# Patient Record
Sex: MALE | Race: WHITE | NOT HISPANIC OR LATINO | Employment: FULL TIME | URBAN - METROPOLITAN AREA
[De-identification: names, ages, dates, MRNs, and addresses within clinical notes are randomized per-mention and may not be internally consistent; named-entity substitution may affect disease eponyms.]

---

## 2017-08-19 ENCOUNTER — APPOINTMENT (OUTPATIENT)
Dept: GENERAL RADIOLOGY | Facility: CLINIC | Age: 27
End: 2017-08-19
Attending: NURSE PRACTITIONER

## 2017-08-19 ENCOUNTER — HOSPITAL ENCOUNTER (EMERGENCY)
Facility: CLINIC | Age: 27
Discharge: HOME | End: 2017-08-19
Attending: NURSE PRACTITIONER | Admitting: NURSE PRACTITIONER

## 2017-08-19 ENCOUNTER — NURSE TRIAGE (OUTPATIENT)
Dept: NURSING | Facility: CLINIC | Age: 27
End: 2017-08-19

## 2017-08-19 VITALS
SYSTOLIC BLOOD PRESSURE: 136 MMHG | OXYGEN SATURATION: 99 % | DIASTOLIC BLOOD PRESSURE: 81 MMHG | RESPIRATION RATE: 18 BRPM | WEIGHT: 166 LBS | TEMPERATURE: 98.9 F

## 2017-08-19 DIAGNOSIS — M25.561 ACUTE PAIN OF RIGHT KNEE: Primary | ICD-10-CM

## 2017-08-19 PROCEDURE — 73562 X-RAY EXAM OF KNEE 3: CPT | Mod: LT

## 2017-08-19 PROCEDURE — 99213 OFFICE O/P EST LOW 20 MIN: CPT | Performed by: NURSE PRACTITIONER

## 2017-08-19 PROCEDURE — 99213 OFFICE O/P EST LOW 20 MIN: CPT

## 2017-08-19 RX ORDER — HYDROCODONE BITARTRATE AND ACETAMINOPHEN 5; 325 MG/1; MG/1
1-2 TABLET ORAL EVERY 4 HOURS PRN
Qty: 15 TABLET | Refills: 0 | Status: SHIPPED | OUTPATIENT
Start: 2017-08-19 | End: 2018-05-17

## 2017-08-19 NOTE — ED AVS SNAPSHOT
[facility] Emergency Department    [address] [facility] [address]    Phone:  [phone]    Fax:  [phone]                                       Rao Leavitt   MRN: [id number]    Department:  [facility] Emergency Department   Date of Visit:  8/19/2017           Patient Information     Date Of Birth          1990        Your diagnoses for this visit were:     Acute pain of right knee        You were seen by Nessa Rojas APRN CNP.      Follow-up Information     Schedule an appointment as soon as possible for a visit with Brandon Sabillon DO.    Specialty:  Family Practice    Contact information:    [place] [address] [place] [address]  [phone]          Discharge Instructions         Wear hinged knee brace at all times except when showering and sleeping.  Follow up with primary care provider for further evaluation of knee.  Start doing exercises daily.  Take tylenol 1000 mg with 600 mg of ibuprofen  3-4 times daily for pain.  Quad Set for Leg and Knee    This exercise is designed to stretch and strengthen your knee. Before beginning, read through all the instructions. While exercising, breathe normally and use smooth movements. If you feel any pain, stop the exercise. If pain persists, call your healthcare provider.  1.  Sit on the floor with one leg straight, the other bent.  2.  Flex the foot of your straight leg by pointing your toes toward you. Press the back of your knee into the floor while tightening the muscle on the top of your thigh. Hold for ______ seconds. Then relax.  3.  Repeat ______ times. Do ______ sets a day.  Caution    Don t arch your back.    Don t hunch your shoulders.  Date Last Reviewed: 9/20/2015 2000-2017 The [employer]. [address] [place] [address], [place] [address]. All rights reserved. This information is not intended as a substitute for professional medical care. Always follow your  healthcare professional's instructions.          Arthralgia    Arthralgia is the term for pain in or around the joint. It is a symptom, not a disease. This pain may involve one or more joints. In some cases, the pain moves from joint to joint.  There are many causes for joint pain. These include:    Injury    Osteoarthritis (wearing out of the joint surface)    Gout (inflammation of the joint due to crystals in the joint fluid)    Infection inside the joint      Bursitis (inflammation of the fluid-filled sacs around the joint)    Autoimmune disorders such as rheumatoid arthritis or lupus    Tendonitis (inflamation of chords that attach muscle to bone)  Home care    Rest the involved joint(s) until your symptoms improve.     You may be prescribed pain medication. If none is prescribed, you may use acetaminophen or ibuprofen to control pain and inflammation.  Follow up  Follow up with your healthcare provider or our staff as advised.  When to seek medical care  Contact your healthcare provider right away if any of the following occurs:    Pain, swelling, or redness of joint increases    Pain worsens or recurs after a period of improvement    Pain moves to other joints    You cannot bear weight on the affected joint     You cannot move the affected joint    Joint appears deformed    New rash appears    Fever of 101 F (38.8 C) or higher, or as directed by your healthcare provider  Date Last Reviewed: 4/26/2015 2000-2017 [employer]. [address] [place] [address], [place] [address]. All rights reserved. This information is not intended as a substitute for professional medical care. Always follow your healthcare professional's instructions.          Knee Pain of Uncertain Cause    There are several common causes for knee pain. These can include:    A sprain of the ligaments that support the joint    An injury to the cartilage lining of the joint    Arthritis from wear-and-tear or inflammation  There are other  causes as well. There may also be swelling, reduced movement of the knee joint, and pain with walking. A definite diagnosis will still need to be made. If your symptoms do not improve, further follow-up and testing may be needed.  Home care    Stay off the injured leg as much as possible until pain improves.    Apply an ice pack over the injured area for 15 to 20 minutes every 3 to 6 hours. You should do this for the first 24 to 48 hours. You can make an ice pack by filling a plastic bag that seals at the top with ice cubes and then wrapping it with a thin towel. Continue to use ice packs for relief of pain and swelling as needed. As the ice melts, be careful to avoid getting your wrap, splint, or cast wet. After 48 hours, apply heat (warm shower or warm bath) for 15 to 20 minutes several times a day, or alternate ice and heat. If you have to wear a hook-and-loop knee brace, you can open it to apply the ice pack, or heat, directly to the knee. Never put ice directly on the skin. Always wrap the ice in a towel or other type of cloth.    You may use over-the-counter pain medicine to control pain, unless another pain medicine was prescribed. If you have chronic liver or kidney disease or ever had a stomach ulcer or GI bleeding, talk with your healthcare provider using these medicines.    If crutches or a walker have been recommended, do not put weight on the injured leg until you can do so without pain. Check with your healthcare provider before returning to sports or full work duties.    If you have a hook-and-loop knee brace, you can remove it to bathe and sleep, unless told otherwise.  Follow-up care  Follow up with your healthcare provider as advised. This is usually within 1-2 weeks.  If X-rays were taken, you will be told of any new findings that may affect your care.  Call 911  Call 911 if you have:    Shortness of breath    Chest pain  When to seek medical advice  Call your healthcare provider right away if any  of these occur:    Toes or foot becomes swollen, cold, blue, numb, or tingly    Pain or swelling spreads over the knee or calf    Warmth or redness appears over the knee or calf    Other joints become painful    Rash appears    Fever of 100.4 F (38 C) or above lasting for 24 to 48 hours  Date Last Reviewed: 11/23/2015 2000-2017 The [employer]. [address] [place] [address]. All rights reserved. This information is not intended as a substitute for professional medical care. Always follow your healthcare professional's instructions.          24 Hour Appointment Hotline       To make an appointment at any [facility], call [phone] ([phone]). If you don't have a family doctor or clinic, we will help you find one. [facility] clinics are conveniently located to serve the needs of you and your family.          ED Discharge Orders     Hinged Knee Brace                    Review of your medicines      Notice     You have not been prescribed any medications.            Procedures and tests performed during your visit     Knee XR, 3 views, left      Orders Needing Specimen Collection     None      Pending Results     Date and Time Order Name Status Description    8/19/2017 1935 Knee XR, 3 views, left Preliminary             Pending Culture Results     No orders found from 8/17/2017 to 8/20/2017.            Pending Results Instructions     If you had any lab results that were not finalized at the time of your Discharge, you can call the ED Lab Result RN at [phone]. You will be contacted by this team for any positive Lab results or changes in treatment. The nurses are available 7 days a week from 10A to 6:30P.  You can leave a message 24 hours per day and they will return your call.        Test Results From Your Hospital Stay        8/19/2017  8:04 PM      Narrative     LEFT KNEE THREE VIEWS    8/19/2017 7:54 PM     HISTORY: Left knee pain.    COMPARISON: None.    FINDINGS: There is  "no fracture or significant joint effusion. Joint  spaces are well maintained.        Impression     IMPRESSION:  Negative left knee x-rays.                   Thank you for choosing [facility]       Thank you for choosing [facility] for your care. Our goal is always to provide you with excellent care. Hearing back from our patients is one way we can continue to improve our services. Please take a few minutes to complete the written survey that you may receive in the mail after you visit with us. Thank you!        [employer] Information     [employer] lets you send messages to your doctor, view your test results, renew your prescriptions, schedule appointments and more. To sign up, go to [email or web address] . Click on \"Log in\" on the left side of the screen, which will take you to the Welcome page. Then click on \"Sign up Now\" on the right side of the page.     You will be asked to enter the access code listed below, as well as some personal information. Please follow the directions to create your username and password.     Your access code is: [email or web address]  Expires: 2017  8:06 PM     Your access code will  in 90 days. If you need help or a new code, please call your [facility] clinic or [phone].        Care EveryWhere ID     This is your Care EveryWhere ID. This could be used by other organizations to access your [facility] medical records  [id number]        Equal Access to Services     DEVAN CORDOVA : Hadii kyleigh eugeneo Soaquiles, waaxda luqadaha, qaybta kaalmada stevenson, finesse combs. So [facility] [phone].    ATENCIÓN: Si habla español, tiene a sims disposición servicios gratuitos de asistencia lingüística. Llame al [phone].    We comply with applicable federal civil rights laws and [place] laws. We do not discriminate on the basis of race, color, national origin, age, disability sex, sexual orientation or gender identity.            After Visit Summary       This is your " record. Keep this with you and show to your community pharmacist(s) and doctor(s) at your next visit.

## 2017-08-19 NOTE — TELEPHONE ENCOUNTER
I called and left a voice message for Rao to call back and we can discuss his symptoms.  Laila Eddy RN-[facility]

## 2017-08-19 NOTE — ED AVS SNAPSHOT
[facility] Emergency Department    [address] [address]    Phone:  [phone]    Fax:  [phone]                                       Rao Leavitt   MRN: [id number]    Department:  [facility] Emergency Department   Date of Visit:  8/19/2017           After Visit Summary Signature Page     I have received my discharge instructions, and my questions have been answered. I have discussed any challenges I see with this plan with the nurse or doctor.    ..........................................................................................................................................  Patient/Patient Representative Signature      ..........................................................................................................................................  Patient Representative Print Name and Relationship to Patient    ..................................................               ................................................  Date                                            Time    ..........................................................................................................................................  Reviewed by Signature/Title    ...................................................              ..............................................  Date                                                            Time

## 2017-08-19 NOTE — TELEPHONE ENCOUNTER
Regarding: bad knee pain  ----- Message from Ely Simms sent at 8/19/2017  3:30 PM CDT -----  Reason for call:  Symptom   Symptom or request: knee pain in both knee's, flare up    Duration (how long have symptoms been present): since last Saturday 8.5  Have you been treated for this before? No    Additional comments: pain in knees     Phone number to reach patient: [phone]    Best Time:  any    Can we leave a detailed message on this number?  YES

## 2017-08-20 NOTE — ED PROVIDER NOTES
History     Chief Complaint   Patient presents with     Knee Pain     left knee pain.   pt was seen by primary provider yesterday and was told to take ibuprofen for pain, pt continues to have pain.     HPI  Rao Leavitt is a 27 year old male who presents with left knee pain.  Pt reports he has chronic knee pain for the past 10 years.  Pt states he is now concerned that there is additional cracking/popping noise in his knee when he wiggles his toes or moves his foot.  He states it feels like water in the knee.  Pt denies fever.  PT reports good health.  Pt states he has never seen an orthopedist for this pain.  He admits to past use of recreational drugs but states last usage was over one year ago.    I have reviewed the Medications, Allergies, Past Medical and Surgical History, and Social History in the Epic system.  Pt denies active medical problems and denies any daily prescription medications.  He states he sees Dr. Majano from [facility].  He states he saw him for a clinic visit to establish care on Friday and he was advised to take ibuprofen and gave him an ace wrap.      Review of Systems  10 point ROS of systems including Constitutional, Eyes, Respiratory, Cardiovascular, Gastroenterology, Genitourinary, Integumentary, Muscularskeletal, Psychiatric were all negative except for pertinent positives noted in my HPI.    Physical Exam   BP: 136/81  Heart Rate: 69  Temp: 98.9  F (37.2  C)  Resp: 18  Weight: 75.3 kg (166 lb)  SpO2: 99 %  Physical Exam   Constitutional: He appears well-developed and well-nourished. No distress.   HENT:   Head: Normocephalic and atraumatic.   Eyes: Conjunctivae are normal. Right eye exhibits no discharge. Left eye exhibits no discharge.   Cardiovascular: Normal rate, regular rhythm and normal heart sounds.  Exam reveals no gallop and no friction rub.    No murmur heard.  Pulmonary/Chest: Effort normal and breath sounds normal. No respiratory distress. He  has no wheezes. He has no rales. He exhibits no tenderness.   Musculoskeletal:        Right knee: He exhibits normal range of motion, no swelling, no effusion, no ecchymosis, no deformity, no laceration, no erythema, normal alignment and no LCL laxity. Tenderness (diffuse tenderness reports when touching quadriceps insertion, medial joint line, lateral joint line - negative stress varus, valgus) found.        Left knee: He exhibits normal range of motion, no swelling, no effusion, no ecchymosis, no deformity, no laceration, no erythema, normal alignment, no LCL laxity, normal patellar mobility, no bony tenderness, normal meniscus and no MCL laxity. Tenderness (pt reports tenderness with palpation when touching at quadriceps insertion, medial joint line, lateral joint line, but is negative for stress varus and stress valgus, no swelling, warmth erythema noted) found.   Skin: He is not diaphoretic.   Nursing note and vitals reviewed.      ED Course     ED Course     Procedures    Labs Ordered and Resulted from Time of ED Arrival Up to the Time of Departure from the ED - No data to display  Results for orders placed or performed during the hospital encounter of 08/19/17   Knee XR, 3 views, left    Narrative    LEFT KNEE THREE VIEWS    8/19/2017 7:54 PM     HISTORY: Left knee pain.    COMPARISON: None.    FINDINGS: There is no fracture or significant joint effusion. Joint  spaces are well maintained.      Impression    IMPRESSION:  Negative left knee x-rays.       RAYRAY DUTTON MD     Assessments & Plan (with Medical Decision Making)     I have reviewed the nursing notes.    I have reviewed the findings, diagnosis, plan and need for follow up with the patient.  Rao Leavitt is a 27 year old male who presents with left knee pain.  Pt reports he has chronic knee pain for the past 10 years.  Pt states he is now concerned that there is additional cracking/popping noise in his knee when he wiggles his toes or moves  his foot.  He states it feels like water in the knee.  Pt denies fever.  PT reports good health.  Pt states he has never seen an orthopedist for this pain.  He admits to past use of recreational drugs but states last usage was over one year ago.  Pt denies active medical problems and denies any daily prescription medications.  He states he sees Dr. Majano from [facility].  He states he saw him for a clinic visit to establish care on Friday and he was advised to take ibuprofen and gave him an ace wrap.  Exam as noted above.  Xray negative.  Reviewed with patient and discussed that I cannot find any definitive findings for his reported level of pain.  Hinged knee brace applied.  Advised to take tylenol 1000 mg with ibuprofen 600 mg three times daily for pain.  Pt then states this is what he has been doing and it is not working and he needs something else.  [place] Prescription drug monitoring program reviewed and patient has received narcotics of tramadol, hydrocodone, and oxycodone.  Pt states the tramadol just doesn't work and the hydrocodone does not work very well and makes him feel itchy but the oxycodone works the best.  Advised that I am not willing to prescribe the oxycodone and will prescribe the hydrocodone and recommend follow up with PCP.  Offered referral to orthopedics and advised that it may take one to two weeks to get in and patient declined this.  Fracture, dislocation, contusion, sprain/strain, drug seeking, drug diversion    There are no discharge medications for this patient.      Final diagnoses:   Acute pain of right knee       8/19/2017   [facility] EMERGENCY DEPARTMENT     Nessa Rojas, JULES CNP  08/19/17 [id number]

## 2017-08-20 NOTE — ED NOTES
Patient here for pain in both knees - pain worsening over the past week.  Patient presents in wheel chair to the urgent care.

## 2017-08-20 NOTE — DISCHARGE INSTRUCTIONS
Wear hinged knee brace at all times except when showering and sleeping.  Follow up with primary care provider for further evaluation of knee.  Start doing exercises daily.  Take tylenol 1000 mg with 600 mg of ibuprofen  3 times daily for pain.   Add Norco 5-325 (hydrocodone-acetaminophen) 1-2 pills up to every 12 hours if needed for pain. Do not use alcohol, operate machinery, drive, or climb on ladders for 8 hours after taking Norco. Use docusate (100mg) 2 times a day to prevent constipation while on narcotics.    Quad Set for Leg and Knee    This exercise is designed to stretch and strengthen your knee. Before beginning, read through all the instructions. While exercising, breathe normally and use smooth movements. If you feel any pain, stop the exercise. If pain persists, call your healthcare provider.  1.  Sit on the floor with one leg straight, the other bent.  2.  Flex the foot of your straight leg by pointing your toes toward you. Press the back of your knee into the floor while tightening the muscle on the top of your thigh. Hold for ______ seconds. Then relax.  3.  Repeat ______ times. Do ______ sets a day.  Caution    Don t arch your back.    Don t hunch your shoulders.  Date Last Reviewed: 9/20/2015 2000-2017 [employer]. [address] [place] [address], [place] [id number]. All rights reserved. This information is not intended as a substitute for professional medical care. Always follow your healthcare professional's instructions.          Arthralgia    Arthralgia is the term for pain in or around the joint. It is a symptom, not a disease. This pain may involve one or more joints. In some cases, the pain moves from joint to joint.  There are many causes for joint pain. These include:    Injury    Osteoarthritis (wearing out of the joint surface)    Gout (inflammation of the joint due to crystals in the joint fluid)    Infection inside the joint      Bursitis (inflammation of the fluid-filled sacs  around the joint)    Autoimmune disorders such as rheumatoid arthritis or lupus    Tendonitis (inflamation of chords that attach muscle to bone)  Home care    Rest the involved joint(s) until your symptoms improve.     You may be prescribed pain medication. If none is prescribed, you may use acetaminophen or ibuprofen to control pain and inflammation.  Follow up  Follow up with your healthcare provider or our staff as advised.  When to seek medical care  Contact your healthcare provider right away if any of the following occurs:    Pain, swelling, or redness of joint increases    Pain worsens or recurs after a period of improvement    Pain moves to other joints    You cannot bear weight on the affected joint     You cannot move the affected joint    Joint appears deformed    New rash appears    Fever of 101 F (38.8 C) or higher, or as directed by your healthcare provider  Date Last Reviewed: 4/26/2015 2000-2017 [employer]. [address] [place] [address]. All rights reserved. This information is not intended as a substitute for professional medical care. Always follow your healthcare professional's instructions.          Knee Pain of Uncertain Cause    There are several common causes for knee pain. These can include:    A sprain of the ligaments that support the joint    An injury to the cartilage lining of the joint    Arthritis from wear-and-tear or inflammation  There are other causes as well. There may also be swelling, reduced movement of the knee joint, and pain with walking. A definite diagnosis will still need to be made. If your symptoms do not improve, further follow-up and testing may be needed.  Home care    Stay off the injured leg as much as possible until pain improves.    Apply an ice pack over the injured area for 15 to 20 minutes every 3 to 6 hours. You should do this for the first 24 to 48 hours. You can make an ice pack by filling a plastic bag that seals at the top  with ice cubes and then wrapping it with a thin towel. Continue to use ice packs for relief of pain and swelling as needed. As the ice melts, be careful to avoid getting your wrap, splint, or cast wet. After 48 hours, apply heat (warm shower or warm bath) for 15 to 20 minutes several times a day, or alternate ice and heat. If you have to wear a hook-and-loop knee brace, you can open it to apply the ice pack, or heat, directly to the knee. Never put ice directly on the skin. Always wrap the ice in a towel or other type of cloth.    You may use over-the-counter pain medicine to control pain, unless another pain medicine was prescribed. If you have chronic liver or kidney disease or ever had a stomach ulcer or GI bleeding, talk with your healthcare provider using these medicines.    If crutches or a walker have been recommended, do not put weight on the injured leg until you can do so without pain. Check with your healthcare provider before returning to sports or full work duties.    If you have a hook-and-loop knee brace, you can remove it to bathe and sleep, unless told otherwise.  Follow-up care  Follow up with your healthcare provider as advised. This is usually within 1-2 weeks.  If X-rays were taken, you will be told of any new findings that may affect your care.  Call 911  Call 911 if you have:    Shortness of breath    Chest pain  When to seek medical advice  Call your healthcare provider right away if any of these occur:    Toes or foot becomes swollen, cold, blue, numb, or tingly    Pain or swelling spreads over the knee or calf    Warmth or redness appears over the knee or calf    Other joints become painful    Rash appears    Fever of 100.4 F (38 C) or above lasting for 24 to 48 hours  Date Last Reviewed: 11/23/2015 2000-2017 [employer]. [address] [place] [address], [place] [address]. All rights reserved. This information is not intended as a substitute for professional medical care. Always  follow your healthcare professional's instructions.

## 2018-04-07 ENCOUNTER — HOSPITAL ENCOUNTER (EMERGENCY)
Facility: CLINIC | Age: 28
Discharge: HOME | End: 2018-04-07
Attending: FAMILY MEDICINE | Admitting: FAMILY MEDICINE

## 2018-04-07 VITALS
TEMPERATURE: 97.8 F | DIASTOLIC BLOOD PRESSURE: 87 MMHG | OXYGEN SATURATION: 100 % | RESPIRATION RATE: 18 BRPM | HEIGHT: 68 IN | BODY MASS INDEX: 22.73 KG/M2 | SYSTOLIC BLOOD PRESSURE: 148 MMHG | WEIGHT: 150 LBS

## 2018-04-07 DIAGNOSIS — K04.7 DENTAL INFECTION: ICD-10-CM

## 2018-04-07 PROCEDURE — 99283 EMERGENCY DEPT VISIT LOW MDM: CPT | Mod: Z6 | Performed by: FAMILY MEDICINE

## 2018-04-07 PROCEDURE — 99283 EMERGENCY DEPT VISIT LOW MDM: CPT

## 2018-04-07 RX ORDER — DICLOFENAC SODIUM 75 MG/1
75 TABLET, DELAYED RELEASE ORAL 2 TIMES DAILY PRN
Qty: 30 TABLET | Refills: 0 | Status: SHIPPED | OUTPATIENT
Start: 2018-04-07 | End: 2018-05-17

## 2018-04-07 RX ORDER — OXYCODONE AND ACETAMINOPHEN 5; 325 MG/1; MG/1
1-2 TABLET ORAL EVERY 6 HOURS PRN
Qty: 10 TABLET | Refills: 0 | Status: SHIPPED | OUTPATIENT
Start: 2018-04-07 | End: 2018-05-17

## 2018-04-07 RX ORDER — PENICILLIN V POTASSIUM 500 MG/1
500 TABLET, FILM COATED ORAL 4 TIMES DAILY
Qty: 40 TABLET | Refills: 0 | Status: SHIPPED | OUTPATIENT
Start: 2018-04-07 | End: 2019-02-20

## 2018-04-07 NOTE — ED PROVIDER NOTES
"  History     Chief Complaint   Patient presents with     Dental Pain     left dental pain, pt has appointment on Monday for removal.  Pt has been taking ibuprofen and tylenol with little relief and unable to sleep.     HPI  Rao Leavitt is a 28 year old male, past medical history is unremarkable, presents to the emergency department with concerns of dental pain left mandible area worse over the past 3-4 days.  The patient states he has had issues with dental caries in the area described for many months, worsening over the last several days he has a dental appointment for early this coming week.  He has been using Tylenol and ibuprofen with minimal improvement of pain.  He reports increased swelling in the left neck area and discomfort with swallowing.  He is able to eat and drink.  No difficulties breathing.  He denies any fever chills or sweats.      Problem List:    There are no active problems to display for this patient.       Past Medical History:    No past medical history on file.    Past Surgical History:    No past surgical history on file.    Family History:    No family history on file.    Social History:  Marital Status:  Single [1]  Social History   Substance Use Topics     Smoking status: Current Every Day Smoker     Smokeless tobacco: Current User     Alcohol use No        Medications:      diclofenac (VOLTAREN) 75 MG EC tablet   oxyCODONE-acetaminophen (PERCOCET) 5-325 MG per tablet   penicillin V potassium (VEETID) 500 MG tablet   HYDROcodone-acetaminophen (NORCO) 5-325 MG per tablet         Review of Systems   All other systems reviewed and are negative.      Physical Exam   BP: 148/87  Heart Rate: 91  Temp: 97.8  F (36.6  C)  Resp: 18  Height: 172.7 cm (5' 8\")  Weight: 68 kg (150 lb)  SpO2: 100 %      Physical Exam   Constitutional: He appears well-developed and well-nourished.   HENT:   Head: Normocephalic and atraumatic.   Right Ear: External ear normal.   Left Ear: External ear normal. "   Mouth/Throat:       Eyes: Conjunctivae and EOM are normal. Pupils are equal, round, and reactive to light.   Neck:       Lymphadenopathy:     He has cervical adenopathy.   Nursing note and vitals reviewed.      ED Course     ED Course     Procedures               Critical Care time:  none               No results found for this or any previous visit (from the past 24 hour(s)).    Medications - No data to display    Assessments & Plan (with Medical Decision Making)   20-year-old male past medical history reviewed as above who presents with concerns of dental infection.  Physical exam is consistent with odontogenic origin left neck anterior cervical lymphadenopathy.  No palpable abscess.  No abscess intraorally amenable to drainage.  The patient has dental follow-up early this coming week.  Placed on penicillin orally as well as diclofenac 75 mg twice daily as baseline and supplement with Percocet as needed.  Return if not improving or worse    Disclaimer: This note consists of symbols derived from keyboarding, dictation and/or voice recognition software. As a result, there may be errors in the script that have gone undetected. Please consider this when interpreting information found in this chart.      I have reviewed the nursing notes.    I have reviewed the findings, diagnosis, plan and need for follow up with the patient.       Discharge Medication List as of 4/7/2018 10:07 AM      START taking these medications    Details   diclofenac (VOLTAREN) 75 MG EC tablet Take 1 tablet (75 mg) by mouth 2 times daily as needed for moderate pain, Disp-30 tablet, R-0, Local Print      oxyCODONE-acetaminophen (PERCOCET) 5-325 MG per tablet Take 1-2 tablets by mouth every 6 hours as needed for moderate to severe pain, Disp-10 tablet, R-0, Local Print      penicillin V potassium (VEETID) 500 MG tablet Take 1 tablet (500 mg) by mouth 4 times daily for 10 days, Disp-40 tablet, R-0, Local Print             Final diagnoses:    Dental infection       4/7/2018   [facility] EMERGENCY DEPARTMENT     Miquel Phillips MD  04/07/18 1024

## 2018-04-07 NOTE — DISCHARGE INSTRUCTIONS
Penicillin ×10 days as directed and dental follow-up this coming week as planned.  Diclofenac 75 mg twice daily.  Percocet as needed for breakthrough pain.  Return if not improving or worse.

## 2018-04-07 NOTE — ED AVS SNAPSHOT
[facility]    [address] [address]    Phone:  [phone]    Fax:  [phone]                                       Rao Leavitt   MRN: [id number]    Department:  [facility]   Date of Visit:  4/7/2018           After Visit Summary Signature Page     I have received my discharge instructions, and my questions have been answered. I have discussed any challenges I see with this plan with the nurse or doctor.    ..........................................................................................................................................  Patient/Patient Representative Signature      ..........................................................................................................................................  Patient Representative Print Name and Relationship to Patient    ..................................................               ................................................  Date                                            Time    ..........................................................................................................................................  Reviewed by Signature/Title    ...................................................              ..............................................  Date                                                            Time

## 2018-04-07 NOTE — ED AVS SNAPSHOT
[facility]    [address] [address]    Phone:  [phone]    Fax:  [phone]                                       Rao Leavitt   MRN: [id number]    Department:  [facility]   Date of Visit:  4/7/2018           Patient Information     Date Of Birth          1990        Your diagnoses for this visit were:     Dental infection        You were seen by Miquel Phillips MD.      Follow-up Information     Schedule an appointment as soon as possible for a visit with Brandon Sabillon DO.    Specialty:  Family Practice    Why:  As needed, If symptoms worsen    Contact information:    [facility]  [address] [place] [address] [facility] [address]  [phone]          Discharge Instructions       Penicillin ×10 days as directed and dental follow-up this coming week as planned.  Diclofenac 75 mg twice daily.  Percocet as needed for breakthrough pain.  Return if not improving or worse.    24 Hour Appointment Hotline       To make an appointment at any [place] clinic, call [phone] ([phone]). If you don't have a family doctor or clinic, we will help you find one. [place] clinics are conveniently located to serve the needs of you and your family.             Review of your medicines      START taking        Dose / Directions Last dose taken    diclofenac 75 MG EC tablet   Commonly known as:  VOLTAREN   Dose:  75 mg   Quantity:  30 tablet        Take 1 tablet (75 mg) by mouth 2 times daily as needed for moderate pain   Refills:  0        oxyCODONE-acetaminophen 5-325 MG per tablet   Commonly known as:  PERCOCET   Dose:  1-2 tablet   Quantity:  10 tablet        Take 1-2 tablets by mouth every 6 hours as needed for moderate to severe pain   Refills:  0        penicillin V potassium 500 MG tablet   Commonly known as:  VEETID   Dose:  500 mg   Quantity:  40 tablet        Take 1 tablet (500 mg) by mouth 4 times  daily for 10 days   Refills:  0          Our records show that you are taking the medicines listed below. If these are incorrect, please call your family doctor or clinic.        Dose / Directions Last dose taken    HYDROcodone-acetaminophen 5-325 MG per tablet   Commonly known as:  NORCO   Dose:  1-2 tablet   Quantity:  15 tablet        Take 1-2 tablets by mouth every 4 hours as needed for moderate to severe pain   Refills:  0                Prescriptions were sent or printed at these locations (3 Prescriptions)                   Other Prescriptions                Printed at Department/Unit printer (3 of 3)         diclofenac (VOLTAREN) 75 MG EC tablet               oxyCODONE-acetaminophen (PERCOCET) 5-325 MG per tablet               penicillin V potassium (VEETID) 500 MG tablet                Orders Needing Specimen Collection     None      Pending Results     No orders found from 4/5/2018 to 4/8/2018.            Pending Culture Results     No orders found from 4/5/2018 to 4/8/2018.            Pending Results Instructions     If you had any lab results that were not finalized at the time of your Discharge, you can call the ED Lab Result RN at [phone]. You will be contacted by this team for any positive Lab results or changes in treatment. The nurses are available 7 days a week from 10A to 6:30P.  You can leave a message 24 hours per day and they will return your call.        Test Results From Your Hospital Stay               Thank you for choosing [facility]       Thank you for choosing [facility] for your care. Our goal is always to provide you with excellent care. Hearing back from our patients is one way we can continue to improve our services. Please take a few minutes to complete the written survey that you may receive in the mail after you visit with us. Thank you!        [employer] Information     [employer] lets you send messages to your doctor, view your test results, renew your prescriptions, schedule  "appointments and more. To sign up, go to [email or web address] . Click on \"Log in\" on the left side of the screen, which will take you to the Welcome page. Then click on \"Sign up Now\" on the right side of the page.     You will be asked to enter the access code listed below, as well as some personal information. Please follow the directions to create your username and password.     Your access code is: [email or web address]  Expires: 2018 10:07 AM     Your access code will  in 90 days. If you need help or a new code, please call your [place] clinic or [phone].        Care EveryWhere ID     This is your Care EveryWhere ID. This could be used by other organizations to access your [place] medical records  [id number]        Equal Access to Services     DEVAN CORDOVA : Hadharman Patel, lewis gay, shawn gamino, finesse christiansen . So [facility] [phone].    ATENCIÓN: Si habla español, tiene a sims disposición servicios gratuitos de asistencia lingüística. Llame al [phone].    We comply with applicable federal civil rights laws and [place] laws. We do not discriminate on the basis of race, color, national origin, age, disability, sex, sexual orientation, or gender identity.            After Visit Summary       This is your record. Keep this with you and show to your community pharmacist(s) and doctor(s) at your next visit.                  "

## 2018-04-09 ENCOUNTER — HOSPITAL ENCOUNTER (EMERGENCY)
Facility: CLINIC | Age: 28
Discharge: HOME | End: 2018-04-09
Attending: PHYSICIAN ASSISTANT | Admitting: PHYSICIAN ASSISTANT

## 2018-04-09 VITALS
OXYGEN SATURATION: 100 % | RESPIRATION RATE: 16 BRPM | HEIGHT: 67 IN | BODY MASS INDEX: 25.11 KG/M2 | TEMPERATURE: 98.4 F | WEIGHT: 160 LBS

## 2018-04-09 DIAGNOSIS — K08.89 PAIN, DENTAL: ICD-10-CM

## 2018-04-09 PROCEDURE — G0463 HOSPITAL OUTPT CLINIC VISIT: HCPCS | Performed by: PHYSICIAN ASSISTANT

## 2018-04-09 PROCEDURE — 99213 OFFICE O/P EST LOW 20 MIN: CPT | Mod: Z6 | Performed by: PHYSICIAN ASSISTANT

## 2018-04-09 ASSESSMENT — ENCOUNTER SYMPTOMS
SORE THROAT: 0
VOICE CHANGE: 0
FEVER: 0
TROUBLE SWALLOWING: 0

## 2018-04-09 NOTE — ED AVS SNAPSHOT
[facility] Emergency Department    [address] [address]    Phone:  [phone]    Fax:  [phone]                                       Rao Leavitt   MRN: [id number]    Department:  [facility] Emergency Department   Date of Visit:  4/9/2018           Patient Information     Date Of Birth          1990        Your diagnoses for this visit were:     Pain, dental        You were seen by Aurora Martinez PA-C.      Follow-up Information     Please follow up.    Why:  As needed, If symptoms worsen        Discharge Instructions       Patient has appointment set up with [facility] tomorrow between 6-9pm tomorrow.     Patient to continue current treatment as directed.     Warm compresses to lymph nodes, salt water gargles.         24 Hour Appointment Hotline       To make an appointment at any [place] clinic, call [phone]). If you don't have a family doctor or clinic, we will help you find one. [place] clinics are conveniently located to serve the needs of you and your family.             Review of your medicines      Our records show that you are taking the medicines listed below. If these are incorrect, please call your family doctor or clinic.        Dose / Directions Last dose taken    diclofenac 75 MG EC tablet   Commonly known as:  VOLTAREN   Dose:  75 mg   Quantity:  30 tablet        Take 1 tablet (75 mg) by mouth 2 times daily as needed for moderate pain   Refills:  0        HYDROcodone-acetaminophen 5-325 MG per tablet   Commonly known as:  NORCO   Dose:  1-2 tablet   Quantity:  15 tablet        Take 1-2 tablets by mouth every 4 hours as needed for moderate to severe pain   Refills:  0        oxyCODONE-acetaminophen 5-325 MG per tablet   Commonly known as:  PERCOCET   Dose:  1-2 tablet   Quantity:  10 tablet        Take 1-2 tablets by mouth every 6 hours as needed for moderate to severe pain   Refills:  0        penicillin V potassium  "500 MG tablet   Commonly known as:  VEETID   Dose:  500 mg   Quantity:  40 tablet        Take 1 tablet (500 mg) by mouth 4 times daily for 10 days   Refills:  0                Orders Needing Specimen Collection     None      Pending Results     No orders found from 2018 to 4/10/2018.            Pending Culture Results     No orders found from 2018 to 4/10/2018.            Pending Results Instructions     If you had any lab results that were not finalized at the time of your Discharge, you can call the ED Lab Result RN at [phone]. You will be contacted by this team for any positive Lab results or changes in treatment. The nurses are available 7 days a week from 10A to 6:30P.  You can leave a message 24 hours per day and they will return your call.        Test Results From Your Hospital Stay               Thank you for choosing [facility]       Thank you for choosing [facility] for your care. Our goal is always to provide you with excellent care. Hearing back from our patients is one way we can continue to improve our services. Please take a few minutes to complete the written survey that you may receive in the mail after you visit with us. Thank you!        [employer] Information     [employer] lets you send messages to your doctor, view your test results, renew your prescriptions, schedule appointments and more. To sign up, go to [email or web address] . Click on \"Log in\" on the left side of the screen, which will take you to the Welcome page. Then click on \"Sign up Now\" on the right side of the page.     You will be asked to enter the access code listed below, as well as some personal information. Please follow the directions to create your username and password.     Your access code is: [email or web address]  Expires: 2018 10:07 AM     Your access code will  in 90 days. If you need help or a new code, please call your [facility] clinic or [phone].        Care EveryWhere ID     This is your Care EveryWhere " ID. This could be used by other organizations to access your [place] medical records  [id number]        Equal Access to Services     DEVAN CORDOVA : Juliane Patel, lewis gay, finesse tucker. So [facility] [phone].    ATENCIÓN: Si habla español, tiene a sims disposición servicios gratuitos de asistencia lingüística. Llame al [phone].    We comply with applicable federal civil rights laws and [place] laws. We do not discriminate on the basis of race, color, national origin, age, disability, sex, sexual orientation, or gender identity.            After Visit Summary       This is your record. Keep this with you and show to your community pharmacist(s) and doctor(s) at your next visit.

## 2018-04-09 NOTE — ED NOTES
Patient here for dental pain/infection, symptoms started 4 days ago.  Patient stated he went to a dentist this morning and they turned him away because of his insurance.  Patient presents ambulatory to the urgent care.

## 2018-04-09 NOTE — ED PROVIDER NOTES
"  History     Chief Complaint   Patient presents with     Dental Pain     seen Sat in ER and today at the dentist at [facility] in [place] states they refused to see him and sent him to [facility]?      HPI  Rao Leavitt is a 28 year old male who presents to the urgent care today for continued dental pain. Patient states he was seen 2 days ago in the Emergency Room for dental pain and given percocet, PCN, and Diclofenac for dental pain. Patient stated he had an appointment to see a dentist today in [place] and when he arrived they informed him that they could not see him and that he should try [facility]. Patient states he has tried calling 3-4 times today with no answer. Patient her for continued pain. Patient denies fevers, facial swelling, change in voice, difficulty swallowing, dysphonia, or dysphagia.     Problem List:    There are no active problems to display for this patient.       Past Medical History:    History reviewed. No pertinent past medical history.    Past Surgical History:    History reviewed. No pertinent surgical history.    Family History:    No family history on file.    Social History:  Marital Status:  Single [1]  Social History   Substance Use Topics     Smoking status: Current Every Day Smoker     Smokeless tobacco: Current User     Alcohol use No        Medications:      diclofenac (VOLTAREN) 75 MG EC tablet   oxyCODONE-acetaminophen (PERCOCET) 5-325 MG per tablet   penicillin V potassium (VEETID) 500 MG tablet   HYDROcodone-acetaminophen (NORCO) 5-325 MG per tablet         Review of Systems   Constitutional: Negative for fever.   HENT: Positive for dental problem. Negative for sore throat, tinnitus, trouble swallowing and voice change.    All other systems reviewed and are negative.      Physical Exam   Heart Rate: 88  Temp: 98.4  F (36.9  C)  Resp: 16  Height: 170.2 cm (5' 7\")  Weight: 72.6 kg (160 lb)  SpO2: 100 %      Physical Exam   Constitutional: He is " oriented to person, place, and time. He appears well-developed and well-nourished. No distress.   HENT:   Mouth/Throat: Uvula is midline, oropharynx is clear and moist and mucous membranes are normal. He does not have dentures. No trismus in the jaw. Dental caries present. No dental abscesses, uvula swelling or lacerations.       #20 tooth decay and mild tenderness with palpation. No gum swelling, erythema, or abscess formation.   Neck: Trachea normal, normal range of motion, full passive range of motion without pain and phonation normal. Neck supple. No rigidity. No edema, no erythema and normal range of motion present.       Cardiovascular: Normal rate, regular rhythm and normal heart sounds.    Pulmonary/Chest: Effort normal and breath sounds normal.   Lymphadenopathy:     He has cervical adenopathy (with tenderness to left submandibular node with palpation. ).   Neurological: He is alert and oriented to person, place, and time.   Skin: Skin is warm and dry. No rash noted. He is not diaphoretic. No erythema. No pallor.   Psychiatric: He has a normal mood and affect. His behavior is normal. Judgment and thought content normal.       ED Course     ED Course     Procedures              Critical Care time:  none               No results found for this or any previous visit (from the past 24 hour(s)).    Medications - No data to display    Assessments & Plan (with Medical Decision Making)     I have reviewed the nursing notes.    I have reviewed the findings, diagnosis, plan and need for follow up with the patient.    Called [facility] and got patient an appointment tomorrow (4/10/18). Patient to arrive to dental office at 5:45pm and will be seen between 6-9pm. Patient to continue current treatment as directed. I offered dental block in office and to switch antibiotic to clindamycin, but patient declined both since he will be seen tomorrow by dental clinic. Patient to use salt water gargles and warm compress to  lymph nodes.        Discharge Medication List as of 4/9/2018 12:24 PM          Final diagnoses:   Pain, dental       4/9/2018   [facility] EMERGENCY DEPARTMENT     Aurora Martinez, TISHA  04/09/18 1301

## 2018-04-09 NOTE — DISCHARGE INSTRUCTIONS
Patient has appointment set up with [facility] tomorrow between 6-9pm tomorrow.     Patient to continue current treatment as directed.     Warm compresses to lymph nodes, salt water gargles.

## 2018-04-09 NOTE — ED AVS SNAPSHOT
[facility] Emergency Department    [address] [address]    Phone:  [phone]    Fax:  [phone]                                       Rao Leavitt   MRN: [id number]    Department:  [facility] Emergency Department   Date of Visit:  4/9/2018           After Visit Summary Signature Page     I have received my discharge instructions, and my questions have been answered. I have discussed any challenges I see with this plan with the nurse or doctor.    ..........................................................................................................................................  Patient/Patient Representative Signature      ..........................................................................................................................................  Patient Representative Print Name and Relationship to Patient    ..................................................               ................................................  Date                                            Time    ..........................................................................................................................................  Reviewed by Signature/Title    ...................................................              ..............................................  Date                                                            Time

## 2018-05-17 ENCOUNTER — HOSPITAL ENCOUNTER (EMERGENCY)
Facility: CLINIC | Age: 28
Discharge: HOME | End: 2018-05-17
Attending: PHYSICIAN ASSISTANT | Admitting: PHYSICIAN ASSISTANT

## 2018-05-17 VITALS
HEART RATE: 59 BPM | DIASTOLIC BLOOD PRESSURE: 77 MMHG | TEMPERATURE: 98 F | SYSTOLIC BLOOD PRESSURE: 127 MMHG | OXYGEN SATURATION: 100 %

## 2018-05-17 DIAGNOSIS — M54.12 CERVICAL RADICULOPATHY: Primary | ICD-10-CM

## 2018-05-17 PROCEDURE — G0463 HOSPITAL OUTPT CLINIC VISIT: HCPCS | Performed by: PHYSICIAN ASSISTANT

## 2018-05-17 PROCEDURE — 99214 OFFICE O/P EST MOD 30 MIN: CPT | Mod: Z6 | Performed by: PHYSICIAN ASSISTANT

## 2018-05-17 RX ORDER — CYCLOBENZAPRINE HCL 10 MG
10 TABLET ORAL
Qty: 20 TABLET | Refills: 0 | Status: SHIPPED | OUTPATIENT
Start: 2018-05-17 | End: 2019-02-20

## 2018-05-17 RX ORDER — PREDNISONE 20 MG/1
TABLET ORAL
Qty: 10 TABLET | Refills: 0 | Status: SHIPPED | OUTPATIENT
Start: 2018-05-17 | End: 2019-02-20

## 2018-05-17 ASSESSMENT — ENCOUNTER SYMPTOMS
NECK PAIN: 1
CONSTITUTIONAL NEGATIVE: 1

## 2018-05-17 NOTE — ED TRIAGE NOTES
Patient here for pain in the R arm - causing pain/numbness, symptoms started 7 days ago - worsening the past 3 days.  Patient presents ambulatory to the urgent care.

## 2018-05-17 NOTE — ED PROVIDER NOTES
History     Chief Complaint   Patient presents with     Arm Pain     rt arm pain and tingling for past week     HPI  Rao Leavitt is a 28 year old male who presents with complaints of right-sided neck and arm pain for the past week.  Pt also c/o associated diffuse paresthesias of his entire right arm.  Denies any specific preceding injury or trauma but reports he does a lot of heavy lifting at work.  States he has increased pain to the right side of his neck, right shoulder, and right upper back that shoots down his right arm.  Pain increases with movement of his neck and right arm.  Denies fevers, chills, nausea, vomiting, abdominal pain, urinary symptoms, or leg pain/swelling.        Problem List:    There are no active problems to display for this patient.       Past Medical History:    History reviewed. No pertinent past medical history.    Past Surgical History:    History reviewed. No pertinent surgical history.    Family History:    No family history on file.    Social History:  Marital Status:  Single [1]  Social History   Substance Use Topics     Smoking status: Current Every Day Smoker     Smokeless tobacco: Current User     Alcohol use No        Medications:      cyclobenzaprine (FLEXERIL) 10 MG tablet   predniSONE (DELTASONE) 20 MG tablet         Review of Systems   Constitutional: Negative.    Musculoskeletal: Positive for neck pain.        Right-sided neck and arm pain   Skin: Negative.    Neurological:        Paresthesias of right arm   All other systems reviewed and are negative.      Physical Exam   BP: 127/77  Pulse: 59  Temp: 98  F (36.7  C)  SpO2: 100 %      Physical Exam   Constitutional: He is oriented to person, place, and time. He appears well-developed and well-nourished. No distress.   HENT:   Head: Normocephalic and atraumatic.   Eyes: Conjunctivae and EOM are normal. Pupils are equal, round, and reactive to light.   Neck: Normal range of motion. Neck supple.   Cardiovascular:  Normal rate, regular rhythm, normal heart sounds and intact distal pulses.    Pulmonary/Chest: Effort normal and breath sounds normal. No respiratory distress. He has no wheezes. He has no rales.   Musculoskeletal: He exhibits no edema or deformity.        Right shoulder: He exhibits decreased range of motion, tenderness and spasm. He exhibits no swelling, no effusion and no deformity.        Right elbow: Normal.       Cervical back: He exhibits tenderness. He exhibits normal range of motion, no bony tenderness, no swelling and no edema.        Thoracic back: Normal.        Lumbar back: Normal.        Right upper arm: Normal.        Arms:       Right hand: Normal. He exhibits no swelling. Normal sensation noted. Normal strength noted.   There is diffuse right-sided trapezius muscle tenderness to palpation with palpable spasm of the area.  No midline neck or back vertebral tenderness on exam.     Neurological: He is alert and oriented to person, place, and time. He has normal strength. No cranial nerve deficit or sensory deficit. Coordination and gait normal.   Skin: Skin is warm and dry. No rash noted. No erythema.       ED Course     ED Course     Procedures    No results found for this or any previous visit (from the past 24 hour(s)).    Medications - No data to display    Assessments & Plan (with Medical Decision Making)     Pt is a 28 year old male who presents with complaints of right-sided neck and arm pain for the past week.  Pt also c/o associated diffuse paresthesias of his entire right arm.  Denies any specific preceding injury or trauma but reports he does a lot of heavy lifting at work.  States he has increased pain to the right side of his neck, right shoulder, and right upper back that shoots down his right arm.  Pt is afebrile on arrival.  No midline neck or back vertebral tenderness on exam.  There is diffuse right-sided trapezius muscle tenderness to palpation.  Normal extremity strength.  History  and physical exam consistent with cervical radiculopathy likely due to pinched nerve with muscular spasm component.  No indication for emergent MRI imaging today as pt has no new trauma or neurologic symptoms or objective findings of weakness on exam.  Encouraged symptomatic treatments at home.  Hand-outs were provided.    Patient was sent with Prednisone and Flexeril and was instructed to follow-up with PCP if no improvement in 5-7 days for continued care and management or sooner if new or worsening symptoms.  He is to return to the ED for persistent and/or worsening symptoms.  Patient expressed understanding of the diagnosis and plan and was discharged home in good condition.    I have reviewed the nursing notes.    I have reviewed the findings, diagnosis, plan and need for follow up with the patient.    New Prescriptions    CYCLOBENZAPRINE (FLEXERIL) 10 MG TABLET    Take 1 tablet (10 mg) by mouth nightly as needed for muscle spasms    PREDNISONE (DELTASONE) 20 MG TABLET    Take two tablets (= 40mg) each day for 5 (five) days       Final diagnoses:   Cervical radiculopathy       5/17/2018   [facility] EMERGENCY DEPARTMENT     Lizy Herr PA-C  05/17/18 3267

## 2018-05-17 NOTE — ED AVS SNAPSHOT
[facility] Emergency Department    [address] [address]    Phone:  [phone]    Fax:  [phone]                                       Rao Leavitt   MRN: [id number]    Department:  [facility] Emergency Department   Date of Visit:  5/17/2018           Patient Information     Date Of Birth          1990        Your diagnoses for this visit were:     Cervical radiculopathy        You were seen by Lizy Herr PA-C.      Follow-up Information     Follow up with Brandon Sabillon DO. Call in 5 days.    Specialty:  Family Practice    Why:  As needed, For persistent symptoms    Contact information:    [facility]  [address] [id number] [phone]          Follow up with [facility] Emergency Department.    Specialty:  EMERGENCY MEDICINE    Why:  As needed, If symptoms worsen    Contact information:    [address] [place] [address] [phone]    Additional information:    The medical center is located at   [address] (between [address] in [place], [place] north   of [place]).      Discharge References/Attachments     RADICULOPATHY, CERVICAL (ENGLISH)      24 Hour Appointment Hotline       To make an appointment at any [facility] clinic, call [phone] ([phone]). If you don't have a family doctor or clinic, we will help you find one. [facility] clinics are conveniently located to serve the needs of you and your family.             Review of your medicines      START taking        Dose / Directions Last dose taken    cyclobenzaprine 10 MG tablet   Commonly known as:  FLEXERIL   Dose:  10 mg   Quantity:  20 tablet        Take 1 tablet (10 mg) by mouth nightly as needed for muscle spasms   Refills:  0        predniSONE 20 MG tablet   Commonly known as:  DELTASONE   Quantity:  10 tablet        Take two tablets (= 40mg) each day for 5 (five) days   Refills:  0            "     Prescriptions were sent or printed at these locations (2 Prescriptions)                   [id number] - [place], [place] - [address]    [address] , [facility] [address]    Telephone:  [phone]   Fax:  [phone]   Hours:                  E-Prescribed (2 of 2)         cyclobenzaprine (FLEXERIL) 10 MG tablet               predniSONE (DELTASONE) 20 MG tablet                Orders Needing Specimen Collection     None      Pending Results     No orders found from 5/15/2018 to 5/18/2018.            Pending Culture Results     No orders found from 5/15/2018 to 5/18/2018.            Pending Results Instructions     If you had any lab results that were not finalized at the time of your Discharge, you can call the ED Lab Result RN at [phone]. You will be contacted by this team for any positive Lab results or changes in treatment. The nurses are available 7 days a week from 10A to 6:30P.  You can leave a message 24 hours per day and they will return your call.        Test Results From Your Hospital Stay               Thank you for choosing [facility]       Thank you for choosing [facility] for your care. Our goal is always to provide you with excellent care. Hearing back from our patients is one way we can continue to improve our services. Please take a few minutes to complete the written survey that you may receive in the mail after you visit with us. Thank you!        [employer] Information     [employer] lets you send messages to your doctor, view your test results, renew your prescriptions, schedule appointments and more. To sign up, go to [email or web address] . Click on \"Log in\" on the left side of the screen, which will take you to the Welcome page. Then click on \"Sign up Now\" on the right side of the page.     You will be asked to enter the access code listed below, as well as some personal information. Please follow the directions to create your username and password.     Your access code " is: [email or web address]  Expires: 2018 10:07 AM     Your access code will  in 90 days. If you need help or a new code, please call your [facility] clinic or [phone].        Care EveryWhere ID     This is your Care EveryWhere ID. This could be used by other organizations to access your [facility] medical records  [id number]        Equal Access to Services     DEVAN CORDOVA : Hadii kyleigh ku hadasho Soomaali, waaxda luqadaha, qaybta kaalmada adeegyada, waxay chrisin haychaparron bladimir christiansen . So [facility] [phone].    ATENCIÓN: Si habla fideañol, tiene a sims disposición servicios gratuitos de asistencia lingüística. Llame al [phone].    We comply with applicable federal civil rights laws and [place] laws. We do not discriminate on the basis of race, color, national origin, age, disability, sex, sexual orientation, or gender identity.            After Visit Summary       This is your record. Keep this with you and show to your community pharmacist(s) and doctor(s) at your next visit.

## 2018-05-17 NOTE — LETTER
May 17, 2018      To Whom It May Concern:      Rao BLANCAS Dagmar was seen in our Emergency Department today, 05/17/18.  Please excuse from work today.  Thank you.      Sincerely,        Lizy Herr PA-C

## 2018-05-17 NOTE — ED AVS SNAPSHOT
[facility] Emergency Department    [address] [address]    Phone:  [phone]    Fax:  [phone]                                       Rao Leavitt   MRN: [id number]    Department:  [facility] Emergency Department   Date of Visit:  5/17/2018           After Visit Summary Signature Page     I have received my discharge instructions, and my questions have been answered. I have discussed any challenges I see with this plan with the nurse or doctor.    ..........................................................................................................................................  Patient/Patient Representative Signature      ..........................................................................................................................................  Patient Representative Print Name and Relationship to Patient    ..................................................               ................................................  Date                                            Time    ..........................................................................................................................................  Reviewed by Signature/Title    ...................................................              ..............................................  Date                                                            Time

## 2018-08-29 ENCOUNTER — HOSPITAL ENCOUNTER (EMERGENCY)
Facility: CLINIC | Age: 28
Discharge: HOME | End: 2018-08-29
Attending: EMERGENCY MEDICINE | Admitting: EMERGENCY MEDICINE

## 2018-08-29 VITALS
RESPIRATION RATE: 18 BRPM | OXYGEN SATURATION: 99 % | TEMPERATURE: 98.2 F | DIASTOLIC BLOOD PRESSURE: 81 MMHG | SYSTOLIC BLOOD PRESSURE: 143 MMHG

## 2018-08-29 DIAGNOSIS — B34.9 VIRAL SYNDROME: ICD-10-CM

## 2018-08-29 PROCEDURE — 99284 EMERGENCY DEPT VISIT MOD MDM: CPT | Mod: Z6 | Performed by: EMERGENCY MEDICINE

## 2018-08-29 PROCEDURE — 99282 EMERGENCY DEPT VISIT SF MDM: CPT | Performed by: EMERGENCY MEDICINE

## 2018-08-29 RX ORDER — ONDANSETRON 4 MG/1
4 TABLET, ORALLY DISINTEGRATING ORAL EVERY 6 HOURS PRN
Qty: 5 TABLET | Refills: 0 | Status: SHIPPED | OUTPATIENT
Start: 2018-08-29 | End: 2019-02-20

## 2018-08-29 NOTE — ED PROVIDER NOTES
"  History     Chief Complaint   Patient presents with     Abdominal Pain     burning across low abdomen for 2 days, nausea     Headache     pounding in head, sinus congestion, \"nothing helps\"     HPI  Rao Leavitt is a 28 year old male who since with myalgias, burning sensation across his abdomen with nausea no vomiting.  Symptoms began last evening, worse today.  Denies fever, diarrhea, black or bloody stool.  Denies ill contacts.  These smoker 1/2-1 pack daily, no alcohol or illicit drug use.  Denies urinary symptoms.    Problem List:    There are no active problems to display for this patient.       Past Medical History:    No past medical history on file.    Past Surgical History:    No past surgical history on file.    Family History:    No family history on file.    Social History:  Marital Status:  Single [1]  Social History   Substance Use Topics     Smoking status: Current Every Day Smoker     Smokeless tobacco: Current User     Alcohol use No        Medications:      ondansetron (ZOFRAN ODT) 4 MG ODT tab   predniSONE (DELTASONE) 20 MG tablet         Review of Systems  All other systems reviewed and are negative.    Physical Exam   BP: 143/81  Heart Rate: 71  Temp: 98.2  F (36.8  C)  Resp: 18  SpO2: 99 %      Physical Exam  Nontoxic appearing no respiratory distress alert and oriented ×3  Head atraumatic normocephalic  TMs/EACs unremarkable, conjunctiva noninjected, oropharynx moist without lesions or erythema  No cervical adenopathy   Neck supple full active painless range of motion  Lungs clear to auscultation  Heart regular no murmur  Abdomen soft nontender bowel sounds positive no masses or HSM  Strength and sensation grossly intact throughout the extremities, gait and station normal  Speech is fluent, good eye contact, thought processes are rational      ED Course     ED Course     Procedures               Critical Care time:  none               No results found for this or any previous visit " (from the past 24 hour(s)).    Medications - No data to display    Assessments & Plan (with Medical Decision Making)  28-year-old male presents with headache, myalgias, abdominal burning sensation without tenderness.  Findings consistent with viral syndrome.  Recommend Tylenol, ibuprofen, rest.  Return criteria reviewed.     I have reviewed the nursing notes.    I have reviewed the findings, diagnosis, plan and need for follow up with the patient.       Discharge Medication List as of 8/29/2018  8:06 AM      START taking these medications    Details   ondansetron (ZOFRAN ODT) 4 MG ODT tab Take 1 tablet (4 mg) by mouth every 6 hours as needed for nausea, Disp-5 tablet, R-0, Local Print             Final diagnoses:   Viral syndrome       8/29/2018   [facility] EMERGENCY DEPARTMENT     Escobar Marquis MD  08/29/18 2308

## 2018-08-29 NOTE — ED AVS SNAPSHOT
" [facility] Emergency Department    [address] [address]    Phone:  [phone]    Fax:  [phone]                                       Rao Leavitt   MRN: [id number]    Department:  [facility] Emergency Department   Date of Visit:  8/29/2018           Patient Information     Date Of Birth          1990        Your diagnoses for this visit were:     Viral syndrome        You were seen by Escobar Marquis MD.      Follow-up Information     Follow up with Brandon Sabillon DO.    Specialty:  Family Practice    Contact information:    [facility]  [address] [address]  [phone]          Discharge Instructions         Viral Syndrome (Adult)  A viral illness may cause a number of symptoms. The symptoms depend on the part of the body that the virus affects. If it settles in your nose, throat, and lungs, it may cause cough, sore throat, congestion, and sometimes headache. If it settles in your stomach and intestinal tract, it may cause vomiting and diarrhea. Sometimes it causes vague symptoms like \"aching all over,\" feeling tired, loss of appetite, or fever.  A viral illness usually lasts 1 to 2 weeks, but sometimes it lasts longer. In some cases, a more serious infection can look like a viral syndrome in the first few days of the illness. You may need another exam and additional tests to know the difference. Watch for the warning signs listed below.  Home care  Follow these guidelines for taking care of yourself at home:    If symptoms are severe, rest at home for the first 2 to 3 days.    Stay away from cigarette smoke - both your smoke and the smoke from others.    You may use over-the-counter acetaminophen or ibuprofen for fever, muscle aching, and headache, unless another medicine was prescribed for this. If you have chronic liver or kidney disease or ever had a stomach ulcer or GI bleeding, talk with your doctor before " using these medicines. No one who is younger than 18 and ill with a fever should take aspirin. It may cause severe disease or death.    Your appetite may be poor, so a light diet is fine. Avoid dehydration by drinking 8 to 12 8-ounce glasses of fluids each day. This may include water; orange juice; lemonade; apple, grape, and cranberry juice; clear fruit drinks; electrolyte replacement and sports drinks; and decaffeinated teas and coffee. If you have been diagnosed with a kidney disease, ask your doctor how much and what types of fluids you should drink to prevent dehydration. If you have kidney disease, drinking too much fluid can cause it build up in the your body and be dangerous to your health.    Over-the-counter remedies won't shorten the length of the illness but may be helpful for cough, sore throat; and nasal and sinus congestion. Don't use decongestants if you have high blood pressure.  Follow-up care  Follow up with your healthcare provider if you do not improve over the next week.  Call 911  Call 911 if any of the following occur:    Convulsion    Feeling weak, dizzy, or like you are going to faint    Chest pain, shortness of breath, wheezing, or difficulty breathing  When to seek medical advice  Call your healthcare provider right away if any of these occur:    Cough with lots of colored sputum (mucus) or blood in your sputum    Chest pain, shortness of breath, wheezing, or difficulty breathing    Severe headache; face, neck, or ear pain    Severe, constant pain in the lower right side of your belly (abdominal)    Continued vomiting (can t keep liquids down)    Frequent diarrhea (more than 5 times a day); blood (red or black color) or mucus in diarrhea    Feeling weak, dizzy, or like you are going to faint    Extreme thirst    Fever of 100.4 F (38 C) or higher, or as directed by your healthcare provider  Date Last Reviewed: 9/25/2015 2000-2017 [employer]. [address],  [place] [address]. All rights reserved. This information is not intended as a substitute for professional medical care. Always follow your healthcare professional's instructions.          24 Hour Appointment Hotline       To make an appointment at any [facility], call [phone] ([phone]). If you don't have a family doctor or clinic, we will help you find one. [facility] clinics are conveniently located to serve the needs of you and your family.             Review of your medicines      START taking        Dose / Directions Last dose taken    ondansetron 4 MG ODT tab   Commonly known as:  ZOFRAN ODT   Dose:  4 mg   Quantity:  5 tablet        Take 1 tablet (4 mg) by mouth every 6 hours as needed for nausea   Refills:  0          Our records show that you are taking the medicines listed below. If these are incorrect, please call your family doctor or clinic.        Dose / Directions Last dose taken    predniSONE 20 MG tablet   Commonly known as:  DELTASONE   Quantity:  10 tablet        Take two tablets (= 40mg) each day for 5 (five) days   Refills:  0                Prescriptions were sent or printed at these locations (1 Prescription)                   Other Prescriptions                Printed at Department/Unit printer (1 of 1)         ondansetron (ZOFRAN ODT) 4 MG ODT tab                Orders Needing Specimen Collection     None      Pending Results     No orders found from 8/27/2018 to 8/30/2018.            Pending Culture Results     No orders found from 8/27/2018 to 8/30/2018.            Pending Results Instructions     If you had any lab results that were not finalized at the time of your Discharge, you can call the ED Lab Result RN at [phone]. You will be contacted by this team for any positive Lab results or changes in treatment. The nurses are available 7 days a week from 10A to 6:30P.  You can leave a message 24 hours per day and they will return your call.        Test Results From Your  "Hospital Stay               Thank you for choosing [facility]       Thank you for choosing [facility] for your care. Our goal is always to provide you with excellent care. Hearing back from our patients is one way we can continue to improve our services. Please take a few minutes to complete the written survey that you may receive in the mail after you visit with us. Thank you!        [employer] Information     M[employer] lets you send messages to your doctor, view your test results, renew your prescriptions, schedule appointments and more. To sign up, go to [email or web address] . Click on \"Log in\" on the left side of the screen, which will take you to the Welcome page. Then click on \"Sign up Now\" on the right side of the page.     You will be asked to enter the access code listed below, as well as some personal information. Please follow the directions to create your username and password.     Your access code is: [email or web address]  Expires: 2018  8:06 AM     Your access code will  in 90 days. If you need help or a new code, please call your [facility] clinic or [phone].        Care EveryWhere ID     This is your Care EveryWhere ID. This could be used by other organizations to access your [facility] medical records  [id number]        Equal Access to Services     DEVAN CORDOVA : Hadii kyleigh eugeneo Sosuzannaali, waaxda luqadaha, qaybta kaalmada adeegyada, finesse combs. So [facility] [phone].    ATENCIÓN: Si habla español, tiene a sims disposición servicios gratuitos de asistencia lingüística. Llame al [phone].    We comply with applicable federal civil rights laws and [place] laws. We do not discriminate on the basis of race, color, national origin, age, disability, sex, sexual orientation, or gender identity.            After Visit Summary       This is your record. Keep this with you and show to your community pharmacist(s) and doctor(s) at your next visit.                  "

## 2018-08-29 NOTE — ED AVS SNAPSHOT
[facility] Emergency Department    [address] [address]    Phone:  [phone]    Fax:  [phone]                                       Rao Leavitt   MRN: [id number]    Department:  [facility] Emergency Department   Date of Visit:  8/29/2018           After Visit Summary Signature Page     I have received my discharge instructions, and my questions have been answered. I have discussed any challenges I see with this plan with the nurse or doctor.    ..........................................................................................................................................  Patient/Patient Representative Signature      ..........................................................................................................................................  Patient Representative Print Name and Relationship to Patient    ..................................................               ................................................  Date                                            Time    ..........................................................................................................................................  Reviewed by Signature/Title    ...................................................              ..............................................  Date                                                            Time          22EPIC Rev 08/18

## 2018-08-29 NOTE — DISCHARGE INSTRUCTIONS

## 2019-01-10 ENCOUNTER — HOSPITAL ENCOUNTER (EMERGENCY)
Facility: CLINIC | Age: 29
Discharge: HOME | End: 2019-01-10
Attending: EMERGENCY MEDICINE | Admitting: EMERGENCY MEDICINE
Payer: COMMERCIAL

## 2019-01-10 VITALS
HEART RATE: 99 BPM | DIASTOLIC BLOOD PRESSURE: 77 MMHG | OXYGEN SATURATION: 97 % | RESPIRATION RATE: 18 BRPM | TEMPERATURE: 98.1 F | SYSTOLIC BLOOD PRESSURE: 136 MMHG

## 2019-01-10 DIAGNOSIS — J11.1 INFLUENZA-LIKE ILLNESS: ICD-10-CM

## 2019-01-10 LAB
DEPRECATED S PYO AG THROAT QL EIA: NORMAL
SPECIMEN SOURCE: NORMAL

## 2019-01-10 PROCEDURE — 99282 EMERGENCY DEPT VISIT SF MDM: CPT | Mod: Z6 | Performed by: EMERGENCY MEDICINE

## 2019-01-10 PROCEDURE — 87880 STREP A ASSAY W/OPTIC: CPT | Performed by: EMERGENCY MEDICINE

## 2019-01-10 PROCEDURE — 87081 CULTURE SCREEN ONLY: CPT | Performed by: EMERGENCY MEDICINE

## 2019-01-10 PROCEDURE — 99283 EMERGENCY DEPT VISIT LOW MDM: CPT | Performed by: EMERGENCY MEDICINE

## 2019-01-10 PROCEDURE — 99281 EMR DPT VST MAYX REQ PHY/QHP: CPT

## 2019-01-10 NOTE — ED PROVIDER NOTES
History     Chief Complaint   Patient presents with     Pharyngitis     HPI  Rao Leavitt is a 29 year old male who presents with headache, fever, sore throat, dry cough, myalgias.  Symptoms began last evening.  Persist today.  No vomiting or diarrhea.  Current smoker, otherwise healthy.    Problem List:    There are no active problems to display for this patient.       Past Medical History:    No past medical history on file.    Past Surgical History:    No past surgical history on file.    Family History:    No family history on file.    Social History:  Marital Status:  Single [1]  Social History     Tobacco Use     Smoking status: Current Every Day Smoker     Smokeless tobacco: Current User   Substance Use Topics     Alcohol use: No     Drug use: No        Medications:      predniSONE (DELTASONE) 20 MG tablet         Review of Systems  All other systems reviewed and are negative.    Physical Exam   BP: 136/77  Pulse: 99  Temp: 98.1  F (36.7  C)  Resp: 18  SpO2: 97 %      Physical Exam  Nontoxic appearing no respiratory distress alert and oriented ×3  Head atraumatic normocephalic  TMs/EACs unremarkable, conjunctiva noninjected, oropharynx moist without lesions or erythema  No cervical adenopathy   Neck supple full active painless range of motion  Lungs clear to auscultation  Heart regular no murmur  Strength and sensation grossly intact throughout the extremities, gait and station normal  Speech is fluent, good eye contact, thought processes are rational    ED Course        Procedures               Critical Care time:  none               Results for orders placed or performed during the hospital encounter of 01/10/19 (from the past 24 hour(s))   Rapid Strep Screen   Result Value Ref Range    Specimen Description Throat     Rapid Strep A Screen       NEGATIVE: No Group A streptococcal antigen detected by immunoassay, await culture report.       Medications - No data to display    Assessments & Plan  (with Medical Decision Making)  Otherwise healthy 29-year-old male with influenza-like illness, declines antiviral therapy.  Symptomatic care reviewed.  Return criteria reviewed.     I have reviewed the nursing notes.    I have reviewed the findings, diagnosis, plan and need for follow up with the patient.          Medication List      There are no discharge medications for this visit.         Final diagnoses:   Influenza-like illness       1/10/2019   [facility] EMERGENCY DEPARTMENT     Escobar Marquis MD  01/15/19 6697

## 2019-01-10 NOTE — ED AVS SNAPSHOT
[facility] Emergency Department  [address] [address]  Phone:  [phone]  Fax:  [phone]                                    Rao Leavitt   MRN: [id number]    Department:  [facility] Emergency Department   Date of Visit:  1/10/2019           After Visit Summary Signature Page    I have received my discharge instructions, and my questions have been answered. I have discussed any challenges I see with this plan with the nurse or doctor.    ..........................................................................................................................................  Patient/Patient Representative Signature      ..........................................................................................................................................  Patient Representative Print Name and Relationship to Patient    ..................................................               ................................................  Date                                   Time    ..........................................................................................................................................  Reviewed by Signature/Title    ...................................................              ..............................................  Date                                               Time          22EPIC Rev 08/18

## 2019-01-10 NOTE — DISCHARGE INSTRUCTIONS
Drink plenty of fluids, rest, ibuprofen and Tylenol as discussed.    Return here for vomiting, trouble breathing, passing out or any other concern.

## 2019-01-12 LAB
BACTERIA SPEC CULT: NORMAL
Lab: NORMAL
SPECIMEN SOURCE: NORMAL

## 2019-01-12 NOTE — RESULT ENCOUNTER NOTE
Final Beta strep group A r/o culture is NEGATIVE for Group A streptococcus.    No treatment or change in treatment per [place] Strep protocol.

## 2019-02-20 ENCOUNTER — HOSPITAL ENCOUNTER (EMERGENCY)
Facility: CLINIC | Age: 29
Discharge: HOME | End: 2019-02-20
Attending: EMERGENCY MEDICINE | Admitting: EMERGENCY MEDICINE
Payer: COMMERCIAL

## 2019-02-20 VITALS
RESPIRATION RATE: 16 BRPM | SYSTOLIC BLOOD PRESSURE: 125 MMHG | DIASTOLIC BLOOD PRESSURE: 65 MMHG | TEMPERATURE: 98 F | OXYGEN SATURATION: 97 %

## 2019-02-20 DIAGNOSIS — H92.01 OTALGIA, RIGHT: ICD-10-CM

## 2019-02-20 PROCEDURE — 99284 EMERGENCY DEPT VISIT MOD MDM: CPT | Mod: Z6 | Performed by: EMERGENCY MEDICINE

## 2019-02-20 PROCEDURE — 99282 EMERGENCY DEPT VISIT SF MDM: CPT | Performed by: EMERGENCY MEDICINE

## 2019-02-20 RX ORDER — OXYCODONE AND ACETAMINOPHEN 5; 325 MG/1; MG/1
1-2 TABLET ORAL EVERY 4 HOURS PRN
Qty: 2 TABLET | Refills: 0 | Status: SHIPPED | OUTPATIENT
Start: 2019-02-20 | End: 2019-03-20

## 2019-02-20 NOTE — LETTER
February 20, 2019      To Whom It May Concern:      Rao J Dagmar was seen in our Emergency Department today, 02/20/19.  .  He may return to work/school with no restrictions.    Sincerely,        Suzi Gallego RN

## 2019-02-20 NOTE — ED PROVIDER NOTES
History     Chief Complaint   Patient presents with     Otalgia     rt ear-fever     HPI  Rao Leavitt is a 29 year old male who since emergency department complaining of right ear pain.  Patient states he woke this morning with right ear pain and has had some trouble hearing.  Went to work but the pain persisted and he could not concentrate at work.  He felt he had a low-grade fever at work but took some DayQuil and now the fever he thinks is gone.  Still having significant pain in the ear denies throat pain.  He is not had a cough he has moderate congestion.  Denies any nausea vomiting abdominal pain is not had any urinary symptoms denies any musculoskeletal issues.  Currently rates his pain a 5 out of 10.    Allergies:  Allergies   Allergen Reactions     Tramadol Nausea     Vicodin [Hydrocodone-Acetaminophen] Rash       Problem List:    There are no active problems to display for this patient.       Past Medical History:    No past medical history on file.    Past Surgical History:    No past surgical history on file.    Family History:    No family history on file.    Social History:  Marital Status:  Single [1]  Social History     Tobacco Use     Smoking status: Current Every Day Smoker     Smokeless tobacco: Current User   Substance Use Topics     Alcohol use: No     Drug use: No        Medications:      No current outpatient medications on file.      Review of Systems    Physical Exam          Physical Exam   Constitutional: He is oriented to person, place, and time. He appears well-developed and well-nourished. No distress.   HENT:   Head: Normocephalic.   Mouth/Throat: Oropharynx is clear and moist.   Right TM without erythema but positive fluid behind the ear.  Left TM is clear without bulging or fluid.  Mild nasal congestion.  Posterior pharynx no erythema edema.   Eyes: Conjunctivae are normal.   Neck: Normal range of motion. Neck supple.   Cardiovascular: Normal rate, regular rhythm, normal  heart sounds and intact distal pulses.   No murmur heard.  Pulmonary/Chest: Effort normal and breath sounds normal. He has no wheezes. He has no rales.   Musculoskeletal: Normal range of motion. He exhibits no edema.   Neurological: He is alert and oriented to person, place, and time. He exhibits normal muscle tone.   Skin: Skin is warm and dry. Capillary refill takes less than 2 seconds. No erythema.   Psychiatric: He has a normal mood and affect.   Nursing note and vitals reviewed.      ED Course        Procedures               Critical Care time:  none               No results found for this or any previous visit (from the past 24 hour(s)).    Medications - No data to display    Assessments & Plan (with Medical Decision Making) records were reviewed.  This appears to be a upper respiratory infection.  There is no evidence of ear infection.  There is fluid behind the ear and he appears congested I have told him to take congestive medications.  I am going to give him to Percocet for severe pain but have him take ibuprofen and Tylenol as needed.  He should return if worsening pain fevers not controlled with ibuprofen or Tylenol shortness of breath chest pain or other symptoms present.     I have reviewed the nursing notes.    I have reviewed the findings, diagnosis, plan and need for follow up with the patient.          Medication List      Started    oxyCODONE-acetaminophen 5-325 MG tablet  Commonly known as:  PERCOCET  1-2 tablets, Oral, EVERY 4 HOURS PRN            Final diagnoses:   Otalgia, right       2/20/2019   [facility] EMERGENCY DEPARTMENT     Kj Goddard MD  02/20/19 6933

## 2019-02-20 NOTE — DISCHARGE INSTRUCTIONS
Return if symptoms worsen or new symptoms develop.  Take decongestant and ibuprofen and Tylenol for pain.  A couple Percocet will be given for severe pain.  Return if increased fever chills nausea vomiting or other symptoms present.

## 2019-03-20 ENCOUNTER — APPOINTMENT (OUTPATIENT)
Dept: CT IMAGING | Facility: CLINIC | Age: 29
End: 2019-03-20
Attending: EMERGENCY MEDICINE
Payer: COMMERCIAL

## 2019-03-20 ENCOUNTER — HOSPITAL ENCOUNTER (EMERGENCY)
Facility: CLINIC | Age: 29
Discharge: HOME | End: 2019-03-20
Attending: EMERGENCY MEDICINE | Admitting: EMERGENCY MEDICINE
Payer: COMMERCIAL

## 2019-03-20 VITALS
TEMPERATURE: 97.6 F | BODY MASS INDEX: 25.11 KG/M2 | HEART RATE: 68 BPM | RESPIRATION RATE: 18 BRPM | OXYGEN SATURATION: 99 % | DIASTOLIC BLOOD PRESSURE: 68 MMHG | SYSTOLIC BLOOD PRESSURE: 124 MMHG | HEIGHT: 67 IN | WEIGHT: 160 LBS

## 2019-03-20 DIAGNOSIS — R10.84 ABDOMINAL PAIN, GENERALIZED: ICD-10-CM

## 2019-03-20 LAB
ALBUMIN SERPL-MCNC: 3.9 G/DL (ref 3.4–5)
ALBUMIN UR-MCNC: NEGATIVE MG/DL
ALP SERPL-CCNC: 81 U/L (ref 40–150)
ALT SERPL W P-5'-P-CCNC: 20 U/L (ref 0–70)
ANION GAP SERPL CALCULATED.3IONS-SCNC: 7 MMOL/L (ref 3–14)
APPEARANCE UR: CLEAR
AST SERPL W P-5'-P-CCNC: 13 U/L (ref 0–45)
BASOPHILS # BLD AUTO: 0 10E9/L (ref 0–0.2)
BASOPHILS NFR BLD AUTO: 0.5 %
BILIRUB SERPL-MCNC: 0.9 MG/DL (ref 0.2–1.3)
BILIRUB UR QL STRIP: NEGATIVE
BUN SERPL-MCNC: 12 MG/DL (ref 7–30)
CALCIUM SERPL-MCNC: 8.6 MG/DL (ref 8.5–10.1)
CHLORIDE SERPL-SCNC: 110 MMOL/L (ref 94–109)
CO2 SERPL-SCNC: 26 MMOL/L (ref 20–32)
COLOR UR AUTO: YELLOW
CREAT SERPL-MCNC: 0.78 MG/DL (ref 0.66–1.25)
DIFFERENTIAL METHOD BLD: NORMAL
EOSINOPHIL # BLD AUTO: 0.4 10E9/L (ref 0–0.7)
EOSINOPHIL NFR BLD AUTO: 5.6 %
ERYTHROCYTE [DISTWIDTH] IN BLOOD BY AUTOMATED COUNT: 12 % (ref 10–15)
GFR SERPL CREATININE-BSD FRML MDRD: >90 ML/MIN/{1.73_M2}
GLUCOSE SERPL-MCNC: 86 MG/DL (ref 70–99)
GLUCOSE UR STRIP-MCNC: NEGATIVE MG/DL
HCT VFR BLD AUTO: 43.3 % (ref 40–53)
HGB BLD-MCNC: 15 G/DL (ref 13.3–17.7)
HGB UR QL STRIP: NEGATIVE
IMM GRANULOCYTES # BLD: 0 10E9/L (ref 0–0.4)
IMM GRANULOCYTES NFR BLD: 0.2 %
KETONES UR STRIP-MCNC: NEGATIVE MG/DL
LEUKOCYTE ESTERASE UR QL STRIP: NEGATIVE
LIPASE SERPL-CCNC: 74 U/L (ref 73–393)
LYMPHOCYTES # BLD AUTO: 3 10E9/L (ref 0.8–5.3)
LYMPHOCYTES NFR BLD AUTO: 45.1 %
MCH RBC QN AUTO: 32.1 PG (ref 26.5–33)
MCHC RBC AUTO-ENTMCNC: 34.6 G/DL (ref 31.5–36.5)
MCV RBC AUTO: 93 FL (ref 78–100)
MONOCYTES # BLD AUTO: 0.4 10E9/L (ref 0–1.3)
MONOCYTES NFR BLD AUTO: 6.5 %
MUCOUS THREADS #/AREA URNS LPF: PRESENT /LPF
NEUTROPHILS # BLD AUTO: 2.8 10E9/L (ref 1.6–8.3)
NEUTROPHILS NFR BLD AUTO: 42.1 %
NITRATE UR QL: NEGATIVE
NRBC # BLD AUTO: 0 10*3/UL
NRBC BLD AUTO-RTO: 0 /100
PH UR STRIP: 5 PH (ref 5–7)
PLATELET # BLD AUTO: 301 10E9/L (ref 150–450)
POTASSIUM SERPL-SCNC: 4 MMOL/L (ref 3.4–5.3)
PROT SERPL-MCNC: 6.8 G/DL (ref 6.8–8.8)
RBC # BLD AUTO: 4.68 10E12/L (ref 4.4–5.9)
RBC #/AREA URNS AUTO: 1 /HPF (ref 0–2)
SODIUM SERPL-SCNC: 143 MMOL/L (ref 133–144)
SOURCE: ABNORMAL
SP GR UR STRIP: 1.02 (ref 1–1.03)
UROBILINOGEN UR STRIP-MCNC: 0 MG/DL (ref 0–2)
WBC # BLD AUTO: 6.6 10E9/L (ref 4–11)
WBC #/AREA URNS AUTO: 1 /HPF (ref 0–5)

## 2019-03-20 PROCEDURE — 25000128 H RX IP 250 OP 636: Performed by: EMERGENCY MEDICINE

## 2019-03-20 PROCEDURE — 96376 TX/PRO/DX INJ SAME DRUG ADON: CPT

## 2019-03-20 PROCEDURE — 74177 CT ABD & PELVIS W/CONTRAST: CPT

## 2019-03-20 PROCEDURE — 81001 URINALYSIS AUTO W/SCOPE: CPT | Performed by: STUDENT IN AN ORGANIZED HEALTH CARE EDUCATION/TRAINING PROGRAM

## 2019-03-20 PROCEDURE — 25000128 H RX IP 250 OP 636

## 2019-03-20 PROCEDURE — 96375 TX/PRO/DX INJ NEW DRUG ADDON: CPT

## 2019-03-20 PROCEDURE — 96361 HYDRATE IV INFUSION ADD-ON: CPT

## 2019-03-20 PROCEDURE — 85025 COMPLETE CBC W/AUTO DIFF WBC: CPT | Performed by: STUDENT IN AN ORGANIZED HEALTH CARE EDUCATION/TRAINING PROGRAM

## 2019-03-20 PROCEDURE — 25800030 ZZH RX IP 258 OP 636: Performed by: EMERGENCY MEDICINE

## 2019-03-20 PROCEDURE — 96374 THER/PROPH/DIAG INJ IV PUSH: CPT

## 2019-03-20 PROCEDURE — 99285 EMERGENCY DEPT VISIT HI MDM: CPT | Mod: 25

## 2019-03-20 PROCEDURE — 80053 COMPREHEN METABOLIC PANEL: CPT | Performed by: STUDENT IN AN ORGANIZED HEALTH CARE EDUCATION/TRAINING PROGRAM

## 2019-03-20 PROCEDURE — 99285 EMERGENCY DEPT VISIT HI MDM: CPT | Mod: Z6 | Performed by: EMERGENCY MEDICINE

## 2019-03-20 PROCEDURE — 83690 ASSAY OF LIPASE: CPT | Performed by: STUDENT IN AN ORGANIZED HEALTH CARE EDUCATION/TRAINING PROGRAM

## 2019-03-20 PROCEDURE — 25000125 ZZHC RX 250: Performed by: EMERGENCY MEDICINE

## 2019-03-20 RX ORDER — HYDROMORPHONE HYDROCHLORIDE 1 MG/ML
0.5 INJECTION, SOLUTION INTRAMUSCULAR; INTRAVENOUS; SUBCUTANEOUS
Status: COMPLETED | OUTPATIENT
Start: 2019-03-20 | End: 2019-03-20

## 2019-03-20 RX ORDER — IOPAMIDOL 755 MG/ML
78 INJECTION, SOLUTION INTRAVASCULAR ONCE
Status: COMPLETED | OUTPATIENT
Start: 2019-03-20 | End: 2019-03-20

## 2019-03-20 RX ORDER — KETOROLAC TROMETHAMINE 15 MG/ML
10 INJECTION, SOLUTION INTRAMUSCULAR; INTRAVENOUS
Status: COMPLETED | OUTPATIENT
Start: 2019-03-20 | End: 2019-03-20

## 2019-03-20 RX ORDER — HYDROMORPHONE HYDROCHLORIDE 1 MG/ML
INJECTION, SOLUTION INTRAMUSCULAR; INTRAVENOUS; SUBCUTANEOUS
Status: COMPLETED
Start: 2019-03-20 | End: 2019-03-20

## 2019-03-20 RX ORDER — MULTIVITAMIN,THERAPEUTIC
1 TABLET ORAL DAILY
COMMUNITY
End: 2024-07-19

## 2019-03-20 RX ORDER — HYDROMORPHONE HYDROCHLORIDE 1 MG/ML
0.5 INJECTION, SOLUTION INTRAMUSCULAR; INTRAVENOUS; SUBCUTANEOUS EVERY 30 MIN PRN
Status: DISCONTINUED | OUTPATIENT
Start: 2019-03-20 | End: 2019-03-20 | Stop reason: HOSPADM

## 2019-03-20 RX ADMIN — KETOROLAC TROMETHAMINE 10 MG: 15 INJECTION, SOLUTION INTRAMUSCULAR; INTRAVENOUS at 06:34

## 2019-03-20 RX ADMIN — Medication 0.5 MG: at 07:59

## 2019-03-20 RX ADMIN — SODIUM CHLORIDE, POTASSIUM CHLORIDE, SODIUM LACTATE AND CALCIUM CHLORIDE 1000 ML: 600; 310; 30; 20 INJECTION, SOLUTION INTRAVENOUS at 06:33

## 2019-03-20 RX ADMIN — IOPAMIDOL 78 ML: 755 INJECTION, SOLUTION INTRAVENOUS at 07:39

## 2019-03-20 RX ADMIN — Medication 0.5 MG: at 07:08

## 2019-03-20 RX ADMIN — SODIUM CHLORIDE 59 ML: 9 INJECTION, SOLUTION INTRAVENOUS at 07:39

## 2019-03-20 RX ADMIN — Medication 0.5 MG: at 06:34

## 2019-03-20 ASSESSMENT — ENCOUNTER SYMPTOMS
CARDIOVASCULAR NEGATIVE: 1
CONSTITUTIONAL NEGATIVE: 1
RESPIRATORY NEGATIVE: 1
ENDOCRINE NEGATIVE: 1
EYES NEGATIVE: 1
HEMATOLOGIC/LYMPHATIC NEGATIVE: 1
MUSCULOSKELETAL NEGATIVE: 1
PSYCHIATRIC NEGATIVE: 1
ABDOMINAL PAIN: 1
ALLERGIC/IMMUNOLOGIC NEGATIVE: 1
NEUROLOGICAL NEGATIVE: 1

## 2019-03-20 ASSESSMENT — MIFFLIN-ST. JEOR: SCORE: 1649.39

## 2019-03-20 NOTE — LETTER
March 20, 2019      To Whom It May Concern:      Rao Leavitt was seen in our Emergency Department today, 03/20/19.  I expect his condition to improve over the next 3-5 days.  He may return to work/school when improved.  He is excused from work until March 22, 2019.  Please excuse him from missing work due to his visit.  If he has worsening symptoms he may need to return to the emergency department for reevaluation and follow-up care with his primary care provider as recommended.        Sincerely,         Patrick Fields MD, FACEP

## 2019-03-20 NOTE — ED TRIAGE NOTES
Pt states abdominal pain which is worse with bending over. States pain shoots up from the abdomin to the chest upon bending over. Last BM 3 days ago. States N/V yesterday.

## 2019-03-20 NOTE — ED PROVIDER NOTES
History     Chief Complaint   Patient presents with     Abdominal Pain     HPI  Rao Leavitt is a 29 year old male who presents for evaluation for 2-3-day history of lower abdominal pain and discomfort.  He reports the pain is sharp.  Pain is not improved with Tylenol or ibuprofen.  Pain is across his belt line.  He reports no prior history of kidney stones and no prior history of abdominal surgeries.  No difficulty urinating, no penile discharge.  He also reported no fever and no chills.  No back pain.  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  No recent foreign travel.  No reported diarrhea.  He has had no vomiting.    Allergies:  Allergies   Allergen Reactions     Tramadol Nausea     Vicodin [Hydrocodone-Acetaminophen] Rash       Problem List:    There are no active problems to display for this patient.       Past Medical History:    History reviewed. No pertinent past medical history.    Past Surgical History:    Past Surgical History:   Procedure Laterality Date     PE tube         Family History:    No family history on file.    Social History:  Marital Status:  Single [1]  Social History     Tobacco Use     Smoking status: Current Every Day Smoker     Packs/day: 0.25     Smokeless tobacco: Current User   Substance Use Topics     Alcohol use: No     Drug use: No        Medications:      multivitamin, therapeutic (THERA-VIT) TABS tablet         Review of Systems   Constitutional: Negative.    HENT: Negative.    Eyes: Negative.    Respiratory: Negative.    Cardiovascular: Negative.    Gastrointestinal: Positive for abdominal pain.   Endocrine: Negative.    Genitourinary: Negative.    Musculoskeletal: Negative.    Skin: Negative.    Allergic/Immunologic: Negative.    Neurological: Negative.    Hematological: Negative.    Psychiatric/Behavioral: Negative.    All other systems reviewed and are  "negative.      Physical Exam   BP: 136/87  Pulse: 71  Temp: 97.6  F (36.4  C)  Resp: 18  Height: 170.2 cm (5' 7\")  Weight: 72.6 kg (160 lb)  SpO2: 99 %      Physical Exam   Constitutional: He is oriented to person, place, and time. He appears well-developed and well-nourished. No distress.   HENT:   Head: Normocephalic and atraumatic.   Eyes: EOM are normal. Pupils are equal, round, and reactive to light. Right eye exhibits no discharge. Left eye exhibits no discharge. No scleral icterus.   Neck: Normal range of motion. Neck supple.   Cardiovascular: Normal rate.   Pulmonary/Chest: Effort normal and breath sounds normal. No stridor. No respiratory distress. He has no wheezes. He has no rales. He exhibits no tenderness.   Abdominal: Soft. There is tenderness.       Musculoskeletal: He exhibits no edema, tenderness or deformity.   Neurological: He is alert and oriented to person, place, and time. No cranial nerve deficit or sensory deficit. He exhibits normal muscle tone. Coordination normal.   Skin: Capillary refill takes less than 2 seconds. No rash noted. He is not diaphoretic. No erythema. No pallor.   Psychiatric: He has a normal mood and affect. His behavior is normal. Judgment and thought content normal.       ED Course        Procedures               Critical Care time:  none               ED medications:  Medications   lactated ringers BOLUS 1,000 mL (0 mLs Intravenous Stopped 3/20/19 0803)   HYDROmorphone (PF) (DILAUDID) injection 0.5 mg (0.5 mg Intravenous Given 3/20/19 0634)   ketorolac (TORADOL) injection 10 mg (10 mg Intravenous Given 3/20/19 0634)   iopamidol (ISOVUE-370) solution 78 mL (78 mLs Intravenous Given 3/20/19 0739)   sodium chloride 0.9 % bag 500mL for CT scan flush use (59 mLs Intravenous Given 3/20/19 0739)         ED labs and imaging:  Results for orders placed or performed during the hospital encounter of 03/20/19   CT Abdomen Pelvis w Contrast    Narrative    CT ABDOMEN/PELVIS WITH " CONTRAST March 20, 2019 7:48 AM     HISTORY: Abdomen pain, unspecified. Abdomen infection (include  peritonitis). Three day history of lower abdominal pain over the belt  line, evaluate for acute appendicitis versus diverticulitis versus  inflammatory colitis versus other acute intra-abdominal catastrophe  process.    TECHNIQUE: CT abdomen and pelvis with 78 mL Isovue-370 IV. Radiation  dose for this scan was reduced using automated exposure control,  adjustment of the mA and/or kV according to patient size, or iterative  reconstruction technique.    COMPARISON: None.    FINDINGS: Normal appendix series 2 image 59. There is no bowel  obstruction. No acute inflammatory change of the large or small bowel.  Moderate stool throughout the colon. There is no free air or abscess  identified.    Liver, gallbladder, adrenals, spleen, and pancreas shows no acute  abnormalities. No hydronephrosis. Unremarkable kidneys that appear  homogeneous. No urolithiasis identified.      Impression    IMPRESSION: No acute abnormality is seen. Normal appendix.   Routine UA with microscopic   Result Value Ref Range    Color Urine Yellow     Appearance Urine Clear     Glucose Urine Negative NEG^Negative mg/dL    Bilirubin Urine Negative NEG^Negative    Ketones Urine Negative NEG^Negative mg/dL    Specific Gravity Urine 1.018 1.003 - 1.035    Blood Urine Negative NEG^Negative    pH Urine 5.0 5.0 - 7.0 pH    Protein Albumin Urine Negative NEG^Negative mg/dL    Urobilinogen mg/dL 0.0 0.0 - 2.0 mg/dL    Nitrite Urine Negative NEG^Negative    Leukocyte Esterase Urine Negative NEG^Negative    Source Midstream Urine     WBC Urine 1 0 - 5 /HPF    RBC Urine 1 0 - 2 /HPF    Mucous Urine Present (A) NEG^Negative /LPF   CBC with platelets differential   Result Value Ref Range    WBC 6.6 4.0 - 11.0 10e9/L    RBC Count 4.68 4.4 - 5.9 10e12/L    Hemoglobin 15.0 13.3 - 17.7 g/dL    Hematocrit 43.3 40.0 - 53.0 %    MCV 93 78 - 100 fl    MCH 32.1 26.5 - 33.0  pg    MCHC 34.6 31.5 - 36.5 g/dL    RDW 12.0 10.0 - 15.0 %    Platelet Count 301 150 - 450 10e9/L    Diff Method Automated Method     % Neutrophils 42.1 %    % Lymphocytes 45.1 %    % Monocytes 6.5 %    % Eosinophils 5.6 %    % Basophils 0.5 %    % Immature Granulocytes 0.2 %    Nucleated RBCs 0 0 /100    Absolute Neutrophil 2.8 1.6 - 8.3 10e9/L    Absolute Lymphocytes 3.0 0.8 - 5.3 10e9/L    Absolute Monocytes 0.4 0.0 - 1.3 10e9/L    Absolute Eosinophils 0.4 0.0 - 0.7 10e9/L    Absolute Basophils 0.0 0.0 - 0.2 10e9/L    Abs Immature Granulocytes 0.0 0 - 0.4 10e9/L    Absolute Nucleated RBC 0.0    Comprehensive metabolic panel   Result Value Ref Range    Sodium 143 133 - 144 mmol/L    Potassium 4.0 3.4 - 5.3 mmol/L    Chloride 110 (H) 94 - 109 mmol/L    Carbon Dioxide 26 20 - 32 mmol/L    Anion Gap 7 3 - 14 mmol/L    Glucose 86 70 - 99 mg/dL    Urea Nitrogen 12 7 - 30 mg/dL    Creatinine 0.78 0.66 - 1.25 mg/dL    GFR Estimate >90 >60 mL/min/[1.73_m2]    GFR Estimate If Black >90 >60 mL/min/[1.73_m2]    Calcium 8.6 8.5 - 10.1 mg/dL    Bilirubin Total 0.9 0.2 - 1.3 mg/dL    Albumin 3.9 3.4 - 5.0 g/dL    Protein Total 6.8 6.8 - 8.8 g/dL    Alkaline Phosphatase 81 40 - 150 U/L    ALT 20 0 - 70 U/L    AST 13 0 - 45 U/L   Lipase   Result Value Ref Range    Lipase 74 73 - 393 U/L       ED Vitals:  Vitals:    03/20/19 0630 03/20/19 0730 03/20/19 0800 03/20/19 0830   BP: 138/69 137/89 (!) 146/95 124/68   Pulse: 73 61 72 68   Resp:       Temp:       TempSrc:       SpO2: 100%   99%   Weight:       Height:           Assessments & Plan (with Medical Decision Making)   Clinical impression: Pleasant 29-year-old male who arrived alone by private car for evaluation for 2-day history of pain and discomfort over the lower abdomen across his belt line.  Unclear cause.   No prior history of abdominal surgeries.  No fever.  No chills.  No prior history of kidney stones.  No abdominal trauma.  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  Minimal relief with over-the-counter remedies including Tylenol and ibuprofen.    On exam he rated his pain a 8 out of 10 intermittent sharp.  Localized tenderness across the beltline.  No guarding.  No distention.  Abdomen soft.      ED course and Plan:  We discussed and reviewed differential diagnosis for 2-day history of lower abdominal pain across his belt line including diverticulitis, appendicitis, colitis.  He was offered pain control he reported an allergy to tramadol and Vicodin.  He was given IV fluids. With 2 days of pain location of pain and broad differential imaging was obtained to exclude appendicitis colitis or diverticulitis or other acute intra-abdominal catastrophe.  His workup revealed normal labs specifically normal lipase normal liver enzymes no white counts and normal hemogram.  Midstream urinalysis was also negative for pyuria or microscopic hematuria.  Patient had minimal relief after dose of IV Dilaudid and IV Toradol.  Additional dose of pain medications was offered.  CT imaging today showed no acute process.  Please see the interpreting radialis report above.   Patient was reevaluated after imaging.  We discussed that his workup today did not show any acute process.    Patient is reassured by his evaluation.  He is discharged home with abdominal pain of unclear cause.  Low suspicion for intra-abdominal catastrophe or gastrointestinal emergency.  He is discharged home with a work excuse for the next 24-48 hours with plan for supportive care.  Reviewed reasons to return to the emergency department for reevaluation including but not limited to fever bloody stools projectile vomiting.  I recommended to follow-up for recheck in clinic in the next 3-5 days if not improved.          Disclaimer: This note consists of symbols derived from keyboarding, dictation and/or voice recognition software. As a result, there may be errors in the script that have  gone undetected. Please consider this when interpreting information found in this chart.  I have reviewed the nursing notes.    I have reviewed the findings, diagnosis, plan and need for follow up with the patient.          Medication List      There are no discharge medications for this visit.         Final diagnoses:   Abdominal pain, generalized - Over the last 2-3 days.  More noticeable over the belt line.  Unclear and uncertain cause       3/20/2019   [facility] EMERGENCY DEPARTMENT     Dov Fields MD  03/20/19 0232

## 2019-03-20 NOTE — DISCHARGE INSTRUCTIONS
1) The exact cause of your abdominal pain and discomfort is not clear today.  Your workup ,lab , imaging and urine did not show any acute infection or inflammatory process.    2) we have agreed to allow you to go home with pain and discomfort of unclear cause.  We reviewed that if your symptoms worsen including bloody stools, vomiting, fever, he may need to return to the emergency department for reevaluation and additional care

## 2019-03-20 NOTE — ED AVS SNAPSHOT
[facility] Emergency Department  [address] [address]  Phone:  [phone]  Fax:  [phone]                                    Rao Leavitt   MRN: [id number]    Department:  [facility] Emergency Department   Date of Visit:  3/20/2019           After Visit Summary Signature Page    I have received my discharge instructions, and my questions have been answered. I have discussed any challenges I see with this plan with the nurse or doctor.    ..........................................................................................................................................  Patient/Patient Representative Signature      ..........................................................................................................................................  Patient Representative Print Name and Relationship to Patient    ..................................................               ................................................  Date                                   Time    ..........................................................................................................................................  Reviewed by Signature/Title    ...................................................              ..............................................  Date                                               Time          22EPIC Rev 08/18

## 2019-04-11 ENCOUNTER — HOSPITAL ENCOUNTER (EMERGENCY)
Facility: CLINIC | Age: 29
Discharge: HOME | End: 2019-04-11
Attending: EMERGENCY MEDICINE | Admitting: EMERGENCY MEDICINE
Payer: COMMERCIAL

## 2019-04-11 VITALS
OXYGEN SATURATION: 99 % | TEMPERATURE: 97.9 F | SYSTOLIC BLOOD PRESSURE: 122 MMHG | RESPIRATION RATE: 14 BRPM | DIASTOLIC BLOOD PRESSURE: 95 MMHG

## 2019-04-11 DIAGNOSIS — R10.12 LEFT UPPER QUADRANT PAIN: ICD-10-CM

## 2019-04-11 LAB
ALBUMIN UR-MCNC: NEGATIVE MG/DL
APPEARANCE UR: CLEAR
BILIRUB UR QL STRIP: NEGATIVE
COLOR UR AUTO: YELLOW
GLUCOSE UR STRIP-MCNC: NEGATIVE MG/DL
HGB UR QL STRIP: NEGATIVE
KETONES UR STRIP-MCNC: NEGATIVE MG/DL
LEUKOCYTE ESTERASE UR QL STRIP: NEGATIVE
NITRATE UR QL: NEGATIVE
PH UR STRIP: 8 PH (ref 5–7)
SOURCE: ABNORMAL
SP GR UR STRIP: 1.02 (ref 1–1.03)
UROBILINOGEN UR STRIP-MCNC: 0 MG/DL (ref 0–2)

## 2019-04-11 PROCEDURE — 25000132 ZZH RX MED GY IP 250 OP 250 PS 637: Performed by: EMERGENCY MEDICINE

## 2019-04-11 PROCEDURE — 99283 EMERGENCY DEPT VISIT LOW MDM: CPT | Performed by: EMERGENCY MEDICINE

## 2019-04-11 PROCEDURE — 25000125 ZZHC RX 250: Performed by: EMERGENCY MEDICINE

## 2019-04-11 PROCEDURE — 81003 URINALYSIS AUTO W/O SCOPE: CPT | Performed by: EMERGENCY MEDICINE

## 2019-04-11 PROCEDURE — 99284 EMERGENCY DEPT VISIT MOD MDM: CPT | Mod: Z6 | Performed by: EMERGENCY MEDICINE

## 2019-04-11 RX ORDER — OXYCODONE AND ACETAMINOPHEN 5; 325 MG/1; MG/1
1-2 TABLET ORAL EVERY 4 HOURS PRN
Qty: 8 TABLET | Refills: 0 | Status: SHIPPED | OUTPATIENT
Start: 2019-04-11 | End: 2019-04-15

## 2019-04-11 RX ORDER — OXYCODONE AND ACETAMINOPHEN 5; 325 MG/1; MG/1
2 TABLET ORAL ONCE
Status: COMPLETED | OUTPATIENT
Start: 2019-04-11 | End: 2019-04-11

## 2019-04-11 RX ORDER — FAMOTIDINE 20 MG/1
20 TABLET, FILM COATED ORAL ONCE
Status: DISCONTINUED | OUTPATIENT
Start: 2019-04-11 | End: 2019-04-11 | Stop reason: CLARIF

## 2019-04-11 RX ORDER — IBUPROFEN 200 MG
600 TABLET ORAL EVERY 8 HOURS PRN
COMMUNITY
End: 2019-09-05

## 2019-04-11 RX ADMIN — LIDOCAINE HYDROCHLORIDE 30 ML: 20 SOLUTION ORAL; TOPICAL at 11:39

## 2019-04-11 RX ADMIN — OXYCODONE HYDROCHLORIDE AND ACETAMINOPHEN 2 TABLET: 5; 325 TABLET ORAL at 12:24

## 2019-04-11 RX ADMIN — RANITIDINE 150 MG: 150 TABLET ORAL at 12:00

## 2019-04-11 ASSESSMENT — ENCOUNTER SYMPTOMS
ABDOMINAL PAIN: 1
SHORTNESS OF BREATH: 0
FEVER: 0

## 2019-04-11 NOTE — LETTER
April 11, 2019      To Whom It May Concern:      Rao BLANCAS Enedeliamaria teresa was seen in our Emergency Department today, 04/11/19.  I expect his condition to improve over the next couple days.  He may return to work/school when improved.    Sincerely,        Kj Dowell MD

## 2019-04-11 NOTE — DISCHARGE INSTRUCTIONS
Recommend omeprazole to be taken daily for at least one month.   Pepcid/zantac can be taken as needed when pain occurs.  Can also try maalox.    Follow up with upper GI (endoscopy) procedure.

## 2019-04-11 NOTE — ED AVS SNAPSHOT
[facility] Emergency Department  [address] [address]  Phone:  [phone]  Fax:  [phone]                                    Rao Leavitt   MRN: [id number]    Department:  [facility] Emergency Department   Date of Visit:  4/11/2019           After Visit Summary Signature Page    I have received my discharge instructions, and my questions have been answered. I have discussed any challenges I see with this plan with the nurse or doctor.    ..........................................................................................................................................  Patient/Patient Representative Signature      ..........................................................................................................................................  Patient Representative Print Name and Relationship to Patient    ..................................................               ................................................  Date                                   Time    ..........................................................................................................................................  Reviewed by Signature/Title    ...................................................              ..............................................  Date                                               Time          22EPIC Rev 08/18

## 2019-04-11 NOTE — ED PROVIDER NOTES
History     Chief Complaint   Patient presents with     Abdominal Pain     started today     HPI  Rao Leavitt is a 29 year old male who is otherwise healthy, presenting to the emergency department with concerns regarding sharp, stabbing left upper quadrant abdominal pain.  Patient states he awoke with chest pain.  He did take ibuprofen, without alleviation of the pain.  It is moderate to severe in nature, with no significant radiation elsewhere.  Denies any right sided abdominal pain.  No lower abdominal pain.  Patient actually states that earlier last month, patient had similar types of pain.  I reviewed that ED visit, and patient had negative laboratory workup, including negative CT scan.  I also reviewed outside records from [facility] when patient had similar presentation of left-sided abdominal pain, with negative workup including negative CT imaging.  Patient denies any recent fever.  No diarrhea.  No urinary symptoms.    Allergies:  Allergies   Allergen Reactions     Tramadol Nausea     Vicodin [Hydrocodone-Acetaminophen] Rash       Problem List:    There are no active problems to display for this patient.       Past Medical History:    History reviewed. No pertinent past medical history.    Past Surgical History:    Past Surgical History:   Procedure Laterality Date     PE tube         Family History:    History reviewed. No pertinent family history.    Social History:  Marital Status:  Single [1]  Social History     Tobacco Use     Smoking status: Current Every Day Smoker     Packs/day: 0.25     Smokeless tobacco: Current User   Substance Use Topics     Alcohol use: No     Drug use: No        Medications:      ibuprofen (ADVIL/MOTRIN) 200 MG tablet   multivitamin, therapeutic (THERA-VIT) TABS tablet   oxyCODONE-acetaminophen (PERCOCET) 5-325 MG tablet         Review of Systems   Constitutional: Negative for fever.   Respiratory: Negative for shortness of breath.     Cardiovascular: Negative for chest pain.   Gastrointestinal: Positive for abdominal pain.   All other systems reviewed and are negative.      Physical Exam   BP: 138/73  Heart Rate: 69  Temp: 97.9  F (36.6  C)  Resp: 16  SpO2: 100 %      Physical Exam  /73   Temp 97.9  F (36.6  C) (Oral)   Resp 16   SpO2 100%   General: alert and in no acute distress  Head: atraumatic, normocephalic  Abd: Soft, tender in the left upper quadrant, with no tenderness elsewhere, no peritoneal signs  Musculoskel/Extremities: normal extremities, no edema, erythema, tenderness and full AROM of major joints without tenderness  Skin: no rashes, no diaphoresis and skin color normal  Neuro: Patient awake, alert, oriented, speech is fluent, gait is normal  Psychiatric: affect/mood normal, cooperative, normal judgement/insight and memory intact      ED Course        Procedures               Critical Care time:  none               Results for orders placed or performed during the hospital encounter of 04/11/19 (from the past 24 hour(s))   UA reflex to Microscopic   Result Value Ref Range    Color Urine Yellow     Appearance Urine Clear     Glucose Urine Negative NEG^Negative mg/dL    Bilirubin Urine Negative NEG^Negative    Ketones Urine Negative NEG^Negative mg/dL    Specific Gravity Urine 1.018 1.003 - 1.035    Blood Urine Negative NEG^Negative    pH Urine 8.0 (H) 5.0 - 7.0 pH    Protein Albumin Urine Negative NEG^Negative mg/dL    Urobilinogen mg/dL 0.0 0.0 - 2.0 mg/dL    Nitrite Urine Negative NEG^Negative    Leukocyte Esterase Urine Negative NEG^Negative    Source Midstream Urine        Medications   lidocaine VISCOUS (XYLOCAINE) 2 % 15 mL, alum & mag hydroxide-simethicone (MYLANTA ES/MAALOX  ES) 15 mL GI Cocktail (30 mLs Oral Given 4/11/19 1139)   ranitidine (ZANTAC) tablet 150 mg (150 mg Oral Given 4/11/19 1200)   oxyCODONE-acetaminophen (PERCOCET) 5-325 MG per tablet 2 tablet (2 tablets Oral Given 4/11/19 1224)        Assessments & Plan (with Medical Decision Making)  29 year old male, who presents to the emergency department with what appears to be recurrent episodes of left-sided abdominal pain.  I reviewed previous ED visit from last month, and I also reviewed outside ED visit from the fall 2018 at [facility].  Patient had negative CT imaging at both of those sites with a CT abdomen/pelvis.  Patient also a few weeks ago had negative laboratory workup.  He has reproducible tenderness of the left upper quadrant of the abdomen.  There is been no diarrhea.  No vomiting.  Given his negative imaging workup previously, and patient was similar types of pain, I do not feel that any more acute process is likely.  He did have worsening after eating spaghetti this afternoon when at work, and may have component of gastric/peptic ulcer worsening his pain.  Therefore, patient was given GI cocktail.  Urinalysis performed that shows no evidence of blood, and therefore feel that this is less likely left-sided flank pain secondary to kidney/ureteral stone etiology.    Patient instructed on omeprazole daily.  Other H2 blockers, and gastric medications as needed.  Referral placed for outpatient upper endoscopy study.     I have reviewed the nursing notes.    I have reviewed the findings, diagnosis, plan and need for follow up with the patient.          Medication List      Started    oxyCODONE-acetaminophen 5-325 MG tablet  Commonly known as:  PERCOCET  1-2 tablets, Oral, EVERY 4 HOURS PRN            Final diagnoses:   Left upper quadrant pain       4/11/2019   [facility] EMERGENCY DEPARTMENT     Kj Dowell MD  04/11/19 4378

## 2019-04-11 NOTE — ED NOTES
"Patient woke up with pain in LUQ, now states pain \"is sharp and constant.\" took 600mg ibuprofen at 0930, without relief. Denies nausea or diarrhea. Normal bowel movement this morning. Had similar episode about 2-3 weeks ago, CT was done, patient reports unknown cause per results.   "

## 2019-04-15 ENCOUNTER — HOSPITAL ENCOUNTER (EMERGENCY)
Facility: CLINIC | Age: 29
Discharge: HOME | End: 2019-04-15
Attending: EMERGENCY MEDICINE | Admitting: EMERGENCY MEDICINE
Payer: COMMERCIAL

## 2019-04-15 ENCOUNTER — ANESTHESIA EVENT (OUTPATIENT)
Dept: GASTROENTEROLOGY | Facility: CLINIC | Age: 29
End: 2019-04-15
Payer: COMMERCIAL

## 2019-04-15 VITALS
TEMPERATURE: 98 F | RESPIRATION RATE: 16 BRPM | HEART RATE: 75 BPM | HEIGHT: 67 IN | BODY MASS INDEX: 25.9 KG/M2 | DIASTOLIC BLOOD PRESSURE: 79 MMHG | SYSTOLIC BLOOD PRESSURE: 139 MMHG | OXYGEN SATURATION: 99 % | WEIGHT: 165 LBS

## 2019-04-15 DIAGNOSIS — R10.12 LUQ ABDOMINAL PAIN: ICD-10-CM

## 2019-04-15 PROBLEM — F31.9 BIPOLAR 1 DISORDER (H): Status: ACTIVE | Noted: 2017-03-16

## 2019-04-15 PROBLEM — F41.1 GENERALIZED ANXIETY DISORDER: Status: ACTIVE | Noted: 2017-03-16

## 2019-04-15 LAB
ALBUMIN SERPL-MCNC: 3.9 G/DL (ref 3.4–5)
ALP SERPL-CCNC: 87 U/L (ref 40–150)
ALT SERPL W P-5'-P-CCNC: 16 U/L (ref 0–70)
ANION GAP SERPL CALCULATED.3IONS-SCNC: 4 MMOL/L (ref 3–14)
AST SERPL W P-5'-P-CCNC: 13 U/L (ref 0–45)
BASOPHILS # BLD AUTO: 0 10E9/L (ref 0–0.2)
BASOPHILS NFR BLD AUTO: 0.1 %
BILIRUB SERPL-MCNC: 0.3 MG/DL (ref 0.2–1.3)
BUN SERPL-MCNC: 20 MG/DL (ref 7–30)
CALCIUM SERPL-MCNC: 8.7 MG/DL (ref 8.5–10.1)
CHLORIDE SERPL-SCNC: 109 MMOL/L (ref 94–109)
CO2 SERPL-SCNC: 28 MMOL/L (ref 20–32)
CREAT SERPL-MCNC: 0.8 MG/DL (ref 0.66–1.25)
DIFFERENTIAL METHOD BLD: NORMAL
EOSINOPHIL # BLD AUTO: 0.3 10E9/L (ref 0–0.7)
EOSINOPHIL NFR BLD AUTO: 4.6 %
ERYTHROCYTE [DISTWIDTH] IN BLOOD BY AUTOMATED COUNT: 11.9 % (ref 10–15)
GFR SERPL CREATININE-BSD FRML MDRD: >90 ML/MIN/{1.73_M2}
GLUCOSE SERPL-MCNC: 99 MG/DL (ref 70–99)
HCT VFR BLD AUTO: 44.7 % (ref 40–53)
HGB BLD-MCNC: 15.1 G/DL (ref 13.3–17.7)
IMM GRANULOCYTES # BLD: 0 10E9/L (ref 0–0.4)
IMM GRANULOCYTES NFR BLD: 0.3 %
LIPASE SERPL-CCNC: 71 U/L (ref 73–393)
LYMPHOCYTES # BLD AUTO: 2.4 10E9/L (ref 0.8–5.3)
LYMPHOCYTES NFR BLD AUTO: 33.6 %
MCH RBC QN AUTO: 31.9 PG (ref 26.5–33)
MCHC RBC AUTO-ENTMCNC: 33.8 G/DL (ref 31.5–36.5)
MCV RBC AUTO: 94 FL (ref 78–100)
MONOCYTES # BLD AUTO: 0.6 10E9/L (ref 0–1.3)
MONOCYTES NFR BLD AUTO: 7.8 %
NEUTROPHILS # BLD AUTO: 3.8 10E9/L (ref 1.6–8.3)
NEUTROPHILS NFR BLD AUTO: 53.6 %
NRBC # BLD AUTO: 0 10*3/UL
NRBC BLD AUTO-RTO: 0 /100
PLATELET # BLD AUTO: 316 10E9/L (ref 150–450)
POTASSIUM SERPL-SCNC: 4.2 MMOL/L (ref 3.4–5.3)
PROT SERPL-MCNC: 7 G/DL (ref 6.8–8.8)
RBC # BLD AUTO: 4.74 10E12/L (ref 4.4–5.9)
SODIUM SERPL-SCNC: 141 MMOL/L (ref 133–144)
WBC # BLD AUTO: 7 10E9/L (ref 4–11)

## 2019-04-15 PROCEDURE — 25000125 ZZHC RX 250: Performed by: EMERGENCY MEDICINE

## 2019-04-15 PROCEDURE — 80053 COMPREHEN METABOLIC PANEL: CPT | Performed by: EMERGENCY MEDICINE

## 2019-04-15 PROCEDURE — 83690 ASSAY OF LIPASE: CPT | Performed by: EMERGENCY MEDICINE

## 2019-04-15 PROCEDURE — 25000132 ZZH RX MED GY IP 250 OP 250 PS 637: Performed by: EMERGENCY MEDICINE

## 2019-04-15 PROCEDURE — 99284 EMERGENCY DEPT VISIT MOD MDM: CPT | Mod: Z6 | Performed by: EMERGENCY MEDICINE

## 2019-04-15 PROCEDURE — 99283 EMERGENCY DEPT VISIT LOW MDM: CPT | Performed by: EMERGENCY MEDICINE

## 2019-04-15 PROCEDURE — 85025 COMPLETE CBC W/AUTO DIFF WBC: CPT | Performed by: EMERGENCY MEDICINE

## 2019-04-15 RX ORDER — OXYCODONE HYDROCHLORIDE 5 MG/1
5 TABLET ORAL EVERY 6 HOURS PRN
Qty: 4 TABLET | Refills: 0 | Status: SHIPPED | OUTPATIENT
Start: 2019-04-15 | End: 2019-07-03

## 2019-04-15 RX ADMIN — RANITIDINE 300 MG: 150 TABLET ORAL at 07:10

## 2019-04-15 RX ADMIN — LIDOCAINE HYDROCHLORIDE 30 ML: 20 SOLUTION ORAL; TOPICAL at 06:28

## 2019-04-15 ASSESSMENT — MIFFLIN-ST. JEOR: SCORE: 1672.07

## 2019-04-15 ASSESSMENT — LIFESTYLE VARIABLES: TOBACCO_USE: 1

## 2019-04-15 NOTE — LETTER
April 15, 2019      To Whom It May Concern:      Rao Leavitt was seen in our Emergency Department today, 04/15/19.   Sincerely,        Vidal Tong MD

## 2019-04-15 NOTE — ANESTHESIA PREPROCEDURE EVALUATION
Anesthesia Pre-Procedure Evaluation    Patient: Rao Leavitt   MRN: [id number] : 1990          Preoperative Diagnosis: left upper quadrant pain  diagnostic    Procedure(s):  COMBINED ESOPHAGOSCOPY, GASTROSCOPY, DUODENOSCOPY (EGD)    No past medical history on file.  Past Surgical History:   Procedure Laterality Date     PE tube         Anesthesia Evaluation     .             ROS/MED HX    ENT/Pulmonary:     (+)tobacco use, Current use , . .    Neurologic:       Cardiovascular:         METS/Exercise Tolerance:     Hematologic:         Musculoskeletal:         GI/Hepatic:         Renal/Genitourinary:         Endo:         Psychiatric:     (+) psychiatric history anxiety and bipolar      Infectious Disease:         Malignancy:         Other:                          Physical Exam  Normal systems: cardiovascular, pulmonary and dental    Airway   Mallampati: II  TM distance: >3 FB  Neck ROM: full    Dental     Cardiovascular       Pulmonary             Lab Results   Component Value Date    WBC 7.0 04/15/2019    HGB 15.1 04/15/2019    HCT 44.7 04/15/2019     04/15/2019     04/15/2019    POTASSIUM 4.2 04/15/2019    CHLORIDE 109 04/15/2019    CO2 28 04/15/2019    BUN 20 04/15/2019    CR 0.80 04/15/2019    GLC 99 04/15/2019    LEANDRO 8.7 04/15/2019    ALBUMIN 3.9 04/15/2019    PROTTOTAL 7.0 04/15/2019    ALT 16 04/15/2019    AST 13 04/15/2019    ALKPHOS 87 04/15/2019    BILITOTAL 0.3 04/15/2019    LIPASE 71 (L) 04/15/2019       Preop Vitals  BP Readings from Last 3 Encounters:   04/15/19 139/79   19 (!) 122/95   19 124/68    Pulse Readings from Last 3 Encounters:   04/15/19 75   19 68   01/10/19 99      Resp Readings from Last 3 Encounters:   04/15/19 16   19 14   19 18    SpO2 Readings from Last 3 Encounters:   04/15/19 99%   19 99%   19 99%      Temp Readings from Last 1 Encounters:   04/15/19 36.7  C (98  F) (Oral)    Ht Readings from Last 1 Encounters:  "  04/15/19 1.702 m (5' 7\")      Wt Readings from Last 1 Encounters:   04/15/19 74.8 kg (165 lb)    Estimated body mass index is 25.84 kg/m  as calculated from the following:    Height as of 4/15/19: 1.702 m (5' 7\").    Weight as of 4/15/19: 74.8 kg (165 lb).       Anesthesia Plan      History & Physical Review  History and physical reviewed and following examination; no interval change.    ASA Status:  2 .    NPO Status:  > 6 hours    Plan for MAC Reason for MAC:  Deep or markedly invasive procedure (G8)         Postoperative Care      Consents  Anesthetic plan, risks, benefits and alternatives discussed with:  Patient..                 Kike Gutierrez CRNA, APRN CRNA  "

## 2019-04-15 NOTE — ED NOTES
"Pt reporting GI cocktail \"made pain worse\"  states \"it made pain worse last time just the pain medication seemed to take the edge off\"  MD advised  "

## 2019-04-15 NOTE — DISCHARGE INSTRUCTIONS
Increase your dosing of ranitidine (Zantac) to 300 mg (2 pills) daily or 150 mg (1 pill) twice a day.  Return to the emergency department for repeated vomiting, severe pain, fevers, or other concerns.  Otherwise follow-up in surgical clinic.    Use ibuprofen and acetaminophen for your symptoms.  Use oxycodone as needed for pain that is not controlled by the prior two medications.    Oxycodone is an addictive opioid medication, please only take it when the pain is more than can be controlled by acetaminophen or ibuprofen alone. It will also make you lightheaded, nauseated, and constipated.  Do not drive, operate heavy machinery, or take care of young children while taking this medication.     Repeated studies have shown that the longer you use opioid pain medications, the longer it is until you return to normal function. It is our recommendation that you taper off opioids as quickly as possible with the goal of returning to normal function or near normal function. Long term use of opioids quickly results in growing tolerance to the medication (less of the benefits) and increased dependence (more of the bad side effects).     Pain is very difficult to treat and we can very rarely take away pain completely. If you are having difficulty with pain over several weeks after an injury, you may need to start different medications and therapies (such as physical therapy, graded exercise, massage, and acupuncture). Please talk about this with your regular doctor.     If you are interested in seeking free, confidential treatment referral and information service for individuals and families facing mental health and/or substance use disorders please call [phone] ([phone])

## 2019-04-15 NOTE — ED AVS SNAPSHOT
[facility] Emergency Department  [address] [address]  Phone:  [phone]  Fax:  [phone]                                    Rao Leavitt   MRN: [id number]    Department:  [facility] Emergency Department   Date of Visit:  4/15/2019           After Visit Summary Signature Page    I have received my discharge instructions, and my questions have been answered. I have discussed any challenges I see with this plan with the nurse or doctor.    ..........................................................................................................................................  Patient/Patient Representative Signature      ..........................................................................................................................................  Patient Representative Print Name and Relationship to Patient    ..................................................               ................................................  Date                                   Time    ..........................................................................................................................................  Reviewed by Signature/Title    ...................................................              ..............................................  Date                                               Time          22EPIC Rev 08/18

## 2019-04-15 NOTE — ED TRIAGE NOTES
"Seen here last week for abd pain was told to follow up with \"specialist\" he has not done this.  Has been taking Prilosec and percocet and he is out of the percocet and that was helping    Came in today for pain   "

## 2019-04-15 NOTE — ED PROVIDER NOTES
History     Chief Complaint   Patient presents with     Abdominal Pain     HPI  Rao Leavitt is a 29 year old male who presents for abdominal pain.  History obtained from patient and review of his chart.  The patient states that he has been having intermittent epigastric abdominal pain radiating to the left upper quadrant over the past several months.  Pain is sharp, worse with eating spicy foods, worse with laying flat.  He has been taking oxycodone-acetaminophen with some improvement.  He did take omeprazole once this morning without improvement in his symptoms.  No nausea or vomiting.  He denies fevers, chills, chest pain, difficulty breathing, dysuria, or rash.  No prior abdominal surgeries.  Review of his chart shows he has had multiple visits with large workups for this that have not shown a specific cause of symptoms.  Last seen on 4/11/19 and discharged with a prescription for short course of oxycodone and acetaminophen.  He had a workup on 3/20/19 for abdominal pain here as well with a negative CT scan of the abdomen/pelvis laboratory workup.    Allergies:  Allergies   Allergen Reactions     Tramadol Nausea     Vicodin [Hydrocodone-Acetaminophen] Rash       Problem List:    Patient Active Problem List    Diagnosis Date Noted     Bipolar 1 disorder (H) 03/16/2017     Priority: Medium     Generalized anxiety disorder 03/16/2017     Priority: Medium        Past Medical History:    No past medical history on file.    Past Surgical History:    Past Surgical History:   Procedure Laterality Date     PE tube         Family History:    No family history on file.    Social History:  Marital Status:  Single [1]  Social History     Tobacco Use     Smoking status: Current Every Day Smoker     Packs/day: 0.25     Smokeless tobacco: Current User   Substance Use Topics     Alcohol use: No     Drug use: No        Medications:      oxyCODONE (ROXICODONE) 5 MG tablet   ibuprofen (ADVIL/MOTRIN) 200 MG tablet  "  multivitamin, therapeutic (THERA-VIT) TABS tablet         Review of Systems  Pertinent positives and negatives listed in the HPI, all other systems reviewed and are negative.    Physical Exam   BP: (!) 156/93  Pulse: 85  Temp: 98  F (36.7  C)  Resp: 16  Height: 170.2 cm (5' 7\")  Weight: 74.8 kg (165 lb)  SpO2: 100 %      Physical Exam   Constitutional: He is oriented to person, place, and time. He appears well-developed and well-nourished. He appears distressed.   HENT:   Head: Normocephalic and atraumatic.   Right Ear: External ear normal.   Left Ear: External ear normal.   Nose: Nose normal.   Eyes: Conjunctivae are normal. No scleral icterus.   Neck: Normal range of motion.   Cardiovascular: Normal rate and regular rhythm.   Pulmonary/Chest: Effort normal. No stridor. No respiratory distress.   Abdominal: Soft. He exhibits no distension and no mass. There is no tenderness. There is no guarding.   Neurological: He is alert and oriented to person, place, and time.   Skin: Skin is warm and dry. He is not diaphoretic.   Psychiatric: He has a normal mood and affect. His behavior is normal.   Nursing note and vitals reviewed.      ED Course        Procedures               Critical Care time:  none               Results for orders placed or performed during the hospital encounter of 04/15/19 (from the past 24 hour(s))   Comprehensive metabolic panel   Result Value Ref Range    Sodium 141 133 - 144 mmol/L    Potassium 4.2 3.4 - 5.3 mmol/L    Chloride 109 94 - 109 mmol/L    Carbon Dioxide 28 20 - 32 mmol/L    Anion Gap 4 3 - 14 mmol/L    Glucose 99 70 - 99 mg/dL    Urea Nitrogen 20 7 - 30 mg/dL    Creatinine 0.80 0.66 - 1.25 mg/dL    GFR Estimate >90 >60 mL/min/[1.73_m2]    GFR Estimate If Black >90 >60 mL/min/[1.73_m2]    Calcium 8.7 8.5 - 10.1 mg/dL    Bilirubin Total 0.3 0.2 - 1.3 mg/dL    Albumin 3.9 3.4 - 5.0 g/dL    Protein Total 7.0 6.8 - 8.8 g/dL    Alkaline Phosphatase 87 40 - 150 U/L    ALT 16 0 - 70 U/L    " AST 13 0 - 45 U/L   CBC with platelets differential   Result Value Ref Range    WBC 7.0 4.0 - 11.0 10e9/L    RBC Count 4.74 4.4 - 5.9 10e12/L    Hemoglobin 15.1 13.3 - 17.7 g/dL    Hematocrit 44.7 40.0 - 53.0 %    MCV 94 78 - 100 fl    MCH 31.9 26.5 - 33.0 pg    MCHC 33.8 31.5 - 36.5 g/dL    RDW 11.9 10.0 - 15.0 %    Platelet Count 316 150 - 450 10e9/L    Diff Method Automated Method     % Neutrophils 53.6 %    % Lymphocytes 33.6 %    % Monocytes 7.8 %    % Eosinophils 4.6 %    % Basophils 0.1 %    % Immature Granulocytes 0.3 %    Nucleated RBCs 0 0 /100    Absolute Neutrophil 3.8 1.6 - 8.3 10e9/L    Absolute Lymphocytes 2.4 0.8 - 5.3 10e9/L    Absolute Monocytes 0.6 0.0 - 1.3 10e9/L    Absolute Eosinophils 0.3 0.0 - 0.7 10e9/L    Absolute Basophils 0.0 0.0 - 0.2 10e9/L    Abs Immature Granulocytes 0.0 0 - 0.4 10e9/L    Absolute Nucleated RBC 0.0    Lipase   Result Value Ref Range    Lipase 71 (L) 73 - 393 U/L       Medications   lidocaine VISCOUS (XYLOCAINE) 2 % 15 mL, alum & mag hydroxide-simethicone (MYLANTA ES/MAALOX  ES) 15 mL GI Cocktail (30 mLs Oral Given 4/15/19 0628)   ranitidine (ZANTAC) tablet 300 mg (300 mg Oral Given 4/15/19 0710)       Assessments & Plan (with Medical Decision Making)   29-year-old male presents with epigastric and left upper quadrant abdominal pain.  Heart 75, temperature is 98  F, SPO2 is 99% on room air.  Abdominal exam is benign and not concerning for an acute surgical process.  Electrodes are within normal limits.  LFTs normal, no signs of hepatitis.  Lipase normal, no signs of pancreatitis.  White blood cell count is 7 which is reassuring.  He was given a GI cocktail and oral ranitidine.  He is safe to discharge home.  Possible gastritis versus gastric ulcer as a cause of symptoms.  He is told to take 300 mg of ranitidine daily, return if worse, otherwise follow-up in surgical clinic to discuss possible endoscopy.  The patient is in agreement with this plan.    I have reviewed  the nursing notes.    I have reviewed the findings, diagnosis, plan and need for follow up with the patient.          Medication List      Started    oxyCODONE 5 MG tablet  Commonly known as:  ROXICODONE  5 mg, Oral, EVERY 6 HOURS PRN        Discontinued    oxyCODONE-acetaminophen 5-325 MG tablet  Commonly known as:  PERCOCET            Final diagnoses:   LUQ abdominal pain       4/15/2019   [facility] EMERGENCY DEPARTMENT     Vidal Tong MD  04/15/19 0819

## 2019-04-16 ENCOUNTER — HOSPITAL ENCOUNTER (OUTPATIENT)
Facility: CLINIC | Age: 29
Discharge: HOME | End: 2019-04-16
Attending: SURGERY | Admitting: SURGERY
Payer: COMMERCIAL

## 2019-04-16 ENCOUNTER — ANESTHESIA (OUTPATIENT)
Dept: GASTROENTEROLOGY | Facility: CLINIC | Age: 29
End: 2019-04-16
Payer: COMMERCIAL

## 2019-04-16 VITALS
HEIGHT: 67 IN | TEMPERATURE: 97.6 F | OXYGEN SATURATION: 100 % | RESPIRATION RATE: 14 BRPM | DIASTOLIC BLOOD PRESSURE: 69 MMHG | HEART RATE: 55 BPM | WEIGHT: 165 LBS | BODY MASS INDEX: 25.9 KG/M2 | SYSTOLIC BLOOD PRESSURE: 120 MMHG

## 2019-04-16 LAB — UPPER GI ENDOSCOPY: NORMAL

## 2019-04-16 PROCEDURE — 43239 EGD BIOPSY SINGLE/MULTIPLE: CPT | Performed by: SURGERY

## 2019-04-16 PROCEDURE — 37000008 ZZH ANESTHESIA TECHNICAL FEE, 1ST 30 MIN: Performed by: SURGERY

## 2019-04-16 PROCEDURE — 25000125 ZZHC RX 250: Performed by: SURGERY

## 2019-04-16 PROCEDURE — 25000125 ZZHC RX 250: Performed by: NURSE ANESTHETIST, CERTIFIED REGISTERED

## 2019-04-16 PROCEDURE — 88305 TISSUE EXAM BY PATHOLOGIST: CPT | Performed by: SURGERY

## 2019-04-16 PROCEDURE — 25800030 ZZH RX IP 258 OP 636: Performed by: SURGERY

## 2019-04-16 PROCEDURE — 25000128 H RX IP 250 OP 636: Performed by: NURSE ANESTHETIST, CERTIFIED REGISTERED

## 2019-04-16 RX ORDER — GLYCOPYRROLATE 0.2 MG/ML
INJECTION, SOLUTION INTRAMUSCULAR; INTRAVENOUS PRN
Status: DISCONTINUED | OUTPATIENT
Start: 2019-04-16 | End: 2019-04-16

## 2019-04-16 RX ORDER — ONDANSETRON 2 MG/ML
4 INJECTION INTRAMUSCULAR; INTRAVENOUS
Status: DISCONTINUED | OUTPATIENT
Start: 2019-04-16 | End: 2019-04-16 | Stop reason: HOSPADM

## 2019-04-16 RX ORDER — LIDOCAINE 40 MG/G
CREAM TOPICAL
Status: DISCONTINUED | OUTPATIENT
Start: 2019-04-16 | End: 2019-04-16 | Stop reason: HOSPADM

## 2019-04-16 RX ORDER — PROPOFOL 10 MG/ML
INJECTION, EMULSION INTRAVENOUS PRN
Status: DISCONTINUED | OUTPATIENT
Start: 2019-04-16 | End: 2019-04-16

## 2019-04-16 RX ORDER — LIDOCAINE HYDROCHLORIDE 10 MG/ML
INJECTION, SOLUTION INFILTRATION; PERINEURAL PRN
Status: DISCONTINUED | OUTPATIENT
Start: 2019-04-16 | End: 2019-04-16

## 2019-04-16 RX ORDER — SODIUM CHLORIDE, SODIUM LACTATE, POTASSIUM CHLORIDE, CALCIUM CHLORIDE 600; 310; 30; 20 MG/100ML; MG/100ML; MG/100ML; MG/100ML
INJECTION, SOLUTION INTRAVENOUS CONTINUOUS
Status: DISCONTINUED | OUTPATIENT
Start: 2019-04-16 | End: 2019-04-16 | Stop reason: HOSPADM

## 2019-04-16 RX ADMIN — GLYCOPYRROLATE 0.2 MG: 0.2 INJECTION, SOLUTION INTRAMUSCULAR; INTRAVENOUS at 13:57

## 2019-04-16 RX ADMIN — PROPOFOL 50 MG: 10 INJECTION, EMULSION INTRAVENOUS at 14:03

## 2019-04-16 RX ADMIN — PROPOFOL 50 MG: 10 INJECTION, EMULSION INTRAVENOUS at 14:00

## 2019-04-16 RX ADMIN — SODIUM CHLORIDE, POTASSIUM CHLORIDE, SODIUM LACTATE AND CALCIUM CHLORIDE: 600; 310; 30; 20 INJECTION, SOLUTION INTRAVENOUS at 13:55

## 2019-04-16 RX ADMIN — PROPOFOL 50 MG: 10 INJECTION, EMULSION INTRAVENOUS at 13:57

## 2019-04-16 RX ADMIN — LIDOCAINE HYDROCHLORIDE 100 MG: 10 INJECTION, SOLUTION INFILTRATION; PERINEURAL at 13:57

## 2019-04-16 RX ADMIN — LIDOCAINE HYDROCHLORIDE 0.1 ML: 10 INJECTION, SOLUTION EPIDURAL; INFILTRATION; INTRACAUDAL; PERINEURAL at 12:27

## 2019-04-16 RX ADMIN — TOPICAL ANESTHETIC 1 SPRAY: 200 SPRAY DENTAL; PERIODONTAL at 13:56

## 2019-04-16 RX ADMIN — PROPOFOL 50 MG: 10 INJECTION, EMULSION INTRAVENOUS at 13:58

## 2019-04-16 ASSESSMENT — MIFFLIN-ST. JEOR: SCORE: 1672.07

## 2019-04-16 NOTE — ANESTHESIA CARE TRANSFER NOTE
Patient: Rao Leavitt    Procedure(s):  COMBINED ESOPHAGOSCOPY, GASTROSCOPY, DUODENOSCOPY (EGD), BIOPSY SINGLE OR MULTIPLE    Diagnosis: left upper quadrant pain  diagnostic  Diagnosis Additional Information: No value filed.    Anesthesia Type:   MAC     Note:  Airway :Nasal Cannula  Patient transferred to:Phase II  Handoff Report: Identifed the Patient, Identified the Reponsible Provider, Reviewed the pertinent medical history, Discussed the surgical course, Reviewed Intra-OP anesthesia mangement and issues during anesthesia, Set expectations for post-procedure period and Allowed opportunity for questions and acknowledgement of understanding      Vitals: (Last set prior to Anesthesia Care Transfer)    CRNA VITALS  4/16/2019 1339 - 4/16/2019 1409      4/16/2019             Pulse:  84    SpO2:  99 %                Electronically Signed By: Kike Gutierrez CRNA, APRN CRNA  April 16, 2019  2:09 PM

## 2019-04-16 NOTE — BRIEF OP NOTE
[facility]   Brief Operative Note    Pre-operative diagnosis: left upper quadrant pain  diagnostic   Post-operative diagnosis Normal exam     Procedure: Procedure(s):  COMBINED ESOPHAGOSCOPY, GASTROSCOPY, DUODENOSCOPY (EGD), BIOPSY SINGLE OR MULTIPLE   Surgeon(s): Surgeon(s) and Role:     * Kj Thomas MD - Primary   Estimated blood loss: * No values recorded between 4/16/2019 12:00 AM and 4/16/2019  2:08 PM *    Specimens: ID Type Source Tests Collected by Time Destination   A : test for h pylori Tissue Stomach, Antrum SURGICAL PATHOLOGY EXAM Kj Thomas MD 4/16/2019  2:03 PM       Findings: 1. Normal esophagus, z-line at 40 cm and regular  2. Stomach normal - biopsied for h. Pylori  3. Duodenum normal

## 2019-04-16 NOTE — ANESTHESIA POSTPROCEDURE EVALUATION
Patient: Rao Leavitt    Procedure(s):  COMBINED ESOPHAGOSCOPY, GASTROSCOPY, DUODENOSCOPY (EGD), BIOPSY SINGLE OR MULTIPLE    Diagnosis:left upper quadrant pain  diagnostic  Diagnosis Additional Information: No value filed.    Anesthesia Type:  MAC    Note:  Anesthesia Post Evaluation    Patient location during evaluation: Bedside  Patient participation: Able to fully participate in evaluation  Level of consciousness: awake and alert  Pain management: adequate  Airway patency: patent  Cardiovascular status: acceptable  Respiratory status: acceptable  Hydration status: acceptable  PONV: none     Anesthetic complications: None          Last vitals:  Vitals:    04/16/19 1154   BP: 132/72   Resp: 18   Temp: 36.4  C (97.6  F)   SpO2: 100%         Electronically Signed By: Kike Gutierrez CRNA, APRN CRNA  April 16, 2019  2:09 PM

## 2019-04-18 LAB — COPATH REPORT: NORMAL

## 2019-05-20 ENCOUNTER — HOSPITAL ENCOUNTER (EMERGENCY)
Facility: CLINIC | Age: 29
Discharge: HOME | End: 2019-05-20
Attending: EMERGENCY MEDICINE | Admitting: EMERGENCY MEDICINE
Payer: COMMERCIAL

## 2019-05-20 VITALS
SYSTOLIC BLOOD PRESSURE: 148 MMHG | DIASTOLIC BLOOD PRESSURE: 88 MMHG | RESPIRATION RATE: 16 BRPM | TEMPERATURE: 98.2 F | OXYGEN SATURATION: 99 % | HEART RATE: 91 BPM | WEIGHT: 160 LBS | BODY MASS INDEX: 25.06 KG/M2

## 2019-05-20 DIAGNOSIS — R51.9 ACUTE NONINTRACTABLE HEADACHE, UNSPECIFIED HEADACHE TYPE: ICD-10-CM

## 2019-05-20 PROCEDURE — 99284 EMERGENCY DEPT VISIT MOD MDM: CPT | Mod: 25 | Performed by: EMERGENCY MEDICINE

## 2019-05-20 PROCEDURE — 25000128 H RX IP 250 OP 636: Performed by: EMERGENCY MEDICINE

## 2019-05-20 PROCEDURE — 96361 HYDRATE IV INFUSION ADD-ON: CPT | Performed by: EMERGENCY MEDICINE

## 2019-05-20 PROCEDURE — 96374 THER/PROPH/DIAG INJ IV PUSH: CPT | Performed by: EMERGENCY MEDICINE

## 2019-05-20 PROCEDURE — 99284 EMERGENCY DEPT VISIT MOD MDM: CPT | Mod: Z6 | Performed by: EMERGENCY MEDICINE

## 2019-05-20 RX ADMIN — SODIUM CHLORIDE 1000 ML: 9 INJECTION, SOLUTION INTRAVENOUS at 11:09

## 2019-05-20 RX ADMIN — PROCHLORPERAZINE EDISYLATE 10 MG: 5 INJECTION INTRAMUSCULAR; INTRAVENOUS at 11:09

## 2019-05-20 NOTE — ED AVS SNAPSHOT
[facility] Emergency Department  [address] [address]  Phone:  [phone]  Fax:  [phone]                                    Rao Leavitt   MRN: [id number]    Department:  [facility] Emergency Department   Date of Visit:  5/20/2019           After Visit Summary Signature Page    I have received my discharge instructions, and my questions have been answered. I have discussed any challenges I see with this plan with the nurse or doctor.    ..........................................................................................................................................  Patient/Patient Representative Signature      ..........................................................................................................................................  Patient Representative Print Name and Relationship to Patient    ..................................................               ................................................  Date                                   Time    ..........................................................................................................................................  Reviewed by Signature/Title    ...................................................              ..............................................  Date                                               Time          22EPIC Rev 08/18

## 2019-05-20 NOTE — DISCHARGE INSTRUCTIONS
Return to the emergency department for fevers, repeated vomiting, severe worsening of symptoms, or other concerns.  Otherwise follow-up in clinic.

## 2019-05-20 NOTE — ED PROVIDER NOTES
History     Chief Complaint   Patient presents with     Headache     migraine since 0200     HPI  Rao Leavitt is a 29 year old male who headache.  Pain started about 9 hours prior to arrival. Onset was not sudden, occurred over several hours.  Pain localized to bilateral temporal, without radiation.  Described as throbbing, rated as severe.  Headache has been associated with photophobia.  He does have a history of headaches with past similar episodes.  Does not have fever.  Does not have neck stiffness.  Has taken ibuprofen without relief.  Denies chest pain, shortness of breath, abdominal pain, nausea, vomiting, diarrhea, dysuria, focal weakness or paresthesias.     Allergies:  Allergies   Allergen Reactions     Tramadol Nausea     Vicodin [Hydrocodone-Acetaminophen] Rash       Problem List:    Patient Active Problem List    Diagnosis Date Noted     Bipolar 1 disorder (H) 03/16/2017     Priority: Medium     Generalized anxiety disorder 03/16/2017     Priority: Medium        Past Medical History:    No past medical history on file.    Past Surgical History:    Past Surgical History:   Procedure Laterality Date     ESOPHAGOSCOPY, GASTROSCOPY, DUODENOSCOPY (EGD), COMBINED N/A 4/16/2019    Procedure: COMBINED ESOPHAGOSCOPY, GASTROSCOPY, DUODENOSCOPY (EGD), BIOPSY SINGLE OR MULTIPLE;  Surgeon: Kj Thomas MD;  Location: [facility]     PE tube         Family History:    No family history on file.    Social History:  Marital Status:  Single [1]  Social History     Tobacco Use     Smoking status: Current Every Day Smoker     Packs/day: 0.25     Smokeless tobacco: Current User   Substance Use Topics     Alcohol use: No     Drug use: No        Medications:      ibuprofen (ADVIL/MOTRIN) 200 MG tablet   multivitamin, therapeutic (THERA-VIT) TABS tablet   oxyCODONE (ROXICODONE) 5 MG tablet         Review of Systems  Pertinent positives and negatives listed in the HPI, all other systems reviewed and are  negative.    Physical Exam   BP: 148/88  Pulse: 91  Temp: 98.2  F (36.8  C)  Resp: 16  Weight: 72.6 kg (160 lb)  SpO2: 99 %      Physical Exam   Constitutional: He is oriented to person, place, and time. He appears well-developed and well-nourished. He appears distressed.   HENT:   Head: Normocephalic and atraumatic.   Right Ear: External ear normal.   Left Ear: External ear normal.   Nose: Nose normal.   Eyes: Conjunctivae are normal. No scleral icterus.   Neck: Normal range of motion.   Cardiovascular: Normal rate and regular rhythm.   Pulmonary/Chest: Effort normal. No stridor. No respiratory distress.   Abdominal: Soft. He exhibits no distension.   Neurological: He is alert and oriented to person, place, and time. He displays no tremor. No cranial nerve deficit. He exhibits normal muscle tone. Coordination and gait normal. GCS eye subscore is 4. GCS verbal subscore is 5. GCS motor subscore is 6.   No dysmetria.  No dysdiadochokinesis.  Normal gait.   Skin: Skin is warm and dry. He is not diaphoretic.   Psychiatric: He has a normal mood and affect. His behavior is normal.   Nursing note and vitals reviewed.      ED Course        Procedures               Critical Care time:  none               No results found for this or any previous visit (from the past 24 hour(s)).    Medications   0.9% sodium chloride BOLUS (0 mLs Intravenous Stopped 5/20/19 1207)   prochlorperazine (COMPAZINE) injection 10 mg (10 mg Intravenous Given 5/20/19 1109)       Assessments & Plan (with Medical Decision Making)   29 year old male who presents with headache.  Given his normal neurologic exam, slow onset of headache, and history of similar previous headaches, no indication for head imaging at this time as he is low risk for subarachnoid, epidural, subdural, or intracranial mass.  Without fever, low risk for meningitis, no indication for lumbar puncture at this time.  Will treat with IV fluids and prochlorperazine.  On recheck he is  feeling much better, ambulate without difficulty and feels comfortable going home.  He is discharged with instructions to return if worse, otherwise follow-up in clinic.  The patient is in agreement with this plan.    I have reviewed the nursing notes.    I have reviewed the findings, diagnosis, plan and need for follow up with the patient.          Medication List      There are no discharge medications for this visit.         Final diagnoses:   Acute nonintractable headache, unspecified headache type       5/20/2019   [facility] EMERGENCY DEPARTMENT     Vidal Tong MD  05/20/19 1219

## 2019-05-20 NOTE — LETTER
May 20, 2019      To Whom It May Concern:      Rao Leavitt was seen in our Emergency Department today, 05/20/19.  Please excuse him from work today, 5/20/2019.    Sincerely,        Vidal Tong MD

## 2019-05-30 ENCOUNTER — HOSPITAL ENCOUNTER (EMERGENCY)
Facility: CLINIC | Age: 29
Discharge: HOME | End: 2019-05-30
Attending: NURSE PRACTITIONER | Admitting: NURSE PRACTITIONER
Payer: COMMERCIAL

## 2019-05-30 VITALS
SYSTOLIC BLOOD PRESSURE: 141 MMHG | WEIGHT: 160 LBS | RESPIRATION RATE: 16 BRPM | DIASTOLIC BLOOD PRESSURE: 80 MMHG | BODY MASS INDEX: 25.11 KG/M2 | TEMPERATURE: 97.6 F | OXYGEN SATURATION: 100 % | HEIGHT: 67 IN

## 2019-05-30 DIAGNOSIS — K08.89 PAIN, DENTAL: ICD-10-CM

## 2019-05-30 PROCEDURE — G0463 HOSPITAL OUTPT CLINIC VISIT: HCPCS | Mod: 25 | Performed by: NURSE PRACTITIONER

## 2019-05-30 PROCEDURE — 99213 OFFICE O/P EST LOW 20 MIN: CPT | Mod: 25 | Performed by: NURSE PRACTITIONER

## 2019-05-30 PROCEDURE — 64400 NJX AA&/STRD TRIGEMINAL NRV: CPT | Mod: Z6 | Performed by: NURSE PRACTITIONER

## 2019-05-30 PROCEDURE — 64400 NJX AA&/STRD TRIGEMINAL NRV: CPT | Performed by: NURSE PRACTITIONER

## 2019-05-30 RX ORDER — BUPIVACAINE HYDROCHLORIDE AND EPINEPHRINE 5; 5 MG/ML; UG/ML
INJECTION, SOLUTION PERINEURAL
Status: DISCONTINUED
Start: 2019-05-30 | End: 2019-05-30 | Stop reason: HOSPADM

## 2019-05-30 ASSESSMENT — MIFFLIN-ST. JEOR: SCORE: 1649.39

## 2019-05-30 ASSESSMENT — ENCOUNTER SYMPTOMS
COUGH: 0
FACIAL SWELLING: 0
SORE THROAT: 0
FEVER: 0
WOUND: 0
COLOR CHANGE: 0
SINUS PAIN: 0
TROUBLE SWALLOWING: 0
SHORTNESS OF BREATH: 0
FATIGUE: 0
VOICE CHANGE: 0

## 2019-05-30 NOTE — LETTER
May 30, 2019      To Whom It May Concern:      Rao BLANCAS Raissalauren was seen in our Emergency Department today, 05/30/19.  I expect his condition to improve over the next day.  He may return to work/school when improved.    Sincerely,        JULES Baker CNP

## 2019-05-30 NOTE — ED PROVIDER NOTES
History     Chief Complaint   Patient presents with     Dental Pain     left side dental pain,  saw dentist on tuesday,states he needs root canal. pain increased     HPI  Rao Leavitt is a 29 year old male who presents the urgent care with complaints of dental pain.  Pain is located to the left lower posterior molar.  Patient saw the dentist on Tuesday who recommended a root canal however patient states he cannot afford it.  Patient has been taking Tylenol and ibuprofen with minimal relief of pain.  Pain radiates into the left lower jaw as well.  No difficulty swallowing or decrease in jaw range of motion.    Allergies:  Allergies   Allergen Reactions     Tramadol Nausea     Vicodin [Hydrocodone-Acetaminophen] Rash       Problem List:    Patient Active Problem List    Diagnosis Date Noted     Bipolar 1 disorder (H) 03/16/2017     Priority: Medium     Generalized anxiety disorder 03/16/2017     Priority: Medium        Past Medical History:    History reviewed. No pertinent past medical history.    Past Surgical History:    Past Surgical History:   Procedure Laterality Date     ESOPHAGOSCOPY, GASTROSCOPY, DUODENOSCOPY (EGD), COMBINED N/A 4/16/2019    Procedure: COMBINED ESOPHAGOSCOPY, GASTROSCOPY, DUODENOSCOPY (EGD), BIOPSY SINGLE OR MULTIPLE;  Surgeon: Kj Thomas MD;  Location: [facility]         Family History:    No family history on file.    Social History:  Marital Status:  Single [1]  Social History     Tobacco Use     Smoking status: Current Every Day Smoker     Packs/day: 0.25     Smokeless tobacco: Current User   Substance Use Topics     Alcohol use: No     Drug use: No        Medications:      ibuprofen (ADVIL/MOTRIN) 200 MG tablet   multivitamin, therapeutic (THERA-VIT) TABS tablet   oxyCODONE (ROXICODONE) 5 MG tablet         Review of Systems   Constitutional: Negative for fatigue and fever.   HENT: Positive for dental problem. Negative for drooling, facial swelling, mouth sores,  "sinus pain, sore throat, trouble swallowing and voice change.    Respiratory: Negative for cough and shortness of breath.    Cardiovascular: Negative for chest pain.   Skin: Negative for color change, rash and wound.       Physical Exam   BP: 141/80  Heart Rate: 90  Temp: 97.6  F (36.4  C)  Resp: 16  Height: 170.2 cm (5' 7\")  Weight: 72.6 kg (160 lb)  SpO2: 100 %      Physical Exam   Constitutional: He appears well-developed and well-nourished. He appears distressed.   HENT:   Tenderness with palpation to the left lower posterior molar region. No abscess felt or lesion noted. Teeth are intact.    Cardiovascular: Normal rate and regular rhythm.   Pulmonary/Chest: Effort normal and breath sounds normal. No respiratory distress.   Musculoskeletal: Normal range of motion.   Tenderness when palpating the jaw along the left lower area near the sore molar   Skin: Skin is warm. Capillary refill takes less than 2 seconds. No erythema.       ED Course        Procedures               No results found for this or any previous visit (from the past 24 hour(s)).    Medications - No data to display    Assessments & Plan (with Medical Decision Making)   Patient is a 29 year old male who presents to the urgent care for complaints of dental pain. Patient exhibits pain with palpation to the left lower posterior molar. There is no sign of infection, abscess or lesions. Dentition is intact. Dental block completed to this area using bupivacaine 0.5% with epi. Patient tolerated the procedure well and denotes improvement in pain symptoms. Discussed with patient that this will be temporary relief and that he still needs to follow up with dental. Patient provided many dental resources.  Patient should return with any new or worsening symptoms.  Return precautions reviewed with patient, agreeable to plan of care, all questions answered.  Patient in no distress upon discharge.  I have reviewed the nursing notes.    I have reviewed the findings, " diagnosis, plan and need for follow up with the patient.        Medication List      There are no discharge medications for this visit.         Final diagnoses:   Pain, dental       5/30/2019   [facility] EMERGENCY DEPARTMENT     Erica Calhoun, APRN CNP  05/30/19 5882

## 2019-05-30 NOTE — DISCHARGE INSTRUCTIONS
Many of these clinics offer a sliding fee option for patients that qualify, and see patients on a walk-in or same day basis. Please call each clinic directly. As services, hours, fees and policies vary greatly.          [facility], [facility]  [phone]  Sees no insurance  [facility], [place]  [phone]  Preventive services only  [facility] (mult loc) [phone]  [facility]    ([facility]  [phone]  [facility], [place]       [phone]  Preventive services only  [facility]  [phone]  Accepts MA & sees no ins  [facility],      Accepts MA & sees no ins   [place]   [phone]; [phone]  [facility], [facility]   Accepts MA & sees no ins       [phone]  [facility], [facility]  [phone]   Accepts MA emergencies  [facility], [place] [phone]  [facility],     Accepts MA   [place]   [phone]    [facility] [phone]  Accepts MA & sees no ins   [facility]    [phone]  [facility], [facility]  [phone]   [facility] [phone]  [facility]  Preventive services only   [place]   [phone]  [facility] [phone]  [facility], [place]  [phone]  Same day [facility] [phone]  Same day [facility],      Same day [facility]   [phone]    [facility], [facility] [phone]  Free [facility], walk-in only  [facility] (multiple locations) [phone]      [facility] , [facility] [phone]    [facility] [phone]  Free clinic, walk-in only  [place] [facility]  [phone]  [facility] [phone] (adults)       [phone]  (children)  [facility] [phone]    Also, referral service for low cost dental and healthcare: [phone]  And [phone]

## 2019-06-19 ENCOUNTER — APPOINTMENT (OUTPATIENT)
Dept: GENERAL RADIOLOGY | Facility: CLINIC | Age: 29
End: 2019-06-19
Attending: NURSE PRACTITIONER
Payer: COMMERCIAL

## 2019-06-19 ENCOUNTER — HOSPITAL ENCOUNTER (EMERGENCY)
Facility: CLINIC | Age: 29
Discharge: HOME | End: 2019-06-19
Attending: NURSE PRACTITIONER | Admitting: NURSE PRACTITIONER
Payer: COMMERCIAL

## 2019-06-19 VITALS
OXYGEN SATURATION: 100 % | WEIGHT: 160 LBS | HEIGHT: 67 IN | BODY MASS INDEX: 25.11 KG/M2 | DIASTOLIC BLOOD PRESSURE: 77 MMHG | RESPIRATION RATE: 16 BRPM | TEMPERATURE: 98.6 F | SYSTOLIC BLOOD PRESSURE: 159 MMHG

## 2019-06-19 DIAGNOSIS — M94.0 COSTOCHONDRITIS: ICD-10-CM

## 2019-06-19 PROCEDURE — 99213 OFFICE O/P EST LOW 20 MIN: CPT | Mod: Z6 | Performed by: NURSE PRACTITIONER

## 2019-06-19 PROCEDURE — 71101 X-RAY EXAM UNILAT RIBS/CHEST: CPT | Mod: LT

## 2019-06-19 PROCEDURE — G0463 HOSPITAL OUTPT CLINIC VISIT: HCPCS | Performed by: NURSE PRACTITIONER

## 2019-06-19 ASSESSMENT — ENCOUNTER SYMPTOMS
COUGH: 0
FATIGUE: 0
CHEST TIGHTNESS: 0
FEVER: 0
MYALGIAS: 1
DIZZINESS: 0
NUMBNESS: 0
WEAKNESS: 0
WOUND: 0
CHILLS: 0
CARDIOVASCULAR NEGATIVE: 1
NECK STIFFNESS: 0
LIGHT-HEADEDNESS: 0
SHORTNESS OF BREATH: 0
STRIDOR: 0
HEADACHES: 0
NECK PAIN: 1
ACTIVITY CHANGE: 0
WHEEZING: 0
COLOR CHANGE: 0

## 2019-06-19 ASSESSMENT — MIFFLIN-ST. JEOR: SCORE: 1649.39

## 2019-06-19 NOTE — ED AVS SNAPSHOT
[facility] Emergency Department  [address] [address]  Phone:  [phone]  Fax:  [phone]                                    Rao Leavitt   MRN: [id number]    Department:  [facility] Emergency Department   Date of Visit:  6/19/2019           After Visit Summary Signature Page    I have received my discharge instructions, and my questions have been answered. I have discussed any challenges I see with this plan with the nurse or doctor.    ..........................................................................................................................................  Patient/Patient Representative Signature      ..........................................................................................................................................  Patient Representative Print Name and Relationship to Patient    ..................................................               ................................................  Date                                   Time    ..........................................................................................................................................  Reviewed by Signature/Title    ...................................................              ..............................................  Date                                               Time          22EPIC Rev 08/18

## 2019-06-19 NOTE — LETTER
June 19, 2019      To Whom It May Concern:      Rao BLANCAS Raissalauren was seen in our Emergency Department today, 06/19/19.  I expect his condition to improve over the next 2 days.  He may return to work/school when improved.    Sincerely,        JULES Baker CNP

## 2019-06-19 NOTE — ED PROVIDER NOTES
History     Chief Complaint   Patient presents with     Shoulder Pain     has been having for 12 hours, worse with movement,     HPI  Rao Leavitt is a 29 year old male who presents to the urgent care with complaints of right sided chest pain. Patient has been wrestling with him brother lately and woke up this am with right sided chest pain. Pain is located around the right pectoral area and occasionally radiates up into the right side of the neck. Complaints of decreased ROM to the right arm due to the pain. Denies any numbness or tingling, shortness of breath/difficulty breathing. Has tried resting, ibuprofen and tylenol with minimal relief.     Allergies:  Allergies   Allergen Reactions     Tramadol Nausea     Vicodin [Hydrocodone-Acetaminophen] Rash       Problem List:    Patient Active Problem List    Diagnosis Date Noted     Bipolar 1 disorder (H) 03/16/2017     Priority: Medium     Generalized anxiety disorder 03/16/2017     Priority: Medium        Past Medical History:    History reviewed. No pertinent past medical history.    Past Surgical History:    Past Surgical History:   Procedure Laterality Date     ESOPHAGOSCOPY, GASTROSCOPY, DUODENOSCOPY (EGD), COMBINED N/A 4/16/2019    Procedure: COMBINED ESOPHAGOSCOPY, GASTROSCOPY, DUODENOSCOPY (EGD), BIOPSY SINGLE OR MULTIPLE;  Surgeon: Kj Thomas MD;  Location: [facility]     PE tube         Family History:    No family history on file.    Social History:  Marital Status:  Single [1]  Social History     Tobacco Use     Smoking status: Current Every Day Smoker     Packs/day: 0.25     Smokeless tobacco: Current User   Substance Use Topics     Alcohol use: No     Drug use: No        Medications:      ibuprofen (ADVIL/MOTRIN) 200 MG tablet   multivitamin, therapeutic (THERA-VIT) TABS tablet   oxyCODONE (ROXICODONE) 5 MG tablet         Review of Systems   Constitutional: Negative for activity change, chills, fatigue and fever.   Respiratory: Negative for  "cough, chest tightness, shortness of breath, wheezing and stridor.    Cardiovascular: Negative.  Negative for chest pain.   Musculoskeletal: Positive for myalgias and neck pain. Negative for neck stiffness.   Skin: Negative for color change, pallor and wound.   Neurological: Negative for dizziness, weakness, light-headedness, numbness and headaches.       Physical Exam   BP: 159/77  Heart Rate: 78  Temp: 98.6  F (37  C)  Resp: 16  Height: 170.2 cm (5' 7\")  Weight: 72.6 kg (160 lb)  SpO2: 100 %      Physical Exam   Constitutional: He appears well-developed and well-nourished. No distress.   Neck: Neck supple. Muscular tenderness present. No spinous process tenderness present. Decreased range of motion present.   Cardiovascular: Normal rate and regular rhythm.   Pulmonary/Chest: Effort normal and breath sounds normal. No tachypnea. No respiratory distress. He has no decreased breath sounds. Chest wall is not dull to percussion. He exhibits tenderness. He exhibits no bony tenderness, no laceration, no crepitus, no edema, no deformity and no swelling.       Musculoskeletal:   Decrease right arm ROM due to right pectoral chest pain. CMS intact. No bony deformity.    Skin: He is not diaphoretic.       ED Course        Procedures          Results for orders placed or performed during the hospital encounter of 06/19/19 (from the past 24 hour(s))   Ribs XR, unilat 3 views + PA chest,  left    Narrative    CHEST AND RIGHT RIBS 5 VIEWS   6/19/2019 7:22 PM     HISTORY: right sided rib pain    COMPARISON: None.      Impression    IMPRESSION: Normal.    OANH VARGHESE MD       Medications - No data to display    Assessments & Plan (with Medical Decision Making)   Patient is a 29 year old male who presents to the urgent care for evaluation of right sided chest pain. Patient with tenderness to palpation along the upper right anterior chest (see picture above). Rib xray normal. Patient educated about costochondritis. Provided note " for work. May use over the counter medications as needed. Patient to return with any new or worsening symptoms. Patient agreeable to plan of care, all questions answered. Patient ambulatory, appearing comfortable and in no distress upon discharge.   I have reviewed the nursing notes.    I have reviewed the findings, diagnosis, plan and need for follow up with the patient.       Medication List      There are no discharge medications for this visit.         Final diagnoses:   Costochondritis       6/19/2019   [facility] EMERGENCY DEPARTMENT     Erica Calhoun, JULES CNP  06/19/19 2027

## 2019-07-03 ENCOUNTER — OFFICE VISIT (OUTPATIENT)
Dept: FAMILY MEDICINE | Facility: CLINIC | Age: 29
End: 2019-07-03
Payer: COMMERCIAL

## 2019-07-03 VITALS
RESPIRATION RATE: 14 BRPM | HEART RATE: 67 BPM | SYSTOLIC BLOOD PRESSURE: 118 MMHG | BODY MASS INDEX: 25.8 KG/M2 | OXYGEN SATURATION: 97 % | WEIGHT: 164.7 LBS | DIASTOLIC BLOOD PRESSURE: 66 MMHG | TEMPERATURE: 99.1 F

## 2019-07-03 DIAGNOSIS — S62.91XK: ICD-10-CM

## 2019-07-03 DIAGNOSIS — Z01.818 PREOP GENERAL PHYSICAL EXAM: Primary | ICD-10-CM

## 2019-07-03 PROCEDURE — 99214 OFFICE O/P EST MOD 30 MIN: CPT | Performed by: INTERNAL MEDICINE

## 2019-07-03 RX ORDER — OXYCODONE AND ACETAMINOPHEN 5; 325 MG/1; MG/1
1 TABLET ORAL EVERY 6 HOURS PRN
Qty: 6 TABLET | Refills: 0 | Status: SHIPPED | OUTPATIENT
Start: 2019-07-03 | End: 2019-07-11

## 2019-07-03 NOTE — PROGRESS NOTES
[facility] PRACTICE  [address] [address]  [phone]  Dept: [phone]    PRE-OP EVALUATION:  Today's date: 7/3/2019  Chief Complaint   Patient presents with     Pre-Op Exam     DOS 19, TC Ortho       Rao Leaivtt (: 1990) presents for pre-operative evaluation assessment as requested by Dr. Bonifacio Hernandez.  He requires evaluation and anesthesia risk assessment prior to undergoing surgery/procedure for treatment of broken fingers .    Proposed Surgery/ Procedure: ORIF right hand 4th and 5thmetacrpal  Date of Surgery/ Procedure: 19  Time of Surgery/ Procedure: 8:00  Hospital/Surgical Facility: [facility]  Fax number for surgical facility: [phone]  Primary Physician: Brandon Sabillon  Type of Anesthesia Anticipated: General    Patient has a Health Care Directive or Living Will:  NO    1. NO - Do you have a history of heart attack, stroke, stent, bypass or surgery on an artery in the head, neck, heart or legs?  2. NO - Do you ever have any pain or discomfort in your chest?  3. NO - Do you have a history of  Heart Failure?  4. NO - Are you troubled by shortness of breath when: walking on the level, up a slight hill or at night?  5. NO - Do you currently have a cold, bronchitis or other respiratory infection?  6. NO - Do you have a cough, shortness of breath or wheezing?  7. NO - Do you sometimes get pains in the calves of your legs when you walk?  8. NO - Do you or anyone in your family have previous history of blood clots?  9. NO - Do you or does anyone in your family have a serious bleeding problem such as prolonged bleeding following surgeries or cuts?  10. NO - Have you ever had problems with anemia or been told to take iron pills?  11. NO - Have you had any abnormal blood loss such as black, tarry or bloody stools, or abnormal vaginal bleeding?  12. NO - Have you ever had a blood transfusion?  13. NO  - Have you or any of your relatives ever had problems with anesthesia?  14. NO - Do you have sleep apnea, excessive snoring or daytime drowsiness?  15. NO - Do you have any prosthetic heart valves?  16. NO - Do you have prosthetic joints?  17. NO - Is there any chance that you may be pregnant?      HPI:     HPI related to upcoming procedure:     Philipp was seen in the ED on 6/30 after punching a fence the night prior and was found to have R 4th and 5th fracture/dislocation as noted in CT findings below.  Surgical repair is planned.      He has only a couple Percocet left- he is taking one every 6 hours.  Trying to use ibuprofen more, but he now has to hold that pre-op.      CT Head Right WO:   1. Disruption fifth CMC joint with dorsal dislocation metacarpal base impacted on dorsal hamate.  2. Associated comminuted fracture dorsal cortex hamate with several small displaced bone fragments.  3. Mildly displaced and moderately angulated fracture fourth metacarpal shaft. Fourth metacarpal base is mildly subluxed radially without yahir dislocation.    See problem list for active medical problems.  Problems all longstanding and stable, except as noted/documented.  See ROS for pertinent symptoms related to these conditions.      MEDICAL HISTORY:     Patient Active Problem List    Diagnosis Date Noted     Bipolar 1 disorder (H) 03/16/2017     Priority: Medium     Generalized anxiety disorder 03/16/2017     Priority: Medium      History reviewed. No pertinent past medical history.  Past Surgical History:   Procedure Laterality Date     ESOPHAGOSCOPY, GASTROSCOPY, DUODENOSCOPY (EGD), COMBINED N/A 4/16/2019    Procedure: COMBINED ESOPHAGOSCOPY, GASTROSCOPY, DUODENOSCOPY (EGD), BIOPSY SINGLE OR MULTIPLE;  Surgeon: Kj Thomas MD;  Location: [facility]     PE tube       Current Outpatient Medications   Medication Sig Dispense Refill     multivitamin, therapeutic (THERA-VIT) TABS tablet Take 1 tablet by mouth daily        oxyCODONE-Acetaminophen (PERCOCET PO)        ibuprofen (ADVIL/MOTRIN) 200 MG tablet Take 600 mg by mouth every 8 hours as needed for mild pain       oxyCODONE (ROXICODONE) 5 MG tablet Take 1 tablet (5 mg) by mouth every 6 hours as needed for pain 4 tablet 0     OTC products: None, except as noted above    Allergies   Allergen Reactions     Tramadol Nausea     Vicodin [Hydrocodone-Acetaminophen] Rash      Latex Allergy: NO    Social History     Tobacco Use     Smoking status: Current Every Day Smoker     Packs/day: 0.25     Smokeless tobacco: Current User   Substance Use Topics     Alcohol use: No     History   Drug Use No       REVIEW OF SYSTEMS:   Constitutional, neuro, ENT, endocrine, pulmonary, cardiac, gastrointestinal, genitourinary, musculoskeletal, integument and psychiatric systems are negative, except as otherwise noted.    EXAM:   /66 (BP Location: Left arm, Patient Position: Sitting, Cuff Size: Adult Regular)   Pulse 67   Temp 99.1  F (37.3  C) (Tympanic)   Resp 14   Wt 74.7 kg (164 lb 11.2 oz)   SpO2 97%   BMI 25.80 kg/m        GENERAL APPEARANCE: healthy, alert and no distress     HENT: normal ear canals and TMs, nose and mouth without ulcers or lesions     NECK: no adenopathy, no asymmetry, masses, or scars     RESP: lungs clear to auscultation - no rales, rhonchi or wheezes     CV: regular rates and rhythm, normal S1 S2, no S3 or S4 and no murmur, click or rub -     ABDOMEN:  soft, nontender, no HSM or masses and bowel sounds normal     MS: right forearm and hand wrapped in splint       NEURO: mentation intact and speech normal     PSYCH: mentation appears normal. and affect normal/bright     LYMPHATICS: No cervical or supraclavicular nodes      DIAGNOSTICS:       Recent Labs   Lab Test 04/15/19  0630 03/20/19  0600   HGB 15.1 15.0    301    143   POTASSIUM 4.2 4.0   CR 0.80 0.78        IMPRESSION:   Reason for surgery/procedure: ORIF of Right hand  fracture/dislocation  Diagnosis/reason for consult: pre-op eval    The proposed surgical procedure is considered INTERMEDIATE risk.    REVISED CARDIAC RISK INDEX  The patient has the following serious cardiovascular risks for perioperative complications such as (MI, PE, VFib and 3  AV Block):  No serious cardiac risks  INTERPRETATION: 0 risks: Class I (very low risk - 0.4% complication rate)    The patient has the following additional risks for perioperative complications:  No identified additional risks      ICD-10-CM    1. Preop general physical exam Z01.818    2. 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     RECOMMENDATIONS:       Recommended smoking cessation; he declined assistance at this time.     --Patient is to take all scheduled medications on the day of surgery EXCEPT for modifications listed below.    Anticoagulant or Antiplatelet Medication Use  NSAIDS: Ibuprofen (Motrin):         Stop two days prior to surgery per pre-surgical paperwork        APPROVAL GIVEN to proceed with proposed procedure, without further diagnostic evaluation       Signed Electronically by: Eze Pruitt MD    Copy of this evaluation report is provided to requesting physician.    [place] Preop Guidelines    Revised Cardiac Risk Index

## 2019-07-03 NOTE — PATIENT INSTRUCTIONS
Avoid ibuprofen for 48 hours prior to surgery, avoid naproxen for 3 days prior, and avoid products containing aspirin for 1 week before surgery.  Tylenol is okay to take for pain if needed.       Before Your Surgery      Call your surgeon if there is any change in your health. This includes signs of a cold or flu (such as a sore throat, runny nose, cough, rash or fever).    Do not smoke, drink alcohol or take over the counter medicine (unless your surgeon or primary care doctor tells you to) for the 24 hours before and after surgery.    If you take prescribed drugs: Follow your doctor s orders about which medicines to take and which to stop until after surgery.    Eating and drinking prior to surgery: follow the instructions from your surgeon    Take a shower or bath the night before surgery. Use the soap your surgeon gave you to gently clean your skin. If you do not have soap from your surgeon, use your regular soap. Do not shave or scrub the surgery site.  Wear clean pajamas and have clean sheets on your bed.

## 2019-07-11 ENCOUNTER — OFFICE VISIT (OUTPATIENT)
Dept: FAMILY MEDICINE | Facility: CLINIC | Age: 29
End: 2019-07-11
Payer: COMMERCIAL

## 2019-07-11 VITALS
BODY MASS INDEX: 26.27 KG/M2 | TEMPERATURE: 98.6 F | SYSTOLIC BLOOD PRESSURE: 134 MMHG | RESPIRATION RATE: 16 BRPM | HEART RATE: 81 BPM | WEIGHT: 167.74 LBS | DIASTOLIC BLOOD PRESSURE: 70 MMHG | OXYGEN SATURATION: 98 %

## 2019-07-11 DIAGNOSIS — L03.113 CELLULITIS OF RIGHT UPPER EXTREMITY: Primary | ICD-10-CM

## 2019-07-11 DIAGNOSIS — S62.91XK: ICD-10-CM

## 2019-07-11 PROCEDURE — 99214 OFFICE O/P EST MOD 30 MIN: CPT | Mod: 25 | Performed by: FAMILY MEDICINE

## 2019-07-11 PROCEDURE — 96372 THER/PROPH/DIAG INJ SC/IM: CPT | Performed by: FAMILY MEDICINE

## 2019-07-11 RX ORDER — CEFTRIAXONE SODIUM 1 G
1 VIAL (EA) INJECTION ONCE
Status: COMPLETED | OUTPATIENT
Start: 2019-07-11 | End: 2019-07-11

## 2019-07-11 RX ORDER — CEPHALEXIN 500 MG/1
500 CAPSULE ORAL 4 TIMES DAILY
Qty: 20 CAPSULE | Refills: 0 | Status: SHIPPED | OUTPATIENT
Start: 2019-07-11 | End: 2019-08-22

## 2019-07-11 RX ORDER — OXYCODONE AND ACETAMINOPHEN 5; 325 MG/1; MG/1
1 TABLET ORAL EVERY 6 HOURS PRN
Qty: 20 TABLET | Refills: 0 | Status: SHIPPED | OUTPATIENT
Start: 2019-07-11 | End: 2019-09-05

## 2019-07-11 RX ADMIN — Medication 1 G: at 14:36

## 2019-07-11 NOTE — PATIENT INSTRUCTIONS
(L03.113) Cellulitis of right upper extremity  (primary encounter diagnosis)  Comment:   Plan: cefTRIAXone (ROCEPHIN) injection 1 g,         cephALEXin (KEFLEX) 500 MG capsule        We discussed the infection of the wound area. I spoke with Dr. Hernandez, his surgeon and we will start Rocephin at 1 gram IM today in clinic, and then start Keflex at 500 mg pills, 2 pills now then one pill 4 times daily for a total of 5 days. Call if any side effects. The dressing of the hand is done.     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 He will go to the [place] office of [facility], at [address], right across from [facility] [place].   The phone os [phone], and the contact is Lubna. He would like to see you at 1 pm. Call if the time needs to be changed.

## 2019-07-11 NOTE — PROGRESS NOTES
Subjective     Rao Leavitt is a 29 year old male who presents to clinic today for the following health issues:    HPI   ED/UC Followup:    Facility:  [facility] ED  Date of visit: 7/5/2019  Reason for visit: Broken metacarpal  Current Status: Patient states his surgery was on Friday 7/5/2019. Patient states his pain has really increased and the swelling of his fingers has gone up too. Patient states he is taking Percocet right now and that is not even touching his pain.           Current Outpatient Medications:      ibuprofen (ADVIL/MOTRIN) 200 MG tablet, Take 600 mg by mouth every 8 hours as needed for mild pain, Disp: , Rfl:      multivitamin, therapeutic (THERA-VIT) TABS tablet, Take 1 tablet by mouth daily, Disp: , Rfl:      oxyCODONE-acetaminophen (PERCOCET) 5-325 MG tablet, Take 1 tablet by mouth every 6 hours as needed for moderate to severe pain, Disp: 6 tablet, Rfl: 0    Patient Active Problem List   Diagnosis     Bipolar 1 disorder (H)     Generalized anxiety disorder       Blood pressure 134/70, pulse 81, temperature 98.6  F (37  C), temperature source Tympanic, resp. rate 16, weight 76.1 kg (167 lb 11.8 oz), SpO2 98 %.    Exam:  GENERAL APPEARANCE: healthy, alert and no distress  EYES: EOMI,  PERRL  CV: regular rates and rhythm, normal S1 S2, no S3 or S4 and no murmur, click or rub -  MS: there is a pin protruding from the dorsum of the right hand; a splint is present.   SKIN: cellulitis of the right  Hand around the incision about 2-3 cm on each side, is noted. No purulence or lymphangitic streaking.   PSYCH: mentation appears normal and affect normal/bright    (L03.113) Cellulitis of right upper extremity  (primary encounter diagnosis)  Comment:   Plan: cefTRIAXone (ROCEPHIN) injection 1 g,         cephALEXin (KEFLEX) 500 MG capsule        We discussed the infection of the wound area. I spoke with Dr. Hernandez, his surgeon and we will start Rocephin at 1 gram IM today in clinic, and then  start Keflex at 500 mg pills, 2 pills now then one pill 4 times daily for a total of 5 days. Call if any side effects. The dressing of the hand is done.     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 He will go to the [place] office of [facility], at [address], right across from [facility].   The phone os [phone], and the contact is Lubna. He would like to see you at 1 pm. Call if the time needs to be changed.       Kj Ellison

## 2019-07-11 NOTE — PROGRESS NOTES
Clinic Administered Medication Documentation      Injectable Medication Documentation    Patient was given Ceftriaxone Sodium (Rocephin). Prior to medication administration, verified patients identity using patient s name and date of birth. Please see MAR and medication order for additional information. Patient instructed to remain in clinic for 15 minutes and report any adverse reaction to staff immediately .      Was entire vial of medication used? Yes  Vial/Syringe: Single dose vial  Expiration Date:  01/2021  Was this medication supplied by the patient? No     Patient tolerated injection with no adverse effects noted at this time.     Mitra Mills RN

## 2019-07-14 ENCOUNTER — NURSE TRIAGE (OUTPATIENT)
Dept: NURSING | Facility: CLINIC | Age: 29
End: 2019-07-14

## 2019-07-14 ENCOUNTER — HOSPITAL ENCOUNTER (EMERGENCY)
Facility: CLINIC | Age: 29
Discharge: HOME | End: 2019-07-14
Attending: PHYSICIAN ASSISTANT | Admitting: PHYSICIAN ASSISTANT
Payer: COMMERCIAL

## 2019-07-14 VITALS
WEIGHT: 165 LBS | HEIGHT: 67 IN | BODY MASS INDEX: 25.9 KG/M2 | SYSTOLIC BLOOD PRESSURE: 150 MMHG | OXYGEN SATURATION: 99 % | RESPIRATION RATE: 18 BRPM | TEMPERATURE: 98 F | DIASTOLIC BLOOD PRESSURE: 90 MMHG

## 2019-07-14 DIAGNOSIS — G89.18 POST-OP PAIN: ICD-10-CM

## 2019-07-14 PROCEDURE — G0463 HOSPITAL OUTPT CLINIC VISIT: HCPCS | Performed by: PHYSICIAN ASSISTANT

## 2019-07-14 PROCEDURE — 99214 OFFICE O/P EST MOD 30 MIN: CPT | Mod: Z6 | Performed by: PHYSICIAN ASSISTANT

## 2019-07-14 RX ORDER — OXYCODONE AND ACETAMINOPHEN 5; 325 MG/1; MG/1
1 TABLET ORAL EVERY 6 HOURS PRN
Qty: 2 TABLET | Refills: 0 | Status: SHIPPED | OUTPATIENT
Start: 2019-07-14 | End: 2019-09-05

## 2019-07-14 ASSESSMENT — ENCOUNTER SYMPTOMS
WEAKNESS: 0
NUMBNESS: 0

## 2019-07-14 ASSESSMENT — MIFFLIN-ST. JEOR: SCORE: 1672.07

## 2019-07-14 NOTE — TELEPHONE ENCOUNTER
"S: Calling about R finger and hand pain.  B: On 6/29 sustained a displaced fracture of shaft of fourth metacarpal bone, right hand and closed dislocation of fifth metacarpal bone of right hand.  Had surgery on fourth finger with a plate and screw.  Woke up with severe pain in R hand.  Saw ortho provider on 7/12 at [facility] ([facility]  Wearing a splint. CMS intact sleeps with hand on pillow. All fingers are swollen.  Can't see incision due to splint.  A: Per care guideline advised to see provider today.  R:  Rao will go to [facility] walk in clinic to have hand checked.  Britney Aguirre RN, [facility]    Reason for Disposition    [1] SEVERE pain (e.g., excruciating, unable to use hand at all) AND [2] not improved after 2 hours of pain medicine    Additional Information    Negative: [1] Similar pain previously AND [2] it was from \"heart attack\"    Negative: [1] Similar pain previously AND [2] it was from \"angina\" AND [3] not relieved by nitroglycerin    Negative: Sounds like a life-threatening emergency to the triager    Protocols used: HAND AND WRIST PAIN-A-AH      "

## 2019-07-14 NOTE — ED AVS SNAPSHOT
[facility] Emergency Department  [address] [address]  Phone:  [phone]  Fax:  [phone]                                    Rao Leavitt   MRN: [id number]    Department:  [facility] Emergency Department   Date of Visit:  7/14/2019           After Visit Summary Signature Page    I have received my discharge instructions, and my questions have been answered. I have discussed any challenges I see with this plan with the nurse or doctor.    ..........................................................................................................................................  Patient/Patient Representative Signature      ..........................................................................................................................................  Patient Representative Print Name and Relationship to Patient    ..................................................               ................................................  Date                                   Time    ..........................................................................................................................................  Reviewed by Signature/Title    ...................................................              ..............................................  Date                                               Time          22EPIC Rev 08/18

## 2019-07-15 NOTE — ED PROVIDER NOTES
History     Chief Complaint   Patient presents with     Medication Refill     pt had right hand surgery from  ortho 1.5 weeks ago.  pt seen primary provider on Wednesday.  pt took last percocet this morning and needs refill.      CALLI Leavitt is a 29 year old male who presents the urgent care today for persistent pain after he had right hand surgery from [facility] 1/2 weeks ago.  Patient was then seen by his provider 5 days ago and given Keflex for cellulitis.  Patient states he has not checked his surgical sites since he was seen on Wednesday and has kept the splint on the entire time.  Patient denies fever, numbness, tingling, red streaking of the arm, pain out of proportion, swelling or drainage.  Patient does state though he ran out of his Percocet and would like some more due to persistent pain.    Allergies:  Allergies   Allergen Reactions     Tramadol Nausea     Vicodin [Hydrocodone-Acetaminophen] Rash       Problem List:    Patient Active Problem List    Diagnosis Date Noted     Bipolar 1 disorder (H) 03/16/2017     Priority: Medium     Generalized anxiety disorder 03/16/2017     Priority: Medium        Past Medical History:    No past medical history on file.    Past Surgical History:    Past Surgical History:   Procedure Laterality Date     ESOPHAGOSCOPY, GASTROSCOPY, DUODENOSCOPY (EGD), COMBINED N/A 4/16/2019    Procedure: COMBINED ESOPHAGOSCOPY, GASTROSCOPY, DUODENOSCOPY (EGD), BIOPSY SINGLE OR MULTIPLE;  Surgeon: Kj Thomas MD;  Location: [facility]     PE tube         Family History:    No family history on file.    Social History:  Marital Status:  Single [1]  Social History     Tobacco Use     Smoking status: Current Every Day Smoker     Packs/day: 0.25     Smokeless tobacco: Current User   Substance Use Topics     Alcohol use: No     Drug use: No        Medications:      oxyCODONE-acetaminophen (PERCOCET) 5-325 MG tablet   cephALEXin (KEFLEX) 500 MG capsule   ibuprofen  "(ADVIL/MOTRIN) 200 MG tablet   multivitamin, therapeutic (THERA-VIT) TABS tablet   oxyCODONE-acetaminophen (PERCOCET) 5-325 MG tablet         Review of Systems   Musculoskeletal:        Right hand surgery with post op pain and recheck of cellulitis.    Neurological: Negative for weakness and numbness.   All other systems reviewed and are negative.      Physical Exam   BP: 150/90  Heart Rate: 96  Temp: 98  F (36.7  C)  Resp: 18  Height: 170.2 cm (5' 7\")  Weight: 74.8 kg (165 lb)  SpO2: 99 %      Physical Exam   Constitutional: He is oriented to person, place, and time. He appears well-developed and well-nourished. No distress.   Cardiovascular: Intact distal pulses.   Musculoskeletal:        Right wrist: He exhibits decreased range of motion. He exhibits no tenderness, no bony tenderness, no swelling, no effusion, no crepitus, no deformity and no laceration.        Right hand: He exhibits decreased range of motion, tenderness, bony tenderness and swelling. He exhibits normal two-point discrimination and normal capillary refill. Normal sensation noted. Decreased strength noted.   Splint removed in office today with patient wrist and hand held in slightly flexed position of the wrist and refused to move the wrist or hand stating that he has not been able to since the surgery.  Patient with full sensation in hand and fingers.  Positive bruising swelling noted to the dorsum of the hand however no erythema, or purulent drainage noted over surgical sites.   Neurological: He is alert and oriented to person, place, and time. No sensory deficit. GCS eye subscore is 4. GCS verbal subscore is 5. GCS motor subscore is 6.   Skin: Skin is warm and dry. Capillary refill takes less than 2 seconds. Bruising noted. No rash noted. He is not diaphoretic. No erythema. No pallor.        Psychiatric: He has a normal mood and affect. His behavior is normal. Judgment and thought content normal.   Nursing note and vitals reviewed.      ED " Course        Procedures  Splint removed in office today to examine right hand especially since he is currently on Keflex for cellulitis.  See exam findings above.  Patient resplinted in office with no complications and kept his wrist flexed stating that it has been in that position since surgery.  Patient neurovascularly intact post splint application.            Critical Care time:  none               No results found for this or any previous visit (from the past 24 hour(s)).    Medications - No data to display    Assessments & Plan (with Medical Decision Making)     I have reviewed the nursing notes.    I have reviewed the findings, diagnosis, plan and need for follow up with the patient.   29-year-old male presents the urgent care with surgery to right hand that occurred 1/2 weeks ago.  Patient was seen and evaluated by his primary care provider 5 days ago and put on Keflex for cellulitis.  Patient here today for more Percocet because he ran out of it and recheck of the cellulitis and hand.  Patient states he does have an appointment with his orthopedic specialist on the 18th.  See exam findings above.  Patient given 2 tablets of Percocet given through today and was told he needs to follow-up with his orthopedic specialist for further pain medication.  Patient to keep splint on and ice, rest, elevate the hand.  Patient given discharge paperwork and discharged in stable condition.  No concerns at this time for worsening cellulitis or DVT.        Medication List      Modified    * oxyCODONE-acetaminophen 5-325 MG tablet  Commonly known as:  PERCOCET  1 tablet, Oral, EVERY 6 HOURS PRN  What changed:  Another medication with the same name was added. Make sure you understand how and when to take each.     * oxyCODONE-acetaminophen 5-325 MG tablet  Commonly known as:  PERCOCET  1 tablet, Oral, EVERY 6 HOURS PRN  What changed:  You were already taking a medication with the same name, and this prescription was added.  Make sure you understand how and when to take each.         * This list has 2 medication(s) that are the same as other medications prescribed for you. Read the directions carefully, and ask your doctor or other care provider to review them with you.                Final diagnoses:   Post-op pain - with recheck cellulitis       7/14/2019   [facility] EMERGENCY DEPARTMENT     Aurora Martinez PA-C  07/14/19 [id number]

## 2019-07-17 ENCOUNTER — OFFICE VISIT (OUTPATIENT)
Dept: FAMILY MEDICINE | Facility: CLINIC | Age: 29
End: 2019-07-17
Payer: COMMERCIAL

## 2019-07-17 VITALS
BODY MASS INDEX: 25.9 KG/M2 | HEART RATE: 87 BPM | RESPIRATION RATE: 14 BRPM | HEIGHT: 67 IN | TEMPERATURE: 97.8 F | OXYGEN SATURATION: 98 % | SYSTOLIC BLOOD PRESSURE: 118 MMHG | WEIGHT: 165 LBS | DIASTOLIC BLOOD PRESSURE: 60 MMHG

## 2019-07-17 DIAGNOSIS — G89.18 ACUTE POST-OPERATIVE PAIN: Primary | ICD-10-CM

## 2019-07-17 PROCEDURE — 99213 OFFICE O/P EST LOW 20 MIN: CPT | Performed by: FAMILY MEDICINE

## 2019-07-17 RX ORDER — IBUPROFEN 800 MG/1
800 TABLET, FILM COATED ORAL EVERY 8 HOURS PRN
Qty: 90 TABLET | Refills: 0 | Status: SHIPPED | OUTPATIENT
Start: 2019-07-17 | End: 2019-09-05

## 2019-07-17 ASSESSMENT — MIFFLIN-ST. JEOR: SCORE: 1672.07

## 2019-07-17 NOTE — PATIENT INSTRUCTIONS
Thank you for choosing [facility].  You may be receiving an email and/or telephone survey request from [facility] [employer] regarding your visit today.  Please take a few minutes to respond to the survey to let us know how we are doing.      If you have questions or concerns, please contact us via [facility] or you can contact your care team at [phone].    Our Clinic hours are:  Monday 6:40 am  to 7:00 pm  Tuesday -Friday 6:40 am to 5:00 pm    The [place] outpatient lab hours are:  Monday - Friday 6:10 am to 4:45 pm  Saturdays 7:00 am to 11:00 am  Appointments are required, call [phone]    If you have clinical questions after hours or would like to schedule an appointment,  call the clinic at [phone].

## 2019-07-17 NOTE — PROGRESS NOTES
Subjective     Rao Leavitt is a 29 year old male who presents to clinic today for the following health issues:    29 yr old male here for pain in his right . Smashed his hand in the wall sustaining a metacarpal fracture fourth digit. Had surgery on the hand a couple of weeks ago. Patient is seeing ortho tomorrow.     Here for refill on pain medication . Has had several prescriptions in the last few days . Patient says pain is severe. I declined his request, prescribed Ibuprofen.     HPI   ED/UC Followup:    [place]  [facility] [place] of visit: 7-14 and 7-15  Reason for visit: Patient has been having pain in R hand after surgery on 7-5   Current Status: Patient is still in pain        Concern - Pain in R hand after surgery on 7-5 would like an x-ray   Onset: on and off since surgery     Description:   Patient had surgery on 7-5 has been seen x2 in the ER for pain in R hand was put on an antibiotic for infection that he  finish yesterday. Patient has been back to workx2 days, but could not make it through the 2nd day       Intensity: severe    Progression of Symptoms:  worsening    Accompanying Signs & Symptoms:  Patient has surgery also wound is infected     Previous history of similar problem:   none    Precipitating factors:   Worsened by: with any use     Alleviating factors:  Improved by: none     Therapies Tried and outcome: Patient was seen in the ER on 7-14 and 7-15 was given 2 pain med and antibiotics patient finished antibiotic yesterday. Is using Ibuprofen and tylenol but is not helping. Patient has appt tomorrow with surgeon      Patient Active Problem List   Diagnosis     Bipolar 1 disorder (H)     Generalized anxiety disorder     Past Surgical History:   Procedure Laterality Date     ESOPHAGOSCOPY, GASTROSCOPY, DUODENOSCOPY (EGD), COMBINED N/A 4/16/2019    Procedure: COMBINED ESOPHAGOSCOPY, GASTROSCOPY, DUODENOSCOPY (EGD), BIOPSY SINGLE OR MULTIPLE;  Surgeon: Kj Thomas MD;  Location: [facility]      "PE tube         Social History     Tobacco Use     Smoking status: Current Every Day Smoker     Packs/day: 0.25     Smokeless tobacco: Current User   Substance Use Topics     Alcohol use: No     History reviewed. No pertinent family history.      Current Outpatient Medications   Medication Sig Dispense Refill     cephALEXin (KEFLEX) 500 MG capsule Take 1 capsule (500 mg) by mouth 4 times daily for 5 days 20 capsule 0     ibuprofen (ADVIL/MOTRIN) 200 MG tablet Take 600 mg by mouth every 8 hours as needed for mild pain       ibuprofen (ADVIL/MOTRIN) 800 MG tablet Take 1 tablet (800 mg) by mouth every 8 hours as needed for moderate pain Please eat with medication . 90 tablet 0     multivitamin, therapeutic (THERA-VIT) TABS tablet Take 1 tablet by mouth daily       oxyCODONE-acetaminophen (PERCOCET) 5-325 MG tablet Take 1 tablet by mouth every 6 hours as needed for moderate to severe pain (Patient not taking: Reported on 7/17/2019) 2 tablet 0     oxyCODONE-acetaminophen (PERCOCET) 5-325 MG tablet Take 1 tablet by mouth every 6 hours as needed for moderate to severe pain (Patient not taking: Reported on 7/17/2019) 20 tablet 0     Allergies   Allergen Reactions     Tramadol Nausea     Vicodin [Hydrocodone-Acetaminophen] Rash     BP Readings from Last 3 Encounters:   07/17/19 118/60   07/14/19 150/90   07/11/19 134/70    Wt Readings from Last 3 Encounters:   07/17/19 74.8 kg (165 lb)   07/14/19 74.8 kg (165 lb)   07/11/19 76.1 kg (167 lb 11.8 oz)                      Reviewed and updated as needed this visit by Provider  Tobacco  Allergies  Meds  Problems  Med Hx  Surg Hx  Fam Hx         Review of Systems   ROS COMP: Constitutional, HEENT, cardiovascular, pulmonary, gi and gu systems are negative, except as otherwise noted.      Objective    /60   Pulse 87   Temp 97.8  F (36.6  C) (Tympanic)   Resp 14   Ht 1.702 m (5' 7\")   Wt 74.8 kg (165 lb)   SpO2 98%   BMI 25.84 kg/m    Body mass index is 25.84 " kg/m .  Physical Exam   GENERAL: healthy, alert and no distress  MS: decreased range of motion of right hand, tenderness to palpation ,inspection of wound shows sutures , no redness , mild warm to the touch ( just finished antibiotics ) wound healing well.No drainage.    Diagnostic Test Results:    none    Assessment & Plan     1. Acute post-operative pain  Ibuprofen faxed   - ibuprofen (ADVIL/MOTRIN) 800 MG tablet; Take 1 tablet (800 mg) by mouth every 8 hours as needed for moderate pain Please eat with medication .  Dispense: 90 tablet; Refill: 0                FUTURE APPOINTMENTS:       - Follow-up visit in 1 day   Patient Instructions         Thank you for choosing [facility].  You may be receiving an email and/or telephone survey request from [facility] Customer Experience regarding your visit today.  Please take a few minutes to respond to the survey to let us know how we are doing.      If you have questions or concerns, please contact us via [employer] or you can contact your care team at [phone].    Our Clinic hours are:  Monday 6:40 am  to 7:00 pm  Tuesday -Friday 6:40 am to 5:00 pm    The [place] outpatient lab hours are:  Monday - Friday 6:10 am to 4:45 pm  Saturdays 7:00 am to 11:00 am  Appointments are required, call [phone]    If you have clinical questions after hours or would like to schedule an appointment,  call the clinic at [phone].      Return in about 1 day (around 7/18/2019) for sees ortho tomorrow.    Manuela Dan MD  [facility]

## 2019-08-08 ENCOUNTER — NURSE TRIAGE (OUTPATIENT)
Dept: FAMILY MEDICINE | Facility: CLINIC | Age: 29
End: 2019-08-08

## 2019-08-08 NOTE — TELEPHONE ENCOUNTER
"Reason for Disposition    SEVERE abdominal pain (e.g., excruciating)    Additional Information    Negative: Passed out (i.e., fainted, collapsed and was not responding)    Negative: Shock suspected (e.g., cold/pale/clammy skin, too weak to stand, low BP, rapid pulse)    Negative: Sounds like a life-threatening emergency to the triager    Negative: Chest pain    Negative: Pain is mainly in upper abdomen (if needed ask: 'is it mainly above the belly button?')    Negative: Vomiting red blood or black (coffee ground) material    Negative: Bloody, black, or tarry bowel movements    Negative: Unable to urinate (or only a few drops) and bladder feels very full    Negative: Pain in scrotum persists > 1 hour    Constant abdominal pain lasting > 2 hours    Negative: Vomiting bile (green color)    Patient sounds very sick or weak to the triager    Answer Assessment - Initial Assessment Questions  1. LOCATION: \"Where does it hurt?\"       Below umbilicus, sharp pain  2. RADIATION: \"Does the pain shoot anywhere else?\" (e.g., chest, back)      No  3. ONSET: \"When did the pain begin?\" (Minutes, hours or days ago)       Yesterday while at work.  Worked a 10 hour shift with the pain.  4. SUDDEN: \"Gradual or sudden onset?\"      He does not recall.  He states this is similar to last month - had endoscopy, nothing remarkable found.  5. PATTERN \"Does the pain come and go, or is it constant?\"     - If constant: \"Is it getting better, staying the same, or worsening?\"       (Note: Constant means the pain never goes away completely; most serious pain is constant and it progresses)      - If intermittent: \"How long does it last?\" \"Do you have pain now?\"      (Note: Intermittent means the pain goes away completely between bouts)      Constant - getting worse  6. SEVERITY: \"How bad is the pain?\"  (e.g., Scale 1-10; mild, moderate, or severe)     - MILD (1-3): doesn't interfere with normal activities, abdomen soft and not tender to touch      - " "MODERATE (4-7): interferes with normal activities or awakens from sleep, tender to touch      - SEVERE (8-10): excruciating pain, doubled over, unable to do any normal activities        He rates on pain scale 7/10 however states he is doubled over in pain.  7. RECURRENT SYMPTOM: \"Have you ever had this type of abdominal pain before?\" If so, ask: \"When was the last time?\" and \"What happened that time?\"       Yes, see above  8. CAUSE: \"What do you think is causing the abdominal pain?\"      unknown  9. RELIEVING/AGGRAVATING FACTORS: \"What makes it better or worse?\" (e.g., movement, antacids, bowel movement)      Nothing.  Took ibuprofen and that seemed to make it worse.  10. OTHER SYMPTOMS: \"Has there been any vomiting, diarrhea, constipation, or urine problems?\"        nausea    Protocols used: ABDOMINAL PAIN - MALE-A-OH      "

## 2019-08-16 ENCOUNTER — HOSPITAL ENCOUNTER (EMERGENCY)
Facility: CLINIC | Age: 29
Discharge: HOME | End: 2019-08-16
Attending: FAMILY MEDICINE | Admitting: FAMILY MEDICINE
Payer: COMMERCIAL

## 2019-08-16 VITALS
RESPIRATION RATE: 18 BRPM | WEIGHT: 165 LBS | DIASTOLIC BLOOD PRESSURE: 77 MMHG | TEMPERATURE: 97.6 F | SYSTOLIC BLOOD PRESSURE: 130 MMHG | OXYGEN SATURATION: 99 % | BODY MASS INDEX: 25.84 KG/M2

## 2019-08-16 DIAGNOSIS — R10.31 ABDOMINAL PAIN, RIGHT LOWER QUADRANT: ICD-10-CM

## 2019-08-16 LAB
ALBUMIN SERPL-MCNC: 3.9 G/DL (ref 3.4–5)
ALBUMIN UR-MCNC: NEGATIVE MG/DL
ALP SERPL-CCNC: 88 U/L (ref 40–150)
ALT SERPL W P-5'-P-CCNC: 22 U/L (ref 0–70)
ANION GAP SERPL CALCULATED.3IONS-SCNC: 6 MMOL/L (ref 3–14)
APPEARANCE UR: CLEAR
AST SERPL W P-5'-P-CCNC: 18 U/L (ref 0–45)
BASOPHILS # BLD AUTO: 0.1 10E9/L (ref 0–0.2)
BASOPHILS NFR BLD AUTO: 0.8 %
BILIRUB SERPL-MCNC: 0.3 MG/DL (ref 0.2–1.3)
BILIRUB UR QL STRIP: NEGATIVE
BUN SERPL-MCNC: 10 MG/DL (ref 7–30)
CALCIUM SERPL-MCNC: 8.4 MG/DL (ref 8.5–10.1)
CHLORIDE SERPL-SCNC: 114 MMOL/L (ref 94–109)
CO2 SERPL-SCNC: 23 MMOL/L (ref 20–32)
COLOR UR AUTO: YELLOW
CREAT SERPL-MCNC: 0.75 MG/DL (ref 0.66–1.25)
DIFFERENTIAL METHOD BLD: NORMAL
EOSINOPHIL # BLD AUTO: 0.5 10E9/L (ref 0–0.7)
EOSINOPHIL NFR BLD AUTO: 5.2 %
ERYTHROCYTE [DISTWIDTH] IN BLOOD BY AUTOMATED COUNT: 12 % (ref 10–15)
GFR SERPL CREATININE-BSD FRML MDRD: >90 ML/MIN/{1.73_M2}
GLUCOSE SERPL-MCNC: 97 MG/DL (ref 70–99)
GLUCOSE UR STRIP-MCNC: NEGATIVE MG/DL
HCT VFR BLD AUTO: 45.4 % (ref 40–53)
HGB BLD-MCNC: 15.4 G/DL (ref 13.3–17.7)
HGB UR QL STRIP: ABNORMAL
IMM GRANULOCYTES # BLD: 0 10E9/L (ref 0–0.4)
IMM GRANULOCYTES NFR BLD: 0.3 %
KETONES UR STRIP-MCNC: NEGATIVE MG/DL
LEUKOCYTE ESTERASE UR QL STRIP: NEGATIVE
LIPASE SERPL-CCNC: 81 U/L (ref 73–393)
LYMPHOCYTES # BLD AUTO: 2 10E9/L (ref 0.8–5.3)
LYMPHOCYTES NFR BLD AUTO: 20.3 %
MCH RBC QN AUTO: 31.6 PG (ref 26.5–33)
MCHC RBC AUTO-ENTMCNC: 33.9 G/DL (ref 31.5–36.5)
MCV RBC AUTO: 93 FL (ref 78–100)
MONOCYTES # BLD AUTO: 0.6 10E9/L (ref 0–1.3)
MONOCYTES NFR BLD AUTO: 6.6 %
MUCOUS THREADS #/AREA URNS LPF: PRESENT /LPF
NEUTROPHILS # BLD AUTO: 6.5 10E9/L (ref 1.6–8.3)
NEUTROPHILS NFR BLD AUTO: 66.8 %
NITRATE UR QL: NEGATIVE
NRBC # BLD AUTO: 0 10*3/UL
NRBC BLD AUTO-RTO: 0 /100
PH UR STRIP: 5 PH (ref 5–7)
PLATELET # BLD AUTO: 355 10E9/L (ref 150–450)
POTASSIUM SERPL-SCNC: 3.9 MMOL/L (ref 3.4–5.3)
PROT SERPL-MCNC: 7 G/DL (ref 6.8–8.8)
RBC # BLD AUTO: 4.87 10E12/L (ref 4.4–5.9)
RBC #/AREA URNS AUTO: 1 /HPF (ref 0–2)
SODIUM SERPL-SCNC: 143 MMOL/L (ref 133–144)
SOURCE: ABNORMAL
SP GR UR STRIP: 1.02 (ref 1–1.03)
UROBILINOGEN UR STRIP-MCNC: 0 MG/DL (ref 0–2)
WBC # BLD AUTO: 9.8 10E9/L (ref 4–11)
WBC #/AREA URNS AUTO: 1 /HPF (ref 0–5)

## 2019-08-16 PROCEDURE — 81001 URINALYSIS AUTO W/SCOPE: CPT | Performed by: FAMILY MEDICINE

## 2019-08-16 PROCEDURE — 80053 COMPREHEN METABOLIC PANEL: CPT | Performed by: FAMILY MEDICINE

## 2019-08-16 PROCEDURE — 96374 THER/PROPH/DIAG INJ IV PUSH: CPT | Performed by: FAMILY MEDICINE

## 2019-08-16 PROCEDURE — 25000128 H RX IP 250 OP 636: Performed by: FAMILY MEDICINE

## 2019-08-16 PROCEDURE — 99284 EMERGENCY DEPT VISIT MOD MDM: CPT | Performed by: FAMILY MEDICINE

## 2019-08-16 PROCEDURE — 83690 ASSAY OF LIPASE: CPT | Performed by: FAMILY MEDICINE

## 2019-08-16 PROCEDURE — 99284 EMERGENCY DEPT VISIT MOD MDM: CPT | Mod: Z6 | Performed by: FAMILY MEDICINE

## 2019-08-16 PROCEDURE — 85025 COMPLETE CBC W/AUTO DIFF WBC: CPT | Performed by: FAMILY MEDICINE

## 2019-08-16 RX ORDER — KETOROLAC TROMETHAMINE 15 MG/ML
15 INJECTION, SOLUTION INTRAMUSCULAR; INTRAVENOUS ONCE
Status: COMPLETED | OUTPATIENT
Start: 2019-08-16 | End: 2019-08-16

## 2019-08-16 RX ORDER — ONDANSETRON 2 MG/ML
4 INJECTION INTRAMUSCULAR; INTRAVENOUS EVERY 30 MIN PRN
Status: DISCONTINUED | OUTPATIENT
Start: 2019-08-16 | End: 2019-08-16 | Stop reason: HOSPADM

## 2019-08-16 RX ADMIN — KETOROLAC TROMETHAMINE 15 MG: 15 INJECTION, SOLUTION INTRAMUSCULAR; INTRAVENOUS at 09:26

## 2019-08-16 NOTE — ED PROVIDER NOTES
"  HPI   The patient is a 29-year-old male presenting with recurrent abdominal pain.  He was seen early this year for similar symptoms, also felt on the right side.  He had a CT scan which was unremarkable.  He had an upper endoscopy which was completely normal.  He refused a colonoscopy at the time.  He changed his diet to remove most caffeine and greasy foods.  He had improvement of symptoms for about 2 months and then his pain began to recur.    His most recent \"flare\" of abdominal pain started about 3 months ago.  He describes constant pain that is sharp and felt especially in the right lower quadrant.  His appetite is unchanged and he eats breakfast, lunch, and dinner.  He denies having nausea or vomiting.  The pain is currently moderate to severe in severity.  He denies having a fever.  He denies obvious exacerbating or relieving factors.  He denies dysuria, urgency, or frequency of urine.  He denies discharge from the penis.  He denies testicular pain, tenderness, or swelling.  He denies having a skin rash or having trauma.  He denies having back pain.  He denies having prior abdominal surgery.  He has had one episode of watery stool this morning.  No hematochezia or melena.  This is the first time he has had diarrhea associated with his pain.        Allergies:  Allergies   Allergen Reactions     Tramadol Nausea     Vicodin [Hydrocodone-Acetaminophen] Rash     Problem List:    Patient Active Problem List    Diagnosis Date Noted     Bipolar 1 disorder (H) 03/16/2017     Priority: Medium     Generalized anxiety disorder 03/16/2017     Priority: Medium      Past Medical History:    History reviewed. No pertinent past medical history.  Past Surgical History:    Past Surgical History:   Procedure Laterality Date     ESOPHAGOSCOPY, GASTROSCOPY, DUODENOSCOPY (EGD), COMBINED N/A 4/16/2019    Procedure: COMBINED ESOPHAGOSCOPY, GASTROSCOPY, DUODENOSCOPY (EGD), BIOPSY SINGLE OR MULTIPLE;  Surgeon: Kj Thomas MD;  " Location: [facility] [facility]     PE tube       Family History:    History reviewed. No pertinent family history.  Social History:  Marital Status:  Single [1]  Social History     Tobacco Use     Smoking status: Current Every Day Smoker     Packs/day: 0.25     Smokeless tobacco: Current User   Substance Use Topics     Alcohol use: No     Drug use: No      Medications:      ibuprofen (ADVIL/MOTRIN) 200 MG tablet   ibuprofen (ADVIL/MOTRIN) 800 MG tablet   multivitamin, therapeutic (THERA-VIT) TABS tablet   oxyCODONE-acetaminophen (PERCOCET) 5-325 MG tablet   oxyCODONE-acetaminophen (PERCOCET) 5-325 MG tablet     Review of Systems   All other systems reviewed and are negative.      PE   BP: 130/77  Heart Rate: 82  Temp: 97.6  F (36.4  C)  Resp: 18  Weight: 74.8 kg (165 lb)  SpO2: 99 %  Physical Exam   Constitutional: He is oriented to person, place, and time. He appears distressed.   He looks tired and uncomfortable.  He is cooperative.  He is answering questions well.  Fit.   HENT:   Head: Atraumatic.   Mouth/Throat: Oropharynx is clear and moist.   Eyes: Pupils are equal, round, and reactive to light. EOM are normal. No scleral icterus.   Neck: Normal range of motion.   Cardiovascular: Normal heart sounds and intact distal pulses.   Pulmonary/Chest: Breath sounds normal. No respiratory distress.   Abdominal: Soft. Bowel sounds are normal. There is tenderness.   Tender on the right side of his abdomen but especially in the right lower quadrant.  Soft throughout.  No guarding.  No organomegaly or masses obvious.  Flat abdomen.   Musculoskeletal: Normal range of motion. He exhibits no edema or tenderness.   Neurological: He is alert and oriented to person, place, and time.   Skin: Skin is warm. No rash noted. He is not diaphoretic.   Psychiatric: He has a normal mood and affect. His behavior is normal.   Nursing note and vitals reviewed.      ED COURSE and MDM   0927.  Recurrent abdominal pain that is been constant over the past 3  months.  One episode of diarrhea today that is new.  His vital signs are unremarkable.  Lab values pending.  Toradol 15 mg IV ordered.    1012.  The patient's blood work is again unremarkable.  He is calm and not in obvious distress.  His vital signs are normal.  He has had pain for 3 months, as above.  I think repeating a CT scan would be of very low yield.  I have low concern for severe intra-abdominal pathology requiring emergent hospitalization or surgery.  I do think the patient needs a colonoscopy.  Inflammatory bowel pathology should be considered.  Gastroenterology follow-up was also recommended.  Return here for worsening as discussed.    LABS  Labs Ordered and Resulted from Time of ED Arrival Up to the Time of Departure from the ED   COMPREHENSIVE METABOLIC PANEL - Abnormal; Notable for the following components:       Result Value    Chloride 114 (*)     Calcium 8.4 (*)     All other components within normal limits   URINE MACROSCOPIC WITH REFLEX TO MICRO - Abnormal; Notable for the following components:    Blood Urine Small (*)     Mucous Urine Present (*)     All other components within normal limits   CBC WITH PLATELETS DIFFERENTIAL   LIPASE   PERIPHERAL IV CATHETER       IMAGING  Images reviewed by me.  Radiology report also reviewed.  No orders to display       Procedures    Medications   ondansetron (ZOFRAN) injection 4 mg (has no administration in time range)   ketorolac (TORADOL) injection 15 mg (15 mg Intravenous Given 8/16/19 0926)         IMPRESSION       ICD-10-CM    1. Abdominal pain, right lower quadrant R10.31             Medication List      ASK your doctor about these medications    cephALEXin 500 MG capsule  Commonly known as:  KEFLEX  500 mg, Oral, 4 TIMES DAILY  Ask about: Should I take this medication?                          Chase Quiles MD  08/16/19 1013

## 2019-08-16 NOTE — LETTER
August 16, 2019      To Whom It May Concern:      Rao Leavitt was seen in our Emergency Department today, 08/16/19.  I expect his condition to improve over the next 1-2 days.  He may return to work/school when improved.     Sincerely,        Chase Quiles MD

## 2019-08-16 NOTE — ED AVS SNAPSHOT
[facility] Emergency Department  [address] [address]  Phone:  [phone]  Fax:  [phone]                                    Rao Leavitt   MRN: [id number]    Department:  [facility] Emergency Department   Date of Visit:  8/16/2019           After Visit Summary Signature Page    I have received my discharge instructions, and my questions have been answered. I have discussed any challenges I see with this plan with the nurse or doctor.    ..........................................................................................................................................  Patient/Patient Representative Signature      ..........................................................................................................................................  Patient Representative Print Name and Relationship to Patient    ..................................................               ................................................  Date                                   Time    ..........................................................................................................................................  Reviewed by Signature/Title    ...................................................              ..............................................  Date                                               Time          22EPIC Rev 08/18

## 2019-08-16 NOTE — DISCHARGE INSTRUCTIONS
Return to the Emergency Room if the following occurs:     Severely worsened pain, fever >101, vomiting/dehydration, or for any concern at anytime.    Or, follow-up with the following provider as we discussed:     Return to the general surgery for reevaluation and possibly a colonoscopy.  See contact information provided for scheduling.  Consider follow-up with gastroenterology.  See contact information provided for scheduling.    Medications discussed:    None new.  No changes.    If you received pain-relieving or sedating medication during your time in the ER, avoid alcohol, driving automobiles, or working with machinery.  Also, a responsible adult must stay with you.        Call the Nurse Advice Line at [phone] or [phone] for any concern at anytime.

## 2019-08-20 ENCOUNTER — ALLIED HEALTH/NURSE VISIT (OUTPATIENT)
Dept: FAMILY MEDICINE | Facility: CLINIC | Age: 29
End: 2019-08-20
Payer: COMMERCIAL

## 2019-08-20 DIAGNOSIS — Z02.89 ENCOUNTER TO OBTAIN EXCUSE FROM WORK: Primary | ICD-10-CM

## 2019-08-20 PROCEDURE — 99207 ZZC NO CHARGE NURSE ONLY: CPT

## 2019-08-20 NOTE — NURSING NOTE
Patient sent over from the ED for his work excuse.  Unclear why he would need a work excuse as one was issued to him at the time of the ED visit.  I have called his place of work to speak to someone to find out what they actually want.  Spoke with chirs ARMIJO  She is not sure why the patient is here either.  She reads the work note and would like a definite date for returning to work but we are not going to be able to apply that.  She is good with the work excuse from ED. Jory PURI RN

## 2019-08-22 ENCOUNTER — OFFICE VISIT (OUTPATIENT)
Dept: FAMILY MEDICINE | Facility: CLINIC | Age: 29
End: 2019-08-22
Payer: COMMERCIAL

## 2019-08-22 VITALS
TEMPERATURE: 98.6 F | DIASTOLIC BLOOD PRESSURE: 86 MMHG | SYSTOLIC BLOOD PRESSURE: 122 MMHG | HEIGHT: 67 IN | OXYGEN SATURATION: 98 % | BODY MASS INDEX: 25.36 KG/M2 | RESPIRATION RATE: 14 BRPM | WEIGHT: 161.6 LBS | HEART RATE: 67 BPM

## 2019-08-22 DIAGNOSIS — Z72.0 TOBACCO USE: ICD-10-CM

## 2019-08-22 DIAGNOSIS — R10.84 ABDOMINAL PAIN, GENERALIZED: Primary | ICD-10-CM

## 2019-08-22 PROCEDURE — 99213 OFFICE O/P EST LOW 20 MIN: CPT | Performed by: NURSE PRACTITIONER

## 2019-08-22 RX ORDER — SUCRALFATE 1 G/1
1 TABLET ORAL 4 TIMES DAILY
Qty: 120 TABLET | Refills: 1 | Status: SHIPPED | OUTPATIENT
Start: 2019-08-22 | End: 2019-11-11

## 2019-08-22 ASSESSMENT — MIFFLIN-ST. JEOR: SCORE: 1656.64

## 2019-08-22 NOTE — PROGRESS NOTES
"Subjective     Rao Leavitt is a 29 year old male who presents to clinic today for the following health issues:    HPI   ED/UC Followup:    Facility:  [place] [facility]   Date of visit: 8/16/19  Reason for visit: Abdominal Pain   Current Status: Pt is still having abdominal pain. Pain is worse in RLQ & LLQ. Pain starts in the AM and will be intermittent throughout the day. He has switched his diet and taken OTC tylenol and ibuprofen for pain. He has also tried acid reducer. He noted the ER dr wants him to do a colonoscopy but he has not scheduled this. His current pain feels like a \"burning\".   Patient states that he changed his diet/quit drinking alcohol. He is a current smoker- 1/2- 1  PPD.   Patient states that \"every male in his family\" has had their gallbladder removed and patient is wondering if this is causing his pain.       ER: note 8/16/19:    0927.  Recurrent abdominal pain that is been constant over the past 3 months.  One episode of diarrhea today that is new.  His vital signs are unremarkable.  Lab values pending.  Toradol 15 mg IV ordered.     1012.  The patient's blood work is again unremarkable.  He is calm and not in obvious distress.  His vital signs are normal.  He has had pain for 3 months, as above.  I think repeating a CT scan would be of very low yield.  I have low concern for severe intra-abdominal pathology requiring emergent hospitalization or surgery.  I do think the patient needs a colonoscopy.  Inflammatory bowel pathology should be considered.  Gastroenterology follow-up was also recommended.  Return here for worsening as discussed.    CT 3/20/19:    FINDINGS: Normal appendix series 2 image 59. There is no bowel  obstruction. No acute inflammatory change of the large or small bowel.  Moderate stool throughout the colon. There is no free air or abscess  identified.     Liver, gallbladder, adrenals, spleen, and pancreas shows no acute  abnormalities. No hydronephrosis. Unremarkable " kidneys that appear  homogeneous. No urolithiasis identified    4/2019: EGD  Normal oropharynx.                        - Z-line regular, 40 cm from the incisors.                        - Normal esophagus.                        - Normal stomach. Biopsied.                        - Normal examined duodenum.   Recommendation:      - Discharge patient to home (ambulatory).                        - Await pathology results.                        - Return to primary care physician after studies are                        complete.    -------------------------------------    Patient Active Problem List   Diagnosis     Bipolar 1 disorder (H)     Generalized anxiety disorder     Past Surgical History:   Procedure Laterality Date     ESOPHAGOSCOPY, GASTROSCOPY, DUODENOSCOPY (EGD), COMBINED N/A 4/16/2019    Procedure: COMBINED ESOPHAGOSCOPY, GASTROSCOPY, DUODENOSCOPY (EGD), BIOPSY SINGLE OR MULTIPLE;  Surgeon: Kj Thomas MD;  Location: [facility]     PE tube         Social History     Tobacco Use     Smoking status: Current Every Day Smoker     Packs/day: 0.25     Smokeless tobacco: Current User   Substance Use Topics     Alcohol use: No     No family history on file.        -------------------------------------  Reviewed and updated as needed this visit by Provider         Review of Systems   ROS COMP: Constitutional, HEENT, cardiovascular, pulmonary, GI, , musculoskeletal, neuro, skin, endocrine and psych systems are negative, except as otherwise noted.      Objective    There were no vitals taken for this visit.  There is no height or weight on file to calculate BMI.  Physical Exam   GENERAL: healthy, alert and no distress  RESP: lungs clear to auscultation - no rales, rhonchi or wheezes  CV: regular rate and rhythm, normal S1 S2, no S3 or S4, no murmur, click or rub, no peripheral edema and peripheral pulses strong  ABDOMEN: tenderness bilateral lower quadrant and bowel sounds normal  MS: no gross musculoskeletal defects  "noted, no edema    Diagnostic Test Results:  Labs reviewed in Epic        Assessment & Plan     1. Abdominal pain, generalized  ? Gallbladder vs GERD  - GASTROENTEROLOGY ADULT REF CONSULT ONLY  - start sucralfate (CARAFATE) 1 GM tablet; Take 1 tablet (1 g) by mouth 4 times daily  Dispense: 120 tablet; Refill: 1  - dicussed GERD diet restrictions     2. Tobacco use  Recommend to stop- patient trying to cut down       Tobacco Cessation:   reports that he has been smoking.  He has been smoking about 0.25 packs per day. He uses smokeless tobacco.  Tobacco Cessation Action Plan: Information offered: Patient not interested at this time      BMI:   Estimated body mass index is 25.31 kg/m  as calculated from the following:    Height as of this encounter: 1.702 m (5' 7\").    Weight as of this encounter: 73.3 kg (161 lb 9.6 oz).   Weight management plan: Discussed healthy diet and exercise guidelines        No follow-ups on file.    JULES Cheek [facility]      "

## 2019-08-22 NOTE — PATIENT INSTRUCTIONS
Thank you for choosing [facility].  You may be receiving an email and/or telephone survey request from [facility] Customer Experience regarding your visit today.  Please take a few minutes to respond to the survey to let us know how we are doing.      If you have questions or concerns, please contact us via [employer] or you can contact your care team at [phone].    Our Clinic hours are:  Monday 6:40 am  to 7:00 pm  Tuesday -Friday 6:40 am to 5:00 pm    The [place] outpatient lab hours are:  Monday - Friday 6:10 am to 4:45 pm  Saturdays 7:00 am to 11:00 am  Appointments are required, call [phone]    If you have clinical questions after hours or would like to schedule an appointment,  call the clinic at [phone].

## 2019-08-22 NOTE — NURSING NOTE
"Initial /86   Pulse 67   Temp 98.6  F (37  C) (Tympanic)   Resp 14   Ht 1.702 m (5' 7\")   Wt 73.3 kg (161 lb 9.6 oz)   SpO2 98%   BMI 25.31 kg/m   Estimated body mass index is 25.31 kg/m  as calculated from the following:    Height as of this encounter: 1.702 m (5' 7\").    Weight as of this encounter: 73.3 kg (161 lb 9.6 oz). .    Va [place], RED ([facility])  "

## 2019-08-31 ENCOUNTER — HOSPITAL ENCOUNTER (EMERGENCY)
Facility: CLINIC | Age: 29
Discharge: HOME | End: 2019-08-31
Attending: FAMILY MEDICINE | Admitting: FAMILY MEDICINE
Payer: COMMERCIAL

## 2019-08-31 VITALS
DIASTOLIC BLOOD PRESSURE: 89 MMHG | TEMPERATURE: 97.6 F | SYSTOLIC BLOOD PRESSURE: 149 MMHG | OXYGEN SATURATION: 99 % | RESPIRATION RATE: 18 BRPM

## 2019-08-31 DIAGNOSIS — K08.89 PAIN, DENTAL: ICD-10-CM

## 2019-08-31 PROCEDURE — 99283 EMERGENCY DEPT VISIT LOW MDM: CPT | Performed by: FAMILY MEDICINE

## 2019-08-31 PROCEDURE — 99284 EMERGENCY DEPT VISIT MOD MDM: CPT | Mod: Z6 | Performed by: FAMILY MEDICINE

## 2019-08-31 PROCEDURE — 25000132 ZZH RX MED GY IP 250 OP 250 PS 637: Performed by: FAMILY MEDICINE

## 2019-08-31 RX ORDER — OXYCODONE HYDROCHLORIDE 5 MG/1
5 TABLET ORAL EVERY 6 HOURS PRN
Qty: 10 TABLET | Refills: 0 | Status: SHIPPED | OUTPATIENT
Start: 2019-08-31 | End: 2019-09-05

## 2019-08-31 RX ORDER — OXYCODONE HYDROCHLORIDE 5 MG/1
5 TABLET ORAL EVERY 4 HOURS PRN
Status: DISCONTINUED | OUTPATIENT
Start: 2019-08-31 | End: 2019-08-31 | Stop reason: HOSPADM

## 2019-08-31 RX ORDER — PENICILLIN V POTASSIUM 500 MG/1
500 TABLET, FILM COATED ORAL 4 TIMES DAILY
Qty: 40 TABLET | Refills: 0 | Status: SHIPPED | OUTPATIENT
Start: 2019-08-31 | End: 2019-09-05

## 2019-08-31 RX ADMIN — OXYCODONE HYDROCHLORIDE 5 MG: 5 TABLET ORAL at 07:10

## 2019-08-31 NOTE — ED AVS SNAPSHOT
[facility] Emergency Department  [address] [address]  Phone:  [phone]  Fax:  [phone]                                    Rao Leavitt   MRN: [id number]    Department:  [facility] Emergency Department   Date of Visit:  8/31/2019           After Visit Summary Signature Page    I have received my discharge instructions, and my questions have been answered. I have discussed any challenges I see with this plan with the nurse or doctor.    ..........................................................................................................................................  Patient/Patient Representative Signature      ..........................................................................................................................................  Patient Representative Print Name and Relationship to Patient    ..................................................               ................................................  Date                                   Time    ..........................................................................................................................................  Reviewed by Signature/Title    ...................................................              ..............................................  Date                                               Time          22EPIC Rev 08/18

## 2019-08-31 NOTE — ED PROVIDER NOTES
History     Chief Complaint   Patient presents with     Dental Pain     HPI  Rao Leavitt is a 29 year old male, past medical history significant for bipolar 1 disorder, generalized anxiety disorder, presents to the emergency department concerns of dental pain.  History is obtained from the patient states that he has had problems with left jaw dentition for the last couple of months.  He has been to see his dentist about his teeth a couple of weeks ago and states that he is not able to pay for what they want to do which is a root canal.  They do not want to just pull his tooth because they tell him this will not definitively solve the problem.  He presents now with a dramatic increase in pain and swelling over the last 24 hours.  Despite using ibuprofen 600-800 mg every 8 hours and Tylenol 1 g every 4 hours his pain is still more than he can handle he cannot sleep.  He notes no fever chills or sweats.  No difficulty swallowing or breathing.    Allergies:  Allergies   Allergen Reactions     Tramadol Nausea     Vicodin [Hydrocodone-Acetaminophen] Rash       Problem List:    Patient Active Problem List    Diagnosis Date Noted     Bipolar 1 disorder (H) 03/16/2017     Priority: Medium     Generalized anxiety disorder 03/16/2017     Priority: Medium        Past Medical History:    No past medical history on file.    Past Surgical History:    Past Surgical History:   Procedure Laterality Date     ESOPHAGOSCOPY, GASTROSCOPY, DUODENOSCOPY (EGD), COMBINED N/A 4/16/2019    Procedure: COMBINED ESOPHAGOSCOPY, GASTROSCOPY, DUODENOSCOPY (EGD), BIOPSY SINGLE OR MULTIPLE;  Surgeon: Kj Thomas MD;  Location: [facility]         Family History:    No family history on file.    Social History:  Marital Status:  Single [1]  Social History     Tobacco Use     Smoking status: Current Every Day Smoker     Packs/day: 0.25     Smokeless tobacco: Current User   Substance Use Topics     Alcohol use: No     Drug use: No         Medications:      oxyCODONE (ROXICODONE) 5 MG tablet   penicillin V (VEETID) 500 MG tablet   ibuprofen (ADVIL/MOTRIN) 200 MG tablet   ibuprofen (ADVIL/MOTRIN) 800 MG tablet   multivitamin, therapeutic (THERA-VIT) TABS tablet   oxyCODONE-acetaminophen (PERCOCET) 5-325 MG tablet   oxyCODONE-acetaminophen (PERCOCET) 5-325 MG tablet   sucralfate (CARAFATE) 1 GM tablet         Review of Systems   All other systems reviewed and are negative.      Physical Exam   BP: (!) 149/89  Heart Rate: 77  Temp: 97.6  F (36.4  C)  Resp: 18      Physical Exam   Constitutional: He appears well-developed and well-nourished.   HENT:   Head: Normocephalic and atraumatic.   Right Ear: External ear normal.   Left Ear: External ear normal.   Mouth/Throat:       Nursing note and vitals reviewed.      ED Course        Procedures               Critical Care time:  none               No results found for this or any previous visit (from the past 24 hour(s)).    Medications   oxyCODONE (ROXICODONE) tablet 5 mg (has no administration in time range)       Assessments & Plan (with Medical Decision Making)   29-year-old male who presents for evaluation of dental pain as discussed in the HPI.  Physical exam reveals significant dental caries and evidence of infection without probable abscess clinically.  Plan to place the patient on oral antibiotics, I will give him a small prescription for oxycodone for breakthrough pain but encouraged to continue with both the ibuprofen and Tylenol.  Additionally a list of emergency dental providers was given to him so that he can follow-up and have this taken care of definitively.  It was emphasized to him that antibiotics and pain medication is not a definitive solution to his dental problem.      Disclaimer: This note consists of symbols derived from keyboarding, dictation and/or voice recognition software. As a result, there may be errors in the script that have gone undetected. Please consider this when  interpreting information found in this chart.      I have reviewed the nursing notes.    I have reviewed the findings, diagnosis, plan and need for follow up with the patient.       New Prescriptions    OXYCODONE (ROXICODONE) 5 MG TABLET    Take 1 tablet (5 mg) by mouth every 6 hours as needed for severe pain    PENICILLIN V (VEETID) 500 MG TABLET    Take 1 tablet (500 mg) by mouth 4 times daily for 10 days       Final diagnoses:   Pain, dental       8/31/2019   [facility] EMERGENCY DEPARTMENT     Miquel Phillips MD  08/31/19 0793

## 2019-08-31 NOTE — DISCHARGE INSTRUCTIONS
Ibuprofen 600 mg as needed every 6-8 hours maximum 2400 mg in 24 hours.  Tylenol 1 g every 4 hours as needed for pain maximum 4 g in 24 hours.  Oxycodone as directed for severe pain that does not respond to ibuprofen or Tylenol.  Penicillin as directed x10 days.  Please get into see a dentist as soon as possible return to the emergency department if worsening or changing.  A list of emergency dental providers:  Many of these clinics offer a sliding fee option for patients that qualify, and see patients on a walk-in or same day basis. Please call each clinic directly. As services, hours, fees and policies vary greatly.          [facility], [facility]  [phone]  Sees no insurance  [facility], [place]  [phone]  Preventive services only  [facility] (mult loc) [phone]  [facility]    ([facility]), [facility]  [phone]  [facility]       [phone]  Preventive services only  [facility]  [phone]  Accepts MA & sees no ins  [facility], [facility],      Accepts MA & sees no ins   [place]   [phone]; [phone]  [facility], [facility]   Accepts MA & sees no ins       [phone]  [facility]  [phone]   Accepts MA emergencies  [facility] [phone]  [facility],     Accepts [facility]   [phone]    [facility] [phone]  Accepts MA & sees no ins   [facility]   Dental Clinic    [phone]  [facility], [facility]  [phone]   [facility] [phone]  [facility]  Preventive services only   [place]   [phone]  [facility] ([facility]) [phone]  [facility], [place]  [phone]  Same day [facility] [phone]  Same day [facility],       Same day apts   [place]   [phone]    [facility] [phone]  Free clinic, walk-in only  [facility] (multiple locations) [phone]      [facility] [phone]    [facility] [phone]  Free clinic, walk-in only  [facility]  [phone]  [facility] [phone] (adults)       [phone] (children)  [facility] [phone]    Also, referral service for low cost dental and healthcare: [phone]  And [phone]

## 2019-08-31 NOTE — ED TRIAGE NOTES
Patient states started with dental pain last night with it increasing through night taking Ibuprofen and tylenol through night with no relief and unable to sleep. Also tried some dental cream with no relief. Called emergency dental and they were closed. Patient states pain in on left lower teeth.

## 2019-09-05 ENCOUNTER — HOSPITAL ENCOUNTER (EMERGENCY)
Facility: CLINIC | Age: 29
Discharge: HOME | End: 2019-09-05
Attending: STUDENT IN AN ORGANIZED HEALTH CARE EDUCATION/TRAINING PROGRAM | Admitting: STUDENT IN AN ORGANIZED HEALTH CARE EDUCATION/TRAINING PROGRAM
Payer: COMMERCIAL

## 2019-09-05 VITALS
HEIGHT: 67 IN | SYSTOLIC BLOOD PRESSURE: 149 MMHG | HEART RATE: 87 BPM | RESPIRATION RATE: 16 BRPM | TEMPERATURE: 98.1 F | DIASTOLIC BLOOD PRESSURE: 77 MMHG | BODY MASS INDEX: 25.9 KG/M2 | OXYGEN SATURATION: 99 % | WEIGHT: 165 LBS

## 2019-09-05 DIAGNOSIS — K08.89 PAIN, DENTAL: ICD-10-CM

## 2019-09-05 PROCEDURE — 99282 EMERGENCY DEPT VISIT SF MDM: CPT | Performed by: STUDENT IN AN ORGANIZED HEALTH CARE EDUCATION/TRAINING PROGRAM

## 2019-09-05 PROCEDURE — 99284 EMERGENCY DEPT VISIT MOD MDM: CPT | Mod: Z6 | Performed by: STUDENT IN AN ORGANIZED HEALTH CARE EDUCATION/TRAINING PROGRAM

## 2019-09-05 RX ORDER — CLINDAMYCIN HCL 300 MG
300 CAPSULE ORAL 3 TIMES DAILY
Qty: 30 CAPSULE | Refills: 0 | Status: SHIPPED | OUTPATIENT
Start: 2019-09-05 | End: 2019-09-24

## 2019-09-05 RX ORDER — BUPIVACAINE HYDROCHLORIDE AND EPINEPHRINE 5; 5 MG/ML; UG/ML
INJECTION, SOLUTION PERINEURAL
Status: DISCONTINUED
Start: 2019-09-05 | End: 2019-09-06 | Stop reason: HOSPADM

## 2019-09-05 RX ORDER — OXYCODONE HYDROCHLORIDE 5 MG/1
5 TABLET ORAL EVERY 6 HOURS PRN
Qty: 8 TABLET | Refills: 0 | Status: SHIPPED | OUTPATIENT
Start: 2019-09-05 | End: 2019-09-18

## 2019-09-05 ASSESSMENT — MIFFLIN-ST. JEOR: SCORE: 1672.07

## 2019-09-05 NOTE — ED AVS SNAPSHOT
[facility] Emergency Department  [address] [address]  Phone:  [phone]  Fax:  [phone]                                    Rao Leavitt   MRN: [id number]    Department:  [facility] Emergency Department   Date of Visit:  9/5/2019           After Visit Summary Signature Page    I have received my discharge instructions, and my questions have been answered. I have discussed any challenges I see with this plan with the nurse or doctor.    ..........................................................................................................................................  Patient/Patient Representative Signature      ..........................................................................................................................................  Patient Representative Print Name and Relationship to Patient    ..................................................               ................................................  Date                                   Time    ..........................................................................................................................................  Reviewed by Signature/Title    ...................................................              ..............................................  Date                                               Time          22EPIC Rev 08/18

## 2019-09-05 NOTE — LETTER
September 5, 2019      To Whom It May Concern:      Rao Leavitt was seen in our Emergency Department today, 09/05/19.     Sincerely,        Naeem Rees DO

## 2019-09-06 NOTE — ED NOTES
Patient has lower left dental pain is unable to get dental appointment until Monday. Patient states pain is radiating down neck and into left ear. He was seen here August 31 st  and put on antibiotics that he is still taking. Denies fevers at home

## 2019-09-06 NOTE — ED PROVIDER NOTES
History     Chief Complaint   Patient presents with     Dental Pain     severe pain tonight. pain recently on antibiotics and pain meds for this. dental appointment monday. left lower jaw.      HPI  Rao Leavitt is a 29 year old male who presents for evaluation of left radicular dental pain.  Records and from the patient has been seen recently for similar complaint, was started on oral penicillin on 8/31/2019 for left mandibular dental pain but has been unable to schedule dental therapy appointment.  He states that he was seen at a local clinic but unable to pay the $350 charge to have his tooth pulled.  He is scheduled for an appointment at a community dentistry clinic in [place] for Monday, 9/9/2019.  He denies fever, chills, difficulty swallowing, shortness of breath, facial swelling or neck involvement.    Allergies:  Allergies   Allergen Reactions     Tramadol Nausea     Vicodin [Hydrocodone-Acetaminophen] Rash       Problem List:    Patient Active Problem List    Diagnosis Date Noted     Bipolar 1 disorder (H) 03/16/2017     Priority: Medium     Generalized anxiety disorder 03/16/2017     Priority: Medium        Past Medical History:    No past medical history on file.    Past Surgical History:    Past Surgical History:   Procedure Laterality Date     ESOPHAGOSCOPY, GASTROSCOPY, DUODENOSCOPY (EGD), COMBINED N/A 4/16/2019    Procedure: COMBINED ESOPHAGOSCOPY, GASTROSCOPY, DUODENOSCOPY (EGD), BIOPSY SINGLE OR MULTIPLE;  Surgeon: Kj Thomas MD;  Location: [facility]         Family History:    No family history on file.    Social History:  Marital Status:  Single [1]  Social History     Tobacco Use     Smoking status: Current Every Day Smoker     Packs/day: 0.25     Smokeless tobacco: Current User   Substance Use Topics     Alcohol use: No     Drug use: No        Medications:      clindamycin (CLEOCIN) 300 MG capsule   oxyCODONE (ROXICODONE) 5 MG tablet   multivitamin, therapeutic  "(THERA-VIT) TABS tablet   sucralfate (CARAFATE) 1 GM tablet         Review of Systems  Constitutional:  Negative for fever or chills.  HENT: Positive for left mandibular dental pain.  Musculoskeletal: Negative for neck pain or stiffness.  Neurological:  Negative for headache.    All others reviewed and are negative.      Physical Exam   BP: (!) 149/77  Pulse: 87  Temp: 98.1  F (36.7  C)  Resp: 16  Height: 170.2 cm (5' 7\")  Weight: 74.8 kg (165 lb)  SpO2: 99 %      Physical Exam  Constitutional:  Well developed, well nourished.  Appears nontoxic and in no acute distress.   HENT:  Normocephalic and atraumatic.  Eyes:  Conjunctivae are normal.  Oral:  Patient is without trismus.  Moist oral mucosa.  Fracture of left first mandibular molar with tenderness but no identifiable abscess.  Gingival lining intact.  No pharyngeal erythema or exudate.  No uvular asymmetry.  Patient is able to elevate tongue without sublingual swelling.  Voice is not muffled.  Tolerates secretions.  Neck:  Neck supple.  Cardiovascular:  No cyanosis.   Respiratory:  Effort normal, no respiratory distress.   Musculoskeletal:  Moves extremities spontaneously.  Neurological:  Patient is alert.  Skin:  Skin is warm and dry.  Psychiatric:  Normal mood and affect.      ED Course        Procedures               Critical Care time:  none               No results found for this or any previous visit (from the past 24 hour(s)).    Medications   bupivacaine 0.5 % EPINEPHrine 1:200,000 0.5% [id number] dental injection SOLN (has no administration in time range)       Assessments & Plan (with Medical Decision Making)   Rao Leavitt is a 29 year old male who presents to the department for evaluation of persistent left radicular dental pain for the past 1 week despite oral antibiotic therapy with penicillin.  He maintains that he was seen by a local dentist but unable to pay out of pocket, is scheduled for community dental appointment on Monday and " expects to have the tooth pulled.  However he reports that panoramic radiographs were performed and he was told that he has infection.  He has no sign of ANUG, peritonsillar abscess, Sascha's angina, osteomyelitis, orofacial or peripharyngeal deep space infection.  Will attempt to change antibiotic therapy to clindamycin, performed inferior alveolar nerve block which patient tolerated well, and he will be prescribed a very short course of oxycodone to take only as absolutely necessary for severe breakthrough pain refractory to OTC medications.      According to prescription monitoring program, the patient has had several short duration prescriptions for oxycodone filled within the past 1 year, but also had acute pain secondary to orthopedic injury 4 months ago.  I have clearly stated that narcotic pain medications such as oxycodone and Percocet have been shown to induce tolerance over time and has addictive properties. He states an understanding and will be given a prescription for a short course to use only as directed for his acute pain, but should discontinue use as soon as able.         Disclaimer:  This note consists of symbols derived from keyboarding, dictation, and/or voice recognition software.  As a result, there may be errors in the script that have gone undetected.  Please consider this when interpreting information found in the chart.        I have reviewed the nursing notes.    I have reviewed the findings, diagnosis, plan and need for follow up with the patient.       New Prescriptions    CLINDAMYCIN (CLEOCIN) 300 MG CAPSULE    Take 1 capsule (300 mg) by mouth 3 times daily for 10 days       Final diagnoses:   Pain, dental       9/5/2019   [facility] EMERGENCY DEPARTMENT     Naeem Rees DO  09/05/19 8999

## 2019-09-10 ENCOUNTER — HOSPITAL ENCOUNTER (EMERGENCY)
Facility: CLINIC | Age: 29
Discharge: HOME | End: 2019-09-10
Attending: PHYSICIAN ASSISTANT | Admitting: PHYSICIAN ASSISTANT
Payer: COMMERCIAL

## 2019-09-10 VITALS
RESPIRATION RATE: 16 BRPM | SYSTOLIC BLOOD PRESSURE: 133 MMHG | BODY MASS INDEX: 24.33 KG/M2 | TEMPERATURE: 97.8 F | HEIGHT: 67 IN | WEIGHT: 155 LBS | OXYGEN SATURATION: 99 % | DIASTOLIC BLOOD PRESSURE: 83 MMHG

## 2019-09-10 DIAGNOSIS — G89.18 ACUTE POST-OPERATIVE PAIN: ICD-10-CM

## 2019-09-10 PROCEDURE — 99213 OFFICE O/P EST LOW 20 MIN: CPT | Mod: Z6 | Performed by: PHYSICIAN ASSISTANT

## 2019-09-10 PROCEDURE — 96372 THER/PROPH/DIAG INJ SC/IM: CPT

## 2019-09-10 PROCEDURE — G0463 HOSPITAL OUTPT CLINIC VISIT: HCPCS

## 2019-09-10 PROCEDURE — 25000128 H RX IP 250 OP 636: Performed by: PHYSICIAN ASSISTANT

## 2019-09-10 RX ORDER — KETOROLAC TROMETHAMINE 30 MG/ML
30 INJECTION, SOLUTION INTRAMUSCULAR; INTRAVENOUS ONCE
Status: COMPLETED | OUTPATIENT
Start: 2019-09-10 | End: 2019-09-10

## 2019-09-10 RX ADMIN — KETOROLAC TROMETHAMINE 30 MG: 30 INJECTION, SOLUTION INTRAMUSCULAR at 14:14

## 2019-09-10 ASSESSMENT — MIFFLIN-ST. JEOR: SCORE: 1626.71

## 2019-09-10 NOTE — ED PROVIDER NOTES
History     Chief Complaint   Patient presents with     Dental Pain     left lower molar extraction today-pain worse now     HPI  Rao Leavitt is a 29 year old male who presents to the urgent care with concern over left lower mouth pain after he had dental extraction earlier this morning.  Patient has been evaluated in the emergency department multiple times previously over the last month and treated with two rounds of antibiotics, oxycodone.  He reports that he had affected tooth pulled this morning at approximately 10:30 AM.  Provider who pulled tooth did not provide any prescription pain medication.  He reports that his pain has been stable since he left the office.  He has had some bleeding form the extraction site. He is unaware if there is any jaw/facial swelling.  He became more concerned as pain began to radiate towards his jaw.  He denies any fever, chills, myalgias, ore throat, cough, dyspnea, wheezing, vomiting, diarrhea, abdominal pain.      Allergies:  Allergies   Allergen Reactions     Tramadol Nausea     Vicodin [Hydrocodone-Acetaminophen] Rash     Problem List:    Patient Active Problem List    Diagnosis Date Noted     Bipolar 1 disorder (H) 03/16/2017     Priority: Medium     Generalized anxiety disorder 03/16/2017     Priority: Medium      Past Medical History:    History reviewed. No pertinent past medical history.    Past Surgical History:    Past Surgical History:   Procedure Laterality Date     ESOPHAGOSCOPY, GASTROSCOPY, DUODENOSCOPY (EGD), COMBINED N/A 4/16/2019    Procedure: COMBINED ESOPHAGOSCOPY, GASTROSCOPY, DUODENOSCOPY (EGD), BIOPSY SINGLE OR MULTIPLE;  Surgeon: Kj Thomas MD;  Location: [facility]       Family History:    History reviewed. No pertinent family history.    Social History:  Marital Status:  Single [1]  Social History     Tobacco Use     Smoking status: Current Every Day Smoker     Packs/day: 0.25     Smokeless tobacco: Current User   Substance Use  "Topics     Alcohol use: No     Drug use: No      Medications:      clindamycin (CLEOCIN) 300 MG capsule   multivitamin, therapeutic (THERA-VIT) TABS tablet   oxyCODONE (ROXICODONE) 5 MG tablet   sucralfate (CARAFATE) 1 GM tablet     Review of Systems  CONSTITUTIONAL:NEGATIVE for fever, chills, change in weight  INTEGUMENTARY/SKIN: NEGATIVE for worrisome rashes, moles or lesions  EYES: NEGATIVE for vision changes or irritation  ENT/MOUTH: POSITIVE for left lower dental/jaw pain and NEGATIVE for sore throat, nasal congestion   RESP:NEGATIVE for significant cough or SOB  GI: NEGATIVE for nausea, vomiting, diarrhea, abdominal pain  Physical Exam   BP: 133/83  Heart Rate: 100  Temp: 97.8  F (36.6  C)  Resp: 16  Height: 170.2 cm (5' 7\")  Weight: 70.3 kg (155 lb)  SpO2: 99 %  Physical Exam  GENERAL APPEARANCE: healthy, alert and no distress  EYES: EOMI,  PERRL, conjunctiva clear  HENT: ear canals and TM's normal. Recent extraction of tooth number 19 with minimal oozing blood, no gum/jaw swelling noted, no TMJ tenderness  NECK: supple, nontender, no lymphadenopathy  RESP: lungs clear to auscultation - no rales, rhonchi or wheezes  CV: regular rates and rhythm, normal S1 S2, no murmur noted  SKIN: no suspicious lesions or rashes  ED Course        Procedures        Critical Care time:  none        No results found for this or any previous visit (from the past 24 hour(s)).    Medications   ketorolac (TORADOL) injection 30 mg (has no administration in time range)     Declined additional nerve block   Assessments & Plan (with Medical Decision Making)     I have reviewed the nursing notes.    I have reviewed the findings, diagnosis, plan and need for follow up with the patient.       Discharge Medication List as of 9/10/2019  2:32 PM        Final diagnoses:   Acute post-operative dental pain     29-year-old male presents to the urgent care with concern over persistent left lower pain after having dental extraction earlier this " morning.  He had stable vital signs on arrival.  Physical exam findings as described above did show evidence of recent extraction.  There was no significant gum swelling or abscess that was amenable to drainage at this time.  I reviewed patient in prescription drug monitoring site as well as epic records which showed numerous visits to the ED over the last 2 weeks with similar complaints and multiple scription opioids from numerous providers over the last 6 months.  He was given an injection of Toradol in the department with some improvement of his symptoms.  I did discuss additional nerve block which patient declined at this time.  He was discharged home stable with instructions for continued symptomatic treatment with ice heat, Tylenol/ibuprofen.  Follow-up with dentist as needed for further management of dental pain or dental procedures.  Worrisome reasons to return to ER/UC or seek medical care sooner discussed.     Disclaimer: This note consists of symbols derived from keyboarding, dictation, and/or voice recognition software. As a result, there may be errors in the script that have gone undetected.  Please consider this when interpreting information found in the chart.    9/10/2019   [facility] EMERGENCY DEPARTMENT     Vida Milan PA-C  09/12/19 2621

## 2019-09-10 NOTE — DISCHARGE INSTRUCTIONS
Many of these clinics offer a sliding fee option for patients that qualify, and see patients on a walk-in or same day basis. Please call each clinic directly. As services, hours, fees and policies vary greatly.          [facility], [facility]  [phone]  Sees no insurance  [facility], [place]  [phone]  Preventive services only  [facility] (mult loc) [phone]  [facility]    ([facility]), [facility]  [phone]  [facility], [place]       [phone]  Preventive services only  [facility]  [phone]  Accepts MA & sees no ins  [facility] [place],      Accepts MA & sees no ins   [place]   [phone]; [phone]  [facility], [facility]   Accepts MA & sees no ins       [phone]  [facility], [place] [facility]  [phone]   Accepts MA emergencies  [facility], [place] [phone]  [facility],     Accepts MA   [place]   [phone]    [facility] [place] [phone]  Accepts MA & sees no ins   [facility]   Dental Clinic    [phone]  [facility], [facility] [place] [facility]  [phone]   [facility] [phone]  [facility]  Preventive services only   [place]   [phone]  [facility] [phone]  [facility], [place]  [phone]  Same day [facility] [phone]  Same day [facility],      Same day [facility]   [phone]    [facility], [place] [facility] [phone]  [facility], walk-in only  [facility] (multiple locations) [phone]      [facility] , [place] [facility] [phone]    [facility] [phone]  Free clinic, walk-in only  [place] [facility]  [phone]  [facility] [phone] (adults)       [phone]  (children)  [place] [facility] [phone]    Also, referral service for low cost dental and healthcare: [phone]  [phone]

## 2019-09-10 NOTE — ED AVS SNAPSHOT
[facility] Emergency Department  [address] [facility] [address]  Phone:  [phone]  Fax:  [phone]                                    Rao Leavitt   MRN: [id number]    Department:  [facility] Emergency Department   Date of Visit:  9/10/2019           After Visit Summary Signature Page    I have received my discharge instructions, and my questions have been answered. I have discussed any challenges I see with this plan with the nurse or doctor.    ..........................................................................................................................................  Patient/Patient Representative Signature      ..........................................................................................................................................  Patient Representative Print Name and Relationship to Patient    ..................................................               ................................................  Date                                   Time    ..........................................................................................................................................  Reviewed by Signature/Title    ...................................................              ..............................................  Date                                               Time          22EPIC Rev 08/18

## 2019-09-18 ENCOUNTER — OFFICE VISIT (OUTPATIENT)
Dept: FAMILY MEDICINE | Facility: CLINIC | Age: 29
End: 2019-09-18
Payer: COMMERCIAL

## 2019-09-18 VITALS
BODY MASS INDEX: 24.3 KG/M2 | HEART RATE: 73 BPM | TEMPERATURE: 97.3 F | RESPIRATION RATE: 16 BRPM | HEIGHT: 67 IN | DIASTOLIC BLOOD PRESSURE: 78 MMHG | OXYGEN SATURATION: 99 % | SYSTOLIC BLOOD PRESSURE: 122 MMHG | WEIGHT: 154.8 LBS

## 2019-09-18 DIAGNOSIS — F41.1 GENERALIZED ANXIETY DISORDER: ICD-10-CM

## 2019-09-18 DIAGNOSIS — R45.4 IRRITABILITY AND ANGER: ICD-10-CM

## 2019-09-18 DIAGNOSIS — F31.9 BIPOLAR 1 DISORDER (H): Primary | ICD-10-CM

## 2019-09-18 PROCEDURE — 99214 OFFICE O/P EST MOD 30 MIN: CPT | Performed by: NURSE PRACTITIONER

## 2019-09-18 RX ORDER — FLUOXETINE 10 MG/1
10 CAPSULE ORAL DAILY
Qty: 30 CAPSULE | Refills: 0 | Status: SHIPPED | OUTPATIENT
Start: 2019-09-18 | End: 2019-11-11

## 2019-09-18 RX ORDER — OXYCODONE AND ACETAMINOPHEN 5; 325 MG/1; MG/1
TABLET ORAL
Refills: 0 | COMMUNITY
Start: 2019-09-11 | End: 2019-09-18

## 2019-09-18 RX ORDER — HYDROXYZINE HYDROCHLORIDE 25 MG/1
TABLET, FILM COATED ORAL
Refills: 0 | COMMUNITY
Start: 2019-07-05 | End: 2019-11-11

## 2019-09-18 RX ORDER — GABAPENTIN 300 MG/1
CAPSULE ORAL
Refills: 1 | COMMUNITY
Start: 2019-07-09 | End: 2019-09-18

## 2019-09-18 SDOH — HEALTH STABILITY: MENTAL HEALTH: HOW MANY STANDARD DRINKS CONTAINING ALCOHOL DO YOU HAVE ON A TYPICAL DAY?: 10 OR MORE

## 2019-09-18 SDOH — HEALTH STABILITY: MENTAL HEALTH: HOW OFTEN DO YOU HAVE 6 OR MORE DRINKS ON ONE OCCASION?: WEEKLY

## 2019-09-18 SDOH — HEALTH STABILITY: MENTAL HEALTH: HOW OFTEN DO YOU HAVE A DRINK CONTAINING ALCOHOL?: 2-3 TIMES A WEEK

## 2019-09-18 ASSESSMENT — ANXIETY QUESTIONNAIRES
1. FEELING NERVOUS, ANXIOUS, OR ON EDGE: NEARLY EVERY DAY
5. BEING SO RESTLESS THAT IT IS HARD TO SIT STILL: MORE THAN HALF THE DAYS
7. FEELING AFRAID AS IF SOMETHING AWFUL MIGHT HAPPEN: MORE THAN HALF THE DAYS
6. BECOMING EASILY ANNOYED OR IRRITABLE: NEARLY EVERY DAY
IF YOU CHECKED OFF ANY PROBLEMS ON THIS QUESTIONNAIRE, HOW DIFFICULT HAVE THESE PROBLEMS MADE IT FOR YOU TO DO YOUR WORK, TAKE CARE OF THINGS AT HOME, OR GET ALONG WITH OTHER PEOPLE: VERY DIFFICULT
3. WORRYING TOO MUCH ABOUT DIFFERENT THINGS: MORE THAN HALF THE DAYS
GAD7 TOTAL SCORE: 15
2. NOT BEING ABLE TO STOP OR CONTROL WORRYING: SEVERAL DAYS

## 2019-09-18 ASSESSMENT — PATIENT HEALTH QUESTIONNAIRE - PHQ9
SUM OF ALL RESPONSES TO PHQ QUESTIONS 1-9: 18
5. POOR APPETITE OR OVEREATING: MORE THAN HALF THE DAYS

## 2019-09-18 ASSESSMENT — MIFFLIN-ST. JEOR: SCORE: 1625.8

## 2019-09-18 NOTE — NURSING NOTE
"Initial /78 (BP Location: Left arm, Patient Position: Sitting, Cuff Size: Adult Regular)   Pulse 73   Temp 97.3  F (36.3  C) (Tympanic)   Resp 16   Ht 1.702 m (5' 7\")   Wt 70.2 kg (154 lb 12.8 oz)   SpO2 99%   BMI 24.25 kg/m   Estimated body mass index is 24.25 kg/m  as calculated from the following:    Height as of this encounter: 1.702 m (5' 7\").    Weight as of this encounter: 70.2 kg (154 lb 12.8 oz). .    Jeannie Patrick on 9/18/2019 at 7:40 AM    "

## 2019-09-18 NOTE — PATIENT INSTRUCTIONS
Possible side effects of antidepressants/anxiety meds, including but not limited to GI upset, disrupted sleep, loss of libido, worsening of mood or even possible risk of increased suicidal thoughts.   Often some of these things if not severe will improve after 1-2 weeks on medications but some may not see effects for 3-4 weeks,  if tolerable patients should continue meds and see if there is improvement.  If symptoms are intolerable or for any suicidal thoughts the medication should be stopped immediately and contact the clinic.      These medications should be used for 6-9 months before stopping, to avoid rebound symptoms.   Contact the clinic if having any problems tolerating these medications.  Take the medication daily and do not stop the medication abruptly.    Follow up in one month to discuss improvement and whether or not we need to change meds or increase dose.  Follow up sooner if problems.      Please plan to contact the clinic or 24 hour nurse line at any time if you are having any thoughts of self harm.    MARSHAL Gaxiola

## 2019-09-18 NOTE — PROGRESS NOTES
"Subjective     Rao Leavitt is a 29 year old male who presents to clinic today for the following health issues:  Chief Complaint   Patient presents with     MOOD CHANGES     Would like to get back on medication for bipolar        HPI   Bi Polar      Duration: dx 2016 - he was put on Lithium and he states that he stopped taking it because it was a \"rollercoaster\"     Description:  Depression: no   Anxiety: YES - not currently taking any medication for this  Sleep problems: YES- he states that his anxiety affects this. He states that he went from having a home to \"absolutely nothing within a 24 hr period\" He has been staying in a car and a motel is where he slept last night.   Concentration problems: YES- He states that his mood has been affecting his work.   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 He would like recommendations on AA classes. He states it is not currently court ordered but he wants to do this for himself and his child.     Therapies tried and outcome: none    See PHQ-9 and AAYUSH scores, as appropriate    Anxiety Follow-Up    How are you doing with your anxiety since your last visit? Worsened see above.     Are you having other symptoms that might be associated with anxiety? Yes:  bipolar disorder    Have you had a significant life event? Job Concerns, Housing Concerns and Health Concerns     Are you feeling depressed? No    Do you have any concerns with your use of alcohol or other drugs? Yes:  alcohol. He is 4 days sober today.     Social History     Tobacco Use     Smoking status: Current Every Day Smoker     Packs/day: 0.25     Smokeless tobacco: Current User   Substance Use Topics     Alcohol use: No     Drug use: No     No flowsheet data found.  No flowsheet data found.         Has been on stimulants (ADHD medication?) " throughout school years (Ritalin, Adderall) did not like that either.  Was seeing Specialists who diagnosed him with Bipolar.      Patient Active Problem List   Diagnosis     Bipolar 1 disorder (H)     Generalized anxiety disorder     Past Surgical History:   Procedure Laterality Date     ESOPHAGOSCOPY, GASTROSCOPY, DUODENOSCOPY (EGD), COMBINED N/A 4/16/2019    Procedure: COMBINED ESOPHAGOSCOPY, GASTROSCOPY, DUODENOSCOPY (EGD), BIOPSY SINGLE OR MULTIPLE;  Surgeon: Kj Thomas MD;  [facility]     PE tube         Social History     Tobacco Use     Smoking status: Current Every Day Smoker     Packs/day: 0.25     Smokeless tobacco: Current User   Substance Use Topics     Alcohol use: No     No family history on file.      Current Outpatient Medications   Medication Sig Dispense Refill     multivitamin, therapeutic (THERA-VIT) TABS tablet Take 1 tablet by mouth daily       sucralfate (CARAFATE) 1 GM tablet Take 1 tablet (1 g) by mouth 4 times daily 120 tablet 1     gabapentin (NEURONTIN) 300 MG capsule TAKE 1 CAPSULE BY MOUTH IN THE MORNING AND 2 IN THE EVENING  1     hydrOXYzine (ATARAX) 25 MG tablet TAKE 1 TO 2 TABLETS BY MOUTH EVERY 4 TO 6 HOURS AS NEEDED FOR MUSCLE SPASM  0     oxyCODONE (ROXICODONE) 5 MG tablet Take 1 tablet (5 mg) by mouth every 6 hours as needed for severe pain (Patient not taking: Reported on 9/18/2019) 8 tablet 0     oxyCODONE-acetaminophen (PERCOCET) 5-325 MG tablet TAKE ONE TABLET BY MOUTH EVERY 6 HOURS AS NEEDED.  0     Allergies   Allergen Reactions     Tramadol Nausea     Vicodin [Hydrocodone-Acetaminophen] Rash     Recent Labs   Lab Test 08/16/19  0915 04/15/19  0630 03/20/19  0600   ALT 22 16 20   CR 0.75 0.80 0.78   GFRESTIMATED >90 >90 >90   GFRESTBLACK >90 >90 >90   POTASSIUM 3.9 4.2 4.0      BP Readings from Last 3 Encounters:   09/18/19 122/78   09/10/19 133/83   09/05/19 (!) 149/77    Wt Readings from Last 3 Encounters:   09/18/19 70.2 kg (154 lb 12.8 oz)   09/10/19 70.3 kg  "(155 lb)   09/05/19 74.8 kg (165 lb)                    Reviewed and updated as needed this visit by Provider  Problems         Review of Systems   ROS COMP: Constitutional, HEENT, cardiovascular, pulmonary, GI, , musculoskeletal, neuro, skin, endocrine and psych systems are negative, except as otherwise noted.      Objective    /78 (BP Location: Left arm, Patient Position: Sitting, Cuff Size: Adult Regular)   Pulse 73   Temp 97.3  F (36.3  C) (Tympanic)   Resp 16   Ht 1.702 m (5' 7\")   Wt 70.2 kg (154 lb 12.8 oz)   SpO2 99%   BMI 24.25 kg/m    Body mass index is 24.25 kg/m .  Physical Exam   GENERAL: healthy, alert and no distress  PSYCH: mentation appears normal, affect normal/bright, anxious, speech pressured and judgement and insight intact    Diagnostic Test Results:  Labs reviewed in Epic        Assessment & Plan     1. Bipolar 1 disorder (H)   Needs mood stabilizer.  Started on serotonin specific reuptake inhibitor for anger/irritability.  Stressed importance of getting involved in chem dep program.  Given resources.  Referral placed for Psych med management.    - MENTAL HEALTH REFERRAL  - Adult; Psychiatry and Medication Management; Psychiatry; [facility]: [facility] Psychiatry Service [phone].  Medication management & future refills will be returned to [facility] PCP upon completion of evaluation; We maurizio...  - FLUoxetine (PROZAC) 10 MG capsule; Take 1 capsule (10 mg) by mouth daily  Dispense: 30 capsule; Refill: 0    2. Generalized anxiety disorder     - MENTAL HEALTH REFERRAL  - Adult; Psychiatry and Medication Management; Psychiatry; [facility]: [facility] Psychiatry Service [phone].  Medication management & future refills will be returned to [facility] PCP upon completion of evaluation; We maurizio...  - FLUoxetine (PROZAC) 10 MG capsule; Take 1 capsule (10 mg) by mouth daily  Dispense: 30 capsule; Refill: 0    3. Irritability and anger     - FLUoxetine (PROZAC) 10 MG capsule; Take 1 capsule " (10 mg) by mouth daily  Dispense: 30 capsule; Refill: 0     Tobacco Cessation:   reports that he has been smoking.  He has been smoking about 0.25 packs per day. He uses smokeless tobacco.  Tobacco Cessation Action Plan: Information offered: Patient not interested at this time        Patient Instructions   Possible side effects of antidepressants/anxiety meds, including but not limited to GI upset, disrupted sleep, loss of libido, worsening of mood or even possible risk of increased suicidal thoughts.   Often some of these things if not severe will improve after 1-2 weeks on medications but some may not see effects for 3-4 weeks,  if tolerable patients should continue meds and see if there is improvement.  If symptoms are intolerable or for any suicidal thoughts the medication should be stopped immediately and contact the clinic.      These medications should be used for 6-9 months before stopping, to avoid rebound symptoms.   Contact the clinic if having any problems tolerating these medications.  Take the medication daily and do not stop the medication abruptly.    Follow up in one month to discuss improvement and whether or not we need to change meds or increase dose.  Follow up sooner if problems.      Please plan to contact the clinic or 24 hour nurse line at any time if you are having any thoughts of self harm.    MARSHAL Gaxiola        No follow-ups on file.    Suzi Jaime NP  [facility]

## 2019-09-19 ASSESSMENT — ANXIETY QUESTIONNAIRES: GAD7 TOTAL SCORE: 15

## 2019-09-24 ENCOUNTER — OFFICE VISIT (OUTPATIENT)
Dept: PSYCHIATRY | Facility: CLINIC | Age: 29
End: 2019-09-24
Attending: NURSE PRACTITIONER
Payer: COMMERCIAL

## 2019-09-24 VITALS
WEIGHT: 154 LBS | BODY MASS INDEX: 24.17 KG/M2 | HEART RATE: 63 BPM | SYSTOLIC BLOOD PRESSURE: 119 MMHG | RESPIRATION RATE: 16 BRPM | DIASTOLIC BLOOD PRESSURE: 74 MMHG | TEMPERATURE: 97.1 F | HEIGHT: 67 IN

## 2019-09-24 DIAGNOSIS — F31.9 BIPOLAR 1 DISORDER (H): Primary | ICD-10-CM

## 2019-09-24 DIAGNOSIS — F41.0 PANIC DISORDER WITHOUT AGORAPHOBIA: ICD-10-CM

## 2019-09-24 PROCEDURE — 90792 PSYCH DIAG EVAL W/MED SRVCS: CPT | Performed by: NURSE PRACTITIONER

## 2019-09-24 RX ORDER — HYDROXYZINE HYDROCHLORIDE 25 MG/1
TABLET, FILM COATED ORAL
Qty: 60 TABLET | Refills: 0 | Status: SHIPPED | OUTPATIENT
Start: 2019-09-24 | End: 2019-09-24 | Stop reason: DRUGHIGH

## 2019-09-24 RX ORDER — ARIPIPRAZOLE 10 MG/1
10 TABLET ORAL DAILY
Qty: 30 TABLET | Refills: 0 | Status: SHIPPED | OUTPATIENT
Start: 2019-09-24 | End: 2019-10-30

## 2019-09-24 ASSESSMENT — ANXIETY QUESTIONNAIRES
2. NOT BEING ABLE TO STOP OR CONTROL WORRYING: NEARLY EVERY DAY
GAD7 TOTAL SCORE: 21
5. BEING SO RESTLESS THAT IT IS HARD TO SIT STILL: NEARLY EVERY DAY
6. BECOMING EASILY ANNOYED OR IRRITABLE: NEARLY EVERY DAY
1. FEELING NERVOUS, ANXIOUS, OR ON EDGE: NEARLY EVERY DAY
7. FEELING AFRAID AS IF SOMETHING AWFUL MIGHT HAPPEN: NEARLY EVERY DAY
3. WORRYING TOO MUCH ABOUT DIFFERENT THINGS: NEARLY EVERY DAY
IF YOU CHECKED OFF ANY PROBLEMS ON THIS QUESTIONNAIRE, HOW DIFFICULT HAVE THESE PROBLEMS MADE IT FOR YOU TO DO YOUR WORK, TAKE CARE OF THINGS AT HOME, OR GET ALONG WITH OTHER PEOPLE: EXTREMELY DIFFICULT

## 2019-09-24 ASSESSMENT — PATIENT HEALTH QUESTIONNAIRE - PHQ9
5. POOR APPETITE OR OVEREATING: NEARLY EVERY DAY
SUM OF ALL RESPONSES TO PHQ QUESTIONS 1-9: 21

## 2019-09-24 ASSESSMENT — MIFFLIN-ST. JEOR: SCORE: 1622.17

## 2019-09-24 NOTE — PATIENT INSTRUCTIONS
1. Begin Aripiprazole (Abilify) 1 tablet (10 mg) daily for Bipolar Disorder - can decrease to 1/2 tablet if you feel over medicated  2. Begin Hydroxyzine 1-2 tablets (25 - 50 mg) daily as needed for anxiety and to help you sleep  3. Continue Fluoxetine 10 mg (1 capsule) daily for anxiety.  4. See Ximena for therapy on Sept. 26        Continue all other medical directions per primary care provider.     Continue all other medications as reviewed per electronic medical record today.     Safety plan reviewed. To the Emergency Department as needed or call after hours crisis line at [phone] or [phone]. [place] Crisis Text Line: Text [place] to 472226  or  Suicide LifeLine Chat: [employer] [place].org/chat/    To schedule individual or family therapy, call [facility] at [phone].     Continue individual therapy as planned with  Ximena    Schedule an appointment with me in 4 weeks or sooner as needed.  Call [facility] at [phone] to schedule.    Follow up with primary care provider as planned or for acute medical concerns.    Call the psychiatric nurse line with medication questions or concerns at [phone].    [employer] may be used to communicate with your provider, but this is not intended to be used for emergencies.    Crisis Resources:    National Suicide Prevention Lifeline: 238.881.4196 (TTY: 148.232.1902). Call anytime for help.  (www.suicidepreventionlifeline.org)  National [place] on Mental Illness (www.nadege.org): 823.417.1665 or 289-847-9046.   Mental Health Association (www.mentalhealth.org): 860.416.6072 or 661-416-4865.  [place] Crisis Text Line: Text [place] to 471889  Suicide LifeLine Chat: [employer] [place].org/chat

## 2019-09-24 NOTE — Clinical Note
Philipp is going to start on Abilify and I added some hydroxyzine for anxiety and to help him sleep - he is finding some relief with the fluoxetine - thanks

## 2019-09-24 NOTE — LETTER
September 24, 2019      Rao Leavitt  [address] [place] [address]        To Whom It May Concern:    Rao MAGALIS Dagmar was seen in our clinic today at 745 AM. He may return to work without restrictions.      Sincerely,        Bree Grullon NP
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
## 2019-09-25 ASSESSMENT — ANXIETY QUESTIONNAIRES: GAD7 TOTAL SCORE: 21

## 2019-09-26 ENCOUNTER — OFFICE VISIT (OUTPATIENT)
Dept: BEHAVIORAL HEALTH | Facility: CLINIC | Age: 29
End: 2019-09-26
Payer: COMMERCIAL

## 2019-09-26 DIAGNOSIS — F41.1 GENERALIZED ANXIETY DISORDER: ICD-10-CM

## 2019-09-26 DIAGNOSIS — F31.9 BIPOLAR 1 DISORDER (H): Primary | ICD-10-CM

## 2019-09-26 PROCEDURE — 90834 PSYTX W PT 45 MINUTES: CPT | Performed by: SOCIAL WORKER

## 2019-09-26 NOTE — LETTER
[facility]  [address] [address]  Phone: [phone]  Fax: [phone]    September 26, 2019        Rao Leavitt  [address] [place] [address]          To whom it may concern:    RE: Rao Leavitt    This is written per Mr. Leavitt's request. He was seen today at 330 pm for an appointment.       Please contact me for questions or concerns.      Sincerely,        Tran (Mindy),EDU,[facility]

## 2019-09-28 ENCOUNTER — HOSPITAL ENCOUNTER (EMERGENCY)
Facility: CLINIC | Age: 29
End: 2019-09-28
Payer: COMMERCIAL

## 2019-09-29 ENCOUNTER — HOSPITAL ENCOUNTER (EMERGENCY)
Facility: CLINIC | Age: 29
Discharge: HOME | End: 2019-09-29
Attending: PHYSICIAN ASSISTANT | Admitting: PHYSICIAN ASSISTANT
Payer: COMMERCIAL

## 2019-09-29 VITALS
DIASTOLIC BLOOD PRESSURE: 93 MMHG | TEMPERATURE: 99 F | OXYGEN SATURATION: 99 % | SYSTOLIC BLOOD PRESSURE: 143 MMHG | RESPIRATION RATE: 18 BRPM

## 2019-09-29 DIAGNOSIS — H57.12 LEFT EYE PAIN: ICD-10-CM

## 2019-09-29 PROCEDURE — 99214 OFFICE O/P EST MOD 30 MIN: CPT | Mod: Z6 | Performed by: PHYSICIAN ASSISTANT

## 2019-09-29 PROCEDURE — G0463 HOSPITAL OUTPT CLINIC VISIT: HCPCS | Performed by: PHYSICIAN ASSISTANT

## 2019-09-29 RX ORDER — OFLOXACIN 3 MG/ML
1-2 SOLUTION/ DROPS OPHTHALMIC 4 TIMES DAILY
Qty: 3 ML | Refills: 0 | Status: SHIPPED | OUTPATIENT
Start: 2019-09-29 | End: 2019-11-11

## 2019-09-29 NOTE — ED PROVIDER NOTES
"  History     Chief Complaint   Patient presents with     Conjunctivitis     left eye, waking up with drainage, irritation     HPI  Rao Leavitt is a 29 year old male who presents to the urgent care with concern over left eye pain is been present for the last 2 days.  He additionally complains of mattering, redness.  He denies any significant vision changes, photophobia, foreign body sensation.  No recent trauma to the eye.  He is not a contact lens user.  He reports that \"pinkeye\" has been going around at his place of employment.  He has not had any recent fever, chills, myalgias, nasal congestion, sore throat, cough, dyspnea, wheezing, vomiting, diarrhea or abdominal pain.  He notes that he was evaluated in the urgent care approximately 2 weeks ago for pain following dental extraction was later diagnosed with dry socket     Allergies:  Allergies   Allergen Reactions     Tramadol Nausea     Vicodin [Hydrocodone-Acetaminophen] Rash     Problem List:    Patient Active Problem List    Diagnosis Date Noted     Bipolar 1 disorder (H) 03/16/2017     Priority: Medium     Generalized anxiety disorder 03/16/2017     Priority: Medium      Past Medical History:    History reviewed. No pertinent past medical history.    Past Surgical History:    Past Surgical History:   Procedure Laterality Date     ESOPHAGOSCOPY, GASTROSCOPY, DUODENOSCOPY (EGD), COMBINED N/A 4/16/2019    Procedure: COMBINED ESOPHAGOSCOPY, GASTROSCOPY, DUODENOSCOPY (EGD), BIOPSY SINGLE OR MULTIPLE;  Surgeon: Kj Thomas MD;  Location: [facility]     PE tube       Family History:    History reviewed. No pertinent family history.    Social History:  Marital Status:  Single [1]  Social History     Tobacco Use     Smoking status: Current Every Day Smoker     Packs/day: 0.25     Smokeless tobacco: Former User   Substance Use Topics     Alcohol use: Not Currently     Frequency: 2-3 times a week     Drinks per session: 10 or more     Binge frequency: Weekly    "  Comment: 4 days sober     Drug use: No     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        Medications:    ARIPiprazole (ABILIFY) 10 MG tablet  FLUoxetine (PROZAC) 10 MG capsule  hydrOXYzine (ATARAX) 25 MG tablet  multivitamin, therapeutic (THERA-VIT) TABS tablet  sucralfate (CARAFATE) 1 GM tablet      Review of Systems  CONSTITUTIONAL:NEGATIVE for fever, chills, change in weight  INTEGUMENTARY/SKIN: NEGATIVE for worrisome rashes, moles or lesions  EYES: POSITIVE for left eye pain, discharge, redness  ENT/MOUTH: NEGATIVE for ear, mouth and throat problems  RESP:NEGATIVE for significant cough or SOB  GI: NEGATIVE for nausea, abdominal pain, heartburn, or change in bowel habits  Physical Exam   BP: (!) 143/93  Heart Rate: 90  Temp: 99  F (37.2  C)  Resp: 18  SpO2: 99 %  Physical Exam  GENERAL APPEARANCE: healthy, alert and no distress  EYES: EOMI,  PERRL, conjunctiva clear, no fluorescein uptake in left eye   HENT: ear canals and TM's normal.  Nose and mouth without ulcers, erythema or lesions  NECK: supple, nontender, no lymphadenopathy  RESP: lungs clear to auscultation - no rales, rhonchi or wheezes  CV: regular rates and rhythm, normal S1 S2, no murmur noted  SKIN: no suspicious lesions or rashes  ED Course        Procedures        Critical Care time:  none           IOP in the left eye was 15    No results found for this or any previous visit (from the past 24 hour(s)).    Medications - No data to display    Assessments & Plan (with Medical Decision Making)     I have reviewed the nursing notes.    I have reviewed the findings, diagnosis, plan and need for follow up with the patient.       Discharge Medication List as of 9/29/2019  1:42 PM      START taking these medications    Details   ofloxacin (OCUFLOX) 0.3 % ophthalmic solution Place 1-2 drops Into the left eye 4 times daily for 7 days, Disp-3 mL, R-0, E-Prescribe           Final diagnoses:   Left eye pain     28-year-old male presents to the urgent care  with concern over left eye pain which were present for the last 2 days accompanied by mattering, redness.  He had stable vital signs upon arrival.  Physical exam findings as described above were essentially benign at this time.  There is no significant erythema of the conjunctiva or discharge present.  Normal intraocular pressure of the left eye.  I did not see any evidence of foreign body in the eye, corneal abrasion, ulceration.  Given that conjunctivitis has been going around in his place of employment I did agree to cover with antibiotic drops.  He was discharged in stable with prescription for ofloxacin ophthalmic solution 1 to 2 drops 4 times daily for the next 7 days.  Follow-up with primary care provider or ophthalmology if no improvement within the next 3 to 5 days.  Worrisome reasons to return to the ER/UC sooner discussed.     Disclaimer: This note consists of symbols derived from keyboarding, dictation, and/or voice recognition software. As a result, there may be errors in the script that have gone undetected.  Please consider this when interpreting information found in the chart.    9/29/2019   [facility] EMERGENCY DEPARTMENT     Vida Milan PA-C  10/02/19 1100

## 2019-09-29 NOTE — ED AVS SNAPSHOT
[facility] Emergency Department  [address] [address]  Phone:  [phone]  Fax:  [phone]                                    Rao Leavitt   MRN: [id number]    Department:  [facility] Emergency Department   Date of Visit:  9/29/2019           After Visit Summary Signature Page    I have received my discharge instructions, and my questions have been answered. I have discussed any challenges I see with this plan with the nurse or doctor.    ..........................................................................................................................................  Patient/Patient Representative Signature      ..........................................................................................................................................  Patient Representative Print Name and Relationship to Patient    ..................................................               ................................................  Date                                   Time    ..........................................................................................................................................  Reviewed by Signature/Title    ...................................................              ..............................................  Date                                               Time          22EPIC Rev 08/18

## 2019-09-29 NOTE — LETTER
[facility] EMERGENCY DEPARTMENT  [address] [address]  Phone: [phone]  Fax: [phone]    September 29, 2019        Rao Leavitt  [address] [place] [address]          To whom it may concern:    RE: Rao Leavitt     Rao was evaluated in the  for an eye complaint on 9/28/19 and 9/29/19.      Please contact me for questions or concerns.      Sincerely,        Vida Milan PA-C

## 2019-09-30 NOTE — PROGRESS NOTES
[facility] Primary Care: Integrated Behavioral Health  September 30, 2019      Behavioral Health Clinician Progress Note    Patient Name: Rao Leavitt           Service Type: Individual      Service Location:  in clinic      Session Start Time: 3:40 PM  session End Time: 4:25 PM      Session Length: 38 - 52      Attendees: Client    Visit Activities (Refresh list every visit): NEW and [facility] Only    Diagnostic Assessment Date: Completed on 9/24/2019 by Bree Grullon [facility]  Treatment Plan Review Date: Not completed  See Flowsheets for today's PHQ-9 and AAYUSH-7 results  Previous PHQ-9:   PHQ-9 SCORE 9/18/2019 9/24/2019   PHQ-9 Total Score 18 21     Previous AAYUSH-7:   AAYUSH-7 SCORE 9/18/2019 9/24/2019   Total Score 15 21       NOMAN LEVEL:  No flowsheet data found.    DATA  Extended Session (60+ minutes): No  Interactive Complexity: No  Crisis: No    Treatment Objective(s) Addressed in This Session:  Target Behavior(s): alcohol use and disease management/lifestyle changes Stabilize mood    Functional Impairment: will effectively address problems that interfere with adaptive functioning  Mood Instability: will develop better understanding of triggers and coping strategies to stabilize mood  Risk / Safety: will develop strategies for more effective management of risk issues  Alcohol / Substance Use: will increase understanding of the effects of substance use  will make healthier choices in regard to substance use  will discuss/consider potential need for formal substance use evaluation, treatment and/or support  continue to make healthy choices regarding substance use and engage in activities / supportive services that promote sobriety  Anger Management: will learn strategies to resolve conflict adaptively and will learn and practice positive anger management skills     Current Stressors / Issues:  First session with patient.  He is referred by Bree Grullon [facility]  psychiatry.  Patient reports he has been sober for over a week.  He also reports his mood has become more stable since starting medication patient was diagnosed in 2016 with bipolar disorder.  Patient also reports being in custodial 2  weeks ago due to domestic charges stemming from an altercation with his mother-in-law.  He reports he will go to court on October 15, 2019.  Patient reports significant family mental health history with his father and paternal grandfather both being bipolar.  He also reports alcohol use parents and grandparents on both sides.  Discussed referral for [facility] therapist along with possibility of treatment.  Patient is agreeable to referral for therapist.  He reports attending AA in the past and this has been helpful.  We will continue to assess as needed      Progress on Treatment Objective(s) / Homework:  None established    Motivational Interviewing    MI Intervention: Expressed Empathy/Understanding, Supported Autonomy, Collaboration, Evocation, Permission to raise concern or advise, Open-ended questions, Reflections: simple and complex, Change talk (evoked) and Reframe     Change Talk Expressed by the Patient: Reasons to change Need to change    Provider Response to Change Talk: E - Evoked more info from patient about behavior change, A - Affirmed patient's thoughts, decisions, or attempts at behavior change, R - Reflected patient's change talk and S - Summarized patient's change talk statements    Also provided psychoeducation about behavioral health condition, symptoms, and treatment options    Care Plan review completed: No    Medication Review:  No changes to current psychiatric medication(s)    Medication Compliance:  Yes    Changes in Health Issues:   None reported    Chemical Use Review:   Substance Use: decrease in alcohol .  Patient reports frequency of use Daily in the past-he reports sobriety for the past 2 weeks..  Reviewed information and resources for treatment and ongoing  sobriety  Provided encouragement towards sobriety  Provided support and affirmation for steps taken towards sobriety   Patient is not interested in a referral for treatment at this time.  He reports that with increased mood stability his urge to drink has decreased.  He reports being sober for 2 weeks at this time and will attend AA if needed.  Will continue to assess as needed        Tobacco Use: Yes, First time assessed.  Patient reports frequency of use Daily -smokes 1 pack of cigarettes per day. Reviewed information and resources for quitting  Patient declined discussion at this time    Assessment: Current Emotional / Mental Status (status of significant symptoms):  Risk status (Self / Other harm or suicidal ideation)  Patient has had a history of suicidal ideation: 2014 was hospitalized-on a 72-hour hold -patient reports no history of any attempts or plans  Patient denies current fears or concerns for personal safety.  Patient denies current or recent suicidal ideation or behaviors.  Patient denies current or recent homicidal ideation or behaviors.  Patient denies current or recent self injurious behavior or ideation.  Patient denies other safety concerns.  A safety and risk management plan has not been developed at this time, however patient was encouraged to call [facility] should there be a change in any of these risk factors.    Appearance:   Appropriate   Eye Contact:   Fair   Psychomotor Behavior: Normal  Restless   Attitude:   Cooperative   Orientation:   All  Speech   Rate / Production: Hyperverbal  Normal    Volume:  Normal   Mood:    Hypomanic  Normal  Affect:    Appropriate  Blunted   Thought Content:  Clear   Thought Form:  Coherent  Logical   Insight:    Adequate for safety     Diagnoses:  1. Bipolar 1 disorder (H)    2. Generalized anxiety disorder        Collateral Reports Completed:  Communicated with: PCP as needed    Plan: (Homework, other):  Patient was given information about  behavioral services and encouraged to schedule a follow up appointment with the clinic [facility] in 2 weeks.  He was also given information about mental health symptoms and treatment options  and strategies to maintain sobriety.  CD Recommendations: Maintain Sobriety.  EDU Tran (Mindy),[facility]

## 2019-10-13 ASSESSMENT — COLUMBIA-SUICIDE SEVERITY RATING SCALE - C-SSRS
TOTAL  NUMBER OF ABORTED OR SELF INTERRUPTED ATTEMPTS PAST 3 MONTHS: NO
TOTAL  NUMBER OF ABORTED OR SELF INTERRUPTED ATTEMPTS PAST LIFETIME: NO
TOTAL  NUMBER OF INTERRUPTED ATTEMPTS LIFETIME: NO
5. HAVE YOU STARTED TO WORK OUT OR WORKED OUT THE DETAILS OF HOW TO KILL YOURSELF? DO YOU INTEND TO CARRY OUT THIS PLAN?: NO
1. IN THE PAST MONTH, HAVE YOU WISHED YOU WERE DEAD OR WISHED YOU COULD GO TO SLEEP AND NOT WAKE UP?: YES
4. HAVE YOU HAD THESE THOUGHTS AND HAD SOME INTENTION OF ACTING ON THEM?: NO
REASONS FOR IDEATION LIFETIME: COMPLETELY TO END OR STOP THE PAIN (YOU COULDN'T GO ON LIVING WITH THE PAIN OR HOW YOU WERE FEELING)
5. HAVE YOU STARTED TO WORK OUT OR WORKED OUT THE DETAILS OF HOW TO KILL YOURSELF? DO YOU INTEND TO CARRY OUT THIS PLAN?: NO
TOTAL  NUMBER OF INTERRUPTED ATTEMPTS PAST 3 MONTHS: NO
6. HAVE YOU EVER DONE ANYTHING, STARTED TO DO ANYTHING, OR PREPARED TO DO ANYTHING TO END YOUR LIFE?: NO
2. HAVE YOU ACTUALLY HAD ANY THOUGHTS OF KILLING YOURSELF?: NO
ATTEMPT LIFETIME: NO
ATTEMPT PAST THREE MONTHS: NO
4. HAVE YOU HAD THESE THOUGHTS AND HAD SOME INTENTION OF ACTING ON THEM?: YES
1. IN THE PAST MONTH, HAVE YOU WISHED YOU WERE DEAD OR WISHED YOU COULD GO TO SLEEP AND NOT WAKE UP?: NO
3. HAVE YOU BEEN THINKING ABOUT HOW YOU MIGHT KILL YOURSELF?: YES
2. HAVE YOU ACTUALLY HAD ANY THOUGHTS OF KILLING YOURSELF LIFETIME?: YES
6. HAVE YOU EVER DONE ANYTHING, STARTED TO DO ANYTHING, OR PREPARED TO DO ANYTHING TO END YOUR LIFE?: NO

## 2019-10-29 ENCOUNTER — HOSPITAL ENCOUNTER (OUTPATIENT)
Dept: BEHAVIORAL HEALTH | Facility: CLINIC | Age: 29
Discharge: HOME | End: 2019-10-29
Attending: SOCIAL WORKER | Admitting: SOCIAL WORKER
Payer: COMMERCIAL

## 2019-10-29 ENCOUNTER — HOSPITAL ENCOUNTER (EMERGENCY)
Facility: CLINIC | Age: 29
Discharge: HOME | End: 2019-10-29
Attending: EMERGENCY MEDICINE | Admitting: EMERGENCY MEDICINE
Payer: COMMERCIAL

## 2019-10-29 VITALS
OXYGEN SATURATION: 99 % | TEMPERATURE: 98 F | BODY MASS INDEX: 25.9 KG/M2 | WEIGHT: 165 LBS | HEIGHT: 67 IN | DIASTOLIC BLOOD PRESSURE: 80 MMHG | RESPIRATION RATE: 18 BRPM | SYSTOLIC BLOOD PRESSURE: 122 MMHG

## 2019-10-29 VITALS — BODY MASS INDEX: 26.52 KG/M2 | WEIGHT: 165 LBS | HEIGHT: 66 IN

## 2019-10-29 DIAGNOSIS — R10.32 ABDOMINAL PAIN, LEFT LOWER QUADRANT: ICD-10-CM

## 2019-10-29 LAB
ALBUMIN UR-MCNC: NEGATIVE MG/DL
ANION GAP SERPL CALCULATED.3IONS-SCNC: 9 MMOL/L (ref 3–14)
APPEARANCE UR: ABNORMAL
BASOPHILS # BLD AUTO: 0 10E9/L (ref 0–0.2)
BASOPHILS NFR BLD AUTO: 0.4 %
BILIRUB UR QL STRIP: NEGATIVE
BUN SERPL-MCNC: 16 MG/DL (ref 7–30)
CALCIUM SERPL-MCNC: 9.1 MG/DL (ref 8.5–10.1)
CHLORIDE SERPL-SCNC: 105 MMOL/L (ref 94–109)
CO2 SERPL-SCNC: 25 MMOL/L (ref 20–32)
COLOR UR AUTO: ABNORMAL
CREAT SERPL-MCNC: 0.77 MG/DL (ref 0.66–1.25)
DIFFERENTIAL METHOD BLD: NORMAL
EOSINOPHIL # BLD AUTO: 0.2 10E9/L (ref 0–0.7)
EOSINOPHIL NFR BLD AUTO: 2.2 %
ERYTHROCYTE [DISTWIDTH] IN BLOOD BY AUTOMATED COUNT: 11.4 % (ref 10–15)
GFR SERPL CREATININE-BSD FRML MDRD: >90 ML/MIN/{1.73_M2}
GLUCOSE SERPL-MCNC: 98 MG/DL (ref 70–99)
GLUCOSE UR STRIP-MCNC: NEGATIVE MG/DL
HCT VFR BLD AUTO: 48.3 % (ref 40–53)
HGB BLD-MCNC: 16.8 G/DL (ref 13.3–17.7)
HGB UR QL STRIP: NEGATIVE
IMM GRANULOCYTES # BLD: 0 10E9/L (ref 0–0.4)
IMM GRANULOCYTES NFR BLD: 0.2 %
KETONES UR STRIP-MCNC: NEGATIVE MG/DL
LEUKOCYTE ESTERASE UR QL STRIP: NEGATIVE
LIPASE SERPL-CCNC: 54 U/L (ref 73–393)
LYMPHOCYTES # BLD AUTO: 3.3 10E9/L (ref 0.8–5.3)
LYMPHOCYTES NFR BLD AUTO: 33.4 %
MCH RBC QN AUTO: 31.9 PG (ref 26.5–33)
MCHC RBC AUTO-ENTMCNC: 34.8 G/DL (ref 31.5–36.5)
MCV RBC AUTO: 92 FL (ref 78–100)
MONOCYTES # BLD AUTO: 0.6 10E9/L (ref 0–1.3)
MONOCYTES NFR BLD AUTO: 5.9 %
MUCOUS THREADS #/AREA URNS LPF: PRESENT /LPF
NEUTROPHILS # BLD AUTO: 5.7 10E9/L (ref 1.6–8.3)
NEUTROPHILS NFR BLD AUTO: 57.9 %
NITRATE UR QL: NEGATIVE
NRBC # BLD AUTO: 0 10*3/UL
NRBC BLD AUTO-RTO: 0 /100
PH UR STRIP: 6 PH (ref 5–7)
PLATELET # BLD AUTO: 352 10E9/L (ref 150–450)
POTASSIUM SERPL-SCNC: 3.6 MMOL/L (ref 3.4–5.3)
RBC # BLD AUTO: 5.27 10E12/L (ref 4.4–5.9)
RBC #/AREA URNS AUTO: 2 /HPF (ref 0–2)
SODIUM SERPL-SCNC: 139 MMOL/L (ref 133–144)
SOURCE: ABNORMAL
SP GR UR STRIP: 1.03 (ref 1–1.03)
UROBILINOGEN UR STRIP-MCNC: 4 MG/DL (ref 0–2)
WBC # BLD AUTO: 9.8 10E9/L (ref 4–11)
WBC #/AREA URNS AUTO: 2 /HPF (ref 0–5)

## 2019-10-29 PROCEDURE — 81001 URINALYSIS AUTO W/SCOPE: CPT | Performed by: EMERGENCY MEDICINE

## 2019-10-29 PROCEDURE — 99284 EMERGENCY DEPT VISIT MOD MDM: CPT

## 2019-10-29 PROCEDURE — 83690 ASSAY OF LIPASE: CPT | Performed by: EMERGENCY MEDICINE

## 2019-10-29 PROCEDURE — H0001 ALCOHOL AND/OR DRUG ASSESS: HCPCS | Mod: HF | Performed by: COUNSELOR

## 2019-10-29 PROCEDURE — 85025 COMPLETE CBC W/AUTO DIFF WBC: CPT | Performed by: EMERGENCY MEDICINE

## 2019-10-29 PROCEDURE — 80048 BASIC METABOLIC PNL TOTAL CA: CPT | Performed by: EMERGENCY MEDICINE

## 2019-10-29 PROCEDURE — 99284 EMERGENCY DEPT VISIT MOD MDM: CPT | Mod: Z6 | Performed by: EMERGENCY MEDICINE

## 2019-10-29 ASSESSMENT — ENCOUNTER SYMPTOMS
HEMATURIA: 0
COUGH: 0
DIARRHEA: 0
BACK PAIN: 0
SEIZURES: 0
BLOOD IN STOOL: 0
FEVER: 0
NECK PAIN: 0
VOMITING: 1
ABDOMINAL PAIN: 1
SHORTNESS OF BREATH: 0
FLANK PAIN: 0
LIGHT-HEADEDNESS: 0
DYSURIA: 0
CHILLS: 0
DIZZINESS: 0
HEADACHES: 0
NAUSEA: 1
MYALGIAS: 0
SORE THROAT: 0

## 2019-10-29 ASSESSMENT — ANXIETY QUESTIONNAIRES
2. NOT BEING ABLE TO STOP OR CONTROL WORRYING: NEARLY EVERY DAY
6. BECOMING EASILY ANNOYED OR IRRITABLE: NEARLY EVERY DAY
3. WORRYING TOO MUCH ABOUT DIFFERENT THINGS: NEARLY EVERY DAY
IF YOU CHECKED OFF ANY PROBLEMS ON THIS QUESTIONNAIRE, HOW DIFFICULT HAVE THESE PROBLEMS MADE IT FOR YOU TO DO YOUR WORK, TAKE CARE OF THINGS AT HOME, OR GET ALONG WITH OTHER PEOPLE: EXTREMELY DIFFICULT
GAD7 TOTAL SCORE: 17
1. FEELING NERVOUS, ANXIOUS, OR ON EDGE: SEVERAL DAYS
5. BEING SO RESTLESS THAT IT IS HARD TO SIT STILL: NEARLY EVERY DAY
7. FEELING AFRAID AS IF SOMETHING AWFUL MIGHT HAPPEN: SEVERAL DAYS

## 2019-10-29 ASSESSMENT — MIFFLIN-ST. JEOR
SCORE: 1672.07
SCORE: 1656.19

## 2019-10-29 ASSESSMENT — PAIN SCALES - GENERAL: PAINLEVEL: SEVERE PAIN (6)

## 2019-10-29 ASSESSMENT — PATIENT HEALTH QUESTIONNAIRE - PHQ9
5. POOR APPETITE OR OVEREATING: NEARLY EVERY DAY
SUM OF ALL RESPONSES TO PHQ QUESTIONS 1-9: 21

## 2019-10-29 NOTE — ED AVS SNAPSHOT
[facility] Emergency Department  [address] [address]  Phone:  [phone]  Fax:  [phone]                                    Rao Leavitt   MRN: [id number]    Department:  [facility] Emergency Department   Date of Visit:  10/29/2019           After Visit Summary Signature Page    I have received my discharge instructions, and my questions have been answered. I have discussed any challenges I see with this plan with the nurse or doctor.    ..........................................................................................................................................  Patient/Patient Representative Signature      ..........................................................................................................................................  Patient Representative Print Name and Relationship to Patient    ..................................................               ................................................  Date                                   Time    ..........................................................................................................................................  Reviewed by Signature/Title    ...................................................              ..............................................  Date                                               Time          22EPIC Rev 08/18

## 2019-10-29 NOTE — ED PROVIDER NOTES
History     Chief Complaint   Patient presents with     Abdominal Pain     2 days of LLQ abdominal pain, has been seen for this, no diagnosis     HPI  Rao Leavitt is a 29 year old male who presents to emergency department for LLQ abdominal pain. Patient states that this has been going on for 3-4 days and has been gradually getting worse. Patient states that he did experience 3 episodes of vomiting yesterday at 3am. He describes the pain as constant, worse with laying down and radiates down his left leg. Patient took Ibuprofen with no relief. He states that he has a history of abdominal pain in the past and states that this feels similar. He denies chest pain, SOB, fever, chills, sore throat or any other complaints. He denies any other complaints at this time. Pt states that he has not had a bowel movement in three days which is normal for him.     The patient's PMHx, Surgical Hx, Allergies, and Medications were all reviewed with the patient.    Allergies:  Allergies   Allergen Reactions     Tramadol Nausea     Vicodin [Hydrocodone-Acetaminophen] Rash       Problem List:    Patient Active Problem List    Diagnosis Date Noted     Bipolar 1 disorder (H) 03/16/2017     Priority: Medium     Generalized anxiety disorder 03/16/2017     Priority: Medium        Past Medical History:    No past medical history on file.    Past Surgical History:    Past Surgical History:   Procedure Laterality Date     ESOPHAGOSCOPY, GASTROSCOPY, DUODENOSCOPY (EGD), COMBINED N/A 4/16/2019    Procedure: COMBINED ESOPHAGOSCOPY, GASTROSCOPY, DUODENOSCOPY (EGD), BIOPSY SINGLE OR MULTIPLE;  Surgeon: Kj Thomas MD;  Location: [facility]     PE tube         Family History:    Family History   Problem Relation Age of Onset     Bipolar Disorder Paternal Grandfather        Social History:  Marital Status:  Single [1]  Social History     Tobacco Use     Smoking status: Current Every Day Smoker     Packs/day: 0.25     Smokeless tobacco: Former  "User   Substance Use Topics     Alcohol use: Not Currently     Frequency: 2-3 times a week     Drinks per session: 10 or more     Binge frequency: Weekly     Comment: 4 weeks sober     Drug use: No     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   HENT: Negative for sore throat.    Respiratory: Negative for cough and shortness of breath.    Cardiovascular: Negative for chest pain and leg swelling.   Gastrointestinal: Positive for abdominal pain, nausea and vomiting. Negative for blood in stool and diarrhea.   Genitourinary: Negative for dysuria, flank pain, hematuria, penile pain, penile swelling, scrotal swelling and testicular pain.   Musculoskeletal: Negative for back pain, myalgias and neck pain.   Skin: Negative for rash.   Neurological: Negative for dizziness, seizures, light-headedness and headaches.     A 10 point review of systems performed and is otherwise negative except as noted in the HPI.    Physical Exam   BP: 123/81  Heart Rate: 105  Temp: 98  F (36.7  C)  Resp: 18  Height: 170.2 cm (5' 7\")  Weight: 74.8 kg (165 lb)  SpO2: 99 %    Physical Exam  GEN: Awake, alert, and cooperative. No acute distress, non toxic appearance.   HENT: MMM. External ears and nose normal bilaterally.  EYES: EOM intact. Conjunctiva clear.    CV :Regular rate and rhythm. Brisk cap refill.   PULM: Normal effort. No wheezes, rales, or rhonchi bilaterally.  ABD: Soft, non-tender, non-distended. Voluntary guarding. No rebound tenderness. No ecchymosis.    : The testicles are descended bilaterally and have a vertical lie. Creamsteric reflex present. They are firm, non-tender, and without masses or lesions. No penile lesions are noted and there is " no discharge from the urethra. The scrotum is without induration, erythema, or edema. No hernias are palpated in the inguinal canals.  NEURO: Normal speech. Following commands. CN II-XII grossly intact. Patient answering questions and interacting appropriately.   EXT: No gross deformity. Warm and well perfused  INT: Warm. No diaphoresis. Normal color.        ED Course        Procedures           Critical Care time:  none           Results for orders placed or performed during the hospital encounter of 10/29/19 (from the past 24 hour(s))   CBC with platelets, differential   Result Value Ref Range    WBC 9.8 4.0 - 11.0 10e9/L    RBC Count 5.27 4.4 - 5.9 10e12/L    Hemoglobin 16.8 13.3 - 17.7 g/dL    Hematocrit 48.3 40.0 - 53.0 %    MCV 92 78 - 100 fl    MCH 31.9 26.5 - 33.0 pg    MCHC 34.8 31.5 - 36.5 g/dL    RDW 11.4 10.0 - 15.0 %    Platelet Count 352 150 - 450 10e9/L    Diff Method Automated Method     % Neutrophils 57.9 %    % Lymphocytes 33.4 %    % Monocytes 5.9 %    % Eosinophils 2.2 %    % Basophils 0.4 %    % Immature Granulocytes 0.2 %    Nucleated RBCs 0 0 /100    Absolute Neutrophil 5.7 1.6 - 8.3 10e9/L    Absolute Lymphocytes 3.3 0.8 - 5.3 10e9/L    Absolute Monocytes 0.6 0.0 - 1.3 10e9/L    Absolute Eosinophils 0.2 0.0 - 0.7 10e9/L    Absolute Basophils 0.0 0.0 - 0.2 10e9/L    Abs Immature Granulocytes 0.0 0 - 0.4 10e9/L    Absolute Nucleated RBC 0.0    Basic metabolic panel   Result Value Ref Range    Sodium 139 133 - 144 mmol/L    Potassium 3.6 3.4 - 5.3 mmol/L    Chloride 105 94 - 109 mmol/L    Carbon Dioxide 25 20 - 32 mmol/L    Anion Gap 9 3 - 14 mmol/L    Glucose 98 70 - 99 mg/dL    Urea Nitrogen 16 7 - 30 mg/dL    Creatinine 0.77 0.66 - 1.25 mg/dL    GFR Estimate >90 >60 mL/min/[1.73_m2]    GFR Estimate If Black >90 >60 mL/min/[1.73_m2]    Calcium 9.1 8.5 - 10.1 mg/dL   Lipase   Result Value Ref Range    Lipase 54 (L) 73 - 393 U/L   UA reflex to Microscopic   Result Value Ref Range    Color Urine  Vida     Appearance Urine Slightly Cloudy     Glucose Urine Negative NEG^Negative mg/dL    Bilirubin Urine Negative NEG^Negative    Ketones Urine Negative NEG^Negative mg/dL    Specific Gravity Urine 1.031 1.003 - 1.035    Blood Urine Negative NEG^Negative    pH Urine 6.0 5.0 - 7.0 pH    Protein Albumin Urine Negative NEG^Negative mg/dL    Urobilinogen mg/dL 4.0 (H) 0.0 - 2.0 mg/dL    Nitrite Urine Negative NEG^Negative    Leukocyte Esterase Urine Negative NEG^Negative    Source Midstream Urine     RBC Urine 2 0 - 2 /HPF    WBC Urine 2 0 - 5 /HPF    Mucous Urine Present (A) NEG^Negative /LPF       Medications - No data to display     2:33 PM Pt Assessed    Assessments & Plan (with Medical Decision Making)   29 year old male with past medical history of generalized anxiety disorder, bipolar disorder, abdominal pain, presents with left lower quadrant pain associated with nausea and vomiting.  On arrival to emergency department he was afebrile and vital signs within normal limits.  He did not appear acutely distressed.  Physical exam with voluntary guarding and left-sided abdominal tenderness which resolved with distraction.  Able to palpate the entire abdomen without tenderness.   exam without evidence of hernia or acute testicular abnormality.  Lipase of 54 making pancreatitis unlikely.  No leukocytosis or fever to suggest infectious process.  No right lower quadrant tenderness to suggest appendicitis.  Normal electrolytes and renal function.  Very low suspicion for obstruction, no prior abdominal surgeries.  Per chart review patient has had upper and lower endoscopy as well as CTs scans of abdomen and pelvis without identifying etiology of his symptoms.  Etiology of his abdominal pain unclear today.  Given his exam I do not feel that additional CT scan would be of benefit today.  Patient declines nausea or pain medication, however would like a note for work.  Plan for him to follow-up with primary care physician  in 1 to 2 days.  ED return precautions discussed.  Patient expressed agreement understanding of plan.  Given note for work.    I have reviewed the nursing notes.    I have reviewed the findings, diagnosis, plan and need for follow up with the patient.       Discharge Medication List as of 10/29/2019  4:19 PM          Final diagnoses:   Abdominal pain, left lower quadrant     Maxx Trujillo MD    10/29/2019   [facility] EMERGENCY DEPARTMENT    This document serves as a record of the services and decisions personally performed and made by Maxx Trujillo MD. It was created on HIS/HER behalf by Margaret Ibarra, a trained medical scribe. The creation of this document is based the provider's statements to the medical scribe.  Margaret Ibarra 2:34 PM 10/29/2019    Provider:   The information in this document, created by the medical scribe for me, accurately reflects the services I personally performed and the decisions made by me. I have reviewed and approved this document for accuracy prior to leaving the patient care area.  Maxx Trujillo MD 2:34 PM 10/29/2019    [unfilled]       Maxx Trujillo MD  10/29/19 6210

## 2019-10-29 NOTE — DISCHARGE INSTRUCTIONS
* Abdominal Pain, Unknown Cause [Male]  Based on your visit today, the exact cause of your abdominal (stomach) pain is not clear. Your exam and tests do not indicate a dangerous cause at this time. However, the signs of a serious problem may take more time to appear. Although your exam was reassuring today, sometimes early in the course of many conditions, exam and lab tests can appear normal. Therefore, it is important for you to watch for any new symptoms or worsening of your condition.  Causes:  It may not be obvious what caused your symptoms. Pay attention to things that do seem to make your symptoms worse or better and discuss this with your doctor when you follow up.  Diagnosis:  The evaluation of abdominal pain in the emergency department may only require an exam by the doctor or it may include blood, urine or imaging studies, depending on many factors. Sometimes exams and tests can identify a cause but in many cases, a clear cause is not found. Further testing at follow up visits may help to suggest a clear diagnosis.  Home Care:    Rest as much as possible until your next exam.    Try to avoid any medications (unless otherwise directed by your doctor), foods, activities, or other factors that you may have contributed to your symptoms.    Try to eat foods that you know that you have tolerated well in the past. Certain diets may be recommended for some conditions that cause abdominal pain. However, since the cause of your symptoms may not be clear, discuss your diet more with your primary care provider or specialist for further recommendations.     Eating several small meals per day as opposed to 2 or 3 larger meals may help.    Monitor closely for anything that may make your symptoms worse or better. Pay close attention to symptoms below that may indicate worsening of your condition.  Follow Up And Precautions:  See your doctor or this facility as instructed (or sooner, if your symptoms are not  improving). In some cases, you may need more testing.  Contact Your Doctor Or Seek Medical Attention  if any of the following occur:    Pain is becoming worse    You are unable to take your medications because of too much vomiting    Swelling of the abdomen    Fever of 101 F (38.3 C) or higher, or as directed by your health care provider    Blood in vomit or bowel movements (dark red or black color)    Jaundice (yellow color of eyes and skin)    New onset of weakness, dizziness or fainting    New onset of chest, arm, back, neck or jaw pain    8821-6817 [employer]. [address] [place], [place] [id number]. All rights reserved. This information is not intended as a substitute for professional medical care. Always follow your healthcare professional's instructions.  This information has been modified by your health care provider with permission from the publisher.  Modifications clinically reviewed by Dr. Dayron Shaw on 7/20/18.          Abdominal Pain    Abdominal pain is pain in the stomach or belly area. Everyone has this pain from time to time. In many cases it goes away on its own. But abdominal pain can sometimes be due to a serious problem, such as appendicitis. So it s important to know when to seek help.  Causes of abdominal pain  There are many possible causes of abdominal pain. Common causes in adults include:    Constipation, diarrhea, or gas    Stomach acid flowing back up into the esophagus (acid reflux or heartburn)    Severe acid reflux, called GERD (gastroesophageal reflux disease)    A sore in the lining of the stomach or small intestine (peptic ulcer)    Inflammation of the gallbladder, liver, or pancreas    Gallstones or kidney stones    Appendicitis     Intestinal blockage     An internal organ pushing through a muscle or other tissue (hernia)    Urinary tract infections    In women, menstrual cramps, fibroids, or endometriosis    Inflammation or infection of the  intestines  Diagnosing the cause of abdominal pain  Your healthcare provider will do a physical exam help find the cause of your pain. If needed, tests will be ordered. Belly pain has many possible causes. So it can be hard to find the reason for your pain. Giving details about your pain can help. Tell your provider where and when you feel the pain, and what makes it better or worse. Also let your provider know if you have other symptoms such as:    Fever    Tiredness    Upset stomach (nausea)    Vomiting    Changes in bathroom habits  Treating abdominal pain  Some causes of pain need emergency medical treatment right away. These include appendicitis or a bowel blockage. Other problems can be treated with rest, fluids, or medicines. Your healthcare provider can give you specific instructions for treatment or self-care based on what is causing your pain.  If you have vomiting or diarrhea, sip water or other clear fluids. When you are ready to eat solid foods again, start with small amounts of easy-to-digest, low-fat foods. These include apple sauce, toast, or crackers.   When to seek medical care  Call 911 or go to the hospital right away if you:    Can t pass stool and are vomiting    Are vomiting blood or have bloody diarrhea or black, tarry diarrhea    Have chest, neck, or shoulder pain    Feel like you might pass out    Have pain in your shoulder blades with nausea    Have sudden, severe belly pain    Have new, severe pain unlike any you have felt before    Have a belly that is rigid, hard, and tender to touch  Call your healthcare provider if you have:    Pain for more than 5 days    Bloating for more than 2 days    Diarrhea for more than 5 days    A fever of 100.4 F (38 C) or higher, or as directed by your healthcare provider    Pain that gets worse    Weight loss for no reason    Continued lack of appetite    Blood in your stool  How to prevent abdominal pain  Here are some tips to help prevent abdominal  pain:    Eat smaller amounts of food at one time.    Avoid greasy, fried, or other high-fat foods.    Avoid foods that give you gas.    Exercise regularly.    Drink plenty of fluids.  To help prevent GERD symptoms:    Quit smoking.    Reduce alcohol and certain foods that increase stomach acid.    Avoid aspirin and over-the-counter pain and fever medicines (NSAIDS or nonsteroidal anti-inflammatory drugs), if possible    Lose extra weight.    Finish eating at least 2 hours before you go to bed or lie down.    Raise the head of your bed.  Date Last Reviewed: 7/1/2016 2000-2018 [employer]. [address] [place] [address]. All rights reserved. This information is not intended as a substitute for professional medical care. Always follow your healthcare professional's instructions.      Please contact your primary care physician tomorrow to set up follow-up appointment and to 3 days.  Your work-up was reassuring today however no identified cause of your abdominal pain.  May continue take acetaminophen (Tylenol) as needed for pain.  If you develop worsening pain, fever, persistent vomiting, or other new concerning sign or symptom, please return to emergency department immediately for further evaluation.
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
## 2019-10-29 NOTE — LETTER
October 29, 2019      To Whom It May Concern:      Rao BLANCAS Dagmar was seen in our Emergency Department today, 10/29/19.  He may return to work on 10/30/2019 without restrictions.    Sincerely,        Maxx Trujillo MD

## 2019-10-29 NOTE — PROGRESS NOTES
[address]  [place] [address]        10/29/2019      Rao Leavitt  1990        To Whom It May Concern:    This is to verify that the above named client attended an appointment at [facility] at the location above at 1:00pm on this date.        SEDRICK Hilario, [facility]  Evaluation Counselor  Ph. [phone]; [phone]  Fax [phone]; [phone]  [email or web address]

## 2019-10-30 ENCOUNTER — HOSPITAL ENCOUNTER (EMERGENCY)
Facility: CLINIC | Age: 29
Discharge: HOME | End: 2019-10-30
Attending: FAMILY MEDICINE | Admitting: FAMILY MEDICINE

## 2019-10-30 ENCOUNTER — APPOINTMENT (OUTPATIENT)
Dept: GENERAL RADIOLOGY | Facility: CLINIC | Age: 29
End: 2019-10-30
Attending: FAMILY MEDICINE

## 2019-10-30 VITALS
TEMPERATURE: 97.6 F | OXYGEN SATURATION: 100 % | RESPIRATION RATE: 16 BRPM | BODY MASS INDEX: 26.63 KG/M2 | WEIGHT: 165 LBS | HEART RATE: 79 BPM | DIASTOLIC BLOOD PRESSURE: 74 MMHG | SYSTOLIC BLOOD PRESSURE: 121 MMHG
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
## 2019-10-30 DIAGNOSIS — M25.552 HIP PAIN, LEFT: ICD-10-CM

## 2019-10-30 PROCEDURE — 25000132 ZZH RX MED GY IP 250 OP 250 PS 637: Performed by: FAMILY MEDICINE

## 2019-10-30 PROCEDURE — 99283 EMERGENCY DEPT VISIT LOW MDM: CPT | Performed by: FAMILY MEDICINE

## 2019-10-30 PROCEDURE — 73552 X-RAY EXAM OF FEMUR 2/>: CPT | Mod: LT

## 2019-10-30 PROCEDURE — 99284 EMERGENCY DEPT VISIT MOD MDM: CPT | Mod: Z6 | Performed by: FAMILY MEDICINE

## 2019-10-30 RX ORDER — OXYCODONE AND ACETAMINOPHEN 5; 325 MG/1; MG/1
1 TABLET ORAL ONCE
Status: COMPLETED | OUTPATIENT
Start: 2019-10-30 | End: 2019-10-30

## 2019-10-30 RX ADMIN — OXYCODONE HYDROCHLORIDE AND ACETAMINOPHEN 1 TABLET: 5; 325 TABLET ORAL at 06:03

## 2019-10-30 ASSESSMENT — ANXIETY QUESTIONNAIRES: GAD7 TOTAL SCORE: 17
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
## 2019-10-30 NOTE — ED NOTES
Pt waiting in room for girlfriend to come pick him up from the ED so he can receive the Percocet before he departs the ED.

## 2019-10-30 NOTE — DISCHARGE INSTRUCTIONS
Rest, ice, elevation over the next 48 hours.  Tylenol/ibuprofen for pain.  Return to the emergency department if worse or changes.  Note given excusing from work next 48 hours.

## 2019-10-30 NOTE — ED AVS SNAPSHOT
[facility] Emergency Department  [address] [facility] [address]  Phone:  [phone]  Fax:  [phone]                                    Rao Leavitt   MRN: [id number]    Department:  [facility] Emergency Department   Date of Visit:  10/30/2019           After Visit Summary Signature Page    I have received my discharge instructions, and my questions have been answered. I have discussed any challenges I see with this plan with the nurse or doctor.    ..........................................................................................................................................  Patient/Patient Representative Signature      ..........................................................................................................................................  Patient Representative Print Name and Relationship to Patient    ..................................................               ................................................  Date                                   Time    ..........................................................................................................................................  Reviewed by Signature/Title    ...................................................              ..............................................  Date                                               Time          22EPIC Rev 08/18

## 2019-11-08 ENCOUNTER — TRANSFERRED RECORDS (OUTPATIENT)
Dept: HEALTH INFORMATION MANAGEMENT | Facility: CLINIC | Age: 29
End: 2019-11-08

## 2019-11-11 ENCOUNTER — OFFICE VISIT (OUTPATIENT)
Dept: FAMILY MEDICINE | Facility: CLINIC | Age: 29
End: 2019-11-11
Payer: COMMERCIAL

## 2019-11-11 VITALS
BODY MASS INDEX: 23.86 KG/M2 | OXYGEN SATURATION: 96 % | WEIGHT: 152 LBS | HEART RATE: 123 BPM | HEIGHT: 67 IN | TEMPERATURE: 97.9 F | SYSTOLIC BLOOD PRESSURE: 102 MMHG | RESPIRATION RATE: 18 BRPM | DIASTOLIC BLOOD PRESSURE: 70 MMHG

## 2019-11-11 VITALS
TEMPERATURE: 98.2 F | DIASTOLIC BLOOD PRESSURE: 72 MMHG | BODY MASS INDEX: 23.92 KG/M2 | HEIGHT: 67 IN | OXYGEN SATURATION: 100 % | WEIGHT: 152.4 LBS | HEART RATE: 91 BPM | SYSTOLIC BLOOD PRESSURE: 100 MMHG | RESPIRATION RATE: 16 BRPM

## 2019-11-11 DIAGNOSIS — M54.16 LUMBAR RADICULOPATHY: ICD-10-CM

## 2019-11-11 DIAGNOSIS — M89.9 LESION OF LUMBAR SPINE: Primary | ICD-10-CM

## 2019-11-11 DIAGNOSIS — D72.829 LEUKOCYTOSIS, UNSPECIFIED TYPE: Primary | ICD-10-CM

## 2019-11-11 LAB
BASOPHILS # BLD AUTO: 0 10E9/L (ref 0–0.2)
BASOPHILS NFR BLD AUTO: 0.4 %
DIFFERENTIAL METHOD BLD: NORMAL
EOSINOPHIL # BLD AUTO: 0.4 10E9/L (ref 0–0.7)
EOSINOPHIL NFR BLD AUTO: 4.6 %
ERYTHROCYTE [DISTWIDTH] IN BLOOD BY AUTOMATED COUNT: 12.2 % (ref 10–15)
HCT VFR BLD AUTO: 47.1 % (ref 40–53)
HGB BLD-MCNC: 16.2 G/DL (ref 13.3–17.7)
LYMPHOCYTES # BLD AUTO: 3.4 10E9/L (ref 0.8–5.3)
LYMPHOCYTES NFR BLD AUTO: 40.9 %
MCH RBC QN AUTO: 31.8 PG (ref 26.5–33)
MCHC RBC AUTO-ENTMCNC: 34.4 G/DL (ref 31.5–36.5)
MCV RBC AUTO: 92 FL (ref 78–100)
MONOCYTES # BLD AUTO: 0.6 10E9/L (ref 0–1.3)
MONOCYTES NFR BLD AUTO: 7.5 %
NEUTROPHILS # BLD AUTO: 3.9 10E9/L (ref 1.6–8.3)
NEUTROPHILS NFR BLD AUTO: 46.6 %
PLATELET # BLD AUTO: 377 10E9/L (ref 150–450)
RBC # BLD AUTO: 5.1 10E12/L (ref 4.4–5.9)
WBC # BLD AUTO: 8.3 10E9/L (ref 4–11)

## 2019-11-11 PROCEDURE — 36415 COLL VENOUS BLD VENIPUNCTURE: CPT | Performed by: FAMILY MEDICINE

## 2019-11-11 PROCEDURE — 99213 OFFICE O/P EST LOW 20 MIN: CPT | Performed by: FAMILY MEDICINE

## 2019-11-11 PROCEDURE — 99214 OFFICE O/P EST MOD 30 MIN: CPT | Performed by: FAMILY MEDICINE

## 2019-11-11 PROCEDURE — 85025 COMPLETE CBC W/AUTO DIFF WBC: CPT | Performed by: FAMILY MEDICINE

## 2019-11-11 RX ORDER — TIZANIDINE 2 MG/1
2 TABLET ORAL 3 TIMES DAILY PRN
Qty: 30 TABLET | Refills: 0 | Status: SHIPPED | OUTPATIENT
Start: 2019-11-11 | End: 2020-01-15

## 2019-11-11 ASSESSMENT — MIFFLIN-ST. JEOR
SCORE: 1613.1
SCORE: 1614.91

## 2019-11-11 ASSESSMENT — PAIN SCALES - GENERAL
PAINLEVEL: EXTREME PAIN (9)
PAINLEVEL: EXTREME PAIN (9)

## 2019-11-11 NOTE — LETTER
[facility]  [address]  [facility] [address]  Phone: [phone]      REPORT OF WORK ABILITY    NOTE TO EMPLOYEE: You must promptly provide a copy of this report to your  employer or worker's compensation insurer, and Qualified Rehabilitation Consultant.    Date: 11/11/2019                     Employee Name: Rao Leavitt         YOB: 1990  Medical Record Number: [id number]   Soc.Sec.No: [id number]  Employer: None                Date of Injury:  No injury - onset date 11/7/2019  Managed Care Organization / Insurance Company Name: UNKNOWN    Diagnosis: low back pain with radicular symptoms.  Work Related: no     MMI: NO   Permanent Partial Disability(PPD) likely: UNKNOWN    EMPLOYEE IS ABLE TO WORK: OFF work for today and with restrictions from 11/13/2019 to 11/20/2019 -  Full shift     RESTRICTIONS IF ANY:     Lift, carry no more than:  10 - 20 lb.Occasionally (2-4 hours)  Push/Pull:  10 - 20 lb.Occasionally (2-4 hours)  Bend:  Occasionally (2-4 hours)  Stoop:  Occasionally (2-4 hours)    OTHER RESTRICTIONS: None    TREATMENT PLAN/NOTES: change work position for comfort and referred to orthopedics.          Lubna Rodriguez MD

## 2019-11-11 NOTE — PATIENT INSTRUCTIONS
Ibuprofen 800 mg three times daily as needed for pain    Acetaminophen (Tylenol) 1000 mg 3-4 times a day (max is 4000 mg in 24 hours) as needed    Heating pad or ice depending on which helps more    Tizanidine 2 mg three times daily - this is a muscle relaxer - do not drive or operate heavy machinery after taking this.     Referral to spine surgeon has been placed, someone should call you about this:  All areas ~ [phone]     Type of Referral : Spine: Lumbar: Spine Surgeon  - Dr. Mejia through [facility]      Timeframe requested: 3 - 5 days            Our Clinic hours are:  Mondays    7:20 am - 7 pm  Tues -  Fri  7:20 am - 5 pm    Clinic Phone: [phone]    The clinic lab opens at 7:30 am Mon - Fri and appointments are required.    [facility]  Ph. [phone]  Monday  8 am - 7pm  Tues - Fri 8 am - 5:30 pm

## 2019-11-11 NOTE — PATIENT INSTRUCTIONS
"  Patient Education       * Sciatica    Sciatica (\"Lumbar Radiculopathy\") causes a pain that spreads from the lower back down into the buttock, hip and leg. Sometimes leg pain can occur without any back pain. Sciatica is due to irritation or pressure on a spinal nerve as it comes out of the spinal canal. This is most often due to pressure on the nerve from a nearby spinal disk (the cartilage cushion between each spinal bone). Other causes include spinal stenosis (narrowing of the spinal canal) and spasm of the piriformis muscle (a muscle in the buttocks that the sciatic nerve passes through).  Sciatica may begin after a sudden twisting/bending force (such as in a car accident), or sometimes after a simple awkward movement. In either case, muscle spasm is commonly present and can add to the pain.  The diagnosis of sciatica is made from the symptoms and physical exam. Unless you had a physical injury (such as a car accident or fall), X-rays are usually not ordered for the initial evaluation of sciatica because the nerves and disks cannot be seen on an X-ray. Most sciatica (80-90%) gets better with time.  What can I do about my low back pain?  There are three main things you can do to ease low back pain and help it go away.    Stay active! Use positions, movements and exercises. Too much rest can make your symptoms worse.    Use heat or cold packs.    Take medicine as directed.  Using positions, movements and exercises  Research tells us that moving your joints and muscles can help you recover from back pain. Such activity should be simple and gentle.  Use walking to help relieve your discomfort. Try taking a short walk every 3 to 4 hours during the day. Walk for a few minutes inside your home or take longer walks outside, on a treadmill or at a mall. Slowly increase the amount of time you walk. Expect discomfort when you begin, but it should lessen as your back starts to recover.  Finding a position that is " comfortable  When your back pain is new, you may find that certain positions will ease your pain. Gently try each of the following positions until you find one that eases your pain. Once you find a position of comfort, use it as often as you like while you recover. Return to your daily routine as soon as possible.     Lie on your back with your legs bent. You can do this by placing a pillow under your knees or lie on the floor and rest your lower legs on the seat of a chair.    Lie on your side with your knees bent and place a pillow between your knees.    Lie on your stomach over pillows.  Using heat or cold packs  Try cold packs or gentle heat to ease your pain. Use whichever gives you the most relief. Apply the cold pack or heat for 15 minutes at a time, as often as needed.  Taking medicine  If taking over-the-counter medicine:    Take ibuprofen (Advil, Motrin) 600 mg. three times a day as needed for pain.  OR    Take Aleve (naproxen sodium) 220 to 440 mg. two times a day as needed for pain.  If your doctor prescribed medicine, follow the instructions. Stop taking the medicine as soon as you can.  When should I call my doctor?  Your back pain should improve over the first couple of weeks. As it improves, you should be able to return to your normal activities. But call your doctor if:    You have a sudden change in your ability to control? your bladder or bowels.    You begin to feel tingling in your groin or legs.    The pain spreads down your leg and into your foot.    Your toes, feet or leg muscles begin to feel weak.    You feel generally unwell or sick.    Your pain gets worse.    7130-1973 [employer]. [address] [place] [address], [place] [address]. All rights reserved. This information is not intended as a substitute for professional medical care. Always follow your healthcare professional's instructions.  This information has been modified by your health care provider with permission from the  publisher.

## 2019-11-11 NOTE — PROGRESS NOTES
Subjective     Rao Leavitt is a 29 year old male who presents to clinic today for the following health issues:  29 yr old male here for an ER follow up . He was seen for influenza like symptoms. Also had back pain which warranted an MRI . MRI did not show any degenerative disease but an indeterminate lesion was seen on the lumbar spine. There was a question of a hemangioma. Patient still has severe pain . Will like to know what the next steps are.     HPI     Chief Complaint   Patient presents with     RECHECK     ED follow up, influenza like illness and back pain. Pain is still uncontrolled, he is missing work. Low back pain with radiation into left leg with tingling in toes since thursday, 11/7/19. Unknown injury.        Hospital Follow-up Visit:  Hospital/Nursing Home/IP Rehab Facility: [facility]  Date of Admission: 11/7/2019  Date of Discharge: 11/8/2019  Reason(s) for Admission: Influenza like illness, chest pain, dehydration, low back pain, leukocytosis, headache.            Problems taking medications regularly:  None       Medication changes since discharge: None       Problems adhering to non-medication therapy:  None    Summary of hospitalization:  [facility] discharge summary reviewed  Diagnostic Tests/Treatments reviewed.  Follow up needed: none  Other Healthcare Providers Involved in Patient s Care:         None  Update since discharge: improved.     Post Discharge Medication Reconciliation: discharge medications reconciled, continue medications without change.  Plan of care communicated with patient     Coding guidelines for this visit:  Type of Medical   Decision Making Face-to-Face Visit       within 7 Days of discharge Face-to-Face Visit        within 14 days of discharge   Moderate Complexity [id number]   High Complexity 41757 67943              Patient Active Problem List   Diagnosis     Bipolar 1 disorder (H)     Generalized anxiety disorder      "Past Surgical History:   Procedure Laterality Date     ESOPHAGOSCOPY, GASTROSCOPY, DUODENOSCOPY (EGD), COMBINED N/A 4/16/2019    Procedure: COMBINED ESOPHAGOSCOPY, GASTROSCOPY, DUODENOSCOPY (EGD), BIOPSY SINGLE OR MULTIPLE;  Surgeon: Kj Thomas MD;  Location: [facility]     PE tube         Social History     Tobacco Use     Smoking status: Current Every Day Smoker     Packs/day: 0.25     Smokeless tobacco: Former User   Substance Use Topics     Alcohol use: Not Currently     Frequency: 2-3 times a week     Drinks per session: 10 or more     Binge frequency: Weekly     Comment: 4 weeks sober     Family History   Problem Relation Age of Onset     Bipolar Disorder Paternal Grandfather          Current Outpatient Medications   Medication Sig Dispense Refill     multivitamin, therapeutic (THERA-VIT) TABS tablet Take 1 tablet by mouth daily       FLUoxetine (PROZAC) 10 MG capsule Take 1 capsule (10 mg) by mouth daily (Patient not taking: Reported on 11/11/2019) 30 capsule 0     hydrOXYzine (ATARAX) 25 MG tablet TAKE 1 TO 2 TABLETS BY MOUTH EVERY 4 TO 6 HOURS AS NEEDED FOR MUSCLE SPASM  0     Allergies   Allergen Reactions     Tramadol Nausea     Vicodin [Hydrocodone-Acetaminophen] Rash     BP Readings from Last 3 Encounters:   11/11/19 100/72   10/30/19 121/74   10/29/19 122/80    Wt Readings from Last 3 Encounters:   11/11/19 69.1 kg (152 lb 6.4 oz)   10/30/19 74.8 kg (165 lb)   10/29/19 74.8 kg (165 lb)                      Reviewed and updated as needed this visit by Provider  Tobacco  Allergies  Meds  Problems  Med Hx  Surg Hx  Fam Hx         Review of Systems   ROS COMP: Constitutional, HEENT, cardiovascular, pulmonary, gi and gu systems are negative, except as otherwise noted.      Objective    /72   Pulse 91   Temp 98.2  F (36.8  C) (Tympanic)   Resp 16   Ht 1.702 m (5' 7\")   Wt 69.1 kg (152 lb 6.4 oz)   SpO2 100%   BMI 23.87 kg/m    Body mass index is 23.87 kg/m .  Physical Exam   GENERAL: " healthy, alert and no distress  EYES: Eyes grossly normal to inspection, PERRL and conjunctivae and sclerae normal  HENT: ear canals and TM's normal, nose and mouth without ulcers or lesions  NECK: no adenopathy, no asymmetry, masses, or scars and thyroid normal to palpation  RESP: lungs clear to auscultation - no rales, rhonchi or wheezes  CV: regular rate and rhythm, normal S1 S2, no S3 or S4, no murmur, click or rub, no peripheral edema and peripheral pulses strong  ABDOMEN: soft, nontender, no hepatosplenomegaly, no masses and bowel sounds normal  MS: no gross musculoskeletal defects noted, no edema    Diagnostic Test Results:  none         Assessment & Plan     1. Lesion of lumbar spine  Explained to the patient that I will recommend that he sees the spine specialist to see about the lesion. Letter for work given .  - ORTHO  REFERRAL       FUTURE APPOINTMENTS:       - Follow-up visit in one month     Return in about 4 weeks (around 12/9/2019) for Routine Visit.    Manuela Dan MD  [facility]

## 2019-11-11 NOTE — LETTER
November 11, 2019      Rao J Dagmar  [address] [facility] [address]        To Whom It May Concern:    Rao BLANCAS Susansaeid  was seen on 11/11/2019. Patient also missed Friday Nov 8th due to back pain. He will be seeing the spine specialist for the back pain in the next few weeks . Please call if you have any further questions.    Sincerely,        Manuela Dan MD

## 2019-11-11 NOTE — PROGRESS NOTES
"Subjective     Rao Leavitt is a 29 year old male who presents to clinic today for the following health issues:  Chief Complaint   Patient presents with     ER F/U     Medication Reconciliation     not taking prozac     Medication Reconciliation     not taking hydroxyazine     Flu Shot     declined         HPI   ED/UC Followup:    Facility:  [facility]   Date of visit: 11/7/19  Reason for visit: headache and cyst on spine  Current Status: Patient is in pain rated at a 9/10. Patient is unable to get in with neurosurgeon as of now. Patient has left messages to set-up appt. Patient is having a constant pain that is shooting up his back with occ shooting down left leg that will cause tingling in his toes. Patient describes it as a throbbing pain.        Has been taking 800 mg ibuprofen.  Pain wakes him every hour and shoots up his back and down into the left leg.  This pain started suddenly 4 days ago.  No injury.  MRI as below.     WBC was also significantly elevate (20k) without localizing source of infection identified at that time. That requires follow up.    Was in to see Dr. Dan this morning already and was referred to Dr. Mejia through [facility].     Here for another opinion on his back.      MRI done in the ER I think due to the elevated wbc to r/o discitis, abscess, etc.  This was negative except a possible hemangioma at L1.  Otherwise just mild degenerative disease.   CT of the head was negative.  Lab w/u otherwise negative in the ER.      Social: smokes, here with SO and daughter.    Works at [employer]            Reviewed and updated as needed this visit by Provider         Review of Systems   ROS COMP: Constitutional, HEENT, cardiovascular, pulmonary, gi and gu systems are negative, except as otherwise noted.      Objective    /70   Pulse 123   Temp 97.9  F (36.6  C) (Tympanic)   Resp 18   Ht 1.702 m (5' 7\")   Wt 68.9 kg (152 lb)   SpO2 96%   BMI 23.81 kg/m    Body mass index is 23.81 " kg/m .  Physical Exam   GENERAL APPEARANCE: alert and moderate distress  RESP: lungs clear to auscultation - no rales, rhonchi or wheezes  CV: regular rates and rhythm, normal S1 S2, no S3 or S4 and no murmur, click or rub  ABDOMEN: soft, nontender, without hepatosplenomegaly or masses and bowel sounds normal  Comprehensive back pain exam:  Tenderness of lumbar paraspinals on the left, Pain limits the following motions: flexion, extension and lateral bending, Lower extremity strength functional and equal on both sides, Lower extremity reflexes within normal limits bilaterally, Lower extremity sensation normal and equal on both sides and Straight leg raise negative bilaterally    EXAM: MR SPINE LUMBAR WWO  LOCATION: [facility]  DATE/TIME: 11/8/2019 2:35 AM    INDICATION: Back Pain  COMPARISON: None.  CONTRAST: GADOBUTROL 1 MMOL/ML IV 7.5 ML VIAL: 7.5mL  TECHNIQUE: Without and with IV contrast    FINDINGS:   Nomenclature is based on 5 lumbar type vertebral bodies. Normal vertebral body  heights, alignment and marrow signal. Normal distal spinal cord and cauda equina  with conus medullaris at upper L1. No extraspinal abnormality. Unremarkable  visualized bony pelvis. Enhancing high T2 and intermediate T1 signal lesion in  posterior left aspect of L2 extending into left pedicle. This could represent an  atypical hemangioma however it is indeterminate.    T12-L1: Normal disc height and signal. No herniation. Normal facets. No spinal  canal or neural foraminal stenosis.     L1-L2: Normal disc height and signal. No herniation. Normal facets. No spinal  canal or neural foraminal stenosis.    L2-L3: Normal disc height and signal. No herniation. Normal facets. No spinal  canal or neural foraminal stenosis.     L3-L4: Normal disc height and signal. No herniation. Normal facets. No spinal  canal or neural foraminal stenosis.    L4-L5: Normal disc height and signal. No herniation. Normal facets. No spinal  canal or neural  foraminal stenosis.    L5-S1: Disc dehydration. Normal disc height. Mild facet arthropathy. No  significant canal or foraminal narrowing.    IMPRESSION:  1.  Normal distal cord.  2.  Mild degenerative changes at the lumbosacral level without significant canal  or foraminal narrowing at any level.  3.  Indeterminate lesion L1, as described.      EXAM: CT HEAD BRAIN WO  LOCATION: [facility]  DATE/TIME: 11/8/2019 12:24 AM    INDICATION: Headache  COMPARISON: None.  TECHNIQUE: Routine without IV contrast. Multiplanar reformats. Dose reduction  techniques were used.    FINDINGS:  INTRACRANIAL CONTENTS: No intracranial hemorrhage, extraaxial collection, or  mass effect.  No CT evidence of acute infarct. Normal parenchymal attenuation.  Normal ventricles and sulci.     VISUALIZED ORBITS/SINUSES/MASTOIDS: No intraorbital abnormality. Mild mucosal  thickening scattered about the paranasal sinuses. No middle ear or mastoid  effusion.    BONES/SOFT TISSUES: No acute abnormality.    IMPRESSION:  1.  No hemorrhage, mass, or mass effect.    Diagnostic Test Results:  Results for orders placed or performed in visit on 11/11/19 (from the past 24 hour(s))   CBC with platelets differential   Result Value Ref Range    WBC 8.3 4.0 - 11.0 10e9/L    RBC Count 5.10 4.4 - 5.9 10e12/L    Hemoglobin 16.2 13.3 - 17.7 g/dL    Hematocrit 47.1 40.0 - 53.0 %    MCV 92 78 - 100 fl    MCH 31.8 26.5 - 33.0 pg    MCHC 34.4 31.5 - 36.5 g/dL    RDW 12.2 10.0 - 15.0 %    Platelet Count 377 150 - 450 10e9/L    % Neutrophils 46.6 %    % Lymphocytes 40.9 %    % Monocytes 7.5 %    % Eosinophils 4.6 %    % Basophils 0.4 %    Absolute Neutrophil 3.9 1.6 - 8.3 10e9/L    Absolute Lymphocytes 3.4 0.8 - 5.3 10e9/L    Absolute Monocytes 0.6 0.0 - 1.3 10e9/L    Absolute Eosinophils 0.4 0.0 - 0.7 10e9/L    Absolute Basophils 0.0 0.0 - 0.2 10e9/L    Diff Method Automated Method            Assessment & Plan       ICD-10-CM    1. Leukocytosis, unspecified type  D72.829 CBC with platelets differential   2. Lumbar radiculopathy M54.16 tiZANidine (ZANAFLEX) 2 MG tablet     Leukocytosis resolved.   Low back pain with radicular symptoms, symptomatic treatment recommended.  Avoid Narcotics, not indicated at this time.   NSAIDS/tylenol and muscle relaxer recommended.  See ortho spine.    Letter for work - off until 11/13 and light duty for another week.     Patient Instructions   Ibuprofen 800 mg three times daily as needed for pain    Acetaminophen (Tylenol) 1000 mg 3-4 times a day (max is 4000 mg in 24 hours) as needed    Heating pad or ice depending on which helps more    Tizanidine 2 mg three times daily - this is a muscle relaxer - do not drive or operate heavy machinery after taking this.     Referral to spine surgeon has been placed, someone should call you about this:  All areas ~ [phone]     Type of Referral : Spine: Lumbar: Spine Surgeon  - Dr. Mejia through [facility]      Timeframe requested: 3 - 5 days            Our Clinic hours are:  Mondays    7:20 am - 7 pm  Tues -  Fri  7:20 am - 5 pm    Clinic Phone: [phone]    The clinic lab opens at 7:30 am Mon - Fri and appointments are required.    [facility]  Ph. [phone]  Monday  8 am - 7pm  Tues - Fri 8 am - 5:30 pm                Tobacco Cessation:   reports that he has been smoking. He has been smoking about 0.25 packs per day. He has quit using smokeless tobacco.  Tobacco Cessation Action Plan: Information offered: Patient not interested at this time            No follow-ups on file.    Lubna Rodriguez MD  [facility]

## 2019-11-13 ENCOUNTER — TELEPHONE (OUTPATIENT)
Dept: FAMILY MEDICINE | Facility: CLINIC | Age: 29
End: 2019-11-13

## 2019-11-13 NOTE — TELEPHONE ENCOUNTER
Reason for Call: Request for an order or referral:    Order or referral being requested: Pt saw Dr. Rodriguez 11/11 and gave pt work restriction letter for his back pain until 11/20/19.  Pt has appt to see Ortho 11/15/19.  Pt wants work restrictions changed to being able to work more hours, with less  restrictions.  He is asking that the new workability letter be faxed to [phone].    Please call patient and advise.      Date needed: at your convenience    Has the patient been seen by the PCP for this problem? NO    Additional comments:     Phone number Patient can be reached at:  Cell number on file:    Telephone Information:   Mobile [phone]     Best Time:  any    Can we leave a detailed message on this number?  YES    Call taken on 11/13/2019 at 1:49 PM by Wen Escobar

## 2019-11-13 NOTE — TELEPHONE ENCOUNTER
"Patient reports he is still have pain. It has not improved. Patient states the employer needs more specific amounts for the lifting/bending/ carrying.  The patient reports they don't know what \"occasionally\" is.  They need more of a range.  Is it ok to change, patient is ok waiting until he sees ortho?    Thank you  Deborah VALERIO RN    "

## 2019-11-13 NOTE — TELEPHONE ENCOUNTER
How is his pain?  If he's still having a significant amount of pain, I wouldn't push the bending/lifting at this time.  If the pain has improved, we can certainly change the restrictions.  He was in enough pain at his last visit that I would have thought he would want some lifting restrictions.  The 2-4 hours means not that he can work 2-4 hours but that he shouldn't be lifting/bending/carrying more than 2-4 hours in an 8 hour shift.    Lubna Rodriguez M.D.

## 2019-11-13 NOTE — LETTER
November 13, 2019      Rao Leavitt  [address]  [facility] [address]        REPORT OF WORK ABILITY     NOTE TO EMPLOYEE: You must promptly provide a copy of this report to your  employer or worker's compensation insurer, and Qualified Rehabilitation Consultant.     Date: 11/11/2019                     Employee Name: Rao Leavitt         YOB: 1990  Medical Record Number: [id number]   Soc.Sec.No: [id number]  Employer: None                Date of Injury:  No injury - onset date 11/7/2019  Managed Care Organization / Insurance Company Name: UNKNOWN     Diagnosis: low back pain with radicular symptoms.  Work Related: no     MMI: NO   Permanent Partial Disability(PPD) likely: UNKNOWN     EMPLOYEE IS ABLE TO WORK: OFF work for today and with restrictions from 11/13/2019 to 11/20/2019 -  Full shift     RESTRICTIONS IF ANY:      Lift, carry no more than:  10 - 20 lb.   (2-4 hours of an 8 hour shift)  Push/Pull:  10 - 20 lb.   (2-4 hours of an 8 hour shift)  Bend:   (2-4 hours of an 8 hour shift)  Stoop:  (2-4 hours of an 8 hour shift)     OTHER RESTRICTIONS: None     TREATMENT PLAN/NOTES: change work position for comfort and referred to orthopedics.             Sincerely,        Lubna Rodriguez MD

## 2019-11-13 NOTE — TELEPHONE ENCOUNTER
Patient would like a letter with less restrictions.  Will send to provider to review. Patient was advised this may not be an option until after consult with ortho.  Patient understands but there is nothing he can do for work.       Letter:    RESTRICTIONS IF ANY:     Lift, carry no more than:  10 - 20 lb.Occasionally (2-4 hours)  Push/Pull:  10 - 20 lb.Occasionally (2-4 hours)  Bend:  Occasionally (2-4 hours)  Stoop:  Occasionally (2-4 hours)      Thank you    Deborah VALERIO RN

## 2019-11-14 NOTE — TELEPHONE ENCOUNTER
"The \"occasionally\" is followed by 2-4 hours in the original letter.  Letter updated to be more clear.  If they can't follow these instructions, then maybe he needs to be off work until he can be seen by ortho.      Lubna Rodriguez M.D.    "

## 2019-11-18 ENCOUNTER — TELEPHONE (OUTPATIENT)
Dept: FAMILY MEDICINE | Facility: CLINIC | Age: 29
End: 2019-11-18

## 2019-11-18 ENCOUNTER — TRANSFERRED RECORDS (OUTPATIENT)
Dept: HEALTH INFORMATION MANAGEMENT | Facility: CLINIC | Age: 29
End: 2019-11-18

## 2019-11-18 NOTE — LETTER
[facility]  [address] [address]  Phone: [phone]    REPORT OF WORK ABILITY     NOTE TO EMPLOYEE: You must promptly provide a copy of this report to your  employer or worker's compensation insurer, and Qualified Rehabilitation Consultant.     Date: 11/11/2019                     Employee Name: Rao Leavitt         YOB: 1990  Medical Record Number: [id number]   Soc.Sec.No: [id number]  Employer: None                Date of Injury:  No injury - onset date 11/7/2019  Managed Care Organization / Insurance Company Name: UNKNOWN     Diagnosis: low back pain with radicular symptoms.  Work Related: no     MMI: NO   Permanent Partial Disability(PPD) likely: UNKNOWN     EMPLOYEE IS ABLE TO WORK: Off work 11/18/19 and 11/19/2019, with restrictions until 11/27/2019       RESTRICTIONS IF ANY:      Lift, carry no more than:  10 - 20 lb.   (2-4 hours of an 8 hour shift)  Push/Pull:  10 - 20 lb.   (2-4 hours of an 8 hour shift)  Bend:   (2-4 hours of an 8 hour shift)  Stoop:  (2-4 hours of an 8 hour shift)     OTHER RESTRICTIONS: None     TREATMENT PLAN/NOTES: has seen orthopedics, CT scan pending 11/26/2019.  change work position for comfort and referred to orthopedics.                 Sincerely,           Lubna Rodriguez MD

## 2019-11-20 ENCOUNTER — HOSPITAL ENCOUNTER (EMERGENCY)
Facility: CLINIC | Age: 29
Discharge: HOME | End: 2019-11-20
Attending: EMERGENCY MEDICINE | Admitting: EMERGENCY MEDICINE
Payer: COMMERCIAL

## 2019-11-20 ENCOUNTER — TELEPHONE (OUTPATIENT)
Dept: FAMILY MEDICINE | Facility: CLINIC | Age: 29
End: 2019-11-20

## 2019-11-20 VITALS
SYSTOLIC BLOOD PRESSURE: 114 MMHG | TEMPERATURE: 97.9 F | OXYGEN SATURATION: 97 % | WEIGHT: 160 LBS | BODY MASS INDEX: 25.11 KG/M2 | DIASTOLIC BLOOD PRESSURE: 65 MMHG | HEIGHT: 67 IN | HEART RATE: 91 BPM | RESPIRATION RATE: 18 BRPM

## 2019-11-20 DIAGNOSIS — M54.6 ACUTE RIGHT-SIDED THORACIC BACK PAIN: ICD-10-CM

## 2019-11-20 DIAGNOSIS — M62.830 BACK MUSCLE SPASM: ICD-10-CM

## 2019-11-20 PROCEDURE — 99284 EMERGENCY DEPT VISIT MOD MDM: CPT | Mod: Z6 | Performed by: EMERGENCY MEDICINE

## 2019-11-20 PROCEDURE — 25000132 ZZH RX MED GY IP 250 OP 250 PS 637: Performed by: EMERGENCY MEDICINE

## 2019-11-20 PROCEDURE — 99283 EMERGENCY DEPT VISIT LOW MDM: CPT | Performed by: EMERGENCY MEDICINE

## 2019-11-20 RX ORDER — DIAZEPAM 5 MG
5 TABLET ORAL ONCE
Status: COMPLETED | OUTPATIENT
Start: 2019-11-20 | End: 2019-11-20

## 2019-11-20 RX ADMIN — DIAZEPAM 5 MG: 5 TABLET ORAL at 19:11

## 2019-11-20 ASSESSMENT — ENCOUNTER SYMPTOMS
CHILLS: 0
NAUSEA: 0
NECK PAIN: 0
JOINT SWELLING: 0
BACK PAIN: 1
DYSURIA: 0
WEAKNESS: 0
ABDOMINAL PAIN: 0
VOMITING: 0
FEVER: 0
COUGH: 1
SHORTNESS OF BREATH: 0
SORE THROAT: 0
APPETITE CHANGE: 0

## 2019-11-20 ASSESSMENT — MIFFLIN-ST. JEOR: SCORE: 1649.39

## 2019-11-20 NOTE — TELEPHONE ENCOUNTER
Reason for Call:  shoulder    Detailed comments: patient is calling and stating that today he lifted 30 - 40 lbs at work, which is over his restrictions and now has a horrible pain in his shoulder. Wondering if he should be seen. Please advise.    Phone Number Patient can be reached at: Cell number on file:    Telephone Information:   Mobile [phone]       Best Time: any    Can we leave a detailed message on this number? YES   Camren Diallo  Clinic Station  [facility]      Call taken on 11/20/2019 at 4:32 PM by Carmen Diallo

## 2019-11-20 NOTE — TELEPHONE ENCOUNTER
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    \"I get some tingling down my leg and it shoots into my Shoulder and my leg...\"     Advised to go to ER now for examination as clinics are about to close for the day.      Patient verbalized understanding. Virginia Duarte RN      "

## 2019-11-20 NOTE — ED AVS SNAPSHOT
[facility] Emergency Department  [address] [address]  Phone:  [phone]  Fax:  [phone]                                    Rao Leavitt   MRN: [id number]    Department:  [facility] Emergency Department   Date of Visit:  11/20/2019           After Visit Summary Signature Page    I have received my discharge instructions, and my questions have been answered. I have discussed any challenges I see with this plan with the nurse or doctor.    ..........................................................................................................................................  Patient/Patient Representative Signature      ..........................................................................................................................................  Patient Representative Print Name and Relationship to Patient    ..................................................               ................................................  Date                                   Time    ..........................................................................................................................................  Reviewed by Signature/Title    ...................................................              ..............................................  Date                                               Time          22EPIC Rev 08/18

## 2019-11-20 NOTE — LETTER
November 21, 2019      To Whom It May Concern:      Rao BLANCAS Enedeliamaria teresa was seen in our Emergency Department today, 11/21/19.  I expect his condition to improve over the next 1-2 days.  He may return to work/school when improved.    Sincerely,        Wen Tavarez RN

## 2019-11-21 ENCOUNTER — NURSE TRIAGE (OUTPATIENT)
Dept: NURSING | Facility: CLINIC | Age: 29
End: 2019-11-21

## 2019-11-21 NOTE — DISCHARGE INSTRUCTIONS
Your evaluated in the emergency department for back pain.  Your evaluation was consistent with muscle spasm.  Continue your current pain management regimen.  Try to avoid strenuous physical activity as able.  Warm compresses, hot showers may be helpful.  Keep your existing appointment with neurosurgery on Monday.  Follow-up with primary care physician as previously planned.  If your symptoms worsen he may return to the emergency department for further evaluation and treatment.

## 2019-11-21 NOTE — ED PROVIDER NOTES
History     Chief Complaint   Patient presents with     Back Pain     HPI  Rao Leavitt is a 29 year old male with history of bipolar 1 disorder and generalized anxiety who presents to the Emergency Department with right shoulder pain onset after work today. Patient has been seen several times recently regarding issues with low back pain. He developed sudden onset low back pain radiating into bilateral lower extremities 11/7/19 and underwent thorough work up at [facility] ED. Patient had leukocytosis (20.4), and combined with new onset low back pain resulted in lumbar MRI which showed a lesion at L2 representing a possible hemangioma. He was subsequently was seen in clinic twice on 11/11/19, was referred to O and prescribed tizanidine. Since then, patient has continued to experience low back pain radiating into the lower extremities and into the upper back. Patient was seen by [facility] two days ago, is scheduled for a CT scan next week and repeat MRI next month to track progress of ?tumor to determine possible surgical intervention. Over the last 13 days, patient has continued to have low back pain radiating into the lower extremities and up to his mid back, for which he has been taking tylenol, ibuprofen and tizanidine. Today, after returning to work patient developed new onset pain radiating into the right shoulder. He denies new injury, but works a physical job in maintenance. Patient reports pain was so severe he could not to get off the couch after returning home this afternoon. He has taken ibuprofen and tylenol without benefit. He has not tried his prescribed muscle relaxants. His low back pain and pain radiating into the lower extremities is unchanged from prior. He denies new lower extremity numbness or weakness or saddle anesthesia. Patient does not smoke, reports minimal alcohol use and denies drug use.     The patient's PMHx, Surgical Hx, Allergies, and Medications were  all reviewed with the patient.    Allergies:  Allergies   Allergen Reactions     Tramadol Nausea     Vicodin [Hydrocodone-Acetaminophen] Rash       Problem List:    Patient Active Problem List    Diagnosis Date Noted     Bipolar 1 disorder (H) 03/16/2017     Priority: Medium     Generalized anxiety disorder 03/16/2017     Priority: Medium        Past Medical History:    No past medical history on file.    Past Surgical History:    Past Surgical History:   Procedure Laterality Date     ESOPHAGOSCOPY, GASTROSCOPY, DUODENOSCOPY (EGD), COMBINED N/A 4/16/2019    Procedure: COMBINED ESOPHAGOSCOPY, GASTROSCOPY, DUODENOSCOPY (EGD), BIOPSY SINGLE OR MULTIPLE;  Surgeon: Kj Thomas MD;  Location: [facility]     PE tube         Family History:    Family History   Problem Relation Age of Onset     Bipolar Disorder Paternal Grandfather        Social History:  Marital Status:  Single [1]  Social History     Tobacco Use     Smoking status: Current Every Day Smoker     Packs/day: 0.25     Smokeless tobacco: Former User   Substance Use Topics     Alcohol use: Not Currently     Frequency: 2-3 times a week     Drinks per session: 10 or more     Binge frequency: Weekly     Comment: 4 weeks sober     Drug use: No     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   HENT: Negative for hearing loss and sore throat.    Eyes: Negative for visual disturbance.   Respiratory: Positive for cough (slight). Negative for shortness of breath.    Cardiovascular: Negative for chest pain.   Gastrointestinal: Negative for abdominal pain, nausea and vomiting.   Endocrine: Negative for cold intolerance and heat intolerance.   Genitourinary: Negative for dysuria.   Musculoskeletal: Positive for back pain (radiating up to right shoulder, down into bilateral legs).  "Negative for joint swelling and neck pain.   Skin: Negative for rash.   Neurological: Negative for weakness.       Physical Exam   BP: 114/65  Pulse: 98  Heart Rate: 88  Temp: 97.9  F (36.6  C)  Resp: 18  Height: 170.2 cm (5' 7\")  Weight: 72.6 kg (160 lb)  SpO2: 98 %    Physical Exam  GEN: Awake, alert, and cooperative. No acute distress  HENT: MMM. External ears and nose normal bilaterally.  Atraumatic  EYES: EOM intact. Conjunctiva clear. PEERL. No discharge.   NECK: Supple, symmetric.  No midline tenderness  CV : Regular rate and rhythm  PULM: Normal effort. No wheezes, rales, or rhonchi bilaterally.  ABD: Soft, non-tender, non-distended. No rebound or guarding.   BACK: No midline spinal tenderness.  Upper thoracic tenderness and tightness of right paraspinal muscles.   NEURO: Normal speech. Following commands. CN II-XII grossly intact. Patient answering questions and interacting appropriately.  5 out of 5 symmetric strength bilaterally in upper extremities.  5 out of 5 symmetric strength bilaterally in lower extremities.  Sensation intact to light touch.  EXT: No gross deformity. Warm and well perfused  INT: Warm. No diaphoresis. Normal color.        ED Course        Procedures           Critical Care time:  none               No results found for this or any previous visit (from the past 24 hour(s)).    Medications   diazepam (VALIUM) tablet 5 mg (5 mg Oral Given 11/20/19 1911)       Assessments & Plan (with Medical Decision Making)   29 year old male with past medical history of Bipolar, anxiety, and low back pain with L2 lesion concerning for hemangioma on MRI who presents for acute onset left thoracic back pain. On arrival to Emergency Department, vital signs were within normal limits. Exam with tenderness to right paraspinal muscles at mid thoracic level. This is spasm palpated. No neurological deficits on exam. Was given 5 mg PO diazepam with improvement of symptoms. Muscle spasm/strain most likely " etiology of his symptoms. Advised him to keep his current appointment with spine specialists and primary care. ED return precautions discussed. He expresses agreement and understanding of plan.     I have reviewed the nursing notes.         Discharge Medication List as of 11/20/2019  8:28 PM          Final diagnoses:   Acute right-sided thoracic back pain   Back muscle spasm     Maxx Trujillo MD    This document serves as a record of the services and decisions personally performed and made by Maxx Trujillo MD. It was created on his behalf by Kat Field, a trained medical scribe. The creation of this document is based the provider's statements to the medical scribe.  Kat Field 6:09 PM 11/20/2019    Provider:   The information in this document, created by the medical scribe for me, accurately reflects the services I personally performed and the decisions made by me. I have reviewed and approved this document for accuracy prior to leaving the patient care area.  Maxx Trujillo MD 6:09 PM 11/20/2019 11/20/2019   [facility] EMERGENCY DEPARTMENT    Disclaimer: This note consists of words and symbols derived from keyboarding and dictation using voice recognition software.  As a result, there may be errors that have gone undetected.  Please consider this when interpreting information found in this note.       Maxx Trujillo MD  11/21/19 7017       Maxx Trujillo MD  11/21/19 7051

## 2019-11-21 NOTE — TELEPHONE ENCOUNTER
Took prescribed valium and slept through his alarm this morning, missing work. He needs a note for work and is still having a lot of pain so wants to come back in. I advised he make sure he lets the Md know he needs a note for work.  Laila Eddy RN-[facility]

## 2019-11-21 NOTE — ED NOTES
Patient states he was just DX with a tumor on his spine a few days ago. He was given permission to return to work with a 20 pound weight restriction. Today he hurt his back at work and now has numbness and tingling down his right leg. He is able to walk in without difficulty.

## 2019-11-26 ENCOUNTER — TRANSFERRED RECORDS (OUTPATIENT)
Dept: HEALTH INFORMATION MANAGEMENT | Facility: CLINIC | Age: 29
End: 2019-11-26

## 2019-11-27 ENCOUNTER — OFFICE VISIT (OUTPATIENT)
Dept: FAMILY MEDICINE | Facility: CLINIC | Age: 29
End: 2019-11-27
Payer: COMMERCIAL

## 2019-11-27 VITALS
BODY MASS INDEX: 24.77 KG/M2 | HEIGHT: 67 IN | TEMPERATURE: 98.4 F | RESPIRATION RATE: 16 BRPM | WEIGHT: 157.8 LBS | SYSTOLIC BLOOD PRESSURE: 108 MMHG | DIASTOLIC BLOOD PRESSURE: 60 MMHG | OXYGEN SATURATION: 98 % | HEART RATE: 84 BPM

## 2019-11-27 DIAGNOSIS — D49.2 LUMBAR SPINE TUMOR: Primary | ICD-10-CM

## 2019-11-27 PROBLEM — Z79.899 HIGH RISK MEDICATION USE: Status: ACTIVE | Noted: 2017-10-05

## 2019-11-27 PROCEDURE — 99213 OFFICE O/P EST LOW 20 MIN: CPT | Performed by: NURSE PRACTITIONER

## 2019-11-27 RX ORDER — PREDNISONE 20 MG/1
40 TABLET ORAL DAILY
Qty: 10 TABLET | Refills: 0 | Status: SHIPPED | OUTPATIENT
Start: 2019-11-27 | End: 2020-01-03

## 2019-11-27 RX ORDER — PREDNISONE 20 MG/1
40 TABLET ORAL DAILY
Qty: 10 TABLET | Refills: 0 | Status: SHIPPED | OUTPATIENT
Start: 2019-11-27 | End: 2019-11-27

## 2019-11-27 ASSESSMENT — MIFFLIN-ST. JEOR: SCORE: 1639.41

## 2019-11-27 NOTE — LETTER
[facility]  [address] [address]  Phone: [phone]    REPORT OF WORK ABILITY     NOTE TO EMPLOYEE: You must promptly provide a copy of this report to your  employer or worker's compensation insurer, and Qualified Rehabilitation Consultant.     Date: 11/11/2019                     Employee Name: Rao Leavitt         YOB: 1990  Medical Record Number: [id number]   Soc.Sec.No: [id number]  Employer: None                Date of Injury:  No injury - onset date 11/7/2019  Managed Care Organization / Insurance Company Name: UNKNOWN     Diagnosis: lumbar spinal tumor  Work Related: no     MMI: NO   Permanent Partial Disability(PPD) likely: UNKNOWN     EMPLOYEE IS ABLE TO WORK:  11/27/19 and 12/23/19, off 12/24/19 for spinal surgeon consult     RESTRICTIONS IF ANY:     Lift, carry no more than:  10 lbs   (2-4 hours of an 8 hour shift)  Push/Pull:  10  lb.   (2-4 hours of an 8 hour shift)  Bend:   None  Stoop:  (2-4 hours of an 8 hour shift)     OTHER RESTRICTIONS: None     TREATMENT PLAN/NOTES:  CT completed and has follow-up appointment with spinal surgeon on 12/24/19 due to lumbar spinal tumor.  If he is unable to work under these restrictions he will need to be off work.  I have recommended he get in touch with human resources for short term/long term disability and FMLA paperwork and submit to his surgeon for coverage of pay during this time.   Started short course of Prednisone to see if this helps with pain.              Sincerely,        MARSHAL Bradley-BC

## 2019-11-27 NOTE — PROGRESS NOTES
"Rao Leavitt is a 29 year old male who presents to clinic today for the following health issues:    HPI   ED/UC Followup:    Facility:  [facility]  Date of visit: 11/20/2019   Reason for visit:    Acute right-sided thoracic back pain   Back muscle spasm  Current Status: in a lot of pain, had MRI at [facility] on 11/8/2019 results in [facility], tizanidine is not helping with pain.  Patient states he has a tumor in his back. Would like to discuss a MARLI from work.      Patient has seen spinal surgeon and was told that the lesion is a spinal tumor.  He states that he is in pain all the time and nothing works.  He has side effects to gabapentin and is taking \"too much\" ibuprofen and it is not helping.  Everyone ordered a muscle relaxant per the patient and this just makes him sleepy and he becomes lazy and has kids to take care of but it does not help the pain.  The pain is like throbbing with sharp pains up the back.  No movements are helpful.  Heat may be somewhat beneficial.  He does not know what to do about his paperwork since he is not able to do his job with restrictions and states that his work is making him do some lifting that is making the pain worse.    Patient Active Problem List   Diagnosis     Bipolar 1 disorder (H)     Generalized anxiety disorder     High risk medication use     Lumbar spine tumor     Past Surgical History:   Procedure Laterality Date     ESOPHAGOSCOPY, GASTROSCOPY, DUODENOSCOPY (EGD), COMBINED N/A 4/16/2019    Procedure: COMBINED ESOPHAGOSCOPY, GASTROSCOPY, DUODENOSCOPY (EGD), BIOPSY SINGLE OR MULTIPLE;  Surgeon: Kj Thomas MD;  Location: [facility]     [facility]         Social History     Tobacco Use     Smoking status: Current Every Day Smoker     Packs/day: 0.25     Smokeless tobacco: Former User   Substance Use Topics     Alcohol use: Not Currently     Frequency: 2-3 times a week     Drinks per session: 10 or more     Binge frequency: Weekly     Comment: 4 weeks " "sober     Family History   Problem Relation Age of Onset     Bipolar Disorder Paternal Grandfather          Current Outpatient Medications   Medication Sig Dispense Refill     multivitamin, therapeutic (THERA-VIT) TABS tablet Take 1 tablet by mouth daily       predniSONE (DELTASONE) 20 MG tablet Take 2 tablets (40 mg) by mouth daily for 5 days 10 tablet 0     tiZANidine (ZANAFLEX) 2 MG tablet Take 1 tablet (2 mg) by mouth 3 times daily as needed for muscle spasms 30 tablet 0     Allergies   Allergen Reactions     Tramadol Nausea     Vicodin [Hydrocodone-Acetaminophen] Rash     Reviewed and updated as needed this visit by Provider  Tobacco  Allergies  Meds  Problems  Med Hx  Surg Hx  Fam Hx         Review of Systems   ROS COMP: CONSTITUTIONAL: NEGATIVE for fever, chills, change in weight  RESP: NEGATIVE for significant cough or SOB  CV: NEGATIVE for chest pain, palpitations or peripheral edema  MUSCULOSKELETAL: POSITIVE  for back pain as per HPI  PSYCHIATRIC: NEGATIVE for changes in mood or affect  ROS otherwise negative      Objective    /60   Pulse 84   Temp 98.4  F (36.9  C) (Tympanic)   Resp 16   Ht 1.702 m (5' 7\")   Wt 71.6 kg (157 lb 12.8 oz)   SpO2 98%   BMI 24.71 kg/m    Body mass index is 24.71 kg/m .  Physical Exam   GENERAL: healthy, alert and no distress  RESP: lungs clear to auscultation - no rales, rhonchi or wheezes  CV: regular rate and rhythm, normal S1 S2, no S3 or S4, no murmur, click or rub, no peripheral edema and peripheral pulses strong  BACK: no CVA tenderness, no paralumbar tenderness;  No midline spinal tenderness.  Upper thoracic tenderness and tightness of right paraspinal muscles.   PSYCH: mentation appears normal, affect normal/bright    Diagnostic Test Results:  none         Assessment & Plan     1. Lumbar spine tumor  Will try to use prednisone for 5 days to see if this reduces pain and inflammation related to this lesion.  May discontinue muscle relaxant if not " helping symptoms.  Recommend follow-up with surgeon as scheduled.  Denied patient pain medications today.  Instructed him to get a hold of HR at his work and get the paperwork to his spinal surgeon to get filled out until he is able to see him and treatment plan can be made.  - predniSONE (DELTASONE) 20 MG tablet; Take 2 tablets (40 mg) by mouth daily for 5 days  Dispense: 10 tablet; Refill: 0     See Patient Instructions    Return in about 1 month (around 12/27/2019) for spine surgeon as scheduled.    Deborah Dickey NP  [facility]

## 2019-11-27 NOTE — PATIENT INSTRUCTIONS
1.  Letter given for ongoing restrictions.  2.  Get paperwork for short/long term disability and FMLA to you spinal surgeon's office.  3.  Take prednisone as directed for 5 days.

## 2019-12-03 ENCOUNTER — TELEPHONE (OUTPATIENT)
Dept: FAMILY MEDICINE | Facility: CLINIC | Age: 29
End: 2019-12-03

## 2019-12-03 ENCOUNTER — HOSPITAL ENCOUNTER (EMERGENCY)
Facility: CLINIC | Age: 29
Discharge: HOME | End: 2019-12-03
Attending: NURSE PRACTITIONER | Admitting: NURSE PRACTITIONER
Payer: COMMERCIAL

## 2019-12-03 VITALS
TEMPERATURE: 97.6 F | HEART RATE: 84 BPM | RESPIRATION RATE: 16 BRPM | OXYGEN SATURATION: 99 % | DIASTOLIC BLOOD PRESSURE: 63 MMHG | SYSTOLIC BLOOD PRESSURE: 126 MMHG | WEIGHT: 165 LBS | BODY MASS INDEX: 25.84 KG/M2

## 2019-12-03 DIAGNOSIS — M54.9 BACK PAIN: ICD-10-CM

## 2019-12-03 PROCEDURE — 99213 OFFICE O/P EST LOW 20 MIN: CPT | Mod: Z6 | Performed by: NURSE PRACTITIONER

## 2019-12-03 PROCEDURE — G0463 HOSPITAL OUTPT CLINIC VISIT: HCPCS

## 2019-12-03 PROCEDURE — 96372 THER/PROPH/DIAG INJ SC/IM: CPT

## 2019-12-03 PROCEDURE — 25000128 H RX IP 250 OP 636: Performed by: NURSE PRACTITIONER

## 2019-12-03 RX ORDER — KETOROLAC TROMETHAMINE 30 MG/ML
30 INJECTION, SOLUTION INTRAMUSCULAR; INTRAVENOUS ONCE
Status: COMPLETED | OUTPATIENT
Start: 2019-12-03 | End: 2019-12-03

## 2019-12-03 RX ADMIN — KETOROLAC TROMETHAMINE 30 MG: 30 INJECTION, SOLUTION INTRAMUSCULAR at 12:24

## 2019-12-03 ASSESSMENT — ENCOUNTER SYMPTOMS
NECK STIFFNESS: 0
NAUSEA: 0
NECK PAIN: 0
NUMBNESS: 0
FATIGUE: 0
ABDOMINAL PAIN: 0
DIZZINESS: 0
LIGHT-HEADEDNESS: 0
HEADACHES: 0
DYSURIA: 0
MYALGIAS: 0
SHORTNESS OF BREATH: 0
WEAKNESS: 0
COUGH: 0
VOMITING: 0
CHILLS: 0
FEVER: 0
ARTHRALGIAS: 0
BACK PAIN: 1
JOINT SWELLING: 0

## 2019-12-03 NOTE — ED TRIAGE NOTES
"Pt complains of worsening back pain. \" I have a tumor on L1\" Discussed ED vs UC, pt was hoping for \" shot in my back\" Discussed possible  obstacles to this today in the ED/UC setting. Pt desires to be seen in UC.   "

## 2019-12-03 NOTE — ED AVS SNAPSHOT
[facility] Emergency Department  [address] [address]  Phone:  [phone]  Fax:  [phone]                                    Rao Leavitt   MRN: [id number]    Department:  [facility] Emergency Department   Date of Visit:  12/3/2019           After Visit Summary Signature Page    I have received my discharge instructions, and my questions have been answered. I have discussed any challenges I see with this plan with the nurse or doctor.    ..........................................................................................................................................  Patient/Patient Representative Signature      ..........................................................................................................................................  Patient Representative Print Name and Relationship to Patient    ..................................................               ................................................  Date                                   Time    ..........................................................................................................................................  Reviewed by Signature/Title    ...................................................              ..............................................  Date                                               Time          22EPIC Rev 08/18

## 2019-12-03 NOTE — ED PROVIDER NOTES
History     Chief Complaint   Patient presents with     Back Pain     acute on chronic, worse today. Called clinic and advised to be seen.      HPI  Rao Leavitt is a 29 year old male who presents to the urgent care due to acute on chronic back pain.  Patient had MRI completed with resulting lumbar spine tumor.  Plan to see spinal surgeon towards the end of the month.  Patient states his pain continues to worsen and is affecting his work and home life.  Pain is described as a sharp and dull pain which occasionally radiates up and down his back.  When pain is at its worse he has difficulty ambulating.  Has tried muscle relaxers, prednisone, Tylenol and ibuprofen with minimal relief. No changes in bowel/bladder control, no numbness or tingling, denies saddle anesthesia.     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 No pertinent past medical history.    Past Surgical History:    Past Surgical History:   Procedure Laterality Date     ESOPHAGOSCOPY, GASTROSCOPY, DUODENOSCOPY (EGD), COMBINED N/A 4/16/2019    Procedure: COMBINED ESOPHAGOSCOPY, GASTROSCOPY, DUODENOSCOPY (EGD), BIOPSY SINGLE OR MULTIPLE;  Surgeon: Kj Thomas MD;  Location: [facility]     PE tube         Family History:    Family History   Problem Relation Age of Onset     Bipolar Disorder Paternal Grandfather        Social History:  Marital Status:  Single [1]  Social History     Tobacco Use     Smoking status: Current Every Day Smoker     Packs/day: 0.25     Smokeless tobacco: Former User   Substance Use Topics     Alcohol  use: Not Currently     Frequency: 2-3 times a week     Drinks per session: 10 or more     Binge frequency: Weekly     Comment: 4 weeks sober     Drug use: No     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   Respiratory: Negative for cough and shortness of breath.    Cardiovascular: Negative for chest pain.   Gastrointestinal: Negative for abdominal pain, nausea and vomiting.   Genitourinary: Negative for dysuria.   Musculoskeletal: Positive for back pain and gait problem. Negative for arthralgias, joint swelling, myalgias, neck pain and neck stiffness.   Skin: Negative for rash.   Neurological: Negative for dizziness, weakness, light-headedness, numbness and headaches.       Physical Exam   BP: 126/63  Pulse: 84  Temp: 97.6  F (36.4  C)  Resp: 16  Weight: 74.8 kg (165 lb)  SpO2: 99 %      Physical Exam  Constitutional:       General: He is not in acute distress.     Appearance: He is well-developed. He is not diaphoretic.   Eyes:      Conjunctiva/sclera: Conjunctivae normal.      Pupils: Pupils are equal, round, and reactive to light.   Neck:      Musculoskeletal: Normal range of motion and neck supple.   Cardiovascular:      Rate and Rhythm: Normal rate and regular rhythm.   Pulmonary:      Effort: Pulmonary effort is normal. No respiratory distress.      Breath sounds: Normal breath sounds.   Abdominal:      General: There is no distension.      Palpations: Abdomen is soft.      Tenderness: There is no abdominal tenderness.   Musculoskeletal:      Lumbar back: He exhibits decreased range of motion (secondary to pain). He exhibits no bony tenderness, no swelling, no deformity and no laceration.   Skin:     General: Skin is warm.      Capillary Refill: Capillary refill takes less than 2 seconds.   Neurological:      Mental  Status: He is alert and oriented to person, place, and time.         ED Course        Procedures    No results found for this or any previous visit (from the past 24 hour(s)).    Medications   ketorolac (TORADOL) injection 30 mg (30 mg Intramuscular Given 12/3/19 1224)       Assessments & Plan (with Medical Decision Making)   Patient is a 29-year-old male who presents the urgent care for evaluation of acute on chronic back pain.  Normal physical exam other than slightly decreased range of motion secondary to pain.  No overlying injuries, edema; pulses equal bilaterally.  Patient recently diagnosed with a mass/lesion on his spine and being followed by spine surgery.  Patient states he recently completed steroid burst with no improvement in pain.  Offered patient Toradol injection during visit.  Discussed the need for appropriate follow-up to manage his pain as we cannot manage chronic pain in the urgent care.  Patient and spouse are agreeable to plan of care, all questions answered.  Patient ambulating well and discharged in stable condition.  I have reviewed the nursing notes.    I have reviewed the findings, diagnosis, plan and need for follow up with the patient.    Discharge Medication List as of 12/3/2019 12:20 PM          Final diagnoses:   Back pain       12/3/2019   [facility] EMERGENCY DEPARTMENT     Erica Calhoun, JULES CNP  12/04/19 2258

## 2019-12-03 NOTE — TELEPHONE ENCOUNTER
Reason for call:  Patient reporting a symptom    Symptom or request: Patient back pain is so bad he is unable to get out of bed. Patient does not see spine doctor til 12/24. Patient does not know what do to next.     Duration (how long have symptoms been present): getting worse.    Have you been treated for this before? Yes    Phone Number patient can be reached at:  Home number on file [phone] (home)    Best Time:  any    Can we leave a detailed message on this number:  YES    Call taken on 12/3/2019 at 9:49 AM by Angela Pastor

## 2019-12-03 NOTE — TELEPHONE ENCOUNTER
Patient is contacted and he has several issues today.  He is unable to get out of bed.  His girl friend is there to help him and his children.  He is able to urinate and defecate with out problem.  When asked if he contacted his spinal surgeon he states yes last week and they told me to contact my PCP.  The prednisone and muscle relaxers did not help him.  I have expressed my concerns for him and safety at home.  He is going to be seen in UC today.  He is on work restrictions and should get a work note from them for being seen today.  Our policy of not prescribing over the phone is explained and the patient states he understands and this is not what he is wanting. To be seen for a plan of care for his pain. Jory PURI RN

## 2019-12-03 NOTE — LETTER
December 3, 2019      To Whom It May Concern:      Rao Leavitt was seen in our urgent care today, 12/03/19. Please excuse him from work on 12/03/19.  Sincerely,        JULES Baker CNP

## 2019-12-06 ENCOUNTER — TELEPHONE (OUTPATIENT)
Dept: FAMILY MEDICINE | Facility: CLINIC | Age: 29
End: 2019-12-06

## 2019-12-06 NOTE — LETTER
[facility]  [address] [address]  Phone: [phone]    December 6, 2019        Rao Leavitt  [id number] [address] [facility] [address] [facility] [address]          To whom it may concern:    RE: Rao Leavitt    Patient was seen and treated today at our clinic and missed work 12/3 through 12/6/2019 due to illness.    Please contact me for questions or concerns.      Sincerely,        Suzi Jaime NP

## 2019-12-06 NOTE — TELEPHONE ENCOUNTER
Letter signed and faxed from [facility] clinic.  Notify patient.    MARSHAL Gaxiola

## 2019-12-06 NOTE — TELEPHONE ENCOUNTER
Reason for Call:  Other Work note    Detailed comments: Patient is calling asking for a work note from when he was in the ER on 12/3/19 patient has missed 3 days since and is unable to get in with PCP patient also stated that he will be seeing the surgeon on 12/10/19.  Patient would like the letter to be faxed to [phone]    Phone Number Patient can be reached at: Home number on file [phone] (home)    Best Time: any    Can we leave a detailed message on this number? YES    Call taken on 12/6/2019 at 10:03 AM by Angela Pastor

## 2019-12-07 NOTE — TELEPHONE ENCOUNTER
[place] for [place].  Form routed to MARIO Jaime for review.  Patient was seen in ED and has follow up with surgeon on 12/10/2019.

## 2019-12-10 ENCOUNTER — OFFICE VISIT (OUTPATIENT)
Dept: FAMILY MEDICINE | Facility: CLINIC | Age: 29
End: 2019-12-10
Payer: COMMERCIAL

## 2019-12-10 ENCOUNTER — TELEPHONE (OUTPATIENT)
Dept: FAMILY MEDICINE | Facility: CLINIC | Age: 29
End: 2019-12-10

## 2019-12-10 VITALS
RESPIRATION RATE: 14 BRPM | OXYGEN SATURATION: 98 % | DIASTOLIC BLOOD PRESSURE: 82 MMHG | SYSTOLIC BLOOD PRESSURE: 144 MMHG | HEART RATE: 108 BPM | BODY MASS INDEX: 23.81 KG/M2 | WEIGHT: 152 LBS

## 2019-12-10 DIAGNOSIS — M54.42 ACUTE BILATERAL LOW BACK PAIN WITH BILATERAL SCIATICA: Primary | ICD-10-CM

## 2019-12-10 DIAGNOSIS — M54.41 ACUTE BILATERAL LOW BACK PAIN WITH BILATERAL SCIATICA: Primary | ICD-10-CM

## 2019-12-10 PROCEDURE — 99213 OFFICE O/P EST LOW 20 MIN: CPT | Performed by: NURSE PRACTITIONER

## 2019-12-10 ASSESSMENT — PATIENT HEALTH QUESTIONNAIRE - PHQ9
SUM OF ALL RESPONSES TO PHQ QUESTIONS 1-9: 2
5. POOR APPETITE OR OVEREATING: MORE THAN HALF THE DAYS

## 2019-12-10 ASSESSMENT — ANXIETY QUESTIONNAIRES
6. BECOMING EASILY ANNOYED OR IRRITABLE: MORE THAN HALF THE DAYS
IF YOU CHECKED OFF ANY PROBLEMS ON THIS QUESTIONNAIRE, HOW DIFFICULT HAVE THESE PROBLEMS MADE IT FOR YOU TO DO YOUR WORK, TAKE CARE OF THINGS AT HOME, OR GET ALONG WITH OTHER PEOPLE: SOMEWHAT DIFFICULT
1. FEELING NERVOUS, ANXIOUS, OR ON EDGE: MORE THAN HALF THE DAYS
5. BEING SO RESTLESS THAT IT IS HARD TO SIT STILL: MORE THAN HALF THE DAYS
GAD7 TOTAL SCORE: 14
7. FEELING AFRAID AS IF SOMETHING AWFUL MIGHT HAPPEN: MORE THAN HALF THE DAYS
2. NOT BEING ABLE TO STOP OR CONTROL WORRYING: MORE THAN HALF THE DAYS
3. WORRYING TOO MUCH ABOUT DIFFERENT THINGS: MORE THAN HALF THE DAYS

## 2019-12-10 NOTE — LETTER
[facility]  [address] [address]  Phone: [phone]    December 10, 2019        Rao Leavitt  [address] [facility] [address]          To whom it may concern:    RE: Roa Leavitt    Patient was seen and treated today at our clinic. Please excuse Rao from work until he has follow up appointment with Sports Medicine clinic.     Please contact me for questions or concerns.      Sincerely,        JULES Ramirez CNP
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
## 2019-12-10 NOTE — PATIENT INSTRUCTIONS
Thank you for choosing [facility].  You may be receiving an email and/or telephone survey request from [facility] [employer] regarding your visit today.  Please take a few minutes to respond to the survey to let us know how we are doing.      If you have questions or concerns, please contact us via [employer] or you can contact your care team at [phone].    Our Clinic hours are:  Monday 6:40 am  to 7:00 pm  Tuesday -Friday 6:40 am to 5:00 pm    The [place] outpatient lab hours are:  Monday - Friday 6:10 am to 4:45 pm  Saturdays 7:00 am to 11:00 am  Appointments are required, call [phone]    If you have clinical questions after hours or would like to schedule an appointment,  call the clinic at [phone].

## 2019-12-10 NOTE — TELEPHONE ENCOUNTER
Placed a call to the patient regarding his FMLA paperwork, Suzi is wondering what period of time he is requesting off. Was it just the ED visit? Is working now with reduced scheduling or is he needing additional time off for appts, management ect.     Spoke with patient's girlfriend Jaylan -  He will return my call when he wakes up.     Ext [phone]     Jeannie Patrick on 12/10/2019 at 10:46 AM

## 2019-12-10 NOTE — TELEPHONE ENCOUNTER
Patient called back, he is requesting time off work due to pain. He states that he saw surgeon who said he is unable to do anything.     He is requesting a continuous amount of time off.     He say when he works one day he cant move the next.       Tamiko DAWKINS  Station

## 2019-12-11 ASSESSMENT — ANXIETY QUESTIONNAIRES: GAD7 TOTAL SCORE: 14

## 2019-12-12 NOTE — TELEPHONE ENCOUNTER
Patient had a follow up appointment with Kim Monroe.  FMLA form was completed and routed to Kim for review and signature.

## 2019-12-16 NOTE — TELEPHONE ENCOUNTER
Pt is stating that if he doesn't get this paperwork for FMLA faxed in today he will be terminated per employer. He was seen on Dec 10th for his appt with SHELBY Monroe.    Please advise and let him know     Jory Gold  [facility]

## 2019-12-17 NOTE — TELEPHONE ENCOUNTER
I have checked [employer] box, the file cabinet, the  and [employer] desk.  Unable to locate this form.  The [facility] will ask Kim about it too. Jory PURI RN

## 2019-12-23 ENCOUNTER — TELEPHONE (OUTPATIENT)
Dept: FAMILY MEDICINE | Facility: CLINIC | Age: 29
End: 2019-12-23

## 2019-12-23 NOTE — TELEPHONE ENCOUNTER
Patient brings FMLA forms to the clinic today to be filled out by provider.  He reports that all the forms need to be filled out and to leave nothing blank on the forms per [place] department of labor.  Patient advised that paperwork take 7-10 business day turn around and Suzi Jaime is out of clinic until 12/27/19.  Patient verbalized understanding and reports that he needs them done as soon as possible and faxed to [phone], Attention: Margie Whitley    Routing to Shanika wheatley, PCP, and care team to monitor status of forms.    Meri ABEBE Rn

## 2019-12-24 NOTE — TELEPHONE ENCOUNTER
"FMLA form updated to reflect questions 6 and 7 which originally were listed as \"unknown at this time\" are now listed as \"no\".  Form routed to MARIO Jaime for review and signature as JAVIER Monroe, who originally completed form, is out until 1/6/2020  "

## 2019-12-29 NOTE — TELEPHONE ENCOUNTER
"[place] form updated with \"no\" on questions 6 and 7, signed, and faxed to Margie Whitley at [phone].        "

## 2020-01-03 ENCOUNTER — OFFICE VISIT (OUTPATIENT)
Dept: NEUROLOGY | Facility: CLINIC | Age: 30
End: 2020-01-03
Payer: COMMERCIAL

## 2020-01-03 VITALS
DIASTOLIC BLOOD PRESSURE: 66 MMHG | SYSTOLIC BLOOD PRESSURE: 109 MMHG | OXYGEN SATURATION: 99 % | HEART RATE: 102 BPM | HEIGHT: 67 IN | WEIGHT: 148 LBS | BODY MASS INDEX: 23.23 KG/M2

## 2020-01-03 DIAGNOSIS — R93.7 ABNORMAL MRI, LUMBAR SPINE: Primary | ICD-10-CM

## 2020-01-03 PROCEDURE — 99214 OFFICE O/P EST MOD 30 MIN: CPT | Performed by: PHYSICIAN ASSISTANT

## 2020-01-03 ASSESSMENT — PAIN SCALES - GENERAL: PAINLEVEL: EXTREME PAIN (8)

## 2020-01-03 ASSESSMENT — MIFFLIN-ST. JEOR: SCORE: 1594.95

## 2020-01-03 NOTE — PROGRESS NOTES
Neurosurgery Clinic Consult    HPI    Mr. Leavitt is a 29-year-old male here for second opinion regarding his lumbar MRI.  He states that he had an MRI performed in November which showed an indeterminant lesion in the L1 vertebral body likely benign hemangioma.  He was seen at [facility] neurosurgery where a CT scan was ordered which did not show the lesion, the radiologist interpreting the report recommended follow-up MRI in 6 months.    The patient reports pain in his low back as well as pain shooting down his legs and up into his upper back.  States has had to be taken off of work as he is unable to lift even 5 pound boxes at his job at [employer].  Reports he has had to stay home and mostly can even do housework and just plays [employer] most of the day.        Medical history  Bipolar 1  General anxiety disorder    Social history  Works at [employer]  Has 2 kids      B/P: 109/66, T: Data Unavailable, P: 102, R: Data Unavailable       Exam    Alert and oriented no acute distress  Bilateral lower extremities with 5/5 strength  Reflexes 2+ patella/ankle  Positive straight leg raise bilaterally  Negative ankle clonus negative Babinski bilaterally  Lumbar spine mildly tender to palpation  Gait is normal, no pain with bending over to tie his shoes  Imaging    Lumbar MRI demonstrates an indeterminate lesion at L1.  This was not seen on a subsequent CT scan.  No foraminal stenosis no nerve impingement or central stenosis on the MRI    Assessment    29-year-old male with probable benign lesion in L1.  Reported symptoms are not congruent with the findings on the lumbar MRI, and there are some inconsistencies with the findings and his physical exam..    Plan:      Recommend the patient have an updated lumbar MRI with and without contrast in 6 months for evaluation of the lesion at L1.  In the meantime I do not have any restrictions to recommend him with regards to his work.  And we do not see any evidence on the MRI that would  explain the symptoms he is describing of nerve pain shooting down his legs and back up.    Total time of 30 minutes met with the patient today greater than 50% spent face to face in counseling and coordination of care.

## 2020-01-03 NOTE — LETTER
1/3/2020         RE: Rao Leavitt  [address] [facility] [place] [place] [address]        Dear Colleague,    Thank you for referring your patient, Rao Leavitt, to the [facility]. Please see a copy of my visit note below.    Neurosurgery Clinic Consult    HPI    Mr. Leavitt is a 29-year-old male here for second opinion regarding his lumbar MRI.  He states that he had an MRI performed in November which showed an indeterminant lesion in the L1 vertebral body likely benign hemangioma.  He was seen at [facility] neurosurgery where a CT scan was ordered which did not show the lesion, the radiologist interpreting the report recommended follow-up MRI in 6 months.    The patient reports pain in his low back as well as pain shooting down his legs and up into his upper back.  States has had to be taken off of work as he is unable to lift even 5 pound boxes at his job at [employer].  Reports he has had to stay home and mostly can even do housework and just plays [employer] most of the day.        Medical history  Bipolar 1  General anxiety disorder    Social history  Works at [employer]  Has 2 kids      B/P: 109/66, T: Data Unavailable, P: 102, R: Data Unavailable       Exam    Alert and oriented no acute distress  Bilateral lower extremities with 5/5 strength  Reflexes 2+ patella/ankle  Positive straight leg raise bilaterally  Negative ankle clonus negative Babinski bilaterally  Lumbar spine mildly tender to palpation  Gait is normal, no pain with bending over to tie his shoes  Imaging    Lumbar MRI demonstrates an indeterminate lesion at L1.  This was not seen on a subsequent CT scan.  No foraminal stenosis no nerve impingement or central stenosis on the MRI    Assessment    29-year-old male with probable benign lesion in L1.  Reported symptoms are not congruent with the findings on the lumbar MRI, and there are some inconsistencies with the findings and his physical exam..    Plan:      Recommend the  patient have an updated lumbar MRI with and without contrast in 6 months for evaluation of the lesion at L1.  In the meantime I do not have any restrictions to recommend him with regards to his work.  And we do not see any evidence on the MRI that would explain the symptoms he is describing of nerve pain shooting down his legs and back up.    Total time of 30 minutes met with the patient today greater than 50% spent face to face in counseling and coordination of care.    Again, thank you for allowing me to participate in the care of your patient.        Sincerely,        Matt Bullard PA-C

## 2020-01-03 NOTE — NURSING NOTE
"Rao Leavitt's goals for this visit include: consult  He requests these members of his care team be copied on today's visit information:     PCP: Suzi Jaime    Referring Provider:  No referring provider defined for this encounter.    /66   Pulse 102   Ht 1.702 m (5' 7\")   Wt 67.1 kg (148 lb)   SpO2 99%   BMI 23.18 kg/m      Do you need any medication refills at today's visit? n  "

## 2020-01-10 ENCOUNTER — ALLIED HEALTH/NURSE VISIT (OUTPATIENT)
Dept: FAMILY MEDICINE | Facility: CLINIC | Age: 30
End: 2020-01-10
Payer: COMMERCIAL

## 2020-01-10 ENCOUNTER — TELEPHONE (OUTPATIENT)
Dept: FAMILY MEDICINE | Facility: CLINIC | Age: 30
End: 2020-01-10

## 2020-01-10 DIAGNOSIS — Z00.00 HEALTHCARE MAINTENANCE: Primary | ICD-10-CM

## 2020-01-10 PROCEDURE — 99207 ZZC NO CHARGE NURSE ONLY: CPT

## 2020-01-10 NOTE — TELEPHONE ENCOUNTER
"Patient received a notice from the [place] Department of Human Services that states he \"needs a medical statement that is verifying incapacity\".     States this is regarding the tumor on his spine. Was told he needs to repeat MRI in 6 months time. Seen Neurology 01/03/20. Has appointment with PCP on 01/15/20 but states he needs letter prior to this appointment.    Fax letter to Ana Serna at [facility]. At [phone]. Patient would also like a copy, ok to leave at .      ZACHARY HernandezN, RN      "

## 2020-01-10 NOTE — PROGRESS NOTES
"Patient received a notice from the [place] Department of Human Services that states he \"needs a medical statement that is verifying incapacity\".      States this is regarding the tumor on his spine. Was told he needs to repeat MRI in 6 months time. Seen Neurology 01/03/20. Has appointment with PCP on 01/15/20 but states he needs letter prior to this appointment.     Fax letter to Ana Serna at [facility]. At [phone]. Patient would also like a copy, ok to leave at .     Routed telephone encounter to PCP.       ZACHARY HernandezN, RN    "

## 2020-01-10 NOTE — TELEPHONE ENCOUNTER
"I have not seen this patient.  Per Neurology note - she states \"In the meantime I do not have any restrictions to recommend him with regards to his work.  And we do not see any evidence on the MRI that would explain the symptoms he is describing of nerve pain shooting down his legs and back up\"    I would recommend Physical therapy for back pain and Ortho consult.    If patient wants these referral this would be the best next steps.  No other recommendations.  MARSHAL Gaxiola    "

## 2020-01-13 NOTE — TELEPHONE ENCOUNTER
Patient called back to clinic but there was no one on the line when the call was transferred. Will await call back from patient.      ZACHARY HernandezN, RN

## 2020-01-15 ENCOUNTER — OFFICE VISIT (OUTPATIENT)
Dept: FAMILY MEDICINE | Facility: CLINIC | Age: 30
End: 2020-01-15
Payer: COMMERCIAL

## 2020-01-15 VITALS
SYSTOLIC BLOOD PRESSURE: 126 MMHG | DIASTOLIC BLOOD PRESSURE: 90 MMHG | TEMPERATURE: 98 F | HEIGHT: 67 IN | HEART RATE: 127 BPM | WEIGHT: 143.8 LBS | RESPIRATION RATE: 14 BRPM | OXYGEN SATURATION: 98 % | BODY MASS INDEX: 22.57 KG/M2

## 2020-01-15 DIAGNOSIS — M54.42 ACUTE BILATERAL LOW BACK PAIN WITH BILATERAL SCIATICA: Primary | ICD-10-CM

## 2020-01-15 DIAGNOSIS — M54.41 ACUTE BILATERAL LOW BACK PAIN WITH BILATERAL SCIATICA: Primary | ICD-10-CM

## 2020-01-15 PROCEDURE — 99213 OFFICE O/P EST LOW 20 MIN: CPT | Performed by: NURSE PRACTITIONER

## 2020-01-15 RX ORDER — METHOCARBAMOL 500 MG/1
500 TABLET, FILM COATED ORAL 2 TIMES DAILY PRN
Qty: 40 TABLET | Refills: 1 | Status: SHIPPED | OUTPATIENT
Start: 2020-01-15 | End: 2020-06-16

## 2020-01-15 ASSESSMENT — MIFFLIN-ST. JEOR: SCORE: 1570.9

## 2020-01-15 NOTE — PATIENT INSTRUCTIONS
Patient Education     Back Care Tips    Caring for your back  These are things you can do to prevent a recurrence of acute back pain and to reduce symptoms from chronic back pain:    Stay at a healthy weight. If you are overweight, losing weight will help most types of back pain.    Exercise is an important part of recovery from most types of back pain. The muscles behind and in front of the spine support the back. This means strengthening both the back muscles and the abdominal muscles will provide better support for your spine.     Swimming and brisk walking are good overall exercises to improve your fitness level.    Practice safe lifting methods (see below).    Practice good posture when sitting, standing, and walking. Don't sit for a long time. This puts more stress on the lower back than standing or walking.    Wear quality shoes with good arch support. Foot and ankle alignment can affect back symptoms. Don't wear high heels.    Therapeutic massage can help relax the back muscles without stretching them.    During the first 24 to 72 hours after an acute injury or flare-up of chronic back pain, put an ice pack on the painful area for 20 minutes and then remove it for 20 minutes. Do thisover a period of 60 to 90 minutes, or several times a day. As a safety precaution, don't use a heating pad at bedtime. Sleeping on a heating pad can lead to skin burns or tissue damage.    You can alternate using ice and heat.  Medicines  Talk with your healthcare provider before using medicines, especially if you have other health problems or are taking other medicines.    You may use over-the-counter medicines, such as acetaminophen, ibuprofen, or naprosyn to control pain, unless your healthcare provider prescribed other pain medicine. Talk with your healthcare provider before taking any medicines if you have a chronic condition such ass diabetes, liver or kidney disease, stomach ulcers, or digestive bleeding, or are taking  blood thinners.    Be careful if you are given prescription pain medicines, opioids, or medicine for muscle spasm. They can cause drowsiness, and affect your coordination, reflexes, and judgment. Don't drive or operate heavy machinery while taking these types of medicines. Take prescription pain medicine only as prescribed by your healthcare provider.  Lumbar stretch  This simple stretch will help relax muscle spasm and keep your back more limber. If exercise makes your back pain worse, don t do it.    Lie on your back with your knees bent and both feet on the ground.    Slowly raise your left knee to your chest as you flatten your lower back against the floor. Hold for 5 seconds.    Relax and repeat the exercise with your right knee.    Do 10 of these exercises for each leg.  Safe lifting method    Don t bend over at the waist to lift an object off the floor.  Instead, bend your knees and hips in a squat.     Keep your back and head upright    Hold the object close to your body, directly in front of you.    Straighten your legs to lift the object.     Lower the object to the floor in the reverse fashion.    If you must slide something across the floor, push it.    Posture tips  Sitting  Sit in chairs with straight backs or low-back support. Keep your knees lower than your hips, with your feet flat on the floor.  When driving, sit up straight. Adjust the seat forward so you are not leaning toward the steering wheel.  A small pillow or rolled towel behind your lower back may help if you are driving long distances.   Standing  When standing for long periods, shift most of your weight to one leg at a time. Switch legs every few minutes.   Sleeping  The best way to sleep is on your side with your knees bent. Put a low pillow under your head to support your neck in a neutral spine position. Don't use thick pillows that bend your neck to one side. Put a pillow between your legs to further relax your lower back. If you  sleep on your back, put pillows under your knees to support your legs in a slightly flexed position. Use a firm mattress. If your mattress sags, replace it, or use a 1/2-inch plywood board under the mattress to add support.  Follow-up care  Follow up with your healthcare provider, or as advised.  If X-rays, a CT scan or an MRI scan were taken, they may be reviewed by a radiologist. You will be told of any new findings that may affect your care.  Call 911  Call 911 if any of the following occur:    Trouble breathing    Confusion    Very drowsy    Fainting or loss of consciousness    Rapid or very slow heart rate    Loss of  bowel or bladder control  When to seek medical advice  Call your healthcare provider right away if any of the following occur:    Pain becomes worse or spreads to your arms or legs    Weakness or numbness in one or both arms or legs    Numbness in the groin area  Date Last Reviewed: 6/1/2016 2000-2019 [employer]. [address] [place] [address]. All rights reserved. This information is not intended as a substitute for professional medical care. Always follow your healthcare professional's instructions.           Patient Education     Back Exercises: Arm Reach    Do this exercise on your hands and knees. Keep your knees under your hips and your hands under your shoulders. Keep your spine in a neutral position (not arched or sagging). Be sure to maintain your neck s natural curve:    Stretch one arm straight out in front of you. Don t raise your head or let your supporting shoulder sag.    Hold for 5 seconds.    Return to starting position.    Repeat 5 times.    Switch arms.  Date Last Reviewed: 3/1/2018    8160-2382 [employer]. [address] [place] [address]. All rights reserved. This information is not intended as a substitute for professional medical care. Always follow your healthcare professional's instructions.           Patient Education      Back Exercises: Back Release  Do this exercise on your hands and knees. Keep your knees under your hips and your hands under your shoulders.        Relax your abdominal and buttocks muscles, lift your head, and let your back sag. Be sure to keep your weight evenly distributed. Don t sit back on your hips.     Hold for 5 seconds.    Return to starting position.    Tuck your head and lift (arch) your back.    Hold for 5 seconds    Return to starting position.    Repeat 5 times.  Date Last Reviewed: 3/1/2018    0511-6505 [employer]. [address] [place] [address]. All rights reserved. This information is not intended as a substitute for professional medical care. Always follow your healthcare professional's instructions.           Patient Education     Back Exercises: Back Press    Do this exercise on your hands and knees. Keep your knees under your hips and your hands under your shoulders. Keep your spine in a neutral position (not arched or sagging). Be sure to maintain your neck s natural curve:    Tighten your stomach and buttock muscles to press your back upward. Let your head drop slightly.    Hold for 5 seconds. Return to starting position.    Repeat 5 times.  Date Last Reviewed: 3/1/2018    6816-6940 [employer]. [address] [place] [address]. All rights reserved. This information is not intended as a substitute for professional medical care. Always follow your healthcare professional's instructions.

## 2020-01-15 NOTE — LETTER
[facility]  [address] [address]  Phone: [phone]    January 15, 2020        Rao Leavitt  [address] [facility] [address]          To whom it may concern:    RE: Rao Leavitt    Patient was seen and treated today at our clinic.  Patient has seen Neurology and Orthopedics and plan is to follow up with Physical Therapy will a goal return to work in 2 weeks.  Will follow up with us here at [facility] prior to this time to recheck symptoms.  Leave started Dec 2019.    Please contact me for questions or concerns.      Sincerely,        Suzi Jaime NP

## 2020-01-15 NOTE — LETTER
[facility]  [address] [facility] [address]  Phone: [phone]    January 15, 2020        Rao Leavitt  [address] [facility] [address]          To whom it may concern:    RE: Rao MAGALIS Dagmar    Patient was seen and treated today at our clinic.  Recommend continued leave until seen by physical therapy for rehab and recommendations for return to work.    Would anticipate return to work in 2 weeks - he will follow up prior to that time to get new restrictions or work release.    Please contact me for questions or concerns.      Sincerely,        Suzi Jaime NP

## 2020-01-15 NOTE — PROGRESS NOTES
Subjective     Rao Leavitt is a 30 year old male who presents to clinic today for the following health issues:    HPI   Chronic/Recurring Back Pain Follow Up      Where is your back pain located? (Select all that apply) L1    How would you describe your back pain?  sharp, shooting and stabbing    Where does your back pain spread? the right and left buttock and the right and left  thigh    Since your last clinic visit for back pain, how has your pain changed? gradually worsening    Does your back pain interfere with your job? YES    Since your last visit, have you tried any new treatment? No     Pt states he wonders when he should be going back to work. He states that with the heating pad nightly it is not getting better and it is difficult to get up in the morning.     He is not taking the tizanidine.     He states he is doing another CT in 07/2020 and MRI on 07/10/2020.       How many servings of fruits and vegetables do you eat daily?  4 or more    On average, how many sweetened beverages do you drink each day (Examples: soda, juice, sweet tea, etc.  Do NOT count diet or artificially sweetened beverages)?   1    How many days per week do you exercise enough to make your heart beat faster? 7    How many minutes a day do you exercise enough to make your heart beat faster? 60 or more    How many days per week do you miss taking your medication? 0    Has been on 5# restriction from another provider. Has been out of work due to low back pain for the past month.  Pain has been more improved with rest per patient.  Using heating pad.   Works at [employer].  Taking Ibuprofen every 4 hours.      MRI performed in November which showed an indeterminant lesion in the L1 vertebral body likely benign hemangioma.  He was seen at [facility] neurosurgery where a CT scan was ordered which did not show the lesion, the radiologist interpreting the report recommended follow-up MRI in 6 months.    Saw Neurology 01/03/2020 and  recommendations were:  Recommend the patient have an updated lumbar MRI with and without contrast in 6 months for evaluation of the lesion at L1.  In the meantime I do not have any restrictions to recommend him with regards to his work.  And we do not see any evidence on the MRI that would explain the symptoms he is describing of nerve pain shooting down his legs and back up.    Patient Active Problem List   Diagnosis     Bipolar 1 disorder (H)     Generalized anxiety disorder     High risk medication use     Lumbar spine tumor     Past Surgical History:   Procedure Laterality Date     ESOPHAGOSCOPY, GASTROSCOPY, DUODENOSCOPY (EGD), COMBINED N/A 4/16/2019    Procedure: COMBINED ESOPHAGOSCOPY, GASTROSCOPY, DUODENOSCOPY (EGD), BIOPSY SINGLE OR MULTIPLE;  Surgeon: Kj Thomas MD;  Location: [facility]     PE tube         Social History     Tobacco Use     Smoking status: Current Every Day Smoker     Packs/day: 0.25     Smokeless tobacco: Former User   Substance Use Topics     Alcohol use: Not Currently     Frequency: 2-3 times a week     Drinks per session: 10 or more     Binge frequency: Weekly     Comment: 4 weeks sober     Family History   Problem Relation Age of Onset     Bipolar Disorder Paternal Grandfather          Current Outpatient Medications   Medication Sig Dispense Refill     multivitamin, therapeutic (THERA-VIT) TABS tablet Take 1 tablet by mouth daily       tiZANidine (ZANAFLEX) 2 MG tablet Take 1 tablet (2 mg) by mouth 3 times daily as needed for muscle spasms (Patient not taking: Reported on 1/3/2020) 30 tablet 0     Allergies   Allergen Reactions     Tramadol Nausea     Vicodin [Hydrocodone-Acetaminophen] Rash     Recent Labs   Lab Test 10/29/19  1431 08/16/19  0915 04/15/19  0630 03/20/19  0600   ALT  --  22 16 20   CR 0.77 0.75 0.80 0.78   GFRESTIMATED >90 >90 >90 >90   GFRESTBLACK >90 >90 >90 >90   POTASSIUM 3.6 3.9 4.2 4.0      BP Readings from Last 3 Encounters:   01/15/20 (!) 126/90   01/03/20  "109/66   12/10/19 (!) 144/82    Wt Readings from Last 3 Encounters:   01/15/20 65.2 kg (143 lb 12.8 oz)   01/03/20 67.1 kg (148 lb)   12/10/19 68.9 kg (152 lb)                    Reviewed and updated as needed this visit by Provider         Review of Systems   ROS COMP: Constitutional, HEENT, cardiovascular, pulmonary, GI, , musculoskeletal, neuro, skin, endocrine and psych systems are negative, except as otherwise noted.      Objective    BP (!) 126/90 (BP Location: Left arm, Patient Position: Sitting, Cuff Size: Adult Regular)   Pulse 127   Temp 98  F (36.7  C)   Resp 14   Ht 1.702 m (5' 7\")   Wt 65.2 kg (143 lb 12.8 oz)   SpO2 98%   BMI 22.52 kg/m    Body mass index is 22.52 kg/m .  Physical Exam   GENERAL: healthy, alert and no distress  Comprehensive back pain exam:  Tenderness of lower para lumbar spine bilateral, Range of motion not limited by pain, Lower extremity strength functional and equal on both sides, Lower extremity reflexes within normal limits bilaterally and Lower extremity sensation normal and equal on both sides    Diagnostic Test Results:  Labs reviewed in Epic        Assessment & Plan     1. Acute bilateral low back pain with bilateral sciatica  Discussed that being off work is not ideal and would want patient to transition back to work.  PT to determine if there are any restriction needs.    Recheck MRI as planned in 6 months.    - PHYSICAL THERAPY REFERRAL; Future  - methocarbamol (ROBAXIN) 500 MG tablet; Take 1 tablet (500 mg) by mouth 2 times daily as needed for muscle spasms  Dispense: 40 tablet; Refill: 1       Patient Instructions       Patient Education     Back Care Tips    Caring for your back  These are things you can do to prevent a recurrence of acute back pain and to reduce symptoms from chronic back pain:    Stay at a healthy weight. If you are overweight, losing weight will help most types of back pain.    Exercise is an important part of recovery from most types of " back pain. The muscles behind and in front of the spine support the back. This means strengthening both the back muscles and the abdominal muscles will provide better support for your spine.     Swimming and brisk walking are good overall exercises to improve your fitness level.    Practice safe lifting methods (see below).    Practice good posture when sitting, standing, and walking. Don't sit for a long time. This puts more stress on the lower back than standing or walking.    Wear quality shoes with good arch support. Foot and ankle alignment can affect back symptoms. Don't wear high heels.    Therapeutic massage can help relax the back muscles without stretching them.    During the first 24 to 72 hours after an acute injury or flare-up of chronic back pain, put an ice pack on the painful area for 20 minutes and then remove it for 20 minutes. Do thisover a period of 60 to 90 minutes, or several times a day. As a safety precaution, don't use a heating pad at bedtime. Sleeping on a heating pad can lead to skin burns or tissue damage.    You can alternate using ice and heat.  Medicines  Talk with your healthcare provider before using medicines, especially if you have other health problems or are taking other medicines.    You may use over-the-counter medicines, such as acetaminophen, ibuprofen, or naprosyn to control pain, unless your healthcare provider prescribed other pain medicine. Talk with your healthcare provider before taking any medicines if you have a chronic condition such ass diabetes, liver or kidney disease, stomach ulcers, or digestive bleeding, or are taking blood thinners.    Be careful if you are given prescription pain medicines, opioids, or medicine for muscle spasm. They can cause drowsiness, and affect your coordination, reflexes, and judgment. Don't drive or operate heavy machinery while taking these types of medicines. Take prescription pain medicine only as prescribed by your healthcare  provider.  Lumbar stretch  This simple stretch will help relax muscle spasm and keep your back more limber. If exercise makes your back pain worse, don t do it.    Lie on your back with your knees bent and both feet on the ground.    Slowly raise your left knee to your chest as you flatten your lower back against the floor. Hold for 5 seconds.    Relax and repeat the exercise with your right knee.    Do 10 of these exercises for each leg.  Safe lifting method    Don t bend over at the waist to lift an object off the floor.  Instead, bend your knees and hips in a squat.     Keep your back and head upright    Hold the object close to your body, directly in front of you.    Straighten your legs to lift the object.     Lower the object to the floor in the reverse fashion.    If you must slide something across the floor, push it.    Posture tips  Sitting  Sit in chairs with straight backs or low-back support. Keep your knees lower than your hips, with your feet flat on the floor.  When driving, sit up straight. Adjust the seat forward so you are not leaning toward the steering wheel.  A small pillow or rolled towel behind your lower back may help if you are driving long distances.   Standing  When standing for long periods, shift most of your weight to one leg at a time. Switch legs every few minutes.   Sleeping  The best way to sleep is on your side with your knees bent. Put a low pillow under your head to support your neck in a neutral spine position. Don't use thick pillows that bend your neck to one side. Put a pillow between your legs to further relax your lower back. If you sleep on your back, put pillows under your knees to support your legs in a slightly flexed position. Use a firm mattress. If your mattress sags, replace it, or use a 1/2-inch plywood board under the mattress to add support.  Follow-up care  Follow up with your healthcare provider, or as advised.  If X-rays, a CT scan or an MRI scan were taken,  they may be reviewed by a radiologist. You will be told of any new findings that may affect your care.  Call 911  Call 911 if any of the following occur:    Trouble breathing    Confusion    Very drowsy    Fainting or loss of consciousness    Rapid or very slow heart rate    Loss of  bowel or bladder control  When to seek medical advice  Call your healthcare provider right away if any of the following occur:    Pain becomes worse or spreads to your arms or legs    Weakness or numbness in one or both arms or legs    Numbness in the groin area  Date Last Reviewed: 6/1/2016 2000-2019 [employer]. [address] [place] [address]. All rights reserved. This information is not intended as a substitute for professional medical care. Always follow your healthcare professional's instructions.           Patient Education     Back Exercises: Arm Reach    Do this exercise on your hands and knees. Keep your knees under your hips and your hands under your shoulders. Keep your spine in a neutral position (not arched or sagging). Be sure to maintain your neck s natural curve:    Stretch one arm straight out in front of you. Don t raise your head or let your supporting shoulder sag.    Hold for 5 seconds.    Return to starting position.    Repeat 5 times.    Switch arms.  Date Last Reviewed: 3/1/2018    3464-4838 [employer]. [address] [place] [address]. All rights reserved. This information is not intended as a substitute for professional medical care. Always follow your healthcare professional's instructions.           Patient Education     Back Exercises: Back Release  Do this exercise on your hands and knees. Keep your knees under your hips and your hands under your shoulders.        Relax your abdominal and buttocks muscles, lift your head, and let your back sag. Be sure to keep your weight evenly distributed. Don t sit back on your hips.     Hold for 5 seconds.    Return to  starting position.    Tuck your head and lift (arch) your back.    Hold for 5 seconds    Return to starting position.    Repeat 5 times.  Date Last Reviewed: 3/1/2018    8735-1480 [employer]. [address] [place] [address]. All rights reserved. This information is not intended as a substitute for professional medical care. Always follow your healthcare professional's instructions.           Patient Education     Back Exercises: Back Press    Do this exercise on your hands and knees. Keep your knees under your hips and your hands under your shoulders. Keep your spine in a neutral position (not arched or sagging). Be sure to maintain your neck s natural curve:    Tighten your stomach and buttock muscles to press your back upward. Let your head drop slightly.    Hold for 5 seconds. Return to starting position.    Repeat 5 times.  Date Last Reviewed: 3/1/2018    3021-1255 [employer]. [address] [place] [address]. All rights reserved. This information is not intended as a substitute for professional medical care. Always follow your healthcare professional's instructions.               No follow-ups on file.    Suzi Jaime NP  [facility]

## 2020-01-28 ENCOUNTER — ALLIED HEALTH/NURSE VISIT (OUTPATIENT)
Dept: FAMILY MEDICINE | Facility: CLINIC | Age: 30
End: 2020-01-28
Payer: COMMERCIAL

## 2020-01-28 DIAGNOSIS — Z00.00 HEALTHCARE MAINTENANCE: Primary | ICD-10-CM

## 2020-01-28 PROCEDURE — 99207 ZZC NO CHARGE NURSE ONLY: CPT

## 2020-01-28 NOTE — PROGRESS NOTES
Patient brings in form. States he needs this done by 2/2. Patient states he will bring form to appointment on 1/30/20 instead.      ZACHARY HernandezN, RN

## 2020-01-30 ENCOUNTER — TELEPHONE (OUTPATIENT)
Dept: FAMILY MEDICINE | Facility: CLINIC | Age: 30
End: 2020-01-30

## 2020-01-30 ENCOUNTER — OFFICE VISIT (OUTPATIENT)
Dept: FAMILY MEDICINE | Facility: CLINIC | Age: 30
End: 2020-01-30

## 2020-01-30 ENCOUNTER — ALLIED HEALTH/NURSE VISIT (OUTPATIENT)
Dept: FAMILY MEDICINE | Facility: CLINIC | Age: 30
End: 2020-01-30

## 2020-01-30 VITALS
TEMPERATURE: 96.7 F | OXYGEN SATURATION: 99 % | HEIGHT: 67 IN | HEART RATE: 110 BPM | DIASTOLIC BLOOD PRESSURE: 82 MMHG | BODY MASS INDEX: 23.95 KG/M2 | SYSTOLIC BLOOD PRESSURE: 138 MMHG | RESPIRATION RATE: 18 BRPM | WEIGHT: 152.6 LBS

## 2020-01-30 DIAGNOSIS — M54.50 ACUTE BILATERAL LOW BACK PAIN WITHOUT SCIATICA: Primary | ICD-10-CM

## 2020-01-30 DIAGNOSIS — D49.2 LUMBAR SPINE TUMOR: Primary | ICD-10-CM

## 2020-01-30 DIAGNOSIS — D49.2 LUMBAR SPINE TUMOR: ICD-10-CM

## 2020-01-30 PROCEDURE — 99207 ZZC NO CHARGE NURSE ONLY: CPT

## 2020-01-30 PROCEDURE — 99214 OFFICE O/P EST MOD 30 MIN: CPT | Performed by: NURSE PRACTITIONER

## 2020-01-30 ASSESSMENT — MIFFLIN-ST. JEOR: SCORE: 1610.82

## 2020-01-30 NOTE — NURSING NOTE
"Chief Complaint   Patient presents with     Back Pain       Initial /82 (BP Location: Right arm, Patient Position: Chair, Cuff Size: Adult Regular)   Pulse 110   Temp 96.7  F (35.9  C) (Tympanic)   Resp 18   Ht 1.702 m (5' 7\")   Wt 69.2 kg (152 lb 9.6 oz)   SpO2 99%   BMI 23.90 kg/m   Estimated body mass index is 23.9 kg/m  as calculated from the following:    Height as of this encounter: 1.702 m (5' 7\").    Weight as of this encounter: 69.2 kg (152 lb 9.6 oz).    Patient presents to the clinic using No DME    Health Maintenance that is potentially due pending provider review:  NONE    Lynn Dozier MA  8:42 AM 1/30/2020  .        "

## 2020-01-30 NOTE — TELEPHONE ENCOUNTER
2nd attempt. I left a message for the patient to return my call. I did confirm earlier when meeting with pt the phone number in his chart is correct.    MARIO VICENTE will not re-fill out his paperwork from today saying that his dx needs to be longer than 30 days; she is going off of the information and imaging she has from neurology to draw this conclusion.    He can discuss this further with ortho when he sees them on 2/4/2020.  Please refer to the A & P section of Office Visit from today with JULES Sarmiento.    Sonya MATUTE RN

## 2020-01-30 NOTE — NURSING NOTE
Attempted to call pt back for the RN but he is not in the lobby.  Left non-detailed message for patient to return a call to the clinic RN; see telephone note.  Pt has questions about his form while being seen by Suzi Jaime earlier today.   JUANCHO Black RN

## 2020-01-30 NOTE — PROGRESS NOTES
"Subjective     Rao Leavitt is a 30 year old male who presents to clinic today for the following health issues:    HPI   Back Pain       Duration: x 2 days         Specific cause: unknown    Description:   Location of pain: bilateral lower back  Character of pain: sharp  Pain radiation:radiates into the right leg  New numbness or weakness in legs, not attributed to pain:  YES- right leg    Intensity: severe    History:   Pain interferes with job: YES  History of back problems: no prior back problems  Any previous MRI or X-rays: Yes- at [facility].  Date 11/26/2019 and 11/8/2019  Sees a specialist for back pain:  No  Therapies tried without relief:     Alleviating factors:   Improved by: heat,     Precipitating factors:  Worsened by: Lifting and Walking      Accompanying Signs & Symptoms:  Risk of Fracture:  None  Risk of Cauda Equina:  None  Risk of Infection:  None  Risk of Cancer:  None  Risk of Ankylosing Spondylitis:  Onset at age <35, male, AND morning back stiffness. no   Using Flexeril up to three times daily.  Not taking Ibuprofen.  Reports \"tries to do stretches\"  Lays on couch and doesn't do much with this.  Tries to be active but \"can't\"  Heating pad as needed.    NO reports of loss of bowel or bladder.     Reports pain had improved but not as much as he is feeling now.  Has been on 5# restriction from another provider. Has been out of work due to low back pain for the past month.  Pain has been more improved with rest per patient.  Using heating pad.   Works at [employer].  Taking Ibuprofen every 4 hours.        MRI performed in November which showed an indeterminant lesion in the L1 vertebral body likely benign hemangioma.  He was seen at [facility] neurosurgery where a CT scan was ordered which did not show the lesion, the radiologist interpreting the report recommended follow-up MRI in 6 months.     Saw Neurology 01/03/2020 and recommendations were:  Recommend the patient have an updated " lumbar MRI with and without contrast in 6 months for evaluation of the lesion at L1.  In the meantime I do not have any restrictions to recommend him with regards to his work.  And we do not see any evidence on the MRI that would explain the symptoms he is describing of nerve pain shooting down his legs and back up.     Patient Active Problem List   Diagnosis     Bipolar 1 disorder (H)     Generalized anxiety disorder     High risk medication use     Lumbar spine tumor     Past Surgical History:   Procedure Laterality Date     ESOPHAGOSCOPY, GASTROSCOPY, DUODENOSCOPY (EGD), COMBINED N/A 4/16/2019    Procedure: COMBINED ESOPHAGOSCOPY, GASTROSCOPY, DUODENOSCOPY (EGD), BIOPSY SINGLE OR MULTIPLE;  Surgeon: Kj Thomas MD;  Location: [facility]     PE tube         Social History     Tobacco Use     Smoking status: Current Every Day Smoker     Packs/day: 0.25     Smokeless tobacco: Former User   Substance Use Topics     Alcohol use: Not Currently     Frequency: 2-3 times a week     Drinks per session: 10 or more     Binge frequency: Weekly     Comment: 4 weeks sober     Family History   Problem Relation Age of Onset     Bipolar Disorder Paternal Grandfather          Current Outpatient Medications   Medication Sig Dispense Refill     methocarbamol (ROBAXIN) 500 MG tablet Take 1 tablet (500 mg) by mouth 2 times daily as needed for muscle spasms 40 tablet 1     multivitamin, therapeutic (THERA-VIT) TABS tablet Take 1 tablet by mouth daily       Allergies   Allergen Reactions     Tramadol Nausea     Vicodin [Hydrocodone-Acetaminophen] Rash     Recent Labs   Lab Test 10/29/19  1431 08/16/19  0915 04/15/19  0630 03/20/19  0600   ALT  --  22 16 20   CR 0.77 0.75 0.80 0.78   GFRESTIMATED >90 >90 >90 >90   GFRESTBLACK >90 >90 >90 >90   POTASSIUM 3.6 3.9 4.2 4.0      BP Readings from Last 3 Encounters:   01/30/20 138/82   01/15/20 (!) 126/90   01/03/20 109/66    Wt Readings from Last 3 Encounters:   01/30/20 69.2 kg (152 lb 9.6 oz)  "  01/15/20 65.2 kg (143 lb 12.8 oz)   01/03/20 67.1 kg (148 lb)                      Reviewed and updated as needed this visit by Provider         Review of Systems   ROS COMP: Constitutional, HEENT, cardiovascular, pulmonary, GI, , musculoskeletal, neuro, skin, endocrine and psych systems are negative, except as otherwise noted.      Objective    /82 (BP Location: Right arm, Patient Position: Chair, Cuff Size: Adult Regular)   Pulse 110   Temp 96.7  F (35.9  C) (Tympanic)   Resp 18   Ht 1.702 m (5' 7\")   Wt 69.2 kg (152 lb 9.6 oz)   SpO2 99%   BMI 23.90 kg/m    Body mass index is 23.9 kg/m .  Physical Exam   GENERAL: healthy, alert and no distress  Patient stood from chair without problem or observed pain.  Walking without deficit or altered gait or limp.  Comprehensive back pain exam:  Tenderness of midline and para lower lumbar spine - vague during exam, Pain limits the following motions: flexion and extension per patient report but asked to completed these and ROM was full., Lower extremity strength functional and equal on both sides, Lower extremity reflexes within normal limits bilaterally and Lower extremity sensation normal and equal on both sides    Diagnostic Test Results:  Labs reviewed in Epic        Assessment & Plan     1. Acute bilateral low back pain without sciatica  No imaging or Neuro findings for pain.  Patient reported symptoms are more than what is seen on imaging or on exam.  Discussed with patient if he wants to continue to be out of work he will need follow up with Ortho and for them to continue as I do not see any reason he can not go back to work at this time.    Discussed increasing activity at home and doing stretches - avoiding laying around on the couch or bed as he has reported above since onset of pain.  COntinue NSAIDs and Flexeril as needed.  Continue PT - has appointment this week.    - Orthopedic & Spine  Referral; Future    2. Lumbar spine tumor     - " Orthopedic & Spine  Referral; Future       There are no Patient Instructions on file for this visit.    No follow-ups on file.    Suzi Jaime NP  [facility]      Total times spent with patient 25 minutes of which > 50% of the time was spent counseling and coordination of care discussion of restrictions and disability associated with back pain, medications, return to work, referral and follow up recommendations.

## 2020-01-30 NOTE — TELEPHONE ENCOUNTER
Pt was on the RN schedule as walk in.  I attempted to call him back but he was not in the lobby.  Pt was seen earlier today and has questions about his forms according to the appt notes.    Left non-detailed message for patient to return a call to the clinic RN.     JUANCHO Black RN

## 2020-01-30 NOTE — LETTER
[facility]  [address] [address]  Phone: [phone]    January 30, 2020        Rao Leavitt  [address] [facility] [place] [facility] [address]          To whom it may concern:    RE: Rao MAGALIS Dagmar    Patient was seen and treated today at our clinic.  Plan is to see Lumbar Specialist - Orthopedist for further recommendations and restrictions going forward.    May return to work but will need to be on restrictions with no lifting, bending, pushing, pulling.  May walk and stand intermittently with breaks.    Advised to continue same restrictions     Please contact me for questions or concerns.      Sincerely,        Suzi Jaime NP

## 2020-01-30 NOTE — TELEPHONE ENCOUNTER
I left a message for the patient to return my call. This RN met with pt earlier, and talked with JAXSON VICENTE afterward.     Sonya MATUTE RN

## 2020-01-31 NOTE — TELEPHONE ENCOUNTER
Pt called back.    1.  He argues that he needs this form, Provider Opinion Form, [facility], be completed by Monday, 2/3/20.  Otherwise, he has to start the whole process over again.    Pt goes on to explain that he is working with Mariluz Serna, [phone], case # [id number].  Pt verbally gives me permission to contact Mariluz regarding the accurate completion of this form.    Pt says that he has not been able to work since December.  He explains that the form is intended to pursue temporary benefits through social security with assistance from [facility].   He says that they need confirmation that it has been greater than 30 days since he has been able to attend work.      Pt refers to an L1 lesion as seen on MR 11/8/19 but not seen by CT on 11/26/19.  Pt saw neurosurgeon, Dr MARJ Bullard on 1/3/20:   Recommend the patient have an updated lumbar MRI with and without contrast in 6 months for evaluation of the lesion at L1.    In the meantime I do not have any restrictions to recommend him with regards to his work.  And we do not see any evidence on the MRI that would explain the symptoms he is describing of nerve pain shooting down his legs and back up.        2.  Pt refers to the letter that he received yesterday at his office visit indicating that pt work under restrictions.  Pt says that his employer, [employer], refuses to accommodate work restrictions.  It's either all or nothing.      I left a message with Mariluz regarding this request.    I have explained to pt that it is clear to me that provider is not willing to complete this form however pt is very persistent that he cannot return to work with restrictions as noted in his letter and that he needs this form completed until further evaluation with ortho.    Routed to provider.  Please advise.  Thank you.    Anastasia Black RN

## 2020-01-31 NOTE — TELEPHONE ENCOUNTER
3rd attempt: See note from Sonya below.    Left message for patient to return call to clinic.   Elda Martinez, ZACHARYN, RN

## 2020-02-01 NOTE — TELEPHONE ENCOUNTER
Form was routed to MARIO Jaime to review and was noted to see Anastasia Black's note below for reference regarding form.

## 2020-02-03 NOTE — TELEPHONE ENCOUNTER
Relayed message to Mariluz at [facility], she is requesting that forms be filled out to the best of provider's ability. Please fill out the form as to what patient can do, even if there are restrictions. Please call Mariluz At [facility] at [phone] on Tue 2/4/2020

## 2020-02-04 NOTE — TELEPHONE ENCOUNTER
Mariluz Serna, [phone], case # [id number], patients . Left message for Mariluz that forms would be faxed today. No need to call back unless having further questions.     Fax # [phone]    [facility] please fax forms.

## 2020-02-04 NOTE — TELEPHONE ENCOUNTER
I am out of clinic on Mondays - so this should have been relayed to  as forms stated they were due 2/3/2020.    Notify  that these are completed/signed.    And fax..  MARSHAL Gaxiola

## 2020-02-21 ENCOUNTER — OFFICE VISIT (OUTPATIENT)
Dept: NEUROSURGERY | Facility: CLINIC | Age: 30
End: 2020-02-21
Attending: PHYSICIAN ASSISTANT

## 2020-02-21 VITALS
TEMPERATURE: 97.8 F | DIASTOLIC BLOOD PRESSURE: 82 MMHG | HEIGHT: 67 IN | OXYGEN SATURATION: 99 % | SYSTOLIC BLOOD PRESSURE: 129 MMHG | HEART RATE: 84 BPM | BODY MASS INDEX: 24.8 KG/M2 | WEIGHT: 158 LBS

## 2020-02-21 DIAGNOSIS — D49.2 LUMBAR SPINE TUMOR: Primary | ICD-10-CM

## 2020-02-21 PROCEDURE — 99203 OFFICE O/P NEW LOW 30 MIN: CPT | Performed by: PHYSICIAN ASSISTANT

## 2020-02-21 PROCEDURE — G0463 HOSPITAL OUTPT CLINIC VISIT: HCPCS

## 2020-02-21 ASSESSMENT — PAIN SCALES - GENERAL: PAINLEVEL: EXTREME PAIN (8)

## 2020-02-21 ASSESSMENT — MIFFLIN-ST. JEOR: SCORE: 1635.31

## 2020-02-21 NOTE — PROGRESS NOTES
NEUROSURGERY CLINIC CONSULT NOTE     DATE OF VISIT: 2/21/2020     SUBJECTIVE:     Rao Leavitt is a pleasant 30 year old male who presents to the clinic today for consultation on abnormal lumbar spine imaging . He is referred to the Neurosurgery Clinic by Dr. Jaime in .  Today, Mr. Leavitt reports a 3-month history of symptoms. He describes constant, sharp, pain that initiates in the midlinelumbar region, but does not  radiate distally in any distribution. This pain is not accompanied by paresthesias, numbness or weakness. Lifting objects does aggravate the symptoms, while alleviation is obtained by rest. No mechanism of injury such as trauma or a fall is associated with the onset of the pain. There are no bowel or bladder changes. The patient does smoke cigarettes.       Current Outpatient Medications:      methocarbamol (ROBAXIN) 500 MG tablet, Take 1 tablet (500 mg) by mouth 2 times daily as needed for muscle spasms, Disp: 40 tablet, Rfl: 1     multivitamin, therapeutic (THERA-VIT) TABS tablet, Take 1 tablet by mouth daily, Disp: , Rfl:      Allergies   Allergen Reactions     Tramadol Nausea     Vicodin [Hydrocodone-Acetaminophen] Rash        History reviewed. No pertinent past medical history.     ROS: 10 point review of symptoms are negative other than the symptoms noted above in the HPI.     Family History has been reviewed with the patient, there are no pertinent findings to presenting concern.     Past Surgical History:   Procedure Laterality Date     ESOPHAGOSCOPY, GASTROSCOPY, DUODENOSCOPY (EGD), COMBINED N/A 4/16/2019    Procedure: COMBINED ESOPHAGOSCOPY, GASTROSCOPY, DUODENOSCOPY (EGD), BIOPSY SINGLE OR MULTIPLE;  Surgeon: Kj Thomas MD;  Location: [facility]     PE tube          Social History     Tobacco Use     Smoking status: Current Every Day Smoker     Packs/day: 0.25     Smokeless tobacco: Former User   Substance Use Topics     Alcohol use: Not Currently     Frequency: 2-3 times a  "week     Drinks per session: 10 or more     Binge frequency: Weekly     Comment: 4 weeks sober     Drug use: No     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     Imaging:     Full radiological report in chart. Imaging reviewed with with patient today.     Exam:     Patient appears comfortable, conversational, and in no apparent distress.   Head: Normocephalic, without obvious abnormality, atraumatic, no facial asymmetry.   Eyes: conjunctivae/corneas clear. PERRL, EOM's intact.   Throat: lips, mucosa, and tongue normal; teeth and gums normal.   Neck: supple, symmetrical, trachea midline, no adenopathy and thyroid: not enlarged, symmetric, no tenderness/mass/nodules.   Lungs: clear to auscultation bilaterally.   Heart: regular rate and rhythm.   Abdomen: soft, non-tender; bowel sounds normal; no masses, no organomegaly.   Pulses: 2+ and symmetric.   Skin: Skin color, texture, turgor normal. No rashes or lesions.     CN II-XII grossly intact, alert and appropriate with conversation and following commands.   Gait is non-antalgic. Able to tandem walk. Able to walk on toes and heels without difficulty.   Cervical spine is non tender to palpation. Appropriate range of motion of neck, not concerning for lhermitte's phenomenon.   Bilateral bicep 2/4 and tricep reflexes 1/4. Sensation intact throughout upper extremities.     UE muscle strength  Right  Left    Deltoid  5/5  5/5    Biceps  5/5  5/5    Triceps  5/5  5/5    Hand intrinsics  5/5  5/5    Hand grasp  5/5  5/5    Powers signs  neg  neg      Lumbar spine is non tender to palpation   Intact sensation throughout lower extremities.   Bilateral patellar 2/4 and achilles reflex 1/4. Negative for pain with straight leg raise.     LE muscle strength  Right  Left    Iliopsoas (hip flexion)  5/5  5/5  "   Quad (knee extension)  5/5  5/5    Hamstring (knee flexion)  5/5  5/5    Gastrocnemius (PF)  5/5  5/5    Tibialis Ant. (DF)  5/5  5/5    EHL  5/5  5/5      Negative Babinski bilaterally. Negative for clonus.   Calves are soft and non-tender bilaterally.     ASSESSMENT/PLAN:     Rao Leavitt is a 30 year old male who presents to the clinic for consultation on on an abnormal lumbar spine MRI suggestive of a hemangioma at L1-2. The patient's most recent imaging was reviewed with him and his wife today. It was explained that images do not show any concerning pathology. On exam, the patient is noted to have no concerning findings.     He is able to return to work without restrictions,. A note was provided stating this.    It has been a pleasure working with Mr. Leavitt. For now, he no longer requires additional Neurosurgical follow-up. We did discuss signs of a worsening problem that he should seek being evaluated.    Respectfully,     JOYCE Nam, TISHA     Exam, imaging, and plan reviewed by Dr. Akhtar.

## 2020-02-21 NOTE — LETTER
February 21, 2020      RE: Rao MAGALIS Dagmar  [address] [facility] [address]       To whom it may concern:    Rao MAGALIS Dagmar was seen in our clinic today. He  may return to work without restriction.    Sincerely,      Caesar Tellez PA-C

## 2020-02-21 NOTE — NURSING NOTE
"Rao Leavitt is a 30 year old male who presents for:  Chief Complaint   Patient presents with     Neurologic Problem     Patient presents with bilateral LBP        Initial Vitals:  /82   Pulse 84   Temp 97.8  F (36.6  C)   Ht 5' 7\" (1.702 m)   Wt 158 lb (71.7 kg)   SpO2 99%   BMI 24.75 kg/m   Estimated body mass index is 24.75 kg/m  as calculated from the following:    Height as of this encounter: 5' 7\" (1.702 m).    Weight as of this encounter: 158 lb (71.7 kg).. Body surface area is 1.84 meters squared. BP completed using cuff size: regular  Extreme Pain (8)    Nursing Comments: Patient looking for work note Has bilateral LBP which radiates down both legs    Bryan Uriarte MA  "

## 2020-03-11 ENCOUNTER — HEALTH MAINTENANCE LETTER (OUTPATIENT)
Age: 30
End: 2020-03-11

## 2020-03-16 ENCOUNTER — TELEPHONE (OUTPATIENT)
Dept: FAMILY MEDICINE | Facility: CLINIC | Age: 30
End: 2020-03-16

## 2020-03-16 NOTE — TELEPHONE ENCOUNTER
Patient is at [facility] buy OTC stuff.  He is going to self treat and then let us know if not improving. Jory PURI RN

## 2020-03-16 NOTE — TELEPHONE ENCOUNTER
Reason for call:  Patient reporting a symptom    Symptom or request: Nose itches and light headed with head aches. Patient said he will go to Urgent Care at 12.    Duration (how long have symptoms been present): since last Thursday    Have you been treated for this before? No    Phone Number patient can be reached at:  Home number on file [phone] (home)    Best Time:  any    Can we leave a detailed message on this number:  YES    Call taken on 3/16/2020 at 9:24 AM by Angela Pastor

## 2020-03-17 ENCOUNTER — VIRTUAL VISIT (OUTPATIENT)
Dept: FAMILY MEDICINE | Facility: OTHER | Age: 30
End: 2020-03-17

## 2020-03-17 ENCOUNTER — APPOINTMENT (OUTPATIENT)
Dept: GENERAL RADIOLOGY | Facility: CLINIC | Age: 30
End: 2020-03-17
Attending: PHYSICIAN ASSISTANT
Payer: OTHER GOVERNMENT

## 2020-03-17 ENCOUNTER — HOSPITAL ENCOUNTER (EMERGENCY)
Facility: CLINIC | Age: 30
Discharge: HOME | End: 2020-03-17
Attending: PHYSICIAN ASSISTANT | Admitting: PHYSICIAN ASSISTANT
Payer: OTHER GOVERNMENT

## 2020-03-17 VITALS
DIASTOLIC BLOOD PRESSURE: 87 MMHG | BODY MASS INDEX: 25.84 KG/M2 | HEART RATE: 82 BPM | SYSTOLIC BLOOD PRESSURE: 124 MMHG | OXYGEN SATURATION: 100 % | TEMPERATURE: 98.7 F | WEIGHT: 165 LBS | RESPIRATION RATE: 16 BRPM

## 2020-03-17 DIAGNOSIS — J11.1 INFLUENZA-LIKE ILLNESS: ICD-10-CM

## 2020-03-17 PROCEDURE — 71046 X-RAY EXAM CHEST 2 VIEWS: CPT

## 2020-03-17 PROCEDURE — G0463 HOSPITAL OUTPT CLINIC VISIT: HCPCS | Mod: 25 | Performed by: PHYSICIAN ASSISTANT

## 2020-03-17 PROCEDURE — 99213 OFFICE O/P EST LOW 20 MIN: CPT | Mod: Z6 | Performed by: PHYSICIAN ASSISTANT

## 2020-03-17 NOTE — DISCHARGE INSTRUCTIONS
Isolate Yourself:  Isolate yourself while traveling.  Do Not allow any visitors within 6 feet.  Do Not go to work or school.  Do Not go to [employer],  centers, shopping, or other public places.  Do Not shake hands.  Avoid close contact with others (hugging, kissing).    Protect Others:  Cover Your Mouth and Nose with a mask, disposable tissue or wash cloth to avoid spreading germs to others.  Wash your hands and face frequently with soap and water    Call Back If: Breathing difficulty develops or you become worse.    For more information about COVID19 and options for caring for yourself at home, please visit the CDC website at https://www.cdc.gov/coronavirus/2019-ncov/about/steps-when-sick.html  For more options for care at [facility], please visit our website at [email or web address]

## 2020-03-17 NOTE — LETTER
[facility] EMERGENCY DEPARTMENT  [address] [address]  Phone: [phone]  Fax: [phone]    March 17, 2020        Rao Leavitt  [address] [facility] [address]          To whom it may concern:    RE: Rao Leavitt    Rao was evaluated in the urgent care for an illness on 2/17/2020.  He should refrain from activity for the next 7 days or as long as he is symptomatic with fever cough for the    Please contact me for questions or concerns.      Sincerely,        Vida Milan PA-C

## 2020-03-17 NOTE — ED PROVIDER NOTES
"  History     Chief Complaint   Patient presents with     URI     started 3/12/20, nasal congestion \" feel like a ton of bricks hit me\".         HPI  Rao Leavitt is a 30 year old male who presents the urgent care with concern over illness which been present for the last 5 to 6 days.  Patient states he initially became ill with headache, fever up to 101 nasal congestion and cough.  He reported that he was feeling better, for approximately 3 days however beginning yesterday he had significant worsening of symptoms more cough, high fever and more severe chills, myalgias.  He de worrisome rashes or skin changes.  No significant dyspnea, wheezing, vomiting, diarrhea or abdominal complaints.  Henies any denies any history of asthma, COPD or other breathing related disorders however states that it has been recommended that he be evaluated to rule out sleep apnea.  He attempted to treat with ibuprofen, last dose was approximately 6 hours prior to arrival.      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 No pertinent past medical history.    Past Surgical History:    Past Surgical History:   Procedure Laterality Date     ESOPHAGOSCOPY, GASTROSCOPY, DUODENOSCOPY (EGD), COMBINED N/A 4/16/2019    Procedure: COMBINED ESOPHAGOSCOPY, GASTROSCOPY, DUODENOSCOPY (EGD), BIOPSY SINGLE OR MULTIPLE;  Surgeon: Kj Thomas MD;  Location: [facility]     PE tube         Family History:    Family History   Problem Relation Age of Onset     Bipolar Disorder Paternal Grandfather        Social History:  Marital " Status:  Single [1]  Social History     Tobacco Use     Smoking status: Current Every Day Smoker     Packs/day: 0.25     Smokeless tobacco: Former User   Substance Use Topics     Alcohol use: Not Currently     Frequency: 2-3 times a week     Drinks per session: 10 or more     Binge frequency: Weekly     Comment: 4 weeks sober     Drug use: No     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        Medications:    methocarbamol (ROBAXIN) 500 MG tablet  multivitamin, therapeutic (THERA-VIT) TABS tablet      Review of Systems  CONSTITUTIONAL:POSITIVE  for fever up to 101, chills, myalgias   INTEGUMENTARY/SKIN: NEGATIVE for worrisome rashes, moles or lesions  EYES: NEGATIVE for vision changes or irritation  ENT/MOUTH: POSITIVE for nasal congestion and NEGATIVE for sore throat, ear pain   RESP:POSITIVE for cough and NEGATIVE for SOB/dyspnea and wheezing  GI: NEGATIVE for abdominal pain, diarrhea, nausea and vomiting  Physical Exam   BP: 124/87  Pulse: 82  Temp: 98.7  F (37.1  C)  Resp: 16  Weight: 74.8 kg (165 lb)  SpO2: 100 %  Physical Exam  GENERAL APPEARANCE: healthy, alert and no distress  EYES: EOMI,  PERRL, conjunctiva clear  HENT: ear canals and TM's normal.  Nose and mouth without ulcers, erythema or lesions  NECK: supple, nontender, no lymphadenopathy  RESP: lungs clear to auscultation - no rales, rhonchi or wheezes  CV: regular rates and rhythm, normal S1 S2, no murmur noted  SKIN: no suspicious lesions or rashes  ED Course        Procedures        Critical Care time:  none            Results for orders placed or performed during the hospital encounter of 03/17/20 (from the past 24 hour(s))   Chest XR,  PA & LAT    Narrative    CHEST TWO VIEWS 3/17/2020 12:40 PM     HISTORY: Cough.    COMPARISON: 6/19/2019    FINDINGS: Heart size and pulmonary vascularity are within normal  limits. The lungs are clear. No pneumothorax or pleural effusion.       Impression    IMPRESSION: Normal 2 views of the chest.     MARCELINO HERRERA  MD MARJAN       Medications - No data to display    Assessments & Plan (with Medical Decision Making)     I have reviewed the nursing notes.    I have reviewed the findings, diagnosis, plan and need for follow up with the patient.       Discharge Medication List as of 3/17/2020  1:01 PM        Final diagnoses:   Influenza-like illness     30-year-old male presents to the urgent care with concern over 6-day history of illness with fever up to 101 accompanied by nasal congestion, cough, chills, myalgias.  He had stable vital signs upon arrival.  Physical exam findings as described above are benign.  As part of evaluation he did have negative chest x-ray did not demonstrate any acute infiltrate, pneumothorax, pleural effusion or change in cardiopulmonary vasculature.  Symptoms consistent with viral URI, would strongly favor presence of influenza however given duration of symptoms patient is no longer a candidate for treatment and he agreed to defer testing.  Discussed with patient that cannot definitively rule out novel coronavirus/COVID-19 at this time.  He was discharged home stable with instructions for symptomatic treatment with self quarantine, rest, Tylenol/ibuprofen as needed.  Follow-up with primary care provider or return to the ER/UC as needed if no resolution of fever within the next 3 days.  Signs of respiratory distress, worrisome reasons to return or seek care sooner were discussed.    Disclaimer: This note consists of symbols derived from keyboarding, dictation, and/or voice recognition software. As a result, there may be errors in the script that have gone undetected.  Please consider this when interpreting information found in the chart.      3/17/2020   [facility] EMERGENCY DEPARTMENT     Vida Milan PA-C  03/17/20 8620

## 2020-03-17 NOTE — ED AVS SNAPSHOT
[facility]  [address] [facility] [address]  Phone:  [phone]  Fax:  [phone]                                    Rao Leavitt   MRN: [id number]    Department:  [facility]   Date of Visit:  3/17/2020           After Visit Summary Signature Page    I have received my discharge instructions, and my questions have been answered. I have discussed any challenges I see with this plan with the nurse or doctor.    ..........................................................................................................................................  Patient/Patient Representative Signature      ..........................................................................................................................................  Patient Representative Print Name and Relationship to Patient    ..................................................               ................................................  Date                                   Time    ..........................................................................................................................................  Reviewed by Signature/Title    ...................................................              ..............................................  Date                                               Time          22EPIC Rev 08/18

## 2020-03-17 NOTE — PROGRESS NOTES
"Date: 2020 14:19:39  Clinician: Kate Bazan  Clinician NPI: [id number]  Patient: Rao Leavitt  Patient : 1990  Patient Address: [address] [place] [id number]  Patient Phone: [phone]  Visit Protocol: URI  Patient Summary:  Rao is a 30 year old ( : 1990 ) male who initiated a Visit for cold, sinus infection, or influenza. When asked the question \"Please sign me up to receive news, health information and promotions. \", Rao responded \"No\".    Rao states his symptoms started gradually 3-6 days ago.   His symptoms consist of nasal congestion, a headache, and rhinitis. Rao also feels feverish.   Symptom details     Nasal secretions: The color of his mucus is clear and yellow.    Temperature: His current temperature is 98 degrees Fahrenheit.     Headache: He states the headache is moderate (4-6 on a 10 point pain scale).      Rao denies having wheezing, sore throat, cough, teeth pain, chills, ear pain, malaise, enlarged lymph nodes, facial pain or pressure, and myalgias. He also denies taking antibiotic medication for the symptoms, having a sinus infection within the past year, having recent facial or sinus surgery in the past 60 days, and double sickening (worsening symptoms after initial improvement). He is not experiencing dyspnea.    Pertinent COVID-19 (Coronavirus) information  Rao has not traveled internationally or to the areas where COVID-19 (Coronavirus) is widespread in the last 14 days before the start of his symptoms.   Rao has not had a close contact with a laboratory-confirmed COVID-19 patient within 14 days of symptom onset. He also has not had a close contact with a suspected COVID-19 patient within 14 days of symptom onset.   Rao is not a healthcare worker and does not work in a healthcare facility.   Pertinent medical history  Rao needs a return to work/school note.   Weight: 165 lbs   Rao smokes or uses smokeless " tobacco.   Weight: 165 lbs    MEDICATIONS: No current medications, ALLERGIES: Allegra-D 12 Hour  Clinician Response:  Dear Rao,   Based on the information you have provided, you do have symptoms that are consistent with Coronavirus (COVID-19).  The coronavirus causes mild to severe respiratory illness with the most common symptoms including fever, cough and difficulty breathing. Unfortunately, many viruses cause similar symptoms and it can be difficult to distinguish between viruses, especially in mild cases, so we are presuming that anyone with cough or fever has coronavirus at this time.  Coronavirus/COVID-19 has reached the point of community spread in [place], meaning that we are finding the virus in people with no known exposure risk for satnam the virus. Given the increasing commonness of coronavirus in the community we are no longer testing patients who are not critically ill.  For everyone else who has cough or fever, you should assume you are infected with coronavirus. Accordingly, you should self-quarantine for fourteen days from the first day your symptoms started. You should call if you find increasing shortness of breath, wheezing or sustained fever above 101.5. If you are significantly short of breath or experience chest pain you should call 911 or report to the nearest emergency department for urgent evaluation.    Isolate yourself at home.   Do Not allow any visitors  Do Not go to work or school  Do Not go to [employer],  centers, shopping, or other public places.  Do Not shake hands.  Avoid close contact with others (hugging, kissing).   Protect Others:    Cover Your Mouth and Nose with a mask, disposable tissue or wash cloth to avoid spreading germs to others.  Wash your hands and face frequently with soap and water.   If you develop significant shortness of breath that prevents you from doing normal activities, please call 911 or proceed to the nearest emergency room and alert  them immediately that you have been in self-isolation for possible coronavirus.   For more information about COVID19 and options for caring for yourself at home, please visit the CDC website at https://www.cdc.gov/coronavirus/2019-ncov/about/steps-when-sick.htmlFor more options for care at [facility], please visit our website at [email or web address]     Diagnosis: Nasal congestion  Diagnosis ICD: R09.81

## 2020-04-03 ENCOUNTER — RECORDS - HEALTHEAST (OUTPATIENT)
Dept: LAB | Facility: HOSPITAL | Age: 30
End: 2020-04-03

## 2020-04-03 LAB
HBV SURFACE AB SERPL IA-ACNC: POSITIVE M[IU]/ML
MEV IGG SER IA-ACNC: POSITIVE
MUV IGG SER QL IA: POSITIVE
RUBV IGG SERPL QL IA: POSITIVE
VZV IGG SER QL IA: POSITIVE

## 2020-04-07 LAB
GAMMA INTERFERON BACKGROUND BLD IA-ACNC: 0.03 IU/ML
M TB IFN-G BLD-IMP: NEGATIVE
MITOGEN IGNF BCKGRD COR BLD-ACNC: 0.01 IU/ML
MITOGEN IGNF BCKGRD COR BLD-ACNC: 0.01 IU/ML
QTF INTERPRETATION: NORMAL
QTF MITOGEN - NIL: 7.73 IU/ML

## 2020-05-15 ENCOUNTER — VIRTUAL VISIT (OUTPATIENT)
Dept: FAMILY MEDICINE | Facility: CLINIC | Age: 30
End: 2020-05-15
Payer: MEDICAID

## 2020-05-15 DIAGNOSIS — F41.1 GENERALIZED ANXIETY DISORDER: ICD-10-CM

## 2020-05-15 DIAGNOSIS — Z79.899 HIGH RISK MEDICATION USE: ICD-10-CM

## 2020-05-15 DIAGNOSIS — F31.9 BIPOLAR 1 DISORDER (H): Primary | ICD-10-CM

## 2020-05-15 DIAGNOSIS — Z79.899 LITHIUM USE: ICD-10-CM

## 2020-05-15 PROCEDURE — 99214 OFFICE O/P EST MOD 30 MIN: CPT | Mod: 95 | Performed by: NURSE PRACTITIONER

## 2020-05-15 RX ORDER — ARIPIPRAZOLE 10 MG/1
TABLET ORAL
COMMUNITY
End: 2020-06-16

## 2020-05-15 RX ORDER — LITHIUM CARBONATE 300 MG/1
TABLET, FILM COATED, EXTENDED RELEASE ORAL
Qty: 74 TABLET | Refills: 0 | Status: SHIPPED | OUTPATIENT
Start: 2020-05-15 | End: 2020-06-16

## 2020-05-15 NOTE — PROGRESS NOTES
"Rao Leavitt is a 30 year old male who is being evaluated via a billable video visit.      The patient has been notified of following:     \"This video visit will be conducted via a call between you and your physician/provider. We have found that certain health care needs can be provided without the need for an in-person physical exam.  This service lets us provide the care you need with a video conversation.  If a prescription is necessary we can send it directly to your pharmacy.  If lab work is needed we can place an order for that and you can then stop by our lab to have the test done at a later time.    Video visits are billed at different rates depending on your insurance coverage.  Please reach out to your insurance provider with any questions.    If during the course of the call the physician/provider feels a video visit is not appropriate, you will not be charged for this service.\"    Patient has given verbal consent for Video visit? Yes    How would you like to obtain your AVS? [employer]    Patient would like the video invitation sent by: Text to cell phone: [phone]    Will anyone else be joining your video visit? No     Subjective     Rao Leavitt is a 30 year old male who presents today via video visit for the following health issues:  Chief Complaint   Patient presents with     Medication Request     patient requesting bipolar medication, was on lithium in past which helped       HPI  New Patient/Transfer of Care       Video Start Time: 9:49 AM    Depression and Anxiety Follow-Up    How are you doing with your depression since your last visit? Worsened     How are you doing with your anxiety since your last visit?  Worsened with probation and current situation.    Are you having other symptoms that might be associated with depression or anxiety? No    Have you had a significant life event? OTHER: probation.     Do you have any concerns with your use of alcohol or other drugs? 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  Little interest or pleasure in doing things 0   2.  Feeling down, depressed, or hopeless 0   3.  Trouble falling or staying asleep, or sleeping too much 1   4.  Feeling tired or having little energy 1   5.  Poor appetite or overeating 0   6.  Feeling bad about yourself 0   7.  Trouble concentrating 0   8.  Moving slowly or restless 0   Q9: Thoughts of better off dead/self-harm past 2 weeks 0   PHQ-9 Total Score 2   Difficulty at work, home, or with people Not difficult at all     AAYUSH-7  12/10/2019   1. Feeling nervous, anxious, or on edge 2   2. Not being able to stop or control worrying 2   3. Worrying too much about different things 2   4. Trouble relaxing 2   5. Being so restless that it is hard to sit still 2   6. Becoming easily annoyed or irritable 2   7. Feeling afraid, as if something awful might happen 2   AAYUSH-7 Total Score 14   If you checked any problems, how difficult have they made it for you to do your work, take care of things at home, or get along with other people? Somewhat difficult     In the past two weeks have you had thoughts of suicide or self-harm?  No.    Do you have concerns about your personal safety or the safety of others?   No    Suicide Assessment  Five-step Evaluation and Treatment (SAFE-T)      Patient Active Problem List   Diagnosis     Bipolar 1 disorder (H)     Generalized anxiety disorder     High risk medication use     Lumbar spine tumor     Lithium use     Past Surgical History:   Procedure Laterality Date     ESOPHAGOSCOPY, GASTROSCOPY, DUODENOSCOPY (EGD), COMBINED N/A 4/16/2019    Procedure: COMBINED ESOPHAGOSCOPY, GASTROSCOPY, DUODENOSCOPY (EGD), BIOPSY SINGLE OR MULTIPLE;  Surgeon: Kj Thomas MD;  Location: [facility]     PE tube         Social History     Tobacco Use     Smoking status: Current Every Day Smoker     Packs/day: 0.25     Smokeless tobacco: Former User   Substance Use Topics     Alcohol use: Not Currently     Frequency: 2-3 times a week     Drinks per session: 10 or more     Binge frequency: Weekly     Comment: 6 months alcohol free     Family History   Problem Relation Age of Onset     Bipolar Disorder Paternal Grandfather          Current Outpatient Medications   Medication Sig Dispense Refill     ARIPiprazole (ABILIFY) 10 MG tablet aripiprazole 10 mg tablet       lithium ER (LITHOBID) 300 MG CR tablet Take 1 tablet (300 mg) by mouth At Bedtime for 14 days, THEN 2 tablets (600 mg) At Bedtime. 74 tablet 0     multivitamin, therapeutic (THERA-VIT) TABS tablet Take 1 tablet by mouth daily       methocarbamol (ROBAXIN) 500 MG tablet Take 1 tablet (500 mg) by mouth 2 times daily as needed for muscle spasms 40 tablet 1     Allergies   Allergen Reactions     Tramadol Nausea     Vicodin [Hydrocodone-Acetaminophen] Rash     Recent Labs   Lab Test 10/29/19  1431 08/16/19  0915 04/15/19  0630 03/20/19  0600   ALT  --  22 16 20   CR 0.77 0.75 0.80 0.78   GFRESTIMATED >90 >90 >90 >90   GFRESTBLACK >90 >90 >90 >90   POTASSIUM 3.6 3.9 4.2 4.0      BP Readings from Last 3 Encounters:   03/17/20 124/87   02/21/20 129/82   01/30/20 138/82    Wt Readings from Last 3 Encounters:   03/17/20 74.8 kg (165 lb)   02/21/20 71.7 kg (158 lb)   01/30/20 69.2  "kg (152 lb 9.6 oz)                    Reviewed and updated as needed this visit by Provider  Tobacco  Allergies  Meds  Problems  Med Hx  Surg Hx  Fam Hx         Review of Systems   Constitutional, HEENT, cardiovascular, pulmonary, GI, , musculoskeletal, neuro, skin, endocrine and psych systems are negative, except as otherwise noted.      Objective    There were no vitals taken for this visit.  Estimated body mass index is 25.84 kg/m  as calculated from the following:    Height as of 2/21/20: 1.702 m (5' 7\").    Weight as of 3/17/20: 74.8 kg (165 lb).  Physical Exam     GENERAL: Healthy, alert and no distress  EYES: Eyes grossly normal to inspection.  No discharge or erythema, or obvious scleral/conjunctival abnormalities.  RESP: No audible wheeze, cough, or visible cyanosis.  No visible retractions or increased work of breathing.    SKIN: Visible skin clear. No significant rash, abnormal pigmentation or lesions.  NEURO: Cranial nerves grossly intact.  Mentation and speech appropriate for age.  PSYCH: Mentation appears normal, affect normal/bright, judgement and insight intact, normal speech and appearance well-groomed.      Diagnostic Test Results:  Labs reviewed in Epic        Assessment & Plan     1. Bipolar 1 disorder (H)   Discussed side effects of LIthium and montioring.  See AVS.  Discussed case with Bree [facility] NP Psych who is in agreement of plan.  Will follow up with Psych in 3-4 weeks for medication management.  - MENTAL HEALTH REFERRAL  - Adult; Psychiatry; Psychiatry; G: Collaborative Care Psychiatry Service/Bridge to Long-Term Psychiatry as indicated ([phone]); No - Patient must be evaluated prior to placing referral OR document reason for refer...  - *UA reflex to Microscopic and Culture ([facility] (except [place] and [place]); Future  - TSH with free T4 reflex; Future  - Basic metabolic panel; Future  - CBC with platelets; Future  - lithium ER (LITHOBID) 300 MG CR " tablet; Take 1 tablet (300 mg) by mouth At Bedtime for 14 days, THEN 2 tablets (600 mg) At Bedtime.  Dispense: 74 tablet; Refill: 0  - Lithium level; Future    2. Generalized anxiety disorder     - MENTAL HEALTH REFERRAL  - Adult; Psychiatry; Psychiatry; [facility]: Collaborative Care Psychiatry Service/Bridge to Long-Term Psychiatry as indicated ([phone]); No - Patient must be evaluated prior to placing referral OR document reason for refer...  - lithium ER (LITHOBID) 300 MG CR tablet; Take 1 tablet (300 mg) by mouth At Bedtime for 14 days, THEN 2 tablets (600 mg) At Bedtime.  Dispense: 74 tablet; Refill: 0  - Lithium level; Future    3. Lithium use     - *UA reflex to Microscopic and Culture ([facility] (except [place] and [place]); Future  - TSH with free T4 reflex; Future  - Basic metabolic panel; Future  - CBC with platelets; Future  - lithium ER (LITHOBID) 300 MG CR tablet; Take 1 tablet (300 mg) by mouth At Bedtime for 14 days, THEN 2 tablets (600 mg) At Bedtime.  Dispense: 74 tablet; Refill: 0  - Lithium level; Future    4. High risk medication use            Patient Instructions   Start Lithium at 300 mg CR at bedtime once daily for the next 2 weeks,  Then increase to 600 mg CR at bedtime after that.    Lithium level at 3 weeks - call lab to schedule this [phone].    Follow-up with [facility] in 3-4 weeks for recheck and adjustment of medication.    Labs today or Monday before starting Lithium.  Stay well hydrated while on this medication, take as prescribed and DO NOT STOP IT WITHOUT CONTACTING YOUR PCP and notify me or the clinic you have any side effects as discussed in our visit today.    LITHIUM   monitor labs lithium level. CBC. Lytes, BUN creat TSH preg test, EKG over 40    Crisis phone numbers:    [facility] ([facility] area): [phone]  [facility]: [phone]  [facility]: [phone]  [facility] Crisis Services ([place], [place], [place], [place] and  [facility] [phone]  [facility]: [phone]    Patient Education     Lithium  Does this test have other names?  Lithium levels, serum lithium levels, lithium blood test  What is this test?  This test measures and monitors the amount of lithium in your blood.  Lithium is a medicine used to treat psychiatric illnesses such as bipolar disorders, acute tom, and other mood disorders. This test is used to find out the right dose for you if you're just starting lithium treatment. The test is also used to make sure you continue to get the right amount for as long as you take this medicine.   If you take too much lithium, it can cause more side effects. If you take too little, the medicine might not help your condition.  Why do I need this test?  You may need this test once or twice a week when you first start taking lithium to help your healthcare provider figure out the best dose for you.  You may also have this test after you've been taking lithium for a while to see whether your dose needs to be changed. You may have this test again 5 to 7 days after your dosage is changed.  You may also have this test if you have symptoms, such as tremor, nausea, vomiting, or diarrhea. This will let your healthcare provider know if your symptoms are because your lithium level is too high or if you have other conditions that need to be treated.    What other tests might I have along with this test?  Your healthcare provider may also order tests that check how well your kidneys and other organs are working. This is because lithium is excreted through your kidneys. It is not metabolized. These tests may include:    Urinalysis    Blood urea nitrogen (BUN)    Creatinine    Calcium    Complete blood count    Thyroid function    Electrocardiogram  Your provider may also order a pregnancy test, because you shouldn't take lithium during the first three months of pregnancy.  Your provider may also order other tests  while you're taking lithium, to watch how your kidneys and thyroid are working:    BUN    Creatinine    Urinalysis    Thyroid function  What do my test results mean?  Test results may vary depending on your age, gender, health history, the method used for the test, and other things. Your test results may not mean you have a problem. Ask your healthcare provider what your test results mean for you.   Results are given in milliequivalents per liter (mEq/L). For lithium to be effective, your level should be between 0.6 and 1.2 mEq/L, but not more than 1.2 mEq/L.  Lithium has a very narrow range where it is effective and nontoxic. At a level of 1.2 mEq/L, lithium can start to cause problems.  If your levels are too high, you could get lithium poisoning and need treatment right away. Too much lithium can be fatal. If your levels are too low, the medicine may not help your condition. Keep a diary of your lithium levels and the blood-level range that provides you with the best protection and fewest side effects. Bring this information with you to your appointments. This information can help your healthcare provider adjust your dose.   Levels that are higher or lower may also mean you have cardiovascular or kidney disease.  How is this test done?  The test is done with a blood sample. A needle is used to draw blood from a vein in your arm or hand.   Does this test pose any risks?  Having a blood test with a needle carries some risks. These include bleeding, infection, bruising, and feeling lightheaded. When the needle pricks your arm or hand, you may feel a slight sting or pain. Afterward, the site may be sore.   What might affect my test results?  Timing is important. Having the test 12 hours after your last dose of lithium gives the best results.  Certain medicines can affect your results. These include:    Antibiotics    Arthritis medicines    Dilantin, for epilepsy    Water pills (diuretics)    Nonsteroidal  anti-inflammatory medicines, except aspirin    Cardiovascular medicines. These include angiotensin converting enzyme (ACE) inhibitors and calcium channel blockers  Being dehydrated or a diet too high or low in salt can also affect your results.  How do I get ready for this test?  You don't need to prepare for this test, but note the time you took your last dose of lithium. Be sure your healthcare provider knows about all medicines, herbs, vitamins, and supplements you are taking. This includes medicines that don't need a prescription and any illicit drugs you may use.      8592-8313 [employer]. [address] [place] [address]. All rights reserved. This information is not intended as a substitute for professional medical care. Always follow your healthcare professional's instructions.               Return in about 4 weeks (around 6/12/2020) for Medication Follow up, Anxiety/Depression Follow up.    Suzi Jaime NP  [facility]      Video-Visit Details    Type of service:  Video Visit    Video End Time:10:05    Originating Location (pt. Location): Other Car    Distant Location (provider location):  [facility]     Platform used for Video Visit: Doximity    Return in about 4 weeks (around 6/12/2020) for Medication Follow up, Anxiety/Depression Follow up.       Suzi Jaime NP

## 2020-05-15 NOTE — PATIENT INSTRUCTIONS
Start Lithium at 300 mg CR at bedtime once daily for the next 2 weeks,  Then increase to 600 mg CR at bedtime after that.    Lithium level at 3 weeks - call lab to schedule this [phone].    Follow-up with [facility] in 3-4 weeks for recheck and adjustment of medication.    Labs today or Monday before starting Lithium.  Stay well hydrated while on this medication, take as prescribed and DO NOT STOP IT WITHOUT CONTACTING YOUR PCP and notify me or the clinic you have any side effects as discussed in our visit today.    LITHIUM   monitor labs lithium level. CBC. Lytes, BUN creat TSH preg test, EKG over 40    Crisis phone numbers:    [facility] ([facility]): [phone]  [facility]: [phone]  [facility]: [phone]  [address] Crisis Services ([facility], [facility] and [facility]) [phone]  [facility]: [phone]    Patient Education     Lithium  Does this test have other names?  Lithium levels, serum lithium levels, lithium blood test  What is this test?  This test measures and monitors the amount of lithium in your blood.  Lithium is a medicine used to treat psychiatric illnesses such as bipolar disorders, acute tom, and other mood disorders. This test is used to find out the right dose for you if you're just starting lithium treatment. The test is also used to make sure you continue to get the right amount for as long as you take this medicine.   If you take too much lithium, it can cause more side effects. If you take too little, the medicine might not help your condition.  Why do I need this test?  You may need this test once or twice a week when you first start taking lithium to help your healthcare provider figure out the best dose for you.  You may also have this test after you've been taking lithium for a while to see whether your dose needs to be changed. You may have this test again 5 to 7 days after your dosage is changed.  You may also  have this test if you have symptoms, such as tremor, nausea, vomiting, or diarrhea. This will let your healthcare provider know if your symptoms are because your lithium level is too high or if you have other conditions that need to be treated.    What other tests might I have along with this test?  Your healthcare provider may also order tests that check how well your kidneys and other organs are working. This is because lithium is excreted through your kidneys. It is not metabolized. These tests may include:    Urinalysis    Blood urea nitrogen (BUN)    Creatinine    Calcium    Complete blood count    Thyroid function    Electrocardiogram  Your provider may also order a pregnancy test, because you shouldn't take lithium during the first three months of pregnancy.  Your provider may also order other tests while you're taking lithium, to watch how your kidneys and thyroid are working:    BUN    Creatinine    Urinalysis    Thyroid function  What do my test results mean?  Test results may vary depending on your age, gender, health history, the method used for the test, and other things. Your test results may not mean you have a problem. Ask your healthcare provider what your test results mean for you.   Results are given in milliequivalents per liter (mEq/L). For lithium to be effective, your level should be between 0.6 and 1.2 mEq/L, but not more than 1.2 mEq/L.  Lithium has a very narrow range where it is effective and nontoxic. At a level of 1.2 mEq/L, lithium can start to cause problems.  If your levels are too high, you could get lithium poisoning and need treatment right away. Too much lithium can be fatal. If your levels are too low, the medicine may not help your condition. Keep a diary of your lithium levels and the blood-level range that provides you with the best protection and fewest side effects. Bring this information with you to your appointments. This information can help your healthcare provider  adjust your dose.   Levels that are higher or lower may also mean you have cardiovascular or kidney disease.  How is this test done?  The test is done with a blood sample. A needle is used to draw blood from a vein in your arm or hand.   Does this test pose any risks?  Having a blood test with a needle carries some risks. These include bleeding, infection, bruising, and feeling lightheaded. When the needle pricks your arm or hand, you may feel a slight sting or pain. Afterward, the site may be sore.   What might affect my test results?  Timing is important. Having the test 12 hours after your last dose of lithium gives the best results.  Certain medicines can affect your results. These include:    Antibiotics    Arthritis medicines    Dilantin, for epilepsy    Water pills (diuretics)    Nonsteroidal anti-inflammatory medicines, except aspirin    Cardiovascular medicines. These include angiotensin converting enzyme (ACE) inhibitors and calcium channel blockers  Being dehydrated or a diet too high or low in salt can also affect your results.  How do I get ready for this test?  You don't need to prepare for this test, but note the time you took your last dose of lithium. Be sure your healthcare provider knows about all medicines, herbs, vitamins, and supplements you are taking. This includes medicines that don't need a prescription and any illicit drugs you may use.      3656-5666 [employer]. [address] [place] [address], [place] [address]. All rights reserved. This information is not intended as a substitute for professional medical care. Always follow your healthcare professional's instructions.

## 2020-05-18 DIAGNOSIS — Z79.899 LITHIUM USE: ICD-10-CM

## 2020-05-18 DIAGNOSIS — F31.9 BIPOLAR 1 DISORDER (H): ICD-10-CM

## 2020-05-18 DIAGNOSIS — F41.1 GENERALIZED ANXIETY DISORDER: ICD-10-CM

## 2020-05-18 LAB
ALBUMIN UR-MCNC: NEGATIVE MG/DL
ANION GAP SERPL CALCULATED.3IONS-SCNC: 6 MMOL/L (ref 3–14)
APPEARANCE UR: CLEAR
BILIRUB UR QL STRIP: NEGATIVE
BUN SERPL-MCNC: 12 MG/DL (ref 7–30)
CALCIUM SERPL-MCNC: 8.9 MG/DL (ref 8.5–10.1)
CHLORIDE SERPL-SCNC: 109 MMOL/L (ref 94–109)
CO2 SERPL-SCNC: 25 MMOL/L (ref 20–32)
COLOR UR AUTO: YELLOW
CREAT SERPL-MCNC: 0.73 MG/DL (ref 0.66–1.25)
ERYTHROCYTE [DISTWIDTH] IN BLOOD BY AUTOMATED COUNT: 12.6 % (ref 10–15)
GFR SERPL CREATININE-BSD FRML MDRD: >90 ML/MIN/{1.73_M2}
GLUCOSE SERPL-MCNC: 105 MG/DL (ref 70–99)
GLUCOSE UR STRIP-MCNC: NEGATIVE MG/DL
HCT VFR BLD AUTO: 47.7 % (ref 40–53)
HGB BLD-MCNC: 15.7 G/DL (ref 13.3–17.7)
HGB UR QL STRIP: ABNORMAL
KETONES UR STRIP-MCNC: NEGATIVE MG/DL
LEUKOCYTE ESTERASE UR QL STRIP: NEGATIVE
LITHIUM SERPL-SCNC: <0.2 MMOL/L (ref 0.6–1.2)
MCH RBC QN AUTO: 31.2 PG (ref 26.5–33)
MCHC RBC AUTO-ENTMCNC: 32.9 G/DL (ref 31.5–36.5)
MCV RBC AUTO: 95 FL (ref 78–100)
NITRATE UR QL: NEGATIVE
NON-SQ EPI CELLS #/AREA URNS LPF: ABNORMAL /LPF
PH UR STRIP: 5.5 PH (ref 5–7)
PLATELET # BLD AUTO: 346 10E9/L (ref 150–450)
POTASSIUM SERPL-SCNC: 3.7 MMOL/L (ref 3.4–5.3)
RBC # BLD AUTO: 5.04 10E12/L (ref 4.4–5.9)
RBC #/AREA URNS AUTO: ABNORMAL /HPF
SODIUM SERPL-SCNC: 140 MMOL/L (ref 133–144)
SOURCE: ABNORMAL
SP GR UR STRIP: >1.03 (ref 1–1.03)
TSH SERPL DL<=0.005 MIU/L-ACNC: 0.93 MU/L (ref 0.4–4)
URATE CRY #/AREA URNS HPF: ABNORMAL /HPF
UROBILINOGEN UR STRIP-ACNC: 0.2 EU/DL (ref 0.2–1)
WBC # BLD AUTO: 8.8 10E9/L (ref 4–11)
WBC #/AREA URNS AUTO: ABNORMAL /HPF

## 2020-05-18 PROCEDURE — 80048 BASIC METABOLIC PNL TOTAL CA: CPT | Performed by: NURSE PRACTITIONER

## 2020-05-18 PROCEDURE — 81001 URINALYSIS AUTO W/SCOPE: CPT | Performed by: NURSE PRACTITIONER

## 2020-05-18 PROCEDURE — 80178 ASSAY OF LITHIUM: CPT | Performed by: NURSE PRACTITIONER

## 2020-05-18 PROCEDURE — 84443 ASSAY THYROID STIM HORMONE: CPT | Performed by: NURSE PRACTITIONER

## 2020-05-18 PROCEDURE — 36415 COLL VENOUS BLD VENIPUNCTURE: CPT | Performed by: NURSE PRACTITIONER

## 2020-05-18 PROCEDURE — 85027 COMPLETE CBC AUTOMATED: CPT | Performed by: NURSE PRACTITIONER

## 2020-05-19 DIAGNOSIS — Z79.899 LITHIUM USE: Primary | ICD-10-CM

## 2020-05-19 NOTE — RESULT ENCOUNTER NOTE
All of your lab results are normal.    Order Lithium level for 4 weeks.    Please notify patient of results.  MARSHAL Gaxiola

## 2020-06-02 ENCOUNTER — HOSPITAL ENCOUNTER (EMERGENCY)
Facility: CLINIC | Age: 30
Discharge: HOME | End: 2020-06-02
Attending: EMERGENCY MEDICINE | Admitting: EMERGENCY MEDICINE
Payer: COMMERCIAL

## 2020-06-02 VITALS
HEART RATE: 79 BPM | RESPIRATION RATE: 18 BRPM | HEIGHT: 67 IN | WEIGHT: 180 LBS | SYSTOLIC BLOOD PRESSURE: 164 MMHG | DIASTOLIC BLOOD PRESSURE: 108 MMHG | TEMPERATURE: 97.7 F | BODY MASS INDEX: 28.25 KG/M2 | OXYGEN SATURATION: 100 %

## 2020-06-02 DIAGNOSIS — K08.89 PAIN, DENTAL: ICD-10-CM

## 2020-06-02 PROCEDURE — 25000132 ZZH RX MED GY IP 250 OP 250 PS 637: Performed by: EMERGENCY MEDICINE

## 2020-06-02 PROCEDURE — 99283 EMERGENCY DEPT VISIT LOW MDM: CPT | Performed by: EMERGENCY MEDICINE

## 2020-06-02 PROCEDURE — 99284 EMERGENCY DEPT VISIT MOD MDM: CPT | Mod: Z6 | Performed by: EMERGENCY MEDICINE

## 2020-06-02 RX ORDER — OXYCODONE AND ACETAMINOPHEN 5; 325 MG/1; MG/1
1-2 TABLET ORAL EVERY 4 HOURS PRN
Qty: 2 TABLET | Refills: 0 | Status: SHIPPED | OUTPATIENT
Start: 2020-06-02 | End: 2020-06-16

## 2020-06-02 RX ORDER — OXYCODONE AND ACETAMINOPHEN 5; 325 MG/1; MG/1
1 TABLET ORAL ONCE
Status: COMPLETED | OUTPATIENT
Start: 2020-06-02 | End: 2020-06-02

## 2020-06-02 RX ADMIN — OXYCODONE HYDROCHLORIDE AND ACETAMINOPHEN 1 TABLET: 5; 325 TABLET ORAL at 22:52

## 2020-06-02 ASSESSMENT — MIFFLIN-ST. JEOR: SCORE: 1735.1

## 2020-06-02 NOTE — ED AVS SNAPSHOT
[facility] Emergency Department  [address] [address]  Phone:  [phone]  Fax:  [phone]                                    Rao Leavitt   MRN: [id number]    Department:  [facility] Emergency Department   Date of Visit:  6/2/2020           After Visit Summary Signature Page    I have received my discharge instructions, and my questions have been answered. I have discussed any challenges I see with this plan with the nurse or doctor.    ..........................................................................................................................................  Patient/Patient Representative Signature      ..........................................................................................................................................  Patient Representative Print Name and Relationship to Patient    ..................................................               ................................................  Date                                   Time    ..........................................................................................................................................  Reviewed by Signature/Title    ...................................................              ..............................................  Date                                               Time          22EPIC Rev 08/18

## 2020-06-03 NOTE — ED PROVIDER NOTES
History     Chief Complaint   Patient presents with     Dental Pain     2 days of pain. seen by dentist yesterday and plans to try to get in again tomorrow. on antibiotic, ibupfrofen and tylenol. cant sleep. pain in right upper jaw with c/o headache     HPI  Rao Leavitt is a 30 year old male with a history of bipolar disorder and anxiety disorder who presents the emergency department complaining of dental pain.  Patient states he has had the pain for the past few days.  Patient states he saw dentist yesterday afternoon and was told he had a probable infection and was started on penicillin.  He was advised to return on Monday for recheck but if pain significantly persisted he should follow-up tomorrow for possible removal of tooth.  Patient states he has been taking the antibiotic along with ibuprofen and Tylenol and cannot sleep.  The pain is in the right upper molar region and radiates into his jaw.  He states he has a mild headache.  He denies any fevers or chills.  He has not had any visual changes.  He denies any neck pain.  He has not had any shortness of breath.  Currently rates his pain a 5 out of 10.    Allergies:  Allergies   Allergen Reactions     Tramadol Nausea     Vicodin [Hydrocodone-Acetaminophen] Rash       Problem List:    Patient Active Problem List    Diagnosis Date Noted     Lithium use 05/15/2020     Priority: Medium     Lumbar spine tumor 11/27/2019     Priority: Medium     High risk medication use 10/05/2017     Priority: Medium     Bipolar 1 disorder (H) 03/16/2017     Priority: Medium     Generalized anxiety disorder 03/16/2017     Priority: Medium        Past Medical History:    No past medical history on file.    Past Surgical History:    Past Surgical History:   Procedure Laterality Date     ESOPHAGOSCOPY, GASTROSCOPY, DUODENOSCOPY (EGD), COMBINED N/A 4/16/2019    Procedure: COMBINED ESOPHAGOSCOPY, GASTROSCOPY, DUODENOSCOPY (EGD), BIOPSY SINGLE OR MULTIPLE;  Surgeon: Martha  "Kj BLAKE MD;  Location: [facility]     PE tube         Family History:    Family History   Problem Relation Age of Onset     Bipolar Disorder Paternal Grandfather        Social History:  Marital Status:  Single [1]  Social History     Tobacco Use     Smoking status: Current Every Day Smoker     Packs/day: 0.25     Smokeless tobacco: Former User   Substance Use Topics     Alcohol use: Not Currently     Frequency: 2-3 times a week     Drinks per session: 10 or more     Binge frequency: Weekly     Comment: 6 months alcohol free     Drug use: No     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  Physical Exam   BP: (!) 164/106  Pulse: 75  Temp: 97.7  F (36.5  C)  Resp: 18  Height: 170.2 cm (5' 7\")  Weight: 81.6 kg (180 lb)  SpO2: 100 %      Physical Exam  Vitals signs and nursing note reviewed.   Constitutional:       General: He is not in acute distress.     Appearance: Normal appearance. He is not ill-appearing, toxic-appearing or diaphoretic.   HENT:      Head: Normocephalic.      Nose: Nose normal.      Mouth/Throat:      Comments: Patient has a carious right upper posterior molar.  This area is tender to palpation with mild gingival swelling.  There is no buccal mucosal swelling or posterior pharynx swelling.  Generalized poor dentition.  Eyes:      Conjunctiva/sclera: Conjunctivae normal.   Neck:      Musculoskeletal: Normal range of motion and neck supple.      Comments: No anterior cervical adenopathy is noted.  Pulmonary:      Effort: Pulmonary effort is normal.   Musculoskeletal: Normal range of motion.   Skin:     General: Skin is warm and dry.      Findings: No rash.   Neurological:      Mental Status: He is alert. Mental status is at " baseline.   Psychiatric:         Mood and Affect: Mood normal.         ED Course        Procedures               Critical Care time:  none               No results found for this or any previous visit (from the past 24 hour(s)).    Medications   oxyCODONE-acetaminophen (PERCOCET) 5-325 MG per tablet 1 tablet (1 tablet Oral Given 6/2/20 7758)       Assessments & Plan (with Medical Decision Making) records were reviewed.  Patient has had several visits for dental pain.  I discussed this with him.  I discussed an injection for pain control patient states these do not last for any significant amount of time.  We therefore decided to give him 1 Percocet here and give him to take home with him.  I have told him he will not receive further pain medication from the ED and any further dental pain he needs to get his medication from the dentist.  He states he will follow-up with the dentist tomorrow.     I have reviewed the nursing notes.    I have reviewed the findings, diagnosis, plan and need for follow up with the patient.       Discharge Medication List as of 6/2/2020 10:55 PM      START taking these medications    Details   oxyCODONE-acetaminophen (PERCOCET) 5-325 MG tablet Take 1-2 tablets by mouth every 4 hours as needed for breakthrough pain, Disp-2 tablet,R-0, Local Print             Final diagnoses:   Pain, dental       6/2/2020   [facility] EMERGENCY DEPARTMENT     Kj Goddard MD  06/03/20 0692

## 2020-06-03 NOTE — DISCHARGE INSTRUCTIONS
Return if symptoms worsen or new symptoms develop.  Follow-up with your dentist in the morning for recheck.  If any fevers increased pain swelling or other symptoms present please return for further evaluation and care.  Take Percocet given for severe breakthrough pain otherwise continue ibuprofen and Tylenol.  Further pain medication should be given by your dentist as needed.

## 2020-06-11 ENCOUNTER — TELEPHONE (OUTPATIENT)
Dept: PSYCHOLOGY | Facility: CLINIC | Age: 30
End: 2020-06-11
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
## 2020-06-16 ENCOUNTER — VIRTUAL VISIT (OUTPATIENT)
Dept: PSYCHIATRY | Facility: CLINIC | Age: 30
End: 2020-06-16
Payer: COMMERCIAL

## 2020-06-16 DIAGNOSIS — F31.9 BIPOLAR 1 DISORDER (H): ICD-10-CM

## 2020-06-16 DIAGNOSIS — F41.1 GENERALIZED ANXIETY DISORDER: ICD-10-CM

## 2020-06-16 DIAGNOSIS — Z79.899 LITHIUM USE: ICD-10-CM

## 2020-06-16 PROCEDURE — 99214 OFFICE O/P EST MOD 30 MIN: CPT | Mod: GT | Performed by: NURSE PRACTITIONER

## 2020-06-16 RX ORDER — LITHIUM CARBONATE 300 MG/1
TABLET, FILM COATED, EXTENDED RELEASE ORAL
Qty: 74 TABLET | Refills: 0 | Status: CANCELLED | OUTPATIENT
Start: 2020-06-16 | End: 2020-07-30

## 2020-06-16 RX ORDER — LITHIUM CARBONATE 600 MG/1
600 CAPSULE ORAL 2 TIMES DAILY WITH MEALS
Qty: 60 CAPSULE | Refills: 1 | Status: SHIPPED | OUTPATIENT
Start: 2020-06-16 | End: 2020-07-22

## 2020-06-16 ASSESSMENT — ANXIETY QUESTIONNAIRES
1. FEELING NERVOUS, ANXIOUS, OR ON EDGE: NEARLY EVERY DAY
GAD7 TOTAL SCORE: 13
6. BECOMING EASILY ANNOYED OR IRRITABLE: NEARLY EVERY DAY
7. FEELING AFRAID AS IF SOMETHING AWFUL MIGHT HAPPEN: SEVERAL DAYS
IF YOU CHECKED OFF ANY PROBLEMS ON THIS QUESTIONNAIRE, HOW DIFFICULT HAVE THESE PROBLEMS MADE IT FOR YOU TO DO YOUR WORK, TAKE CARE OF THINGS AT HOME, OR GET ALONG WITH OTHER PEOPLE: SOMEWHAT DIFFICULT
3. WORRYING TOO MUCH ABOUT DIFFERENT THINGS: NEARLY EVERY DAY
2. NOT BEING ABLE TO STOP OR CONTROL WORRYING: SEVERAL DAYS
5. BEING SO RESTLESS THAT IT IS HARD TO SIT STILL: SEVERAL DAYS

## 2020-06-16 ASSESSMENT — PATIENT HEALTH QUESTIONNAIRE - PHQ9
SUM OF ALL RESPONSES TO PHQ QUESTIONS 1-9: 12
5. POOR APPETITE OR OVEREATING: SEVERAL DAYS

## 2020-06-17 ASSESSMENT — ANXIETY QUESTIONNAIRES: GAD7 TOTAL SCORE: 13

## 2020-06-26 NOTE — PATIENT INSTRUCTIONS
1. Increase Lithium to 600 mg twice daily - IR not ER form    2. Lithium level in one week      Continue all other medications as reviewed per electronic medical record today.     Safety plan reviewed. To the Emergency Department as needed or call after hours crisis line at [phone] or [phone]. [place] Crisis Text [place]. Text [place] to 984195 or Suicide LifeLine Chat: suicideWorld First.org/chat/    To schedule individual or family therapy, call [facility] at [phone].    Schedule an appointment with me in 4 weeks or sooner as needed. Call [facility] at [phone] to schedule.    Follow up with primary care provider as planned or for acute medical concerns.    Call the psychiatric nurse line with medication questions or concerns at [phone].    ShareDesk may be used to communicate with your provider, but this is not intended to be used for emergencies.    Crisis Resources:    National Suicide Prevention Lifeline: 578.865.3038 (TTY: 473.120.9172). Call anytime for help.  (www.suicidepreventionlifeline.org)  National [place] on Mental Illness (www.nadege.org): 249.964.6678 or 364-884-0934.   Mental Health Association (www.mentalhealth.org): 101.905.7074 or 852-282-4063.  [place] Crisis Text [place]: Text [place] to 391239  Suicide LifeLine Chat: suicideWorld First.org/chat

## 2020-07-02 ENCOUNTER — OFFICE VISIT (OUTPATIENT)
Dept: FAMILY MEDICINE | Facility: CLINIC | Age: 30
End: 2020-07-02
Payer: COMMERCIAL

## 2020-07-02 VITALS
BODY MASS INDEX: 26.71 KG/M2 | HEIGHT: 67 IN | WEIGHT: 170.2 LBS | SYSTOLIC BLOOD PRESSURE: 114 MMHG | OXYGEN SATURATION: 99 % | TEMPERATURE: 98.5 F | HEART RATE: 85 BPM | DIASTOLIC BLOOD PRESSURE: 82 MMHG | RESPIRATION RATE: 16 BRPM

## 2020-07-02 DIAGNOSIS — Z79.899 LITHIUM USE: ICD-10-CM

## 2020-07-02 DIAGNOSIS — F31.9 BIPOLAR 1 DISORDER (H): Primary | ICD-10-CM

## 2020-07-02 PROCEDURE — 99214 OFFICE O/P EST MOD 30 MIN: CPT | Performed by: NURSE PRACTITIONER

## 2020-07-02 ASSESSMENT — MIFFLIN-ST. JEOR: SCORE: 1690.65

## 2020-07-02 NOTE — PATIENT INSTRUCTIONS
Patient Education     Understanding Bipolar Disorder  Bipolar disorder is a serious disorder of the brain. It may severely disrupt your life. At times, it may cause you and your loved ones great pain. But there is hope. Although there is no cure, treatment can help control your symptoms. Talk with your healthcare provider or a mental health professional. He or she can offer guidance and support.    What causes bipolar disorder?  The exact causes of bipolar disorder aren t known. It is known that the disease runs in families. Genes that affect nerve cells in the brain may be inherited, but as yet these genes have not been found.  Who does it affect?  Over 5 million adults in this country have bipolar disorder. Most often, it strikes young adults. It can affect children and older adults as well. Bipolar disorder affects both men and women. It can strike people of all races, cultures, and incomes.  Ups and downs  Bipolar disorder used to be called manic-depressive illness. That is because it causes extreme mood swings. At times the person may feel almost too happy. These times are often followed by great despair. In some cases, both extremes may occur at once. More often, moods shift back and forth. These mood swings may occur just once in a while. Or they may happen 4 or more times a year. Without treatment, they will likely recur throughout life.  Manic episodes  During manic episodes of bipolar disorder, you feel like you re on top of the world. Even the worst news can t bring you down. You ll likely feel as if you can do anything. And sometimes you may try. You may take great risks, thinking you can t be hurt. You may also talk too fast, and your thoughts may race. You may go for days without sleeping. And you might be very active and do a lot of things in a short time. Manic episodes often end in a depression.  Depressive episodes  In depressive episodes, you feel intense sadness and depression. You may also  feel worthless, tired, and helpless. Even the things you value most don t give you pleasure. At times you may want to die. You may even think about taking your own life.  Date Last Reviewed: 2/1/2017 2000-2019 [employer]. [address] [place] [address], [place] [address]. All rights reserved. This information is not intended as a substitute for professional medical care. Always follow your healthcare professional's instructions.

## 2020-07-02 NOTE — PROGRESS NOTES
"Subjective     Rao Leavitt is a 30 year old male who presents to clinic today for the following health issues:    HPI     Chief Complaint   Patient presents with     Forms     would like disability paper work due to bi polar      Per patient his chemical dependency counselor took him out of work due to out bursts of bi polar. He states that he was advised by her to discuss disability with Radha first prior to having any paperwork filled out.     Doing outpatient CD treatment 4 X per week group therapy and counseling 1:1. Current program is 2-3 months but he plans to continue programs longer than that with a recovery program and treatment.    Currently reports alcohol and drug free.  Last drug use was \"years ago\" , last alcohol use was \"Sept\"    Working in healthcare with brain injured adults/memory care - was getting \"triggered\" by patients/clients.    Counselor at CD facility advised not working until mental health is stable - having outbursts at work.    Working on getting county assistance.    Depression and Anxiety Follow-Up    How are you doing with your depression since your last visit? Improved since starting Lithium 600 mg twice daily.    How are you doing with your anxiety since your last visit?  Improved see above    Are you having other symptoms that might be associated with depression or anxiety? No    Have you had a significant life event? OTHER: Job concerns     Do you have any concerns with your use of alcohol or other drugs? 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  Little interest or pleasure in doing things 1   2.  Feeling down, depressed, or hopeless 1   3.  Trouble falling or staying asleep, or sleeping too much 3   4.  Feeling tired or having little energy 3   5.  Poor appetite or overeating 1   6.  Feeling bad about yourself 1   7.  Trouble concentrating 1   8.  Moving slowly or restless 1   Q9: Thoughts of better off dead/self-harm past 2 weeks 0   PHQ-9 Total Score 12   Difficulty at work, home, or with people Very difficult     AAYUSH-7  6/16/2020   1. Feeling nervous, anxious, or on edge 3   2. Not being able to stop or control worrying 1   3. Worrying too much about different things 3   4. Trouble relaxing 1   5. Being so restless that it is hard to sit still 1   6. Becoming easily annoyed or irritable 3   7. Feeling afraid, as if something awful might happen 1   AAYUSH-7 Total Score 13   If you checked any problems, how difficult have they made it for you to do your work, take care of things at home, or get along with other people? Somewhat difficult     In the past two weeks have you had thoughts of suicide or self-harm?  No.    Do you have concerns about your personal safety or the safety of others?   No    Suicide Assessment Five-step Evaluation and Treatment (SAFE-T)      How many servings of fruits and vegetables do you eat daily?  2-3    On average, how many sweetened beverages do you drink each day (Examples: soda, juice, sweet tea, etc.  Do NOT count diet or artificially sweetened beverages)?   1    How many days per week do you exercise enough to make your heart beat faster? 3 or less    How many minutes a day do you exercise enough to make your heart beat faster? 9 or less    How many days per week do you miss taking your medication? 0    Patient Active Problem List    Diagnosis     Bipolar 1 disorder (H)     Generalized anxiety disorder     High risk medication use     Lumbar spine tumor     Lithium use     Past Surgical History:   Procedure Laterality Date     ESOPHAGOSCOPY, GASTROSCOPY, DUODENOSCOPY (EGD), COMBINED N/A 4/16/2019    Procedure: COMBINED ESOPHAGOSCOPY, GASTROSCOPY, DUODENOSCOPY (EGD), BIOPSY SINGLE OR MULTIPLE;  Surgeon: Kj Thomas MD;  Location: [facility]     PE tube         Social History     Tobacco Use     Smoking status: Current Every Day Smoker     Packs/day: 0.25     Smokeless tobacco: Former User   Substance Use Topics     Alcohol use: Not Currently     Frequency: 2-3 times a week     Drinks per session: 10 or more     Binge frequency: Weekly     Comment: 6 months alcohol free     Family History   Problem Relation Age of Onset     Bipolar Disorder Paternal Grandfather          Current Outpatient Medications   Medication Sig Dispense Refill     lithium (ESKALITH) 600 MG capsule Take 1 capsule (600 mg) by mouth 2 times daily (with meals) 60 capsule 1     multivitamin, therapeutic (THERA-VIT) TABS tablet Take 1 tablet by mouth daily       Allergies   Allergen Reactions     Tramadol Nausea     Vicodin [Hydrocodone-Acetaminophen] Rash     Recent Labs   Lab Test 05/18/20  0730 10/29/19  1431 08/16/19  0915 04/15/19  0630 03/20/19  0600   ALT  --   --  22 16 20   CR 0.73 0.77 0.75 0.80 0.78   GFRESTIMATED >90 >90 >90 >90 >90   GFRESTBLACK >90 >90 >90 >90 >90   POTASSIUM 3.7 3.6 3.9 4.2 4.0   TSH 0.93  --   --   --   --       BP Readings from Last 3 Encounters:   07/02/20 114/82   06/02/20 (!) 164/108   03/17/20 124/87    Wt Readings from Last 3 Encounters:   07/02/20 77.2 kg (170 lb 3.2 oz)   06/02/20 81.6 kg (180 lb)   03/17/20 74.8 kg (165 lb)                    Reviewed and updated as needed this visit by Provider  Tobacco  Allergies  Meds  Problems  Med Hx  Surg Hx  Fam Hx         Review of Systems   Constitutional, HEENT, cardiovascular, pulmonary,  "GI, , musculoskeletal, neuro, skin, endocrine and psych systems are negative, except as otherwise noted.      Objective    /82 (BP Location: Left arm, Patient Position: Sitting, Cuff Size: Adult Regular)   Pulse 85   Temp 98.5  F (36.9  C) (Tympanic)   Resp 16   Ht 1.702 m (5' 7\")   Wt 77.2 kg (170 lb 3.2 oz)   SpO2 99%   BMI 26.66 kg/m    Body mass index is 26.66 kg/m .  Physical Exam   GENERAL: healthy, alert and no distress  PSYCH: mentation appears normal, affect normal/bright and anxious    Diagnostic Test Results:  Labs reviewed in Epic        Assessment & Plan     1. Bipolar 1 disorder (H)  On Lithium - in CD treatment program.Current not working.    2. Lithium use   Last lithium level was low - due today.       BMI:   Estimated body mass index is 26.66 kg/m  as calculated from the following:    Height as of this encounter: 1.702 m (5' 7\").    Weight as of this encounter: 77.2 kg (170 lb 3.2 oz).           See Patient Instructions    Return in about 3 months (around 10/2/2020) for Recheck symptoms, Anxiety/Depression Follow up.    Suzi Jaime NP  [facility]    Total times spent with patient 25 minutes of which > 50% of the time was spent counseling and coordination of care discussed disability, letter written for patient, medication discussed, treatment plan and follow up       "

## 2020-07-02 NOTE — LETTER
[facility]  [address] [address]  Phone: [phone]    July 2, 2020        Rao BLANCAS Dagmar  [address] [facility] [address]          To whom it may concern:    RE: Rao BLANCAS Dagmar    Rao was seen at our clinic today 7/2/2020. He has been a patient of mine since 09/2019.  Rao has a history of Bipolar disorder and chemical dependency.  He is currently in a CD program and outpatient mental health program that is intended to last the next 1-2 months.  During this time he is unable to maintain work due to mental health instability and demands of the program.      Please contact me for questions or concerns.      Sincerely,        Suzi Jaime NP

## 2020-07-22 ENCOUNTER — VIRTUAL VISIT (OUTPATIENT)
Dept: PSYCHIATRY | Facility: CLINIC | Age: 30
End: 2020-07-22
Payer: COMMERCIAL

## 2020-07-22 DIAGNOSIS — F41.1 GENERALIZED ANXIETY DISORDER: Primary | ICD-10-CM

## 2020-07-22 DIAGNOSIS — F31.9 BIPOLAR 1 DISORDER (H): ICD-10-CM

## 2020-07-22 PROCEDURE — 99214 OFFICE O/P EST MOD 30 MIN: CPT | Mod: 95 | Performed by: NURSE PRACTITIONER

## 2020-07-22 RX ORDER — LITHIUM CARBONATE 600 MG/1
600 CAPSULE ORAL 2 TIMES DAILY WITH MEALS
Qty: 60 CAPSULE | Refills: 1 | Status: SHIPPED | OUTPATIENT
Start: 2020-07-22 | End: 2020-09-03

## 2020-07-22 RX ORDER — PROPRANOLOL HYDROCHLORIDE 20 MG/1
20 TABLET ORAL 2 TIMES DAILY
Qty: 60 TABLET | Refills: 1 | Status: SHIPPED | OUTPATIENT
Start: 2020-07-22 | End: 2020-09-03

## 2020-07-22 ASSESSMENT — ANXIETY QUESTIONNAIRES
1. FEELING NERVOUS, ANXIOUS, OR ON EDGE: NEARLY EVERY DAY
2. NOT BEING ABLE TO STOP OR CONTROL WORRYING: NEARLY EVERY DAY
7. FEELING AFRAID AS IF SOMETHING AWFUL MIGHT HAPPEN: SEVERAL DAYS
GAD7 TOTAL SCORE: 15
IF YOU CHECKED OFF ANY PROBLEMS ON THIS QUESTIONNAIRE, HOW DIFFICULT HAVE THESE PROBLEMS MADE IT FOR YOU TO DO YOUR WORK, TAKE CARE OF THINGS AT HOME, OR GET ALONG WITH OTHER PEOPLE: VERY DIFFICULT
6. BECOMING EASILY ANNOYED OR IRRITABLE: NEARLY EVERY DAY
5. BEING SO RESTLESS THAT IT IS HARD TO SIT STILL: SEVERAL DAYS
3. WORRYING TOO MUCH ABOUT DIFFERENT THINGS: NEARLY EVERY DAY

## 2020-07-22 ASSESSMENT — PATIENT HEALTH QUESTIONNAIRE - PHQ9
SUM OF ALL RESPONSES TO PHQ QUESTIONS 1-9: 5
5. POOR APPETITE OR OVEREATING: SEVERAL DAYS

## 2020-07-22 NOTE — PATIENT INSTRUCTIONS
1. Increase Lithium  600 mg twice daily     2. Propranolol 20 mg twice daily for anxiety        Continue all other medications as reviewed per electronic medical record today.     Safety plan reviewed. To the Emergency Department as needed or call after hours crisis line at [phone] or [phone]. [place] Crisis Text Line. Text [place] to 816007 or Suicide LifeLine Chat: [employer]org/chat/    To schedule individual or family therapy, call [facility] at [phone].    Schedule an appointment with me in 4 weeks or sooner as needed. Call [facility] at [phone] to schedule.    Follow up with primary care provider as planned or for acute medical concerns.    Call the psychiatric nurse line with medication questions or concerns at [phone].    BeavEx may be used to communicate with your provider, but this is not intended to be used for emergencies.    Crisis Resources:    National Suicide Prevention Lifeline: 594.317.7069 (TTY: 493.854.3251). Call anytime for help.  (www.suicidepreventionlifeline.org)  National [place] on Mental Illness (www.nadege.org): 396.833.3927 or 596-100-3435.   Mental Health Association (www.mentalhealth.org): 389.293.6726 or 487-012-1146.  [place] Crisis Text [place]: Text [place] to 814792  Suicide LifeLine Chat: suicideZero2IPO.org/chat

## 2020-07-22 NOTE — PROGRESS NOTES
"Rao Leavitt is a 30 year old male who is being evaluated via a billable video visit.      The patient has been notified of following:     \"This video visit will be conducted via a call between you and your physician/provider. We have found that certain health care needs can be provided without the need for an in-person physical exam.  This service lets us provide the care you need with a video conversation.  If a prescription is necessary we can send it directly to your pharmacy.  If lab work is needed we can place an order for that and you can then stop by our lab to have the test done at a later time.    Video visits are billed at different rates depending on your insurance coverage.  Please reach out to your insurance provider with any questions.    If during the course of the call the physician/provider feels a video visit is not appropriate, you will not be charged for this service.\"    Patient has given verbal consent for Video visit? Yes  How would you like to obtain your AVS? MyChart  If you are dropped from the video visit, the video invite should be resent to: Text to cell phone: [phone]  Will anyone else be joining your video visit? No        Video-Visit Details    Type of service:  Video Visit    Video Start Time: 2:37 PM  Video End Time: 3:08 PM    Originating Location (pt. Location): Home    Distant Location (provider location):  [facility]     Platform used for Video Visit: Osriis Grullon NP          Outpatient Psychiatric Progress Note    Name: Rao Leavitt   : 1990                    Primary Care Provider: Suzi Jaime NP   Therapist: [employer]     PHQ-9 scores:  PHQ-9 SCORE 12/10/2019 2020 2020   PHQ-9 Total Score 2 12 5   Some encounter information is confidential and restricted. Go to Review Flowsheets activity to see all data.       AAYUSH-7 scores:  AAYUSH-7 SCORE 12/10/2019 2020 2020   Total Score 14 13 15 " "  Some encounter information is confidential and restricted. Go to Review Flowsheets activity to see all data.       Patient Identification:    Patient is a 30 year old year old, partnered / significant other  White American male  who presents for return visit with me.  Patient is currently employed part time. Patient attended the session alone. Patient prefers to be called: \"Philipp\".    Interim History:    I last saw Rao Leavitt for outpatient psychiatry Return Visit on June 16,2020.     During that appointment, he reported that he had been placed on probation for domestic charges which he tells me were not his fault. He comes to me today to talk about increasing his dose of lithium as he continues to harbor animosity against his girlfriend's mother who has a protection order out against him.  .His last Lithium level was sub therapeutic so I increased his dose to 600 mg twice daily inn the IR form per his request.  I am asking him to submit a lithum level in a week after this increase.   .     Current medications include: lithium (ESKALITH) 600 MG capsule, Take 1 capsule (600 mg) by mouth 2 times daily (with meals)  multivitamin, therapeutic (THERA-VIT) TABS tablet, Take 1 tablet by mouth daily    No current facility-administered medications on file prior to visit.        The [place] Prescription Monitoring Program has been reviewed and there are no concerns about diversionary activity for controlled substances at this time.      I was able to review most recent Primary Care Provider, specialty provider, and therapy visit notes that I have access to.     Today, patient reports that  he was having anger outbursts at work and he had to stop work June 16. He had felt disrespected and then took things out on people and family members.   Being off of work has helped his mood stabilize..  He is  Now having financial stress.  Philipp maintains that he is taking his medications as prescribed.      He is on probation " for disorderly conduct and is almost done with this.  He is receiving treatment to develop coping skills to manage mood and meets three days a week in a  Group setting and 1 day a week fr individual therapy.  He has not used drugs for 6 years.  His treatment is for alcohol use with last use in September 2019.  He denies cravings.  He has had little depression for the past year.  He is planning on getting a lithjium level on Friday.      His father and grandfather have a similar profile as Philipp has.      Case number: [id number]  Rita Huitron  Medical opinion form   Fax to [place] Office to   [facility]   Fax: [phone]    Referred to [place]       has no past medical history on file.    Social history updates:    He has been isolating to his home    Substance use updates:    Philipp denies alcohol and drug use.  He is subject to random drug screens  Tobacco use: Yes E-cigarettes  Ready to quit?  No  Nicotine Replacement Therapy tried: none     Vital Signs:   There were no vitals taken for this visit.    Labs:    Most recent laboratory results reviewed and no new labs. Lithium level 0.25    Review of Systems:  10 systems (general, cardiovascular, respiratory, eyes, ENT, endocrine, GI, , M/S, neurological) were reviewed. Most pertinent finding(s) is/are: no chest pain, no shortness of breath , no skin rashes. The remaining systems are all unremarkable.    Mental Status Examination:  Appearance:  awake, alert, moderate distress and casually dressed  Attitude:  evasive and guarded   Eye Contact:  adequate  Gait and Station: No assistive Devices used and No dizziness or falls  Psychomotor Behavior:  no evidence of tardive dyskinesia, dystonia, or tics and fidgeting  Oriented to:  time, person, and place  Attention Span and Concentration:  Fair  Speech:   pressured speech, rambling and Speaks: English  Mood:  anxious and depressed  Affect:  intensity is heightened  Associations:  no loose  associations  Thought Process:  tangental  Thought Content:  no evidence of suicidal ideation or homicidal ideation, no auditory hallucinations present and no visual hallucinations present  Recent and Remote Memory:  fair Not formally assessed. No amnesia.  Fund of Knowledge: low-normal  Insight:  limited  Judgment:  limited  Impulse Control:  limited    Suicide Risk Assessment:  Today Rao Leavitt reports that he is having no thoughts to harm himself or other people. In addition, there are notable risk factors for self-harm, including substance abuse, anger/rage, wrecklessness and mood change. However, risk is mitigated by commitment to family, history of seeking help when needed, future oriented, no access to firearms or weapons, denies suicidal intent or plan and denies homicidal ideation, intent, or plan. Therefore, based on all available evidence including the factors cited above, Rao Leavitt does not appear to be at imminent risk for self-harm, does not meet criteria for a 72-hr hold, and therefore remains appropriate for ongoing outpatient level of care.  A thorough assessment of risk factors related to suicide and self-harm have been reviewed and are noted above. The patient convincingly denies suicidality on several occasions. Local community safety resources printed and reviewed for patient to use if needed. There was no deceit detected, and the patient presented in a manner that was believable.     DSM5 Diagnosis:  296.42 Bipolar I Disorder Current or Most Recent Episode Manic, Moderate   300.02 (F41.1) Generalized Anxiety Disorder    Medical comorbidities include:   Patient Active Problem List    Diagnosis Date Noted     Lithium use 05/15/2020     Priority: Medium     Lumbar spine tumor 11/27/2019     Priority: Medium     High risk medication use 10/05/2017     Priority: Medium     Bipolar 1 disorder (H) 03/16/2017     Priority: Medium     Generalized anxiety disorder 03/16/2017      Priority: Medium       Assessment:    Rao Leavitt  Remains dysregulated and impulsive to the point that he was  Asked to leave work after an anger outburst.  Now he is anxious about his financial future.  A medical Opinion form was completed in support to have him remain unemployed for now as his medications continue to be adjusted.  His Lithium level is subtherapeutic even though he maintains that he is taking the Lithium as prescribed..I added propranolol for him to take to decrease anxiety that he is experiencing.     Medication side effects and alternatives were reviewed. Health promotion activities recommended and reviewed today. All questions addressed. Education and counseling completed regarding risks and benefits of medications and psychotherapy options.    Treatment Plan:        1. Increase Lithium  600 mg twice daily     2. Propranolol 20 mg twice daily for anxiety      Continue all other medications as reviewed per electronic medical record today.     Safety plan reviewed. To the Emergency Department as needed or call after hours crisis line at [phone] or [phone]. [place] Crisis Text Line. Text [place] to 735285 or Suicide LifeLine Chat: suicidepreventionlifeline.org/chat/    To schedule individual or family therapy, call [facility] at [phone].    Schedule an appointment with me in 4 weeks or sooner as needed. Call [facility] at [phone] to schedule.    Follow up with primary care provider as planned or for acute medical concerns.    Call the psychiatric nurse line with medication questions or concerns at [phone].    CommProvehart may be used to communicate with your provider, but this is not intended to be used for emergencies.    Crisis Resources:    National Suicide Prevention Lifeline: 119.492.4056 (TTY: 688.811.9987). Call anytime for help.  (www.suicidepreventionlifeline.org)  National [place] on Mental Illness (www.nadege.org): 330.855.2209  or 283-526-7889.   Mental Health Association (www.mentalhealth.org): 336-080-0653 or 867-512-6862.  [place] Crisis Text Line: Text [place] to 579573  Suicide LifeLine Chat: suicidepreventionlifeline.org/chat    Administrative Billing:   Time spent with patient was spent in counseling and coordination of care regarding above diagnoses and treatment plan.    Patient Status:  Patient will continue to be seen for ongoing consultation and stabilization.    Signed:   DUYEN Carrington-BC   Psychiatry

## 2020-07-23 ENCOUNTER — TELEPHONE (OUTPATIENT)
Dept: FAMILY MEDICINE | Facility: CLINIC | Age: 30
End: 2020-07-23

## 2020-07-23 ASSESSMENT — ANXIETY QUESTIONNAIRES: GAD7 TOTAL SCORE: 15

## 2020-07-23 NOTE — TELEPHONE ENCOUNTER
Patient needs an appt to sign forms  for Bree RAMIREZ placed the forms in family practice at  for signature. Once completed please fax to [phone]--jenn DAWKINS  Station

## 2020-07-27 ENCOUNTER — OFFICE VISIT (OUTPATIENT)
Dept: FAMILY MEDICINE | Facility: CLINIC | Age: 30
End: 2020-07-27
Payer: COMMERCIAL

## 2020-07-27 DIAGNOSIS — Z79.899 LITHIUM USE: ICD-10-CM

## 2020-07-27 DIAGNOSIS — F31.9 BIPOLAR 1 DISORDER (H): Primary | ICD-10-CM

## 2020-07-27 LAB — LITHIUM SERPL-SCNC: 0.25 MMOL/L (ref 0.6–1.2)

## 2020-07-27 PROCEDURE — 36415 COLL VENOUS BLD VENIPUNCTURE: CPT | Performed by: NURSE PRACTITIONER

## 2020-07-27 PROCEDURE — 80178 ASSAY OF LITHIUM: CPT | Performed by: NURSE PRACTITIONER

## 2020-07-27 PROCEDURE — 99207 ZZC NO CHARGE NURSE ONLY: CPT

## 2020-07-27 NOTE — TELEPHONE ENCOUNTER
Patient came to clinic signed the forms with MIS Morrison CMA. After signing, it was faxed to Rita Huitron.       Copy of conversion.  [7/27/2020 9:14 AM]  Arline Morrison:    José Miguel Morrison!  Tree ARMIJO was just in and signed the paperwork from Bree.  I went ahead and faxed the information to Rita Huitron.  I will send the originals to trey DAWKINS  Station

## 2020-07-27 NOTE — PROGRESS NOTES
Chief Complaint   Patient presents with     Forms     Patient came in to sign [facility] Request for Medical Opinion form (for Bree Grullon).

## 2020-07-29 ENCOUNTER — VIRTUAL VISIT (OUTPATIENT)
Dept: BEHAVIORAL HEALTH | Facility: CLINIC | Age: 30
End: 2020-07-29
Payer: COMMERCIAL

## 2020-07-29 DIAGNOSIS — R69 DIAGNOSIS DEFERRED: Primary | ICD-10-CM

## 2020-07-29 NOTE — PROGRESS NOTES
Behavioral Health Home Services  [facility]: [place]        Social Work Care Navigator Note      Patient: Rao Leavitt  Date: July 29, 2020  Preferred Name: Rao    Previous PHQ-9:   PHQ-9 SCORE 12/10/2019 6/16/2020 7/22/2020   PHQ-9 Total Score 2 12 5   Some encounter information is confidential and restricted. Go to Review Flowsheets activity to see all data.     Previous AAYUSH-7:   AAYUSH-7 SCORE 12/10/2019 6/16/2020 7/22/2020   Total Score 14 13 15   Some encounter information is confidential and restricted. Go to Review Flowsheets activity to see all data.     NOMAN LEVEL:  No flowsheet data found.    Preferred Contact:  Need for : No  Preferred Contact: Cell      Type of Contact Today: Phone call (patient / identified key support person reached)      Data: (subjective / Objective):    [facility] Introduction:  Hi my name is SARA Irwin from your [place] primary care clinic.     I work closely with your primary care provider, Suzi Jaime.     If it's ok I'd like to talk about some new services available to you, at no out of pocket cost to you.      Before we get started can you verify your insurance for me? [employer] [place]    What social work or case management services do you receive? (If so, are you receiving ACT or TCM?).  None - Patient states he is currently in outpt treatment for CD at [facility] three times per week and meets individually with his therapist Leslye Westfall. 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    Getting to Know You - Whole Person Care:  This new service is called Behavioral Health Home services, which is designed to support you as a whole person beyond just your medical needs.        I'm here to be a central point of contact for your healthcare needs and to help with:    Housing    Transportation    Financial resources    Comprehensive Health needs (appointment help, medication costs,  etc.)    Employment    Education    Health Insurance applications    And connecting with social supports or community resources    Out of the things I mentioned what would you find helpful?  Housing, Financial resources, and connecting with social supports or community resources.    Patient states he lives with his significant other, her seven year-old and their two year-old at [facility]. Patient states he and his significant other have been together for 4 years now. Patient states that due to a back surgery and his mental health and chemical dependency concerns, he has not been working for almost a year now.    Patient states his significant other is working as a CNA at [employer] and is working 40 hours a week or more to make ends meet. Patient states this is putting undue stress on their family. Patient states they are three weeks behind on their rent, need help with utilities, cell phones, and internet.     Patient filled out the combined application form for EA  but still need to provide additional proofs of rent paid to the [facility]. Patient reports they are receiving $149 a month in SNAP benefits. [facility] educated patient that all forms do need to be signed before turning into the [facility] and will provide additional assistance when needed.  [facility] provided support/encouragement, education and motivational interviewing today.    [facility] emailed patient information on rental assistance,food shelves, cell phones, and internet. [facility] mailed out application for EAP utility program.    Diagnostic Assessment - completed on 09/24/2019 by Bree Grullon NP    Email sent to patient:    Greetings,    Per our conversation yesterday I have included the following resources for rental assistance, food shelves, cell phones, and internet. I mailed you the utilities emergency program forms as well.    Our next Health & Wellness appointment is on Tuesday, Aug. 11th at 10am.    Additional resources can be  accessed through the following west:    Covid  West:        COVID  - can be downloaded from Google Play or the Apple iTunes Store    Please feel free to reach out at any time if you have additional questions or concerns.    Sincerely,    Patient response to [facility] Service offering:   Patient enrolled in [facility] today - Patient provided verbal authorization for Behavioral Health Home ([facility]) enrollment forms, records faxed to HIM.  [facility] emailed patient welcome letter, Behavioral Health Home ([facility]) brochure and additional resources.    SARA Irwin [facility]  Behavioral Health [place] ([facility])   [facility]  [phone]

## 2020-07-29 NOTE — Clinical Note
Good morning - I was able to connect with this patient and enroll him in the [facility] [place] ([facility]) case management program. If you have additional questions or concerns please let me know.    Thank you,  SARA Irwin [facility]  [facility] [place] [facility])   [facility]  [phone]

## 2020-08-11 ENCOUNTER — VIRTUAL VISIT (OUTPATIENT)
Dept: BEHAVIORAL HEALTH | Facility: CLINIC | Age: 30
End: 2020-08-11
Payer: COMMERCIAL

## 2020-08-11 DIAGNOSIS — R69 DIAGNOSIS DEFERRED: Primary | ICD-10-CM

## 2020-08-11 ASSESSMENT — ANXIETY QUESTIONNAIRES
2. NOT BEING ABLE TO STOP OR CONTROL WORRYING: NEARLY EVERY DAY
IF YOU CHECKED OFF ANY PROBLEMS ON THIS QUESTIONNAIRE, HOW DIFFICULT HAVE THESE PROBLEMS MADE IT FOR YOU TO DO YOUR WORK, TAKE CARE OF THINGS AT HOME, OR GET ALONG WITH OTHER PEOPLE: EXTREMELY DIFFICULT
6. BECOMING EASILY ANNOYED OR IRRITABLE: NEARLY EVERY DAY
4. TROUBLE RELAXING: NEARLY EVERY DAY
7. FEELING AFRAID AS IF SOMETHING AWFUL MIGHT HAPPEN: NEARLY EVERY DAY
1. FEELING NERVOUS, ANXIOUS, OR ON EDGE: NEARLY EVERY DAY
3. WORRYING TOO MUCH ABOUT DIFFERENT THINGS: NEARLY EVERY DAY
GAD7 TOTAL SCORE: 21
5. BEING SO RESTLESS THAT IT IS HARD TO SIT STILL: NEARLY EVERY DAY

## 2020-08-11 ASSESSMENT — PATIENT HEALTH QUESTIONNAIRE - PHQ9: SUM OF ALL RESPONSES TO PHQ QUESTIONS 1-9: 15

## 2020-08-11 NOTE — LETTER
Behavioral Health Home ([facility]): Health Action Plan  [facility]: [place]    Well and Beyond      Name: Rao Leavitt  Preferred Name: Rao  : 1990  MRN: [id number]      My Goals  Goal Areas: Health;Mental Health;Chemical Health;Financial and Social Service Benefits;Transportation    Patient stated goals: Patient would like assistance with his physical, mental and chemical health for creating a balanced and healthy lifestyle. Patient would like assistance with SSDI and social service assistance to aid with county benefitsto increase his financial stability. Patient would like assistance with reliable transportation for his family to get to appointments, run errands and get out into the community.    Strengths related to each goal: Patient states his strengths are being a good dad; he has grown a lot since he has been in treatment; and he is somewhat understanding of others.  Services and Supports Needed: The [facility] Team will provide monthly contact with patient in order to monitor progress towards goals.    Activities / Actions of Team to support goal(s): [facility] team will locate appropriate resources that align with patient's goals and promote health and wellness.    Activities / Actions of Patient / Parent / Guardian to support goal(s): It is a requirement that patient's primary care physician is through the [facility] system and that they are on Medical Assistance/Medicaid. If either of these were to change or if patient needs any type of assistance, they are to reach out to their Behavioral Health Home ([facility]) team.    **Michael phone number is [phone] - medical transportation to medical appointments and pharmacy pick-up. You must call 48-72 hours prior to your medical appointment or pharmacy pick-up.    Recommended Referral  Tobacco cessation referrals made?: No  Mental Health / Chemical Dependency Referrals: No  Substance Use Referrals: Not Applicable  Mental Health Referrals: None  Community  Health Referrals: [facility] ;Other (see comments)          My Team Members and Their Contact Information  Patient Care Team       Relationship Specialty Notifications Start End    Suzi Jaime, NP PCP - General Nurse Practitioner - Family  9/24/19     Phone: [phone] Fax: [phone] [address] [address]    Suzi Jaime, MICKI Assigned PCP   1/19/20     Phone: [phone] Fax: [phone] [address] [address]    Bree Grullon, NP Nurse Practitioner Nurse Practitioner Admissions 7/30/20     Psychiatric prescriber     Phone: [phone] Fax: [phone]         [address] [place] [address]    Varghese Alvarez LSW  Clinic Admissions 7/30/20     [facility] [phone]    Leslye Westfall Therapist Chemical Dependency  7/30/20     [facility] CD Therapist    Phone: [phone]         Rita Cosby Probation/   8/11/20     [facility]  until Oct 1, 2020    Phone: [phone]               My Wellness Plan  Safety Concerns: None Reported / Observed  Recommendations / Plan for safety concerns: A safety and risk management plan has not been developed at this time, however patient was encouraged to call [facility] should there be a change in any of these risk factors.  Crisis Plan (emergencies / when urgent support needed): Patient states he feels comfortable contacting his mother Renetta or calling 911 in a crisis or emergency situation.          Rao Leavitt co-developed the Health Action Plan with the [facility] Team and received a copy of this document.  Date Health Action Plan Completed/Updated: 08/11/20

## 2020-08-11 NOTE — PROGRESS NOTES
Behavioral Health Home Services  [facility]: [place]        Social Work Care Navigator Note      Patient: Rao Leavitt  Date: August 11, 2020  Preferred Name: Rao    Previous PHQ-9:   PHQ-9 SCORE 6/16/2020 7/22/2020 8/11/2020   PHQ-9 Total Score 12 5 15   Some encounter information is confidential and restricted. Go to Review Flowsheets activity to see all data.     Previous AAYUSH-7:   AAYUSH-7 SCORE 6/16/2020 7/22/2020 8/11/2020   Total Score 13 15 21   Some encounter information is confidential and restricted. Go to Review Flowsheets activity to see all data.     NOMAN LEVEL:  No flowsheet data found.    Preferred Contact:  Need for : No  Preferred Contact: Cell      Type of Contact Today: Phone call (patient / identified key support person reached)      Data: (subjective / Objective):    Patient came in to complete the comprehensive wellness assessment for Behavioral Health Home Services.  See [facility] Flowsheets for details on the assessment.  See [facility], Behavioral Health [place] for a copy of the patient's care plan.    Patient stated Goal Areas:   Health;Mental Health;Chemical Health;Financial and Social Service Benefits;Transportation    Patient stated goals:   Patient would like assistance with his physical, mental and chemical health for creating a balanced and healthy lifestyle. Patient would like assistance with SSDI and social service assistance to aid with county benefits to increase his financial stability. Patient would like assistance with reliable transportation for his family to get to appointments, run errands and get out into the community.    Patient would like assistance with his physical, mental and chemical health for creating a balanced and healthy lifestyle. Patient states he is currently in outpt treatment for CD with [facility] and attends three times per week plus has weekly individual supervision with Leslye Westfall. Patients states his sobriety and mental health are a  "priority for him right now. Patient states he appreciates [facility] to assist with care coordination, as patient states he has a lot of providers right now. Patient states his mood has been depressed and anxious lately. [facility] reminded patient that he has an appointment with his psychiatry prescriber on Sept 3rd but if symptoms were to worsen to contact psychiatry office.      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 Patient states he can now think and has a \"sober brain.\" Patient states it has improved his relationships with family, friends and professionals.  Patient states he does have a  Rita Cosby with [facility] and states he will complete his probation the first of Oct. 2020. Patient states he has worked hard for this. Patient states his intent once his outpt CD treatment program ends is to continue with counseling and CD supports. [facility] and patient to work together to manage this.    Patient would like assistance with SSDI and social service assistance to aid with [facility] benefitsto increase his financial stability.  Patient states due to his recovery, treatment, mental and physical health concerns he has not been able to work for almost a year. Patient states his significant other is working hard and sometimes over 40 hours a week to make ends meet while he stays home with their children. Patient states has applied for SSDI and plans to file for unemployment benefits. Patient states he has applied for additional [facility] assistance to help with finances but has not heard back yet.  [facility] will provide education, support/empathy and resources.     Patient would like assistance with reliable transportation for his family to get to appointments, run errands and get out into the community. Patient states it can be difficult for he and his significant other to afford regular maintenance on their car and are concerned about larger bills if something were to " happen. [facility] provided patient with phone number for Michael at [phone] and mailed out low cost vehicle maintenance and repair facilities.      Patient stated strengths:    Patient states his strengths are being a good dad; he has grown a lot since he has been in treatment; and he is somewhat understanding of others.      SARA Irwin [facility]  Behavioral Health Home ([facility])   [facility]  [phone]  August 11, 2020  1:24 PM    Health Action Plan approved by Behavioral Health Clinician 8/11/2020. [facility] sent patient a copy of approved HAP in the mail. Next Health Action Plan review due in February of 2020.    ARCELIA Irwin  8/12/2020  7:31 AM

## 2020-08-12 ASSESSMENT — ANXIETY QUESTIONNAIRES: GAD7 TOTAL SCORE: 21

## 2020-08-14 ENCOUNTER — TELEPHONE (OUTPATIENT)
Dept: PSYCHIATRY | Facility: CLINIC | Age: 30
End: 2020-08-14

## 2020-08-14 ENCOUNTER — DOCUMENTATION ONLY (OUTPATIENT)
Dept: BEHAVIORAL HEALTH | Facility: CLINIC | Age: 30
End: 2020-08-14

## 2020-08-14 DIAGNOSIS — Z79.899 LITHIUM USE: Primary | ICD-10-CM

## 2020-08-14 NOTE — PROGRESS NOTES
Documentation Only     Upon patients enrollment into [facility] services, Epic did not allow for the appropriate selection for [facility] location for utilization on the ERV dashboard. Updated [facility] brief needs flowsheet to reflect this on the ER dashboard.    SARA Irwin [facility]  Behavioral Health Home ([facility])   [facility]  [phone]  August 14, 2020  3:01 PM

## 2020-08-14 NOTE — TELEPHONE ENCOUNTER
Spoke with patient and walk him through the steps to attach the Unemployment form in My chart.     Patient verbalized need to an order placed for his monthly Lithium level. Order pended and instructed patient to call a [employer] lab near him to schedule an appointment. Routing to provider for approval.

## 2020-08-14 NOTE — TELEPHONE ENCOUNTER
He does not need a monthly lithium level.  Also - the soonest I will be able to complete this form and get it where it needs to be is Tuesday August 18.

## 2020-08-14 NOTE — TELEPHONE ENCOUNTER
Patient is wondering where he can send unemployment paperwork? He has it saved on his phone and no way to print it.  Can we provide a digital way to submit this for her to fill?    Thanks!    [phone]

## 2020-08-14 NOTE — TELEPHONE ENCOUNTER
Patient notified via [employer] in previous encounter.     Camryn Nichols, RN    Nurse Liaison  [facility]

## 2020-08-18 ENCOUNTER — VIRTUAL VISIT (OUTPATIENT)
Dept: BEHAVIORAL HEALTH | Facility: CLINIC | Age: 30
End: 2020-08-18
Payer: COMMERCIAL

## 2020-08-18 ENCOUNTER — TELEPHONE (OUTPATIENT)
Dept: PSYCHIATRY | Facility: CLINIC | Age: 30
End: 2020-08-18

## 2020-08-18 ENCOUNTER — MYC MEDICAL ADVICE (OUTPATIENT)
Dept: ADDICTION MEDICINE | Facility: CLINIC | Age: 30
End: 2020-08-18

## 2020-08-18 DIAGNOSIS — R69 DIAGNOSIS DEFERRED: Primary | ICD-10-CM

## 2020-08-18 NOTE — TELEPHONE ENCOUNTER
"Spoke with patient he states that he met with his Sw today and he was telling her that at times when he \"freak out\" he blacks out and does not remember what he says. He explains the \"freak out\" as anger outbursts. Recently he could not remember of he took his medications, ane he took a double dose and was \"loopy\". He bought a medication planner today. He confirmed that his current medications are helping him. He feels currently feels safe, just stressed out about his unemployment.   "

## 2020-08-18 NOTE — TELEPHONE ENCOUNTER
Pt called stating he is safe and if RN needs to follow up to contact at primary phone#. Pt also stated he needs to sign unemployment paper work as soon possible in order to get benefits.

## 2020-08-18 NOTE — PROGRESS NOTES
Behavioral Health Home Services  [facility]: [place]        Social Work Care Navigator Note      Patient: Rao Leavitt  Date: August 18, 2020  Preferred Name: Rao    Previous PHQ-9:   PHQ-9 SCORE 6/16/2020 7/22/2020 8/11/2020   PHQ-9 Total Score 12 5 15     Previous AAYUSH-7:   AAYUSH-7 SCORE 6/16/2020 7/22/2020 8/11/2020   Total Score 13 15 21     NOMAN LEVEL:  No flowsheet data found.    Preferred Contact:  Need for : No  Preferred Contact: Cell      Type of Contact Today: Phone call (patient / identified key support person reached)      Data: (subjective / Objective):  Recent ED/IP Admission or Discharge?   None    Patient Goals:  Goal Areas: Health;Mental Health;Chemical Health;Financial and Social Service Benefits;Transportation  Patient stated goals: Patient would like assistance with his physical, mental and chemical health for creating a balanced and healthy lifestyle. Patient would like assistance with SSDI and social service assistance to aid with county benefitsto increase his financial stability. Patient would like assistance with reliable transportation for his family to get to appointments, run errands and get out into the community.        [facility] Core Service Provided:  Comprehensive Care Management: utilized the electronic medical record / patient registry to identify and support patient's health conditions / needs more effectively   Care Transitions: focused on the coordinated and seamless movement of patient between or within different levels of care or settings  Care Coordination: provided care management services/referrals necessary to ensure patient and their identified supports have access to medical, behavioral health, pharmacology and recovery support services.  Ensured that patient's care is integrated across all settings and services.   Referral to Community and Social Support Services: Provided patient with referrals (see plan section)  Followed-up with patient on whether  "or not they completed a referral    Current Stressors / Issues / Care Plan Objective Addressed Today:  Patient states he has not sleep much in the past three days and states his mood as \"high.\" Patient states ruminating and racing thoughts. Patient states he has been having difficulty with memory. Patient states on Sunday (08/16) evening he thinks he might have taken a double dose of his lithium, as he did not remember taking it earlier. Patient states he does have an appt with his PCP on Thrusday. CC encouraged patient to go to ED or reach out to psychiatric provider if symptoms worsen. [facility] messaged providers.    SWCC and patient discussed plan for more support at home with his current physical and mental/chemical health needs. Patient states he continues to go to outpt CD treatment and meets with his CD counselor several times per week. Patient states that his treatment is nearing the end and would like more support with this. Patient has an appt with [facility] therapist Haydee Christina on on Monday, Aug. 24th. SWCC and patient discussed additional home support with [facility] and [facility] assessment for PCA services, patient states agreement with this. [facility] placed referrals with Alondra with [facility] at [phone] and [facility] with [facility], referrals completed.    Patient states he did receive information and resources from [facility]. Patient states he has started the process of applying for SSDI and unemployment benefits. [facility] to monitor and provide assistance and additional resources as needed.     Intervention:  Motivational Interviewing: Expressed Empathy/Understanding, Supported Autonomy, Collaboration, Evocation, Permission to raise concern or advise, Open-ended questions and Reflections: simple and complex   Target Behavior(s): Explored thoughts about taking an anti-depressant, Explored and resolved challenges related to taking anti-depressants as prescribed, Explored thoughts and " readiness to participate in individual therapy, Explored and resolved challenges to attending appointments as scheduled, Explored current social supports and reinforced opportunities to increase engagement and Explored current sleep hygeine and patient's perception and knowledge of relationship to mood    Assessment: (Progress on Goals / Homework):  Patient would benefit from continued coordination in reaching their goals set for the Behavioral Health Home ([facility]) program. SWCC reviewed Health Action Plan goals and will continue to monitor progress and work with patient and their care team.      Plan: (Homework, other):  Patient was encouraged to continue to seek condition-related information and education.      Scheduled a Phone follow up appointment with SW  in 3 weeks     Patient has set self-identified goals and will monitor progress until the next appointment on: 09/09/2020.         SARA Irwin LGSW Behavioral Health [place] ([facility])   [facility]  [phone]  August 18, 2020  11:39 AM                  Next 5 appointments (look out 90 days)    Aug 20, 2020  8:20 AM CDT  SHORT with Suzi Jaime NP  [facility]  Arrive at: Clinic [address] [address]  [phone]

## 2020-08-18 NOTE — Clinical Note
"Hello - this patient reached out to me today with the following concerns, memory loss and feeling as though he had periods of black out.     Patient states he has not sleep much in the past three days and states his mood as \"high.\" Patient states ruminating and racing thoughts. Patient states he has been having difficulty with memory. Patient states on Sunday (08/16) evening he thinks he might have taken a double dose of his lithium, as he did not remember taking it earlier. Patient states he does have an appt with his PCP on Thrusday. [facility] encouraged patient to go to ED or reach out to psychiatric provider if symptoms worsen.    Suzi - I know he has an appointment with you on Thursday and follows up with Bree on Sept 3rd.     Let me know if you have additional questions or concerns.    Thank you,  Varghese"

## 2020-08-18 NOTE — TELEPHONE ENCOUNTER
[facility] and assess [facility].   Patient had called [facility] and reported memory loss and feeling that he had periods of blacking out.     Writer attempted to contact patient by phone, no answer, unable to LVM as VM is full.   Will send [employer] message as patient has been active on [employer] today with staff.     Routing to provider as FYI of attempt.  Routing to RN pool for follow up.     Camryn Nichols, RN    Nurse Liaison  [facility]

## 2020-08-19 ENCOUNTER — ANCILLARY PROCEDURE (OUTPATIENT)
Dept: GENERAL RADIOLOGY | Facility: CLINIC | Age: 30
End: 2020-08-19
Attending: NURSE PRACTITIONER
Payer: COMMERCIAL

## 2020-08-19 ENCOUNTER — OFFICE VISIT (OUTPATIENT)
Dept: FAMILY MEDICINE | Facility: CLINIC | Age: 30
End: 2020-08-19
Payer: COMMERCIAL

## 2020-08-19 VITALS
WEIGHT: 162.5 LBS | TEMPERATURE: 98.3 F | OXYGEN SATURATION: 99 % | DIASTOLIC BLOOD PRESSURE: 82 MMHG | BODY MASS INDEX: 25.51 KG/M2 | RESPIRATION RATE: 16 BRPM | HEIGHT: 67 IN | HEART RATE: 75 BPM | SYSTOLIC BLOOD PRESSURE: 128 MMHG

## 2020-08-19 DIAGNOSIS — D49.2 LUMBAR SPINE TUMOR: ICD-10-CM

## 2020-08-19 DIAGNOSIS — Z79.899 HIGH RISK MEDICATION USE: ICD-10-CM

## 2020-08-19 DIAGNOSIS — S69.91XA INJURY OF HAND, RIGHT, INITIAL ENCOUNTER: ICD-10-CM

## 2020-08-19 DIAGNOSIS — F31.9 BIPOLAR 1 DISORDER (H): ICD-10-CM

## 2020-08-19 DIAGNOSIS — R41.3 MEMORY DIFFICULTIES: ICD-10-CM

## 2020-08-19 DIAGNOSIS — S69.91XA INJURY OF HAND, RIGHT, INITIAL ENCOUNTER: Primary | ICD-10-CM

## 2020-08-19 PROCEDURE — 99213 OFFICE O/P EST LOW 20 MIN: CPT | Performed by: NURSE PRACTITIONER

## 2020-08-19 PROCEDURE — 73130 X-RAY EXAM OF HAND: CPT | Mod: RT

## 2020-08-19 ASSESSMENT — MIFFLIN-ST. JEOR: SCORE: 1655.73

## 2020-08-19 NOTE — PROGRESS NOTES
"Subjective     Rao Leavitt is a 30 year old male who presents to clinic today for the following health issues:    HPI   Pt is requesting a note for his work. He states that the paperwork that was faxed yesterday by behavioral health was wrong - he sent a [email or web address] message stating that there was a form that needed to be attached to this paperwork. Form was printed. He states that he needs a note from being gone from work since 6-.     Musculoskeletal problem/pain      Duration: happened this morning    Description  Location: right hand     Intensity:  moderate    Accompanying signs and symptoms: swelling, pain going up the arm     History  Previous similar problem: YES- hx of surgery on this hand   Previous evaluation:  none    Precipitating or alleviating factors:  Trauma or overuse: YES- hit it on the entertainment center this morning.   Aggravating factors include: lifting and overuse    Therapies tried and outcome: ice and tylenol     Reported history of hand surgery due to punching a fence.  Surgery was 1 year ago.    Previously did not have pain until recent injury.        Depression and Anxiety Follow-Up    How are you doing with your depression since your last visit? Worsened with triggers and worsened mood.  More memory issues.    Reports dad has been in town and having more issues with that.    How are you doing with your anxiety since your last visit?  Worsened - seeing Psychiatry and Counseling.  Following with [facility].    Talked with Psych and Counseling yesterday due to \"black outs\".     He also has an arms worker and PCA at home due to above from behavioral specialist.    Has pill planner at home - but had taken wrong doses at home.  Reports having side effects from wrong medications.    Are you having other symptoms that might be associated with depression or anxiety? No    Have you had a significant life event? Job Concerns and Financial Concerns     Do you have any concerns " with your use of alcohol or other drugs? No Reports sobriety for the past year.    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      Recently diagnosed with Bipolar II per patient.    Has passed drug tests and still in probation.    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  Little interest or pleasure in doing things 1   2.  Feeling down, depressed, or hopeless 1   3.  Trouble falling or staying asleep, or sleeping too much 3   4.  Feeling tired or having little energy 3   5.  Poor appetite or overeating 0   6.  Feeling bad about yourself 1   7.  Trouble concentrating 3   8.  Moving slowly or restless 3   Q9: Thoughts of better off dead/self-harm past 2 weeks 0   PHQ-9 Total Score 15   Difficulty at work, home, or with people Extremely dIfficult     AAYUSH-7  8/11/2020   1. Feeling nervous, anxious, or on edge 3   2. Not being able to stop or control worrying 3   3. Worrying too much about different things 3   4. Trouble relaxing 3   5. Being so restless that it is hard to sit still 3   6. Becoming easily annoyed or irritable 3   7. Feeling afraid, as if something awful might happen 3   AAYUSH-7 Total Score 21   If you checked any problems, how difficult have they made it for you to do your work, take care of things at home, or get along with other people? Extremely difficult       Suicide Assessment Five-step Evaluation and Treatment  "(SAFE-T)      How many servings of fruits and vegetables do you eat daily?  2-3    On average, how many sweetened beverages do you drink each day (Examples: soda, juice, sweet tea, etc.  Do NOT count diet or artificially sweetened beverages)?   2    How many days per week do you exercise enough to make your heart beat faster? 3 or less    How many minutes a day do you exercise enough to make your heart beat faster? 9 or less    How many days per week do you miss taking your medication? 0      Review of Systems   Constitutional, HEENT, cardiovascular, pulmonary, GI, , musculoskeletal, neuro, skin, endocrine and psych systems are negative, except as otherwise noted.      Objective    /82 (BP Location: Right arm, Patient Position: Sitting, Cuff Size: Adult Regular)   Pulse 75   Temp 98.3  F (36.8  C) (Tympanic)   Resp 16   Ht 1.702 m (5' 7\")   Wt 73.7 kg (162 lb 8 oz)   SpO2 99%   BMI 25.45 kg/m    Body mass index is 25.45 kg/m .  Physical Exam   GENERAL: healthy, alert and no distress  MS: Right hand with swelling and mod tenderness right hand 5th MC mid shaft.  Difficulty with ROM of pinky on exam due to pain. + CMS  PSYCH: mentation appears normal, affect flat, anxious and judgement and insight intact    No results found for this or any previous visit (from the past 24 hour(s)).        Assessment & Plan     Injury of hand, right, initial encounter   Xray reviewed by me and negative for fracture - post surgical changes identified and intact.  No new changes seen.  Recommended rest, ice, and wrap with ace if needed.    - XR Hand Right G/E 3 Views; Future    Bipolar 1 disorder (H)   Worsening - recommended follow up with [facility] Psych.  Needed paperwork clarification -this was completed by [facility] - faxed today per patient request to [place] [employer] - but need [facility] to update dates.  Explained this to patient.    Lumbar spine tumor       High risk medication use       Memory difficulties            Patient " Instructions   Xray reviewed and no fracture seen - will allow radiology to review.    Ice and elevate as able.  Avoid aggravating activities.    Follow-up if pain or swelling continues beyond the next few weeks.    MARSHAL Gaxiola    Patient Education     Understanding Bipolar Disorder  Bipolar disorder is a serious disorder of the brain. It may severely disrupt your life. At times, it may cause you and your loved ones great pain. But there is hope. Although there is no cure, treatment can help control your symptoms. Talk with your healthcare provider or a mental health professional. He or she can offer guidance and support.    What causes bipolar disorder?  The exact causes of bipolar disorder aren t known. It is known that the disease runs in families. Genes that affect nerve cells in the brain may be inherited, but as yet these genes have not been found.  Who does it affect?  Over 5 million adults in this country have bipolar disorder. Most often, it strikes young adults. It can affect children and older adults as well. Bipolar disorder affects both men and women. It can strike people of all races, cultures, and incomes.  Ups and downs  Bipolar disorder used to be called manic-depressive illness. That is because it causes extreme mood swings. At times the person may feel almost too happy. These times are often followed by great despair. In some cases, both extremes may occur at once. More often, moods shift back and forth. These mood swings may occur just once in a while. Or they may happen 4 or more times a year. Without treatment, they will likely recur throughout life.  Manic episodes  During manic episodes of bipolar disorder, you feel like you re on top of the world. Even the worst news can t bring you down. You ll likely feel as if you can do anything. And sometimes you may try. You may take great risks, thinking you can t be hurt. You may also talk too fast, and your thoughts may race. You may go for  days without sleeping. And you might be very active and do a lot of things in a short time. Manic episodes often end in a depression.  Depressive episodes  In depressive episodes, you feel intense sadness and depression. You may also feel worthless, tired, and helpless. Even the things you value most don t give you pleasure. At times you may want to die. You may even think about taking your own life.  Date Last Reviewed: 2/1/2017 2000-2019 [employer] [address] [place] [id number]. All rights reserved. This information is not intended as a substitute for professional medical care. Always follow your healthcare professional's instructions.               Return if symptoms worsen or fail to improve.    Suzi Jaime NP  [facility]

## 2020-08-19 NOTE — PATIENT INSTRUCTIONS
Xray reviewed and no fracture seen - will allow radiology to review.    Ice and elevate as able.  Avoid aggravating activities.    Follow-up if pain or swelling continues beyond the next few weeks.    Suzi Jaime, MARSHAL    Patient Education     Understanding Bipolar Disorder  Bipolar disorder is a serious disorder of the brain. It may severely disrupt your life. At times, it may cause you and your loved ones great pain. But there is hope. Although there is no cure, treatment can help control your symptoms. Talk with your healthcare provider or a mental health professional. He or she can offer guidance and support.    What causes bipolar disorder?  The exact causes of bipolar disorder aren t known. It is known that the disease runs in families. Genes that affect nerve cells in the brain may be inherited, but as yet these genes have not been found.  Who does it affect?  Over 5 million adults in this country have bipolar disorder. Most often, it strikes young adults. It can affect children and older adults as well. Bipolar disorder affects both men and women. It can strike people of all races, cultures, and incomes.  Ups and downs  Bipolar disorder used to be called manic-depressive illness. That is because it causes extreme mood swings. At times the person may feel almost too happy. These times are often followed by great despair. In some cases, both extremes may occur at once. More often, moods shift back and forth. These mood swings may occur just once in a while. Or they may happen 4 or more times a year. Without treatment, they will likely recur throughout life.  Manic episodes  During manic episodes of bipolar disorder, you feel like you re on top of the world. Even the worst news can t bring you down. You ll likely feel as if you can do anything. And sometimes you may try. You may take great risks, thinking you can t be hurt. You may also talk too fast, and your thoughts may race. You may go for days without  sleeping. And you might be very active and do a lot of things in a short time. Manic episodes often end in a depression.  Depressive episodes  In depressive episodes, you feel intense sadness and depression. You may also feel worthless, tired, and helpless. Even the things you value most don t give you pleasure. At times you may want to die. You may even think about taking your own life.  Date Last Reviewed: 2/1/2017 2000-2019 [employer]. [address] [place] [address], [place] [address]. All rights reserved. This information is not intended as a substitute for professional medical care. Always follow your healthcare professional's instructions.

## 2020-08-21 ENCOUNTER — TELEPHONE (OUTPATIENT)
Dept: PSYCHOLOGY | Facility: CLINIC | Age: 30
End: 2020-08-21

## 2020-08-24 ENCOUNTER — VIRTUAL VISIT (OUTPATIENT)
Dept: PSYCHOLOGY | Facility: CLINIC | Age: 30
End: 2020-08-24
Payer: COMMERCIAL

## 2020-08-24 ENCOUNTER — FCC EXTENDED DOCUMENTATION (OUTPATIENT)
Dept: PSYCHOLOGY | Facility: CLINIC | Age: 30
End: 2020-08-24

## 2020-08-24 DIAGNOSIS — F10.21 ALCOHOL USE DISORDER, MODERATE, IN EARLY REMISSION (H): ICD-10-CM

## 2020-08-24 DIAGNOSIS — F31.9 BIPOLAR 1 DISORDER (H): Primary | ICD-10-CM

## 2020-08-24 PROCEDURE — 90791 PSYCH DIAGNOSTIC EVALUATION: CPT | Mod: 95 | Performed by: COUNSELOR

## 2020-08-24 ASSESSMENT — COLUMBIA-SUICIDE SEVERITY RATING SCALE - C-SSRS
1. IN THE PAST MONTH, HAVE YOU WISHED YOU WERE DEAD OR WISHED YOU COULD GO TO SLEEP AND NOT WAKE UP?: NO
TOTAL  NUMBER OF INTERRUPTED ATTEMPTS PAST 3 MONTHS: NO
2. HAVE YOU ACTUALLY HAD ANY THOUGHTS OF KILLING YOURSELF?: NO
4. HAVE YOU HAD THESE THOUGHTS AND HAD SOME INTENTION OF ACTING ON THEM?: YES
6. HAVE YOU EVER DONE ANYTHING, STARTED TO DO ANYTHING, OR PREPARED TO DO ANYTHING TO END YOUR LIFE?: NO
TOTAL  NUMBER OF INTERRUPTED ATTEMPTS LIFETIME: NO
ATTEMPT PAST THREE MONTHS: NO
5. HAVE YOU STARTED TO WORK OUT OR WORKED OUT THE DETAILS OF HOW TO KILL YOURSELF? DO YOU INTEND TO CARRY OUT THIS PLAN?: NO
2. HAVE YOU ACTUALLY HAD ANY THOUGHTS OF KILLING YOURSELF LIFETIME?: YES
6. HAVE YOU EVER DONE ANYTHING, STARTED TO DO ANYTHING, OR PREPARED TO DO ANYTHING TO END YOUR LIFE?: NO
TOTAL  NUMBER OF ABORTED OR SELF INTERRUPTED ATTEMPTS PAST LIFETIME: NO
ATTEMPT LIFETIME: NO
3. HAVE YOU BEEN THINKING ABOUT HOW YOU MIGHT KILL YOURSELF?: YES
5. HAVE YOU STARTED TO WORK OUT OR WORKED OUT THE DETAILS OF HOW TO KILL YOURSELF? DO YOU INTEND TO CARRY OUT THIS PLAN?: NO
1. IN THE PAST MONTH, HAVE YOU WISHED YOU WERE DEAD OR WISHED YOU COULD GO TO SLEEP AND NOT WAKE UP?: YES
REASONS FOR IDEATION LIFETIME: EQUALLY TO GET ATTENTION, REVENGE OR A REACTION FROM OTHERS AND TO END/STOP THE PAIN
4. HAVE YOU HAD THESE THOUGHTS AND HAD SOME INTENTION OF ACTING ON THEM?: NO
TOTAL  NUMBER OF ABORTED OR SELF INTERRUPTED ATTEMPTS PAST 3 MONTHS: NO
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
## 2020-08-24 NOTE — Clinical Note
Good morning Suzi,  I completed an intake with Rao today. Seems like a nice and motivated individual. I have him scheduled again on September 10th. Thank you for the referral. Please let me know if you have any questions.  Haydee
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
## 2020-08-27 ENCOUNTER — TELEPHONE (OUTPATIENT)
Dept: PSYCHIATRY | Facility: CLINIC | Age: 30
End: 2020-08-27

## 2020-08-28 NOTE — TELEPHONE ENCOUNTER
[place] unemployment form received and left on counter to be routed to [facility] for appt 9/3/20 at 9:15 am

## 2020-09-03 ENCOUNTER — VIRTUAL VISIT (OUTPATIENT)
Dept: PSYCHIATRY | Facility: CLINIC | Age: 30
End: 2020-09-03
Payer: COMMERCIAL

## 2020-09-03 DIAGNOSIS — F41.1 GENERALIZED ANXIETY DISORDER: ICD-10-CM

## 2020-09-03 DIAGNOSIS — F31.9 BIPOLAR 1 DISORDER (H): ICD-10-CM

## 2020-09-03 PROCEDURE — 99214 OFFICE O/P EST MOD 30 MIN: CPT | Mod: 95 | Performed by: NURSE PRACTITIONER

## 2020-09-03 RX ORDER — LITHIUM CARBONATE 600 MG/1
600 CAPSULE ORAL 2 TIMES DAILY WITH MEALS
Qty: 60 CAPSULE | Refills: 1 | Status: SHIPPED | OUTPATIENT
Start: 2020-09-03 | End: 2020-11-02

## 2020-09-03 RX ORDER — PROPRANOLOL HYDROCHLORIDE 20 MG/1
20 TABLET ORAL 3 TIMES DAILY
Qty: 90 TABLET | Refills: 1 | Status: SHIPPED | OUTPATIENT
Start: 2020-09-03 | End: 2020-11-02

## 2020-09-03 ASSESSMENT — ANXIETY QUESTIONNAIRES
3. WORRYING TOO MUCH ABOUT DIFFERENT THINGS: NEARLY EVERY DAY
7. FEELING AFRAID AS IF SOMETHING AWFUL MIGHT HAPPEN: SEVERAL DAYS
IF YOU CHECKED OFF ANY PROBLEMS ON THIS QUESTIONNAIRE, HOW DIFFICULT HAVE THESE PROBLEMS MADE IT FOR YOU TO DO YOUR WORK, TAKE CARE OF THINGS AT HOME, OR GET ALONG WITH OTHER PEOPLE: VERY DIFFICULT
GAD7 TOTAL SCORE: 17
6. BECOMING EASILY ANNOYED OR IRRITABLE: NEARLY EVERY DAY
2. NOT BEING ABLE TO STOP OR CONTROL WORRYING: NEARLY EVERY DAY
5. BEING SO RESTLESS THAT IT IS HARD TO SIT STILL: NEARLY EVERY DAY
1. FEELING NERVOUS, ANXIOUS, OR ON EDGE: SEVERAL DAYS

## 2020-09-03 ASSESSMENT — PATIENT HEALTH QUESTIONNAIRE - PHQ9
5. POOR APPETITE OR OVEREATING: NEARLY EVERY DAY
SUM OF ALL RESPONSES TO PHQ QUESTIONS 1-9: 8
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
## 2020-09-03 NOTE — PATIENT INSTRUCTIONS
1. C ontinue Lithium  600 mg twice daily   2.Increase  Propranolol to 20 mg three times daily for anxiety  3.  Continue treatment therapies, individual therapy, and Behavior Home Health visits      Continue all other medications as reviewed per electronic medical record today.     Safety plan reviewed. To the Emergency Department as needed or call after hours crisis line at [phone] or [phone]. [place] Crisis Text [place]. Text [place] to 536177 or Suicide LifeLine Chat: suicideWaypoint Health Innovatoins.org/chat/    To schedule individual or family therapy, call [facility] at [phone].    Schedule an appointment with me in October or sooner as needed. Call [facility] at [phone] to schedule.    Follow up with primary care provider as planned or for acute medical concerns.    Call the psychiatric nurse line with medication questions or concerns at [phone].    Futurestream Networkshart may be used to communicate with your provider, but this is not intended to be used for emergencies.    Crisis Resources:    National Suicide Prevention Lifeline: 639.827.9571 (TTY: 676.929.4204). Call anytime for help.  (www.suicidepreventionlifeline.org)  National [place] on Mental Illness (www.nadege.org): 216.907.8552 or 190-772-8526.   Mental Health Association (www.mentalhealth.org): 816.273.6346 or 066-602-8048.  [place] Crisis Text [place]: Text [place] to 460813  Suicide LifeLine Chat: suicidePandoo TEKline.org/chat

## 2020-09-04 ASSESSMENT — ANXIETY QUESTIONNAIRES: GAD7 TOTAL SCORE: 17

## 2020-09-09 ENCOUNTER — VIRTUAL VISIT (OUTPATIENT)
Dept: BEHAVIORAL HEALTH | Facility: CLINIC | Age: 30
End: 2020-09-09
Payer: COMMERCIAL

## 2020-09-09 DIAGNOSIS — R69 DIAGNOSIS DEFERRED: Primary | ICD-10-CM
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
## 2020-09-10 ENCOUNTER — OFFICE VISIT (OUTPATIENT)
Dept: FAMILY MEDICINE | Facility: CLINIC | Age: 30
End: 2020-09-10
Payer: COMMERCIAL

## 2020-09-10 ENCOUNTER — VIRTUAL VISIT (OUTPATIENT)
Dept: PSYCHOLOGY | Facility: CLINIC | Age: 30
End: 2020-09-10
Payer: COMMERCIAL

## 2020-09-10 VITALS
HEART RATE: 80 BPM | OXYGEN SATURATION: 99 % | RESPIRATION RATE: 16 BRPM | TEMPERATURE: 97 F | HEIGHT: 67 IN | BODY MASS INDEX: 26.21 KG/M2 | SYSTOLIC BLOOD PRESSURE: 124 MMHG | WEIGHT: 167 LBS | DIASTOLIC BLOOD PRESSURE: 76 MMHG

## 2020-09-10 DIAGNOSIS — Z79.899 HIGH RISK MEDICATION USE: Primary | ICD-10-CM

## 2020-09-10 DIAGNOSIS — F31.9 BIPOLAR 1 DISORDER (H): Primary | ICD-10-CM

## 2020-09-10 DIAGNOSIS — F31.9 BIPOLAR 1 DISORDER (H): ICD-10-CM

## 2020-09-10 DIAGNOSIS — F10.21 ALCOHOL USE DISORDER, MODERATE, IN EARLY REMISSION (H): ICD-10-CM

## 2020-09-10 DIAGNOSIS — F19.20 CHEMICAL DEPENDENCY (H): ICD-10-CM

## 2020-09-10 DIAGNOSIS — F41.1 GENERALIZED ANXIETY DISORDER: ICD-10-CM

## 2020-09-10 PROCEDURE — 99214 OFFICE O/P EST MOD 30 MIN: CPT | Performed by: NURSE PRACTITIONER

## 2020-09-10 PROCEDURE — 90834 PSYTX W PT 45 MINUTES: CPT | Mod: 95 | Performed by: COUNSELOR

## 2020-09-10 ASSESSMENT — MIFFLIN-ST. JEOR: SCORE: 1676.14
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
## 2020-09-10 NOTE — PATIENT INSTRUCTIONS
Patient Education     Treating Post-Traumatic Stress Disorder (PTSD) with Medicine    Post-traumatic stress disorder (PTSD) can happen after you go through a severe trauma. This may include physical abuse, rape, a natural disaster, a car accident, the death of a loved one, or  combat. Symptoms of PTSD involve intense anxiety that keeps coming back. Nightmares, intrusive memories, and flashbacks (vivid memories that seem real) related to the trauma may also occur. But you don t have to suffer anymore. Treatment is available. Along with therapy (also called counseling), medicine may help manage your symptoms.  Medicines  Certain medicines may be prescribed to help relieve your symptoms. As a result, you may feel less anxious or depressed. You may also feel able to move forward with therapy. At first, medicines and dosages may need to be adjusted to find what works best for you. Try to be patient. Tell your doctor how a medicine makes you feel. This way, you can work together to find the treatment that s best for you. Keep in mind that medicines can have side effects. Talk to your doctor about any side effects that are bothering you. Changing the dose or type of medicine may help. Don t stop taking medicine on your own because it can cause symptoms to come back.    Anti-Anxiety medicine: This medicine relieves symptoms and helps you relax. Your doctor and pharmacist will explain when and how to use it. It may be prescribed for use before entering situations that make you anxious. Or, you may be told to take it on a regular schedule. Anti-anxiety medicine may make you feel a little sleepy or  out of it.  Don t drive a car or operate machinery while on this medicine, until you know how it affects you.  Caution  Never use alcohol or other drugs with anti-anxiety medicine. This could result in sedation, lack of muscular control, coma or death. Also, use only the amount of medicine prescribed to you. If you think  you may have taken too much, get emergency care right away.    Antidepressant medicine: This kind of medicine is often used to treat anxiety, even if you aren t depressed. An antidepressant alters the levels of brain chemicals. This helps keep anxiety under control. This medicine is taken on a schedule. It takes a few weeks to start working. If you don t notice a change at first, you may just need more time. But if you don t notice results after the first few weeks, tell your doctor.  Keep taking medicines as prescribed  Never change your dosage or stop taking your medicine without talking to your doctor first. Keep the following in mind:    Some medicine must be taken on a schedule. Make this part of your daily routine. For instance, always take your pill before brushing your teeth. A pillbox can help you remember if you ve taken your medicine each day.    Medicines are often taken for 6 to12 months. Your doctor will then decide if you need to keep taking them. Many people who have also had therapy may no longer require medicine to manage anxiety.    You may need to stop taking medicine slowly to give your body time to adjust. When it s time to stop, your doctor will tell you more.    If symptoms return, you may need to start taking medicine again. This isn t your fault. It s just the nature of your anxiety disorder. If symptoms return, seek care as soon as possible. If symptoms return, seek care as soon as possible.  Special concerns    Side effects: Medicines may cause side effects. Ask your doctor or pharmacist what you can expect. They may have ideas for avoiding some side effects.    Sexual problems: Some antidepressants can affect your desire for sex or your ability to have an orgasm. A change in dosage or medicine often solves the problem. If you have a sexual side effect that concerns you, tell your doctor.    Addiction: If you have history of addiction, you may need to avoid certain medicines. Be honest  with your provider about any past history of drug use. This will ensure that you receive the safest possible medicines for your PTSD symptoms. Anti-anxiety medicine carry a risk of addiction, and no matter the reason for medicine or the type of drug, your provider will want to prescribe medicines based on a complete knowledge of your medical history.  Resources    National [employer] of Mental Health  www.nimh.nih.gov/topics/ifvsw-sopy-oewk.shtml    The American Academy of Experts in Traumatic Stress  738.264.2252  www.aaets.org  Date Last Reviewed: 2/1/2017 2000-2019 [employer]. [address] [place] [address]. All rights reserved. This information is not intended as a substitute for professional medical care. Always follow your healthcare professional's instructions.

## 2020-09-13 ENCOUNTER — TELEPHONE (OUTPATIENT)
Dept: FAMILY MEDICINE | Facility: CLINIC | Age: 30
End: 2020-09-13

## 2020-09-14 ENCOUNTER — VIRTUAL VISIT (OUTPATIENT)
Dept: BEHAVIORAL HEALTH | Facility: CLINIC | Age: 30
End: 2020-09-14
Payer: COMMERCIAL

## 2020-09-14 DIAGNOSIS — R69 DIAGNOSIS DEFERRED: Primary | ICD-10-CM
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
## 2020-09-22 ENCOUNTER — VIRTUAL VISIT (OUTPATIENT)
Dept: BEHAVIORAL HEALTH | Facility: CLINIC | Age: 30
End: 2020-09-22
Payer: COMMERCIAL

## 2020-09-22 DIAGNOSIS — R69 DIAGNOSIS DEFERRED: Primary | ICD-10-CM

## 2020-09-22 NOTE — PROGRESS NOTES
Behavioral Health Home Services  [facility]: [place]        Social Work Care Navigator Note      Patient: Rao Colonzsaeid  Date: September 22, 2020  Preferred Name: Rao    Previous PHQ-9:   PHQ-9 SCORE 7/22/2020 8/11/2020 9/3/2020   PHQ-9 Total Score 5 15 8   Some encounter information is confidential and restricted. Go to Review Flowsheets activity to see all data.     Previous AAYUSH-7:   AAYUSH-7 SCORE 7/22/2020 8/11/2020 9/3/2020   Total Score 15 21 17   Some encounter information is confidential and restricted. Go to Review Flowsheets activity to see all data.     NOMAN LEVEL:  No flowsheet data found.    Preferred Contact:  Need for : No  Preferred Contact: Cell      Type of Contact Today: Phone call (patient / identified key support person reached)      Data: (subjective / Objective):  Recent ED/IP Admission or Discharge?   None    Patient Goals:  Goal Areas: Health;Mental Health;Chemical Health;Financial and Social Service Benefits;Transportation  Patient stated goals: Patient would like assistance with his physical, mental and chemical health for creating a balanced and healthy lifestyle. Patient would like assistance with SSDI and social service assistance to aid with county benefitsto increase his financial stability. Patient would like assistance with reliable transportation for his family to get to appointments, run errands and get out into the community.        [facility] Core Service Provided:  Comprehensive Care Management: utilized the electronic medical record / patient registry to identify and support patient's health conditions / needs more effectively   Care Transitions: focused on the coordinated and seamless movement of patient between or within different levels of care or settings  Care Coordination: provided care management services/referrals necessary to ensure patient and their identified supports have access to medical, behavioral health, pharmacology and recovery support  services.  Ensured that patient's care is integrated across all settings and services.   Individual and Family Support: aimed to help clients reduce barriers to achieving goals, increase health literacy and knowledge about chronic condition(s), increase self-efficacy skills, and improve health outcomes  Referral to Community and Social Support Services: Followed-up with patient on whether or not they completed a referral    Current Stressors / Issues / Care Plan Objective Addressed Today:  Patient states he is doing ok. Patient states his most recent drug test came back negative from his treatment program. Patient states he is excited and proud of his sobriety. Patient states he is keeping himself positive by listening to motivational rap, talking with friends, fishing and being busy in his garage.     Patient states he and his significant other are ending their relationship. Patient states this is a stressor for him and we discussed interventions and coping skills to manage his mental health and CD symptoms, including having a place he can get away for a bit to sort his thoughts out if he and his significant other are arguing.     Patient states he will be moving out and is applying for Section 8 housing. Patient states he has received short-term housing information from his CD therapist but needs additional information for long-term housing. [facility] mailed housing resources to patient today.    Intervention:  Motivational Interviewing: Expressed Empathy/Understanding, Supported Autonomy, Collaboration, Evocation, Permission to raise concern or advise, Open-ended questions, Reflections: simple and complex and Reframe   Target Behavior(s): Explored and resolved challenges to attending appointments as scheduled, Explored current social supports and reinforced opportunities to increase engagement and Explored current sleep hygeine and patient's perception and knowledge of relationship to mood    Assessment: (Progress on  Goals / Homework):  Patient would benefit from continued coordination in reaching their goals set for the Behavioral Health Home (H) program. [facility] reviewed Health Action Plan goals and will continue to monitor progress and work with patient and their care team.      Plan: (Homework, other):  Patient was encouraged to continue to seek condition-related information and education.      Scheduled a Phone follow up appointment with ANGI  in 2 weeks     Patient has set self-identified goals and will monitor progress until the next appointment on: 10/07/2020.    SARA Irwin [facility]  Behavioral Health Home ([facility])   [facility]  [phone]  September 23, 2020  7:49 AM                Next 5 appointments (look out 90 days)    Oct 16, 2020  7:45 AM CDT  Return Visit with Bree Grullon NP  [facility] [address] [id number]  [phone]

## 2020-09-24 ENCOUNTER — TELEPHONE (OUTPATIENT)
Dept: PSYCHIATRY | Facility: CLINIC | Age: 30
End: 2020-09-24

## 2020-09-24 DIAGNOSIS — F41.1 GENERALIZED ANXIETY DISORDER: ICD-10-CM

## 2020-09-24 DIAGNOSIS — F31.9 BIPOLAR 1 DISORDER (H): Primary | ICD-10-CM

## 2020-09-24 DIAGNOSIS — F41.0 PANIC DISORDER WITHOUT AGORAPHOBIA: ICD-10-CM

## 2020-09-24 NOTE — TELEPHONE ENCOUNTER
A new referral for the testing will be needed for this testing to be completed.    Referral for testing ordered.    Camryn Nichols, MARTIN    Nurse Liaison  [facility]

## 2020-09-24 NOTE — TELEPHONE ENCOUNTER
Bree Grullon NP  P Psych Rn - [facility]    Caller: Unspecified (Today,  9:50 AM)               He is looking to get on disability and would benefit from diagnosis clarification from psychological testing -

## 2020-09-24 NOTE — TELEPHONE ENCOUNTER
Reason for call:  Other   Patient called regarding (reason for call): Referral  Additional comments: Pt was referred to [facility] by   Suzi Jaime NP. Pt is already a pt of Bree.  Pt reports Bree referred him back to Suzi for a new diagnosis.  Intake is trying to clarify with both providers what pt needs; [facility] or long-term psychiatry and will help pt schedule appropriately.    Phone number to reach patient:  Cell number on file:    Telephone Information:   Mobile [phone]       Best Time:  Anytime    Can we leave a detailed message on this number?  YES    Travel screening: Not Applicable

## 2020-09-24 NOTE — TELEPHONE ENCOUNTER
Unsure if patient is to see Bree or PCP.   Routing to provider for review.    Camryn Nichols, RN    Nurse Liaison  [facility]

## 2020-09-25 ENCOUNTER — VIRTUAL VISIT (OUTPATIENT)
Dept: PSYCHOLOGY | Facility: CLINIC | Age: 30
End: 2020-09-25
Payer: COMMERCIAL

## 2020-09-25 DIAGNOSIS — F10.21 ALCOHOL USE DISORDER, MODERATE, IN EARLY REMISSION (H): ICD-10-CM

## 2020-09-25 DIAGNOSIS — F31.9 BIPOLAR 1 DISORDER (H): Primary | ICD-10-CM

## 2020-09-25 PROCEDURE — 90834 PSYTX W PT 45 MINUTES: CPT | Mod: 95 | Performed by: COUNSELOR
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
## 2020-10-05 ENCOUNTER — TELEPHONE (OUTPATIENT)
Dept: BEHAVIORAL HEALTH | Facility: CLINIC | Age: 30
End: 2020-10-05

## 2020-10-05 NOTE — TELEPHONE ENCOUNTER
Behavioral Health Home Services  [facility]: [place]        Social Work Care Navigator Note      Patient: Rao Leavitt  Date: October 5, 2020  Preferred Name: Rao    Previous PHQ-9:   PHQ-9 SCORE 7/22/2020 8/11/2020 9/3/2020   PHQ-9 Total Score 5 15 8   Some encounter information is confidential and restricted. Go to Review Flowsheets activity to see all data.     Previous AAYUSH-7:   AAYUSH-7 SCORE 7/22/2020 8/11/2020 9/3/2020   Total Score 15 21 17   Some encounter information is confidential and restricted. Go to Review Flowsheets activity to see all data.     NOMAN LEVEL:  No flowsheet data found.    Preferred Contact:  Need for : No  Preferred Contact: Cell      Type of Contact Today: Phone call (not reached/unavailable)      Data: (subjective / Objective):  [facility] received voicemail from patient stating he would like a return phone call to update [facility] about appt w/[facility] provider. Patient states he had a good weekend. Attempted to reach patient for Behavioral Health Home ([facility]) monthly check-in, but was unsuccessful. [facility] and patient have an appointment scheduled for 10/07.      SARA Irwin [facility]  Behavioral Health [place] ([facility])   [facility]  [phone]  October 5, 2020  2:43 PM

## 2020-10-06 ENCOUNTER — VIRTUAL VISIT (OUTPATIENT)
Dept: BEHAVIORAL HEALTH | Facility: CLINIC | Age: 30
End: 2020-10-06
Payer: COMMERCIAL

## 2020-10-06 DIAGNOSIS — R69 DIAGNOSIS DEFERRED: Primary | ICD-10-CM
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
## 2020-10-06 NOTE — Clinical Note
Hello - I was able to connect with patient and his is wondering about additional neuropsych scanning/testing due to several concussions over the years and his drug and alcohol use. He has an appointment with you next week and he wanted to connect with you about this.    Thanks,  Varg[place]se

## 2020-10-07 ENCOUNTER — VIRTUAL VISIT (OUTPATIENT)
Dept: BEHAVIORAL HEALTH | Facility: CLINIC | Age: 30
End: 2020-10-07
Payer: COMMERCIAL

## 2020-10-07 ENCOUNTER — TELEPHONE (OUTPATIENT)
Dept: BEHAVIORAL HEALTH | Facility: CLINIC | Age: 30
End: 2020-10-07

## 2020-10-07 DIAGNOSIS — R69 DIAGNOSIS DEFERRED: Primary | ICD-10-CM

## 2020-10-07 NOTE — TELEPHONE ENCOUNTER
Behavioral Health Home Services  [facility]: [place]        Social Work Care Navigator Note      Patient: Rao Leavitt  Date: October 7, 2020  Preferred Name: Rao    Previous PHQ-9:   PHQ-9 SCORE 7/22/2020 8/11/2020 9/3/2020   PHQ-9 Total Score 5 15 8   Some encounter information is confidential and restricted. Go to Review Flowsheets activity to see all data.     Previous AAYUSH-7:   AAYUSH-7 SCORE 7/22/2020 8/11/2020 9/3/2020   Total Score 15 21 17   Some encounter information is confidential and restricted. Go to Review Flowsheets activity to see all data.     NOMAN LEVEL:  No flowsheet data found.    Preferred Contact:  Need for : No  Preferred Contact: Cell      Type of Contact Today: Phone call (not reached/unavailable)      Data: (subjective / Objective):  [facility] received message from patient stating he would like additional resources for housing/rental assistance. Patient stated he was able to receive funds from the [facility] program but is worried about paying his rent this month. [facility] mailed out [employer] housing and rental assistance through the [facility]. [facility] attempted to reach patient, but was unsuccessful. [facility] left message for patient stating resources mailed and to call again if he needed additional support or resources.     SARA Irwin [facility]  Behavioral Health [place] ([facility])   [facility]  [phone]  October 7, 2020  9:25 AM

## 2020-10-07 NOTE — PROGRESS NOTES
Behavioral Health Home Services  [facility]: [place]        Social Work Care Navigator Note      Patient: Rao Colonzsaeid  Date: October 7, 2020  Preferred Name: Rao    Previous PHQ-9:   PHQ-9 SCORE 7/22/2020 8/11/2020 9/3/2020   PHQ-9 Total Score 5 15 8   Some encounter information is confidential and restricted. Go to Review Flowsheets activity to see all data.     Previous AAYUSH-7:   AAYUSH-7 SCORE 7/22/2020 8/11/2020 9/3/2020   Total Score 15 21 17   Some encounter information is confidential and restricted. Go to Review Flowsheets activity to see all data.     NOMAN LEVEL:  No flowsheet data found.    Preferred Contact:  Need for : No  Preferred Contact: Cell      Type of Contact Today: Phone call (patient / identified key support person reached)      Data: (subjective / Objective):  Recent ED/IP Admission or Discharge?   None    Patient Goals:  Goal Areas: Health;Mental Health;Chemical Health;Financial and Social Service Benefits;Transportation  Patient stated goals: Patient would like assistance with his physical, mental and chemical health for creating a balanced and healthy lifestyle. Patient would like assistance with SSDI and social service assistance to aid with county benefitsto increase his financial stability. Patient would like assistance with reliable transportation for his family to get to appointments, run errands and get out into the community.        [facility] Core Service Provided:  Comprehensive Care Management: utilized the electronic medical record / patient registry to identify and support patient's health conditions / needs more effectively   Care Transitions: focused on the coordinated and seamless movement of patient between or within different levels of care or settings  Care Coordination: provided care management services/referrals necessary to ensure patient and their identified supports have access to medical, behavioral health, pharmacology and recovery support services.   Ensured that patient's care is integrated across all settings and services.   Individual and Family Support: aimed to help clients reduce barriers to achieving goals, increase health literacy and knowledge about chronic condition(s), increase self-efficacy skills, and improve health outcomes  Referral to Community and Social Support Services: Provided patient with referrals (see plan section)  Followed-up with patient on whether or not they completed a referral    Current Stressors / Issues / Care Plan Objective Addressed Today:  Patient states he received a call from the [facility] and was approved for emergency assistance to help with rent an utilities for the Sept and Oct. Patient states he is needing additional resources for utilities. [facility] mailed patient emergency utility assistance application and income verification forms.     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 [facility] provided patient with support/empathy, motivational interviewing and positive affirmation. Patient states he will call tomorrow to update [facility] on how his meeting went.    Intervention:  Motivational Interviewing: Expressed Empathy/Understanding, Supported Autonomy, Collaboration, Evocation, Permission to raise concern or advise, Open-ended questions, Reflections: simple and complex, Change talk (evoked) and Reframe   Target Behavior(s): Explored thoughts and readiness to participate in individual therapy, Explored and resolved challenges to attending appointments as scheduled, Explored current social supports and reinforced opportunities to increase engagement and Explored current sleep hygeine and patient's perception and knowledge of relationship to mood    Assessment: (Progress on Goals / Homework):  Patient would benefit from continued coordination in reaching their goals set for the Behavioral Health Home ([facility]) program. [facility] reviewed Health Action Plan goals  and will continue to monitor progress and work with patient and their care team.      Plan: (Homework, other):  Patient was encouraged to continue to seek condition-related information and education.      Scheduled a Phone follow up appointment with ANGI EDUARDO in 2 weeks     Patient has set self-identified goals and will monitor progress until the next appointment on: 10/19/2020.        SARA Irwin [facility]  Behavioral Health Home ([facility])   [facility]  [phone]  October 7, 2020  12:07 PM

## 2020-10-08 ENCOUNTER — VIRTUAL VISIT (OUTPATIENT)
Dept: BEHAVIORAL HEALTH | Facility: CLINIC | Age: 30
End: 2020-10-08
Payer: COMMERCIAL

## 2020-10-08 DIAGNOSIS — R69 DIAGNOSIS DEFERRED: Primary | ICD-10-CM

## 2020-10-08 NOTE — PROGRESS NOTES
Behavioral Health Home Services  [facility]: [place]        Social Work Care Navigator Note      Patient: Rao Colonzsaeid  Date: October 8, 2020  Preferred Name: Rao    Previous PHQ-9:   PHQ-9 SCORE 7/22/2020 8/11/2020 9/3/2020   PHQ-9 Total Score 5 15 8   Some encounter information is confidential and restricted. Go to Review Flowsheets activity to see all data.     Previous AAYUSH-7:   AAYUSH-7 SCORE 7/22/2020 8/11/2020 9/3/2020   Total Score 15 21 17   Some encounter information is confidential and restricted. Go to Review Flowsheets activity to see all data.     NOMAN LEVEL:  No flowsheet data found.    Preferred Contact:  Need for : No  Preferred Contact: Cell      Type of Contact Today: Phone call (patient / identified key support person reached)      Data: (subjective / Objective):  Recent ED/IP Admission or Discharge?   None    Patient Goals:  Goal Areas: Health;Mental Health;Chemical Health;Financial and Social Service Benefits;Transportation  Patient stated goals: Patient would like assistance with his physical, mental and chemical health for creating a balanced and healthy lifestyle. Patient would like assistance with SSDI and social service assistance to aid with county benefitsto increase his financial stability. Patient would like assistance with reliable transportation for his family to get to appointments, run errands and get out into the community.        [facility] Core Service Provided:  Comprehensive Care Management: utilized the electronic medical record / patient registry to identify and support patient's health conditions / needs more effectively   Care Transitions: focused on the coordinated and seamless movement of patient between or within different levels of care or settings  Care Coordination: provided care management services/referrals necessary to ensure patient and their identified supports have access to medical, behavioral health, pharmacology and recovery support services.   Ensured that patient's care is integrated across all settings and services.   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 Patient states it is going well and he has one more night to share his story. Patient states he continues to have stress surrounding his girlfriend and her mother. [facility] provided support/empathy, supportive counseling and motivational interviewing.    Intervention:  Motivational Interviewing: Expressed Empathy/Understanding, Supported Autonomy, Collaboration, Evocation, Permission to raise concern or advise, Open-ended questions, Reflections: simple and complex and Reframe   Target Behavior(s): Explored thoughts and readiness to participate in individual therapy, Explored and resolved challenges to attending appointments as scheduled and Explored current social supports and reinforced opportunities to increase engagement    Assessment: (Progress on Goals / Homework):  Patient would benefit from continued coordination in reaching their goals set for the Behavioral Health Home ([facility]) program. [facility] reviewed Health Action Plan goals and will continue to monitor progress and work with patient and their care team.      Plan: (Homework, other):  Patient was encouraged to continue to seek condition-related information and education.      Scheduled a Phone follow up appointment with ANGI  in 2 weeks     Patient has set self-identified goals and will monitor progress until the next appointment on: 10/19/2020.    SARA Irwin [facility]  Behavioral Health Home ([facility])   [facility]  [phone]  October 8, 2020  1:47 PM

## 2020-10-12 ENCOUNTER — VIRTUAL VISIT (OUTPATIENT)
Dept: BEHAVIORAL HEALTH | Facility: CLINIC | Age: 30
End: 2020-10-12
Payer: COMMERCIAL

## 2020-10-12 DIAGNOSIS — R69 DIAGNOSIS DEFERRED: Primary | ICD-10-CM

## 2020-10-12 NOTE — PROGRESS NOTES
Behavioral Health Home Services  [facility]: [place]        Social Work Care Navigator Note      Patient: Rao Colonzsaeid  Date: October 12, 2020  Preferred Name: Rao    Previous PHQ-9:   PHQ-9 SCORE 7/22/2020 8/11/2020 9/3/2020   PHQ-9 Total Score 5 15 8   Some encounter information is confidential and restricted. Go to Review Flowsheets activity to see all data.     Previous AAYUSH-7:   AAYUSH-7 SCORE 7/22/2020 8/11/2020 9/3/2020   Total Score 15 21 17   Some encounter information is confidential and restricted. Go to Review Flowsheets activity to see all data.     NOMAN LEVEL:  No flowsheet data found.    Preferred Contact:  Need for : No  Preferred Contact: Cell      Type of Contact Today: Phone call (patient / identified key support person reached)      Data: (subjective / Objective):  Recent ED/IP Admission or Discharge?   None    Patient Goals:  Goal Areas: Health;Mental Health;Chemical Health;Financial and Social Service Benefits;Transportation  Patient stated goals: Patient would like assistance with his physical, mental and chemical health for creating a balanced and healthy lifestyle. Patient would like assistance with SSDI and social service assistance to aid with county benefitsto increase his financial stability. Patient would like assistance with reliable transportation for his family to get to appointments, run errands and get out into the community.        [facility] Core Service Provided:  Comprehensive Care Management: utilized the electronic medical record / patient registry to identify and support patient's health conditions / needs more effectively   Care Transitions: focused on the coordinated and seamless movement of patient between or within different levels of care or settings  Care Coordination: provided care management services/referrals necessary to ensure patient and their identified supports have access to medical, behavioral health, pharmacology and recovery support services.   Ensured that patient's care is integrated across all settings and services.   Individual and Family Support: aimed to help clients reduce barriers to achieving goals, increase health literacy and knowledge about chronic condition(s), increase self-efficacy skills, and improve health outcomes  Referral to Community and Social Support Services: Followed-up with patient on whether or not they completed a referral    Current Stressors / Issues / Care Plan Objective Addressed Today:  Patient states he had a rough weekend. Patient states his significant other contacted the Corona Virus and he has been caring for her. Patient states he and his children are being tested tomorrow. Patient states he has been sanitizing their apartment and keeping their distance. Patient states his significant other is concerned about missing work, as she is the sole income provider for their household. [facility] provided education and resources for this.    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 Patient states this left him in a vulnerable mood and states he lost his temper on Friday with his significant other. Patient states he was able to walk away, calm himself and go for a walk. Patient states he was able to contact his CD therapist to help talk things out.    Patient states his vehicle is currently in the shop needing new tires and brakes. Patient is concerned about the cost. [facility] mailed out low cost repair and trasnportation information and resources to patient today. [facility] provided support/empathy, resources, education, motivational interviewing and care coordination for patient today.    Intervention:  Motivational Interviewing: Expressed Empathy/Understanding, Supported Autonomy, Collaboration, Evocation, Permission to raise concern or advise, Open-ended questions and Change talk (evoked)   Target Behavior(s): Explored thoughts and readiness to participate in individual therapy, Explored  and resolved challenges to attending appointments as scheduled and Explored current social supports and reinforced opportunities to increase engagement    Assessment: (Progress on Goals / Homework):  Patient would benefit from continued coordination in reaching their goals set for the Behavioral Health Home ([facility]) program. [facility] reviewed Health Action Plan goals and will continue to monitor progress and work with patient and their care team.      Plan: (Homework, other):  Patient was encouraged to continue to seek condition-related information and education.      Scheduled a Phone follow up appointment with ANGI EDUARDO in 1 week.     Patient has set self-identified goals and will monitor progress until the next appointment on: 10/19/2020.        SARA Irwin [facility]  Behavioral Health Home ([facility])   [facility]  [phone]  October 12, 2020  11:46 AM

## 2020-10-16 ENCOUNTER — VIRTUAL VISIT (OUTPATIENT)
Dept: PSYCHIATRY | Facility: CLINIC | Age: 30
End: 2020-10-16
Payer: COMMERCIAL

## 2020-10-16 DIAGNOSIS — Z53.9 NO SHOW: Primary | ICD-10-CM

## 2020-10-19 ENCOUNTER — VIRTUAL VISIT (OUTPATIENT)
Dept: BEHAVIORAL HEALTH | Facility: CLINIC | Age: 30
End: 2020-10-19
Payer: COMMERCIAL

## 2020-10-19 DIAGNOSIS — R69 DIAGNOSIS DEFERRED: Primary | ICD-10-CM

## 2020-10-19 NOTE — PROGRESS NOTES
Behavioral Health Home Services  [facility]: [place]        Social Work Care Navigator Note      Patient: Rao Colonzsaeid  Date: October 19, 2020  Preferred Name: Rao    Previous PHQ-9:   PHQ-9 SCORE 7/22/2020 8/11/2020 9/3/2020   PHQ-9 Total Score 5 15 8   Some encounter information is confidential and restricted. Go to Review Flowsheets activity to see all data.     Previous AAYUSH-7:   AAYUSH-7 SCORE 7/22/2020 8/11/2020 9/3/2020   Total Score 15 21 17   Some encounter information is confidential and restricted. Go to Review Flowsheets activity to see all data.     NOMAN LEVEL:  No flowsheet data found.    Preferred Contact:  Need for : No  Preferred Contact: Cell      Type of Contact Today: Phone call (patient / identified key support person reached)      Data: (subjective / Objective):  Recent ED/IP Admission or Discharge?   None    Patient Goals:  Goal Areas: Health;Mental Health;Chemical Health;Financial and Social Service Benefits;Transportation  Patient stated goals: Patient would like assistance with his physical, mental and chemical health for creating a balanced and healthy lifestyle. Patient would like assistance with SSDI and social service assistance to aid with county benefitsto increase his financial stability. Patient would like assistance with reliable transportation for his family to get to appointments, run errands and get out into the community.        [facility] Core Service Provided:  Comprehensive Care Management: utilized the electronic medical record / patient registry to identify and support patient's health conditions / needs more effectively   Care Transitions: focused on the coordinated and seamless movement of patient between or within different levels of care or settings  Care Coordination: provided care management services/referrals necessary to ensure patient and their identified supports have access to medical, behavioral health, pharmacology and recovery support services.   Ensured that patient's care is integrated across all settings and services.   Individual and Family Support: aimed to help clients reduce barriers to achieving goals, increase health literacy and knowledge about chronic condition(s), increase self-efficacy skills, and improve health outcomes  Referral to Community and Social Support Services: Followed-up with patient on whether or not they completed a referral    Current Stressors / Issues / Care Plan Objective Addressed Today:  Patient states his weekend was stressful, as his significant other needed to be admitted due to Covid complications along with their youngest dtr. Patient states he and his significant other's child remain symptom free. Patient states he spent the weekend cleaning and sanitizing their apartment. Patient states his significant other and child came home today feeling much better.  Patient states his significant other is planning on returning to work on Saturday.    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 Patient is excited by this and will continue to meet individually with his CD counselor for another 6-12 weeks. Patient states his CD counselor has made a referral to an outpatient mental health program through [facility] that he will be starting at the end of Nov. to beginning of December. Patient states his probation has officially ended and his restraining order has . Patient states he is proud of this accomplishment.  Patient states his  recommended anger management or continued outpatient mental health services. Patient continues to see [facility] therapist Cherie Christina (twice a month) and his psychiatric prescriber.    Patient states he and his significant other were able to complete their car repairs and fix their vehicle. Patient states they continue to plan for a new car in the first quarter of the year.    Patient states he has completed all the paperwork needed for his SSDI claim and  it has been 3-4 months since he has applied. [facility] and patient will continue to monitor and [facility] will provide resources and support as needed.    Intervention:  Motivational Interviewing: Expressed Empathy/Understanding, Supported Autonomy, Collaboration, Evocation, Permission to raise concern or advise, Open-ended questions, Reflections: simple and complex, Change talk (evoked) and Reframe   Target Behavior(s): Explored thoughts about taking an anti-depressant, Explored and resolved challenges related to taking anti-depressants as prescribed, Explored thoughts and readiness to participate in individual therapy, Explored and resolved challenges to attending appointments as scheduled and Explored current social supports and reinforced opportunities to increase engagement    Assessment: (Progress on Goals / Homework):  Patient would benefit from continued coordination in reaching their goals set for the Behavioral Health Home ([facility]) program. [facility] reviewed Health Action Plan goals and will continue to monitor progress and work with patient and their care team.      Plan: (Homework, other):  Patient was encouraged to continue to seek condition-related information and education.      Scheduled a Phone follow up appointment with SW CC in 2 weeks     Patient has set self-identified goals and will monitor progress until the next appointment on: 11/02/2020.         SARA Irwin [facility]  Behavioral Health Home ([facility])   [facility]  [phone]  October 19, 2020  3:13 PM

## 2020-10-23 ENCOUNTER — VIRTUAL VISIT (OUTPATIENT)
Dept: BEHAVIORAL HEALTH | Facility: CLINIC | Age: 30
End: 2020-10-23
Payer: COMMERCIAL

## 2020-10-23 DIAGNOSIS — R69 DIAGNOSIS DEFERRED: Primary | ICD-10-CM

## 2020-10-23 NOTE — PROGRESS NOTES
Behavioral Health Home Services  [facility]: [place]        Social Work Care Navigator Note      Patient: Rao Colonzsaeid  Date: October 23, 2020  Preferred Name: Rao    Previous PHQ-9:   PHQ-9 SCORE 7/22/2020 8/11/2020 9/3/2020   PHQ-9 Total Score 5 15 8   Some encounter information is confidential and restricted. Go to Review Flowsheets activity to see all data.     Previous AAYUSH-7:   AAYUSH-7 SCORE 7/22/2020 8/11/2020 9/3/2020   Total Score 15 21 17   Some encounter information is confidential and restricted. Go to Review Flowsheets activity to see all data.     NOMAN LEVEL:  No flowsheet data found.    Preferred Contact:  Need for : No  Preferred Contact: Cell      Type of Contact Today: Phone call (patient / identified key support person reached)      Data: (subjective / Objective):  Recent ED/IP Admission or Discharge?   None    Patient Goals:  Goal Areas: Health;Mental Health;Chemical Health;Financial and Social Service Benefits;Transportation  Patient stated goals: Patient would like assistance with his physical, mental and chemical health for creating a balanced and healthy lifestyle. Patient would like assistance with SSDI and social service assistance to aid with county benefitsto increase his financial stability. Patient would like assistance with reliable transportation for his family to get to appointments, run errands and get out into the community.        [facility] Core Service Provided:  Comprehensive Care Management: utilized the electronic medical record / patient registry to identify and support patient's health conditions / needs more effectively   Care Transitions: focused on the coordinated and seamless movement of patient between or within different levels of care or settings  Care Coordination: provided care management services/referrals necessary to ensure patient and their identified supports have access to medical, behavioral health, pharmacology and recovery support services.   Ensured that patient's care is integrated across all settings and services.   Individual and Family Support: aimed to help clients reduce barriers to achieving goals, increase health literacy and knowledge about chronic condition(s), increase self-efficacy skills, and improve health outcomes  Referral to Community and Social Support Services: Coordinated / Confirmed patient's appointment time or referral and transportation plan  Followed-up with patient on whether or not they completed a referral    Current Stressors / Issues / Care Plan Objective Addressed Today:  [facility] received phone call from patient this am. Patient states his [facility] worker Shanika is no longer to meet with him. Patient states that he was only able to complete two visits with her before this transfer. Patient states he has been connected with Kanwal Vigil [facility] [employer] at [phone]. Patient states he has not received a call from Kanwal and has left her two messages to see when they would be meeting for their initial appointment with no response. Patient states he likes the support from [facility] but needs the service to be more reliable and would like to have a worker that will get to know him and support him. Patient requesting [facility] reach out to Kanwal to coordinate care.     [facility] called [employer]  and was unable to connect with [facility] worker today. [facility] left message, awaiting response. [facility] updated patient with results. Patient states he will call on Tues or Wed of next week to update [facility] with status of [facility] worker. [facility] to monitor and follow-up with patient.    Intervention:  Motivational Interviewing: Expressed Empathy/Understanding, Supported Autonomy, Collaboration, Evocation, Permission to raise concern or advise, Open-ended questions and Reflections: simple and complex   Target Behavior(s): Explored thoughts and readiness to participate in individual therapy and Explored current social supports and reinforced opportunities to  increase engagement    Assessment: (Progress on Goals / Homework):  Patient would benefit from continued coordination in reaching their goals set for the Behavioral Health Home ([facility]) program. SWCC reviewed Health Action Plan goals and will continue to monitor progress and work with patient and their care team.      Plan: (Homework, other):  Patient was encouraged to continue to seek condition-related information and education.      Scheduled a Phone follow up appointment with ANGI  in 1 week     Patient has set self-identified goals and will monitor progress until the next appointment on: 11/02/2020.        SARA Irwin [facility]  Behavioral Health Home ([facility])   [facility]  [phone]  October 23, 2020  2:11 PM

## 2020-10-29 ENCOUNTER — VIRTUAL VISIT (OUTPATIENT)
Dept: PSYCHOLOGY | Facility: CLINIC | Age: 30
End: 2020-10-29
Payer: COMMERCIAL

## 2020-10-29 DIAGNOSIS — F10.21 ALCOHOL USE DISORDER, MODERATE, IN EARLY REMISSION (H): ICD-10-CM

## 2020-10-29 DIAGNOSIS — F31.9 BIPOLAR 1 DISORDER (H): Primary | ICD-10-CM

## 2020-10-29 DIAGNOSIS — F15.21 METHAMPHETAMINE USE DISORDER, MODERATE, IN EARLY REMISSION (H): ICD-10-CM

## 2020-10-29 PROCEDURE — 90834 PSYTX W PT 45 MINUTES: CPT | Mod: 95 | Performed by: COUNSELOR
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
## 2020-11-02 ENCOUNTER — VIRTUAL VISIT (OUTPATIENT)
Dept: PSYCHIATRY | Facility: CLINIC | Age: 30
End: 2020-11-02
Payer: COMMERCIAL

## 2020-11-02 ENCOUNTER — VIRTUAL VISIT (OUTPATIENT)
Dept: BEHAVIORAL HEALTH | Facility: CLINIC | Age: 30
End: 2020-11-02
Payer: COMMERCIAL

## 2020-11-02 DIAGNOSIS — R69 DIAGNOSIS DEFERRED: Primary | ICD-10-CM

## 2020-11-02 DIAGNOSIS — F63.9 IMPULSE CONTROL DISORDER: ICD-10-CM

## 2020-11-02 DIAGNOSIS — R46.89 DIFFICULTY CONTROLLING BEHAVIOR AS LATE EFFECT OF TRAUMATIC BRAIN INJURY (H): ICD-10-CM

## 2020-11-02 DIAGNOSIS — Z79.899 LITHIUM USE: ICD-10-CM

## 2020-11-02 DIAGNOSIS — Z79.899 HIGH RISK MEDICATION USE: Primary | ICD-10-CM

## 2020-11-02 DIAGNOSIS — F41.1 GENERALIZED ANXIETY DISORDER: ICD-10-CM

## 2020-11-02 DIAGNOSIS — S06.9XAS DIFFICULTY CONTROLLING BEHAVIOR AS LATE EFFECT OF TRAUMATIC BRAIN INJURY (H): ICD-10-CM

## 2020-11-02 DIAGNOSIS — F31.9 BIPOLAR 1 DISORDER (H): ICD-10-CM

## 2020-11-02 PROCEDURE — 99214 OFFICE O/P EST MOD 30 MIN: CPT | Mod: 95 | Performed by: NURSE PRACTITIONER

## 2020-11-02 RX ORDER — LITHIUM CARBONATE 600 MG/1
600 CAPSULE ORAL 2 TIMES DAILY WITH MEALS
Qty: 60 CAPSULE | Refills: 3 | Status: SHIPPED | OUTPATIENT
Start: 2020-11-02 | End: 2020-12-10

## 2020-11-02 RX ORDER — PROPRANOLOL HYDROCHLORIDE 20 MG/1
20 TABLET ORAL 3 TIMES DAILY
Qty: 90 TABLET | Refills: 1 | Status: SHIPPED | OUTPATIENT
Start: 2020-11-02 | End: 2020-12-10 | Stop reason: DRUGHIGH

## 2020-11-02 RX ORDER — SPIRONOLACT/HYDROCHLOROTHIAZID 25 MG-25MG
TABLET ORAL
COMMUNITY
End: 2024-02-12

## 2020-11-02 ASSESSMENT — PATIENT HEALTH QUESTIONNAIRE - PHQ9
5. POOR APPETITE OR OVEREATING: NEARLY EVERY DAY
SUM OF ALL RESPONSES TO PHQ QUESTIONS 1-9: 12

## 2020-11-02 ASSESSMENT — ANXIETY QUESTIONNAIRES
5. BEING SO RESTLESS THAT IT IS HARD TO SIT STILL: NEARLY EVERY DAY
6. BECOMING EASILY ANNOYED OR IRRITABLE: NEARLY EVERY DAY
2. NOT BEING ABLE TO STOP OR CONTROL WORRYING: SEVERAL DAYS
7. FEELING AFRAID AS IF SOMETHING AWFUL MIGHT HAPPEN: SEVERAL DAYS
1. FEELING NERVOUS, ANXIOUS, OR ON EDGE: NEARLY EVERY DAY
GAD7 TOTAL SCORE: 17
IF YOU CHECKED OFF ANY PROBLEMS ON THIS QUESTIONNAIRE, HOW DIFFICULT HAVE THESE PROBLEMS MADE IT FOR YOU TO DO YOUR WORK, TAKE CARE OF THINGS AT HOME, OR GET ALONG WITH OTHER PEOPLE: EXTREMELY DIFFICULT
3. WORRYING TOO MUCH ABOUT DIFFERENT THINGS: NEARLY EVERY DAY
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
## 2020-11-02 NOTE — PATIENT INSTRUCTIONS
1. Neurology referral-history of TBI with behavioral changes  2. Continue lithium 600 mg twice daily  3. Continue propranolol 20 mg three times daily for anxiety  4. Continue therapies to process trauma and addiction issues         continue all other medications as reviewed per electronic medical record today.     Safety plan reviewed. To the Emergency Department as needed or call after hours crisis line at [phone] or [phone]. [place] Crisis Text Line. Text [place] to 566776 or Suicide LifeLine Chat: TidePool.org/chat/    To schedule individual or family therapy, call [facility] at [phone].    Schedule an appointment with me in 6 weeks or sooner as needed. Call [facility] at [phone] to schedule.    Follow up with primary care provider as planned or for acute medical concerns.    Call the psychiatric nurse line with medication questions or concerns at [phone].    [place] may be used to communicate with your provider, but this is not intended to be used for emergencies.    Crisis Resources:    National Suicide Prevention Lifeline: 245.229.9274 (TTY: 137.557.5531). Call anytime for help.  (www.suicidepreventionlifeline.org)  National [place] on Mental Illness (www.nadege.org): 640.321.5527 or 761-531-3991.   Mental Health Association (www.mentalhealth.org): 157.952.6960 or 081-763-5685.  [place] Crisis Text Line: Text [place] to 866862  Suicide LifeLine Chat: suicideWiki-PRline.org/chat
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
## 2020-11-03 ASSESSMENT — ANXIETY QUESTIONNAIRES: GAD7 TOTAL SCORE: 17

## 2020-11-06 ENCOUNTER — ALLIED HEALTH/NURSE VISIT (OUTPATIENT)
Dept: BEHAVIORAL HEALTH | Facility: CLINIC | Age: 30
End: 2020-11-06
Payer: COMMERCIAL

## 2020-11-06 DIAGNOSIS — R69 DIAGNOSIS DEFERRED: Primary | ICD-10-CM

## 2020-11-06 NOTE — PROGRESS NOTES
Behavioral Health Home Services  [facility]: [place]        Social Work Care Navigator Note      Patient: Rao Leavitt  Date: November 6, 2020  Preferred Name: Rao    Previous PHQ-9:   PHQ-9 SCORE 8/11/2020 9/3/2020 11/2/2020   PHQ-9 Total Score 15 8 12   Some encounter information is confidential and restricted. Go to Review Flowsheets activity to see all data.     Previous AAYUSH-7:   AAYUSH-7 SCORE 8/11/2020 9/3/2020 11/2/2020   Total Score 21 17 17   Some encounter information is confidential and restricted. Go to Review Flowsheets activity to see all data.     NOMAN LEVEL:  No flowsheet data found.    Preferred Contact:  Need for : No  Preferred Contact: Cell      Type of Contact Today: Phone call (patient / identified key support person reached)      Data: (subjective / Objective):  Recent ED/IP Admission or Discharge?   None    Patient Goals:  Goal Areas: Health;Mental Health;Chemical Health;Financial and Social Service Benefits;Transportation  Patient stated goals: Patient would like assistance with his physical, mental and chemical health for creating a balanced and healthy lifestyle. Patient would like assistance with SSDI and social service assistance to aid with county benefitsto increase his financial stability. Patient would like assistance with reliable transportation for his family to get to appointments, run errands and get out into the community.        [facility] Core Service Provided:  Comprehensive Care Management: utilized the electronic medical record / patient registry to identify and support patient's health conditions / needs more effectively   Care Transitions: focused on the coordinated and seamless movement of patient between or within different levels of care or settings  Care Coordination: provided care management services/referrals necessary to ensure patient and their identified supports have access to medical, behavioral health, pharmacology and recovery support  services.  Ensured that patient's care is integrated across all settings and services.   Individual and Family Support: aimed to help clients reduce barriers to achieving goals, increase health literacy and knowledge about chronic condition(s), increase self-efficacy skills, and improve health outcomes  Referral to Community and Social Support Services: Provided patient with referrals (see plan section)  Followed-up with patient on whether or not they completed a referral    Current Stressors / Issues / Care Plan Objective Addressed Today:  Patient called [facility] today to discuss difficulty he has been having with his significant other's mother and how this impacts his relationship with his significant other and their children. Patient reports he continues to have cravings to use and actively seeks support for this. Júnior reports that the anniversary of his grandmother's death 3-years ago is coming up again in Feb. [facility] and patient discussed extra supports and making sure he is connecting to his therapists about this. Patient reports he has been receiving additional support from his [facility] counselor, [facility] provider and therapist. [facility] was able to provide support/empathy, coping skills, motivational interviewing and healthy relationships/boundaries.    Patient reports he has been coping with the additional stress by seeking additional professional support, talking with friends and family, getting some exercise, going for a walk and taking the kids to the park. Patient reports he would like to get a punching bag and open a [employer] membership. [facility] encouraged patient to continue to seek positive relationships and to remain physically active.    Patient reports he has not heard anything from SSDI put his claim continues to be open. Patient reports once things have settled down he would like to connect with his PCP for boots for his feet to help relieve pain and swelling. [facility] to monitor and continue to follow-up with patient  about this.     Intervention:  Motivational Interviewing: Expressed Empathy/Understanding, Supported Autonomy, Collaboration, Evocation, Permission to raise concern or advise, Open-ended questions, Reflections: simple and complex, Change talk (evoked) and Reframe   Target Behavior(s): Explored thoughts about taking an anti-depressant, Explored and resolved challenges related to taking anti-depressants as prescribed, Explored patient's knowledge of the impact of exercise on mood, Explored current exercise activities and thoughts about how this influences mood, Explored current social supports and reinforced opportunities to increase engagement and Explored patient's perception of how alcohol and / or drugs influences mood    Assessment: (Progress on Goals / Homework):  Patient would benefit from continued coordination in reaching their goals set for the Behavioral Health Home ([facility]) program. SW reviewed Health Action Plan goals and will continue to monitor progress and work with patient and their care team.      Plan: (Homework, other):  Patient was encouraged to continue to seek condition-related information and education.      Scheduled a Phone follow up appointment with SW  in 2 weeks     Patient has set self-identified goals and will monitor progress until the next appointment on: 11/16/2020.        SARA Irwin [facility]  Behavioral Health Home ([facility])   [facility]  [phone]

## 2020-11-09 ENCOUNTER — ALLIED HEALTH/NURSE VISIT (OUTPATIENT)
Dept: BEHAVIORAL HEALTH | Facility: CLINIC | Age: 30
End: 2020-11-09
Payer: COMMERCIAL

## 2020-11-09 DIAGNOSIS — R69 DIAGNOSIS DEFERRED: Primary | ICD-10-CM

## 2020-11-09 NOTE — Clinical Note
Good morning - I was able to meet with this patient today and discussed his difficulty with sleeping. Not sure if you are working on sleep hygiene with him but I know this has been a struggle for him currently and in the past when his thoughts get racing.  Just wanted to let you know.    Thanks,  SARA Irwin LGSW [facility] [place] [facility]   [facility]  [phone]

## 2020-11-09 NOTE — PROGRESS NOTES
Behavioral Health Home Services  [facility]: [place]        Social Work Care Navigator Note      Patient: Rao Leavitt  Date: November 9, 2020  Preferred Name: Rao    Previous PHQ-9:   PHQ-9 SCORE 8/11/2020 9/3/2020 11/2/2020   PHQ-9 Total Score 15 8 12   Some encounter information is confidential and restricted. Go to Review Flowsheets activity to see all data.     Previous AAYUSH-7:   AAYUSH-7 SCORE 8/11/2020 9/3/2020 11/2/2020   Total Score 21 17 17   Some encounter information is confidential and restricted. Go to Review Flowsheets activity to see all data.     NOMAN LEVEL:  No flowsheet data found.    Preferred Contact:  Need for : No  Preferred Contact: Cell      Type of Contact Today: Phone call (patient / identified key support person reached)      Data: (subjective / Objective):  Recent ED/IP Admission or Discharge?   None    Patient Goals:  Goal Areas: Health;Mental Health;Chemical Health;Financial and Social Service Benefits;Transportation  Patient stated goals: Patient would like assistance with his physical, mental and chemical health for creating a balanced and healthy lifestyle. Patient would like assistance with SSDI and social service assistance to aid with county benefitsto increase his financial stability. Patient would like assistance with reliable transportation for his family to get to appointments, run errands and get out into the community.        [facility] Core Service Provided:  Comprehensive Care Management: utilized the electronic medical record / patient registry to identify and support patient's health conditions / needs more effectively   Care Transitions: focused on the coordinated and seamless movement of patient between or within different levels of care or settings  Care Coordination: provided care management services/referrals necessary to ensure patient and their identified supports have access to medical, behavioral health, pharmacology and recovery support  services.  Ensured that patient's care is integrated across all settings and services.   Individual and Family Support: aimed to help clients reduce barriers to achieving goals, increase health literacy and knowledge about chronic condition(s), increase self-efficacy skills, and improve health outcomes  Referral to Community and Social Support Services: Followed-up with patient on whether or not they completed a referral    Current Stressors / Issues / Care Plan Objective Addressed Today:  Patient called [facility] to get additional support today. Patient reports that he and his significant other have decided it best to separate. Patient reports he and his significant other had long discussion this weekend and reports he finally feels at peace about where his relationship is with the mother of his child. Patient reports he receives additional support from his [facility] counselor, therapist, [facility] worker and family/friends.    Patient reports he and his significant other will continue to live together and he will continue to provide  for their child plus her child. Patient reports they have both agreed on how to move forward in a positive way for their daughter. [facility] provided support and encouraged patient to maintain healthy relationship boundaries.    Patient reports he continues to retain his sobriety and his drug test came back clean. Patient reports he would like to continue to do weekly UA testing to help support his sobriety. Patient reports he appreciates the support of his therapy team and CD team. Patient reports over the past few days having difficulty sleeping. Patient reports he has been working on his sleep hygiene but continues to need support with this.    Patient reports he needs to find an agency to provide him with PCA services. Patient reports he will reach out to [facility] [employer]  for help with this.     Patient reports he has been denied for his SSDI claim. Patient reports he will be contacting  his  for next steps and an appeal.        Intervention:  Motivational Interviewing: Expressed Empathy/Understanding, Supported Autonomy, Collaboration, Evocation, Permission to raise concern or advise, Open-ended questions, Reflections: simple and complex, Change talk (evoked) and Reframe   Target Behavior(s): Explored thoughts about taking an anti-depressant, Explored and resolved challenges related to taking anti-depressants as prescribed, Explored thoughts and readiness to participate in individual therapy, Explored and resolved challenges to attending appointments as scheduled, Explored current social supports and reinforced opportunities to increase engagement and Explored current sleep hygeine and patient's perception and knowledge of relationship to mood    Assessment: (Progress on Goals / Homework):  Patient would benefit from continued coordination in reaching their goals set for the Behavioral Health Home ([facility]) program. [facility] reviewed Health Action Plan goals and will continue to monitor progress and work with patient and their care team.      Plan: (Homework, other):  Patient was encouraged to continue to seek condition-related information and education.      Scheduled a Phone follow up appointment with ANGI EDUARDO in 1 week     Patient has set self-identified goals and will monitor progress until the next appointment on: 11/16/2020.        SARA Irwin [facility]  Behavioral Health Home ([facility])   [facility]  [phone]

## 2020-11-13 ENCOUNTER — VIRTUAL VISIT (OUTPATIENT)
Dept: PSYCHOLOGY | Facility: CLINIC | Age: 30
End: 2020-11-13
Payer: COMMERCIAL

## 2020-11-13 DIAGNOSIS — F31.9 BIPOLAR 1 DISORDER (H): Primary | ICD-10-CM

## 2020-11-13 DIAGNOSIS — F10.21 ALCOHOL USE DISORDER, MODERATE, IN EARLY REMISSION (H): ICD-10-CM

## 2020-11-13 DIAGNOSIS — F15.21 METHAMPHETAMINE USE DISORDER, MODERATE, IN EARLY REMISSION (H): ICD-10-CM

## 2020-11-13 PROCEDURE — 90834 PSYTX W PT 45 MINUTES: CPT | Mod: 95 | Performed by: COUNSELOR

## 2020-11-16 ENCOUNTER — ALLIED HEALTH/NURSE VISIT (OUTPATIENT)
Dept: BEHAVIORAL HEALTH | Facility: CLINIC | Age: 30
End: 2020-11-16
Payer: COMMERCIAL

## 2020-11-16 DIAGNOSIS — R69 DIAGNOSIS DEFERRED: Primary | ICD-10-CM

## 2020-11-16 NOTE — PROGRESS NOTES
Behavioral Health Home Services  [facility]: [place]        Social Work Care Navigator Note      Patient: Rao Colonzsaeid  Date: November 16, 2020  Preferred Name: Rao    Previous PHQ-9:   PHQ-9 SCORE 8/11/2020 9/3/2020 11/2/2020   PHQ-9 Total Score 15 8 12   Some encounter information is confidential and restricted. Go to Review Flowsheets activity to see all data.     Previous AAYUSH-7:   AAYUSH-7 SCORE 8/11/2020 9/3/2020 11/2/2020   Total Score 21 17 17   Some encounter information is confidential and restricted. Go to Review Flowsheets activity to see all data.     NOMAN LEVEL:  No flowsheet data found.    Preferred Contact:  Need for : No  Preferred Contact: Cell      Type of Contact Today: Phone call (patient / identified key support person reached)      Data: (subjective / Objective):  Recent ED/IP Admission or Discharge?   None    Patient Goals:  Goal Areas: Health;Mental Health;Chemical Health;Financial and Social Service Benefits;Transportation  Patient stated goals: Patient would like assistance with his physical, mental and chemical health for creating a balanced and healthy lifestyle. Patient would like assistance with SSDI and social service assistance to aid with county benefitsto increase his financial stability. Patient would like assistance with reliable transportation for his family to get to appointments, run errands and get out into the community.        [facility] Core Service Provided:  Comprehensive Care Management: utilized the electronic medical record / patient registry to identify and support patient's health conditions / needs more effectively   Care Transitions: focused on the coordinated and seamless movement of patient between or within different levels of care or settings  Care Coordination: provided care management services/referrals necessary to ensure patient and their identified supports have access to medical, behavioral health, pharmacology and recovery support  "services.  Ensured that patient's care is integrated across all settings and services.   Individual and Family Support: aimed to help clients reduce barriers to achieving goals, increase health literacy and knowledge about chronic condition(s), increase self-efficacy skills, and improve health outcomes  Referral to Community and Social Support Services: Provided patient with referrals (see plan section)  Followed-up with patient on whether or not they completed a referral    Current Stressors / Issues / Care Plan Objective Addressed Today:  [facility] and patient were able to meet today for Behavioral Health Home ([facility]) Monthly Check-In.    1. Discussed current mood and healthy supports. 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 [facility] was able to provide patient with support empathy, motivational interviewing, healthy boundaries and coping skills. Patient meets twice a week with his [facility] counselor. once a week with [facility] worker and twice a month with his therapist with additional support from [facility] and psychiatry.    2. Discussed PCA services. Patient reports he still intends to have his significant other be his PCA and she is filling out the paperwork to get certified, in process. [facility] to continue to monitor.    3. Discussed current housing status. Patient reports he is thinking about moving out in the next few months to help with his mental health and due to tumultuous relationship with his daughter's mother. Patient reports he will continue to seek SSDI benefits but may start working part-time at his old job where he was a . Patient reports he is unsure of this, as his mental health and maintaining sobriety can be a barrier for him.    4. Patient reports he is going to get an eye exam tomorrow. [facility] to continue to monitor and follow-up with patient.    5. Patient reports he continues to retain his sobriety and his drug " test came back clean. Patient reports he would like to continue to do weekly UA testing to help support his sobriety. Patient reports he is watching rap videos and inspirational movies to help maintain his sobriety.        Intervention:  Motivational Interviewing: Expressed Empathy/Understanding, Supported Autonomy, Collaboration, Evocation, Permission to raise concern or advise, Open-ended questions, Reflections: simple and complex, Change talk (evoked) and Reframe   Target Behavior(s): Explored thoughts about taking an anti-depressant, Explored and resolved challenges related to taking anti-depressants as prescribed, Explored thoughts and readiness to participate in individual therapy, Explored and resolved challenges to attending appointments as scheduled, Explored current social supports and reinforced opportunities to increase engagement and Explored patient's perception of how alcohol and / or drugs influences mood    Assessment: (Progress on Goals / Homework):  Patient would benefit from continued coordination in reaching their goals set for the Behavioral Health Home ([facility]) program. [facility] reviewed Health Action Plan goals and will continue to monitor progress and work with patient and their care team.      Plan: (Homework, other):  Patient was encouraged to continue to seek condition-related information and education.      Scheduled a Phone follow up appointment with ANGI EDUARDO in 2 weeks     Patient has set self-identified goals and will monitor progress until the next appointment on: 11/30/2020.         SARA Irwin [facility]  Behavioral Health Home ([facility])   [facility]  [phone]  November 16, 2020  3:43 PM
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
## 2020-11-30 ENCOUNTER — ALLIED HEALTH/NURSE VISIT (OUTPATIENT)
Dept: BEHAVIORAL HEALTH | Facility: CLINIC | Age: 30
End: 2020-11-30
Payer: COMMERCIAL

## 2020-11-30 DIAGNOSIS — R69 DIAGNOSIS DEFERRED: Primary | ICD-10-CM
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
## 2020-12-03 ENCOUNTER — VIRTUAL VISIT (OUTPATIENT)
Dept: PSYCHOLOGY | Facility: CLINIC | Age: 30
End: 2020-12-03
Payer: COMMERCIAL

## 2020-12-03 DIAGNOSIS — F15.21 METHAMPHETAMINE USE DISORDER, MODERATE, IN EARLY REMISSION (H): ICD-10-CM

## 2020-12-03 DIAGNOSIS — F31.9 BIPOLAR 1 DISORDER (H): Primary | ICD-10-CM

## 2020-12-03 DIAGNOSIS — F10.21 ALCOHOL USE DISORDER, MODERATE, IN EARLY REMISSION (H): ICD-10-CM

## 2020-12-03 PROCEDURE — 90837 PSYTX W PT 60 MINUTES: CPT | Mod: 95 | Performed by: COUNSELOR

## 2020-12-07 ENCOUNTER — ALLIED HEALTH/NURSE VISIT (OUTPATIENT)
Dept: BEHAVIORAL HEALTH | Facility: CLINIC | Age: 30
End: 2020-12-07
Payer: COMMERCIAL

## 2020-12-07 DIAGNOSIS — R69 DIAGNOSIS DEFERRED: Primary | ICD-10-CM

## 2020-12-07 NOTE — PROGRESS NOTES
Behavioral Health Home Services  [facility]: [place]        Social Work Care Navigator Note      Patient: Rao Colonzsaeid  Date: December 7, 2020  Preferred Name: Rao    Previous PHQ-9:   PHQ-9 SCORE 8/11/2020 9/3/2020 11/2/2020   PHQ-9 Total Score 15 8 12   Some encounter information is confidential and restricted. Go to Review Flowsheets activity to see all data.     Previous AAYUSH-7:   AAYUSH-7 SCORE 8/11/2020 9/3/2020 11/2/2020   Total Score 21 17 17   Some encounter information is confidential and restricted. Go to Review Flowsheets activity to see all data.     NOMAN LEVEL:  No flowsheet data found.    Preferred Contact:  Need for : No  Preferred Contact: Cell      Type of Contact Today: Phone call (patient / identified key support person reached)      Data: (subjective / Objective):  Recent ED/IP Admission or Discharge?   None    Patient Goals:  Goal Areas: Health;Mental Health;Chemical Health;Financial and Social Service Benefits;Transportation  Patient stated goals: Patient would like assistance with his physical, mental and chemical health for creating a balanced and healthy lifestyle. Patient would like assistance with SSDI and social service assistance to aid with county benefitsto increase his financial stability. Patient would like assistance with reliable transportation for his family to get to appointments, run errands and get out into the community.        [facility] Core Service Provided:  Comprehensive Care Management: utilized the electronic medical record / patient registry to identify and support patient's health conditions / needs more effectively   Care Transitions: focused on the coordinated and seamless movement of patient between or within different levels of care or settings  Care Coordination: provided care management services/referrals necessary to ensure patient and their identified supports have access to medical, behavioral health, pharmacology and recovery support  services.  Ensured that patient's care is integrated across all settings and services.   Individual and Family Support: aimed to help clients reduce barriers to achieving goals, increase health literacy and knowledge about chronic condition(s), increase self-efficacy skills, and improve health outcomes  Referral to Community and Social Support Services: Provided patient with referrals (see plan section)  Assisted in scheduling an appointment to a referral / service (see plan section)  Coordinated / Confirmed patient's appointment time or referral and transportation plan  Followed-up with patient on whether or not they completed a referral    Current Stressors / Issues / Care Plan Objective Addressed Today:  [facility] able to meet today via telehealth visit for Behavioral Health Home ([facility]) monthly check-in.    1. [facility] called today to see how patient is doing today on the anniversary of his grandfather's death. Patient reports a few sad moments but an uplifting gift from his cousin. Patient reports she sent him several afghans that his grandmother had made. Patient reports this has lifted his spirits and allowed him to remember pleasant memories of his grandparents. [facility] provided support/empathy and coping skills to process grief.    2. Patient reports he and his significant other continue to live together but he may be interested in finding additional housing. [facility] mailed out housing stabilization resources for [facility] to patient today. [facility] encouraged patient to continue to work on healthy relationships and boundaries.    3. Patient reports he has been sober/clean for the past two months off meth use. Patient reports he continues to meet weekly to twice a week with his LADC for additional support. Patient has upcoming neuro psych eval and a referral has been placed to neurology. [facility] provided patient with the phone number to schedule neurology appointment.     4. Patient reports his step-mom  has invited him and his daughter to stay with her for 10-days over the holidays. Patient reports he would like to do this but is not sure if this is financially viable for him at this time. Patient reports he will let [facility] know what he decides.    5. [facility] to check-in on status of SSDI at next visit.        Intervention:  Motivational Interviewing: Expressed Empathy/Understanding, Supported Autonomy, Collaboration, Evocation, Permission to raise concern or advise, Open-ended questions, Reflections: simple and complex and Reframe   Target Behavior(s): Explored thoughts and readiness to participate in individual therapy, Explored and resolved challenges to attending appointments as scheduled and Explored current social supports and reinforced opportunities to increase engagement    Assessment: (Progress on Goals / Homework):  Patient would benefit from continued coordination in reaching their goals set for the Behavioral Health Home ([facility]) program. [facility] reviewed Health Action Plan goals and will continue to monitor progress and work with patient and their care team.      Plan: (Homework, other):  Patient was encouraged to continue to seek condition-related information and education.      Scheduled a Phone follow up appointment with ANGI  in 2 weeks     Patient has set self-identified goals and will monitor progress until the next appointment on: 12/21/2020.        SARA Irwin [facility]  Behavioral Health Home ([facility])   [facility]  [phone]

## 2020-12-09 ENCOUNTER — ALLIED HEALTH/NURSE VISIT (OUTPATIENT)
Dept: BEHAVIORAL HEALTH | Facility: CLINIC | Age: 30
End: 2020-12-09
Payer: COMMERCIAL

## 2020-12-09 DIAGNOSIS — R69 DIAGNOSIS DEFERRED: Primary | ICD-10-CM

## 2020-12-09 NOTE — PROGRESS NOTES
Behavioral Health Home Services  [facility]: [place]        Social Work Care Navigator Note      Patient: Rao Colonzsaeid  Date: December 9, 2020  Preferred Name: Rao    Previous PHQ-9:   PHQ-9 SCORE 8/11/2020 9/3/2020 11/2/2020   PHQ-9 Total Score 15 8 12   Some encounter information is confidential and restricted. Go to Review Flowsheets activity to see all data.     Previous AAYUSH-7:   AAYUSH-7 SCORE 8/11/2020 9/3/2020 11/2/2020   Total Score 21 17 17   Some encounter information is confidential and restricted. Go to Review Flowsheets activity to see all data.     NOMAN LEVEL:  No flowsheet data found.    Preferred Contact:  Need for : No  Preferred Contact: Cell      Type of Contact Today: Phone call (patient / identified key support person reached)      Data: (subjective / Objective):  Recent ED/IP Admission or Discharge?   None    Patient Goals:  Goal Areas: Health;Mental Health;Chemical Health;Financial and Social Service Benefits;Transportation  Patient stated goals: Patient would like assistance with his physical, mental and chemical health for creating a balanced and healthy lifestyle. Patient would like assistance with SSDI and social service assistance to aid with county benefitsto increase his financial stability. Patient would like assistance with reliable transportation for his family to get to appointments, run errands and get out into the community.        [facility] Core Service Provided:  Comprehensive Care Management: utilized the electronic medical record / patient registry to identify and support patient's health conditions / needs more effectively   Care Transitions: focused on the coordinated and seamless movement of patient between or within different levels of care or settings  Care Coordination: provided care management services/referrals necessary to ensure patient and their identified supports have access to medical, behavioral health, pharmacology and recovery support  services.  Ensured that patient's care is integrated across all settings and services.   Individual and Family Support: aimed to help clients reduce barriers to achieving goals, increase health literacy and knowledge about chronic condition(s), increase self-efficacy skills, and improve health outcomes  Referral to Community and Social Support Services: Followed-up with patient on whether or not they completed a referral    Current Stressors / Issues / Care Plan Objective Addressed Today:  [facility] and patient were able to meet today via telehealth visit for Behavioral Health Home ([facility]) monthly check-in.    1. Patient called today to confirm that he will be going to his dad and step-mother's for the holiday break with his daughter. Patient reports he will be able to keep appointment on the 21st.    2. Patient reports he was able to schedule with the neurologist in Jan.    3. Patient reports he will be working with his [facility] provider to update SSDI and to apply for DBT program.    4. Patient reports he continues to keep his sobriety and is celebrating 2 months. Patient reports he continues to meet weekly with his [facility] provider and has weekly UAs.     5. Patient reports pride in achieving a savings account and has started saving money each month. Patient reports he wants to achieve and maintain financial security for himself and his family.    Intervention:  Motivational Interviewing: Expressed Empathy/Understanding, Supported Autonomy, Collaboration, Evocation, Permission to raise concern or advise, Open-ended questions, Reflections: simple and complex and Importance Scale (1-10) 7 Confidence Scale (1-10) 7    Target Behavior(s): Explored thoughts about taking an anti-depressant, Explored and resolved challenges related to taking anti-depressants as prescribed, Explored thoughts and readiness to participate in individual therapy, Explored current social supports and reinforced opportunities to increase engagement and  Explored patient's perception of how alcohol and / or drugs influences mood    Assessment: (Progress on Goals / Homework):  Patient would benefit from continued coordination in reaching their goals set for the Behavioral Health Home ([facility]) program. [facility] reviewed Health Action Plan goals and will continue to monitor progress and work with patient and their care team.      Plan: (Homework, other):  Patient was encouraged to continue to seek condition-related information and education.      Scheduled a Phone follow up appointment with ANGI CC in 2 weeks     Patient has set self-identified goals and will monitor progress until the next appointment on: 12/21/2020.        SARA Irwin [facility]  Behavioral Health Home ([facility])   [facility]  [phone]  December 9, 2020  11:38 AM
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
## 2020-12-10 ENCOUNTER — VIRTUAL VISIT (OUTPATIENT)
Dept: PSYCHIATRY | Facility: CLINIC | Age: 30
End: 2020-12-10
Payer: COMMERCIAL

## 2020-12-10 DIAGNOSIS — F31.9 BIPOLAR 1 DISORDER (H): ICD-10-CM

## 2020-12-10 DIAGNOSIS — F63.9 IMPULSE CONTROL DISORDER: Primary | ICD-10-CM

## 2020-12-10 DIAGNOSIS — F41.1 GENERALIZED ANXIETY DISORDER: ICD-10-CM

## 2020-12-10 PROCEDURE — 99214 OFFICE O/P EST MOD 30 MIN: CPT | Mod: 95 | Performed by: NURSE PRACTITIONER

## 2020-12-10 RX ORDER — LITHIUM CARBONATE 600 MG/1
600 CAPSULE ORAL 2 TIMES DAILY WITH MEALS
Qty: 60 CAPSULE | Refills: 3 | Status: SHIPPED | OUTPATIENT
Start: 2020-12-10 | End: 2021-03-25

## 2020-12-10 RX ORDER — PROPRANOLOL HYDROCHLORIDE 40 MG/1
40 TABLET ORAL 2 TIMES DAILY
Qty: 60 TABLET | Refills: 1 | Status: SHIPPED | OUTPATIENT
Start: 2020-12-10 | End: 2021-01-05

## 2020-12-10 RX ORDER — TOPIRAMATE 25 MG/1
25 TABLET, FILM COATED ORAL 2 TIMES DAILY
Qty: 60 TABLET | Refills: 1 | Status: SHIPPED | OUTPATIENT
Start: 2020-12-10 | End: 2021-01-05

## 2020-12-10 ASSESSMENT — ANXIETY QUESTIONNAIRES
1. FEELING NERVOUS, ANXIOUS, OR ON EDGE: MORE THAN HALF THE DAYS
6. BECOMING EASILY ANNOYED OR IRRITABLE: SEVERAL DAYS
3. WORRYING TOO MUCH ABOUT DIFFERENT THINGS: SEVERAL DAYS
GAD7 TOTAL SCORE: 12
IF YOU CHECKED OFF ANY PROBLEMS ON THIS QUESTIONNAIRE, HOW DIFFICULT HAVE THESE PROBLEMS MADE IT FOR YOU TO DO YOUR WORK, TAKE CARE OF THINGS AT HOME, OR GET ALONG WITH OTHER PEOPLE: EXTREMELY DIFFICULT
5. BEING SO RESTLESS THAT IT IS HARD TO SIT STILL: NEARLY EVERY DAY
7. FEELING AFRAID AS IF SOMETHING AWFUL MIGHT HAPPEN: SEVERAL DAYS
2. NOT BEING ABLE TO STOP OR CONTROL WORRYING: SEVERAL DAYS

## 2020-12-10 ASSESSMENT — PATIENT HEALTH QUESTIONNAIRE - PHQ9: 5. POOR APPETITE OR OVEREATING: NEARLY EVERY DAY

## 2020-12-10 NOTE — PROGRESS NOTES
"  Rao Leavitt is a 30 year old male who is being evaluated via a billable video visit.      The patient has been notified of following:     \"This video visit will be conducted via a call between you and your physician/provider. We have found that certain health care needs can be provided without the need for an in-person physical exam.  This service lets us provide the care you need with a video conversation.  If a prescription is necessary we can send it directly to your pharmacy.  If lab work is needed we can place an order for that and you can then stop by our lab to have the test done at a later time.    Video visits are billed at different rates depending on your insurance coverage.  Please reach out to your insurance provider with any questions.    If during the course of the call the physician/provider feels a video visit is not appropriate, you will not be charged for this service.\"    Patient has given verbal consent for Video visit? Yes  How would you like to obtain your AVS? Mailed today.  If you are dropped from the video visit, the video invite should be resent to: Text to cell phone: [phone]  Will anyone else be joining your video visit? No        Video-Visit Details    Type of service:  Video Visit    Video Start Time: 10:07 AM  Video End Time: 10:34  AM    Originating Location (pt. Location): Home    Distant Location (provider location):  [facility] MENTAL [facility] & ADDICTION [facility]     Platform used for Video Visit: Osiris Grullon NP        Outpatient Psychiatric Progress Note    Name: Rao Leavitt   : 1990                    Primary Care Provider: Suzi Jaime NP  Therapist: Leslye     PHQ-9 scores:  PHQ-9 SCORE 2020 9/3/2020 2020   PHQ-9 Total Score 15 8 12   Some encounter information is confidential and restricted. Go to Review Flowsheets activity to see all data.       AAYUSH-7 scores:  AAYUSH-7 SCORE 2020 9/3/2020 " "11/2/2020   Total Score 21 17 17   Some encounter information is confidential and restricted. Go to Review Flowsheets activity to see all data.       Patient Identification:    Patient is a 30 year old year old, partnered / significant other  White American male  who presents for return visit with me.  Patient is currently unemployed. Patient attended the session alone. Patient prefers to be called: \"Philipp\".    Interim History:    I last saw Rao Leavitt for outpatient psychiatry Return Visit on November 2, 2020.     During that appointment, he admitted to using methamphetamines and has been sober for about a week.  He was  going to be discharged from substance use treatment but in light of this relapse he now is undergoing random drug screens for the next month..  That tells me that his mood is feeling more stable.  He tells me his lithium and propranolol working well together and he desires no changes today.  Lucio is concerned about his history of traumatic brain injury and maintains that sit is happened in fifth grade and again in high school that has had behavioral changes and learning difficulties.  A neurology referral has been made.  He will continue his lithium at 600 mg twice daily and propranolol 20 g 3 daily.  He continues to have flashback from his any days of meds with his girlfriend or has conflict with other people.  He plans to start PTSD therapy in the future .     Current medications include:      Apple Ralph Vn-Grn Tea-Bit Or-Cr (APPLE CIDER VINEGAR PLUS) TABS,        lithium (ESKALITH) 600 MG capsule, Take 1 capsule (600 mg) by mouth 2 times daily (with meals)       multivitamin, therapeutic (THERA-VIT) TABS tablet, Take 1 tablet by mouth daily       propranolol (INDERAL) 20 MG tablet, Take 1 tablet (20 mg) by mouth 3 times daily       vitamin B-Complex, Take 1 tablet by mouth daily    No current facility-administered medications on file prior to visit.        The [place] Prescription " Monitoring Program has been reviewed and there are no concerns about diversionary activity for controlled substances at this time.      I was able to review most recent Primary Care Provider, specialty provider, and therapy visit notes that I have access to.     Today, patient reports that He increased his anxiety medication.  Two in the morning and two at night.  He feels well right now.  He is not using drugs or alcohol.  He has trouble sitting still.  He paces a lot.  He squeezes a ball in gabbie's office.  He feels restless at night.  He gets tired during the day.  He is almost two months clean from meth.  Brain scan scheduled for Januaryy 12.  PTSD therapy started next week.  He has an [employer] worker who is going to help him fill out the disability paperwork.       has no past medical history on file.    Social history updates:    That finds the community supports in place are very helpful in maintaining mental wellness    Substance use updates:    He denies alcohol or meth use  Tobacco use: Yes E-cigarettes  Ready to quit?  No  Nicotine Replacement Therapy tried: none     Vital Signs:   There were no vitals taken for this visit.    Labs:    Most recent laboratory results reviewed and no new labs.     Review of Systems:  10 systems (general, cardiovascular, respiratory, eyes, ENT, endocrine, GI, , M/S, neurological) were reviewed. Most pertinent finding(s) is/are: He reports restlessness, no chest pain, no shortness of breath, no skin rashes. The remaining systems are all unremarkable.    Mental Status Examination:  Appearance:  awake, alert and casually dressed  Attitude:  cooperative   Eye Contact:  adequate  Gait and Station: No assistive Devices used and No dizziness or falls  Psychomotor Behavior:  fidgeting  Oriented to:  time, person, and place  Attention Span and Concentration:  Normal  Speech:   clear, coherent and Speaks: English  Mood:  anxious and better  Affect:  intensity is  heightened  Associations:  no loose associations  Thought Process:  goal oriented  Thought Content:  no evidence of suicidal ideation or homicidal ideation, no auditory hallucinations present and no visual hallucinations present  Recent and Remote Memory:  intact Not formally assessed. No amnesia.  Fund of Knowledge: appropriate  Insight:  good  Judgment:  intact  Impulse Control:  intact    Suicide Risk Assessment:  Today Rao Leavitt reports that he is having no thoughts to want to harm himself or hurt other people. In addition, there are notable risk factors for self-harm, including anxiety, substance abuse and mood change. However, risk is mitigated by commitment to family, sobriety, history of seeking help when needed, future oriented, denies suicidal intent or plan and denies homicidal ideation, intent, or plan. Therefore, based on all available evidence including the factors cited above, Rao Leavitt does not appear to be at imminent risk for self-harm, does not meet criteria for a 72-hr hold, and therefore remains appropriate for ongoing outpatient level of care.  A thorough assessment of risk factors related to suicide and self-harm have been reviewed and are noted above. The patient convincingly denies suicidality on several occasions. Local community safety resources printed and reviewed for patient to use if needed. There was no deceit detected, and the patient presented in a manner that was believable.     DSM5 Diagnosis:  296.40 Bipolar I Disorder Current or Most Recent Episode Hypomanic  300.02 (F41.1) Generalized Anxiety Disorder  780.52 (G47.00) Insomnia Disorder   With non-sleep disorder mental comorbidity  Recurrent    Substance-Related & Addictive Disorders Stimulant Use Disorder:  In early remission, , Specify current severity:  Moderate  304.40 (F15.20) Moderate, Amphetamine type substance  Specify if: In a controlled environment, Specify current severity:  305.1(F17.200)  Moderate    Medical comorbidities include:   Patient Active Problem List    Diagnosis Date Noted     Chemical dependency (H) 09/10/2020     Priority: Medium     Lithium use 05/15/2020     Priority: Medium     Lumbar spine tumor 11/27/2019     Priority: Medium     High risk medication use 10/05/2017     Priority: Medium     Bipolar 1 disorder (H) 03/16/2017     Priority: Medium     Generalized anxiety disorder 03/16/2017     Priority: Medium       Assessment:    Rao Leavitt maintains his sobriety for the past 2 months with regular drug screens.  Since stopping the methamphetamines, he has felt an increase in his restlessness.  He also has experienced continuing of heightened mood outbursts.  I added topiramate 25 mg twice daily to help him slow these things down.  He had taken it upon himself to increase his propranolol to 40 mg twice daily and reports that he is feeling more calm.  That we will continue.  He finds the lithium helpful at 600 mg twice daily to help with mood regulation.  Zack is appreciative of all of the community supports he has in place with plans to begin DBT therapy soon..    Medication side effects and alternatives were reviewed. Health promotion activities recommended and reviewed today. All questions addressed. Education and counseling completed regarding risks and benefits of medications and psychotherapy options.    Treatment Plan:        1.  Continue lithium 600 mg twice daily    2.  Increase propranolol to 40 mg twice daily anxiety    3.  Add topiramate 25 mg twice daily for racing thoughts    4.  Continue all community services including arms worker, behavioral health home care services, substance use outpatient treatment, and talk therapies      Continue all other medications as reviewed per electronic medical record today.     Safety plan reviewed. To the Emergency Department as needed or call after hours crisis line at [phone] or [phone]. [place] Crisis Text Line.  Text [place] to 018808 or Suicide LifeLine Chat: suicideADstruc.org/chat/    To schedule individual or family therapy, call [facility] at [phone].    Schedule an appointment with me in January or sooner as needed. Call [facility] at [phone] to schedule.    Follow up with primary care provider as planned or for acute medical concerns.    Call the psychiatric nurse line with medication questions or concerns at [phone].    Optrace may be used to communicate with your provider, but this is not intended to be used for emergencies.    Crisis Resources:    National Suicide Prevention Lifeline: 186.285.6411 (TTY: 802.195.5335). Call anytime for help.  (www.suicidepreventionlifeline.org)  National [place] on Mental Illness (www.nadege.org): 516.937.3474 or 789-403-9738.   Mental Health Association (www.mentalhealth.org): 847.717.7782 or 971-614-7800.  [place] Crisis Text Line: Text [place] to 770068  Suicide LifeLine Chat: suicideADstruc.org/chat    Administrative Billing:   Time spent with patient was 30 minutes and greater than 50% of time or 20 minutes was spent in counseling and coordination of care regarding above diagnoses and treatment plan.    Patient Status:  Patient will continue to be seen for ongoing consultation and stabilization.    Signed:   DUYEN Carrington-BC   Psychiatry

## 2020-12-10 NOTE — PATIENT INSTRUCTIONS
1.  Continue lithium 600 mg twice daily    2.  Increase propranolol to 40 mg twice daily anxiety    3.  Add topiramate 25 mg twice daily for racing thoughts    4.  Continue all community services including arms worker, behavioral health home care services, substance use outpatient treatment, and talk therapies      Continue all other medications as reviewed per electronic medical record today.     Safety plan reviewed. To the Emergency Department as needed or call after hours crisis line at [phone] or [phone]. [place] Crisis Text Line. Text [place] to 981223 or Suicide LifeLine Chat: suicideLumiFold.org/chat/    To schedule individual or family therapy, call [facility] at [phone].    Schedule an appointment with me in January or sooner as needed. Call [facility] at [phone] to schedule.    Follow up with primary care provider as planned or for acute medical concerns.    Call the psychiatric nurse line with medication questions or concerns at [phone].    Wizerhart may be used to communicate with your provider, but this is not intended to be used for emergencies.    Crisis Resources:    National Suicide Prevention Lifeline: 953.387.1242 (TTY: 759.914.5409). Call anytime for help.  (www.suicidepreventionlifeline.org)  National [place] on Mental Illness (www.nadege.org): 719.219.8115 or 205-447-6732.   Mental Health Association (www.mentalhealth.org): 270.316.4060 or 745-050-9403.  [place] Crisis Text Line: Text [place] to 004937  Suicide LifeLine Chat: suicideLumiFold.org/chat

## 2020-12-11 ASSESSMENT — ANXIETY QUESTIONNAIRES: GAD7 TOTAL SCORE: 12

## 2020-12-14 ENCOUNTER — VIRTUAL VISIT (OUTPATIENT)
Dept: BEHAVIORAL HEALTH | Facility: CLINIC | Age: 30
End: 2020-12-14
Payer: COMMERCIAL

## 2020-12-14 DIAGNOSIS — R69 DIAGNOSIS DEFERRED: Primary | ICD-10-CM

## 2020-12-14 NOTE — PROGRESS NOTES
Behavioral Health Home Services  [facility]: [place]        Social Work Care Navigator Note      Patient: Rao Colonzsaeid  Date: December 14, 2020  Preferred Name: Rao    Previous PHQ-9:   PHQ-9 SCORE 8/11/2020 9/3/2020 11/2/2020   PHQ-9 Total Score 15 8 12   Some encounter information is confidential and restricted. Go to Review Flowsheets activity to see all data.     Previous AAYUSH-7:   AAYUSH-7 SCORE 9/3/2020 11/2/2020 12/10/2020   Total Score 17 17 12   Some encounter information is confidential and restricted. Go to Review Flowsheets activity to see all data.     NOMAN LEVEL:  No flowsheet data found.    Preferred Contact:  Need for : No  Preferred Contact: Cell      Type of Contact Today: Phone call (patient / identified key support person reached)      Data: (subjective / Objective):  Recent ED/IP Admission or Discharge?   None    Patient Goals:  Goal Areas: Health;Mental Health;Chemical Health;Financial and Social Service Benefits;Transportation  Patient stated goals: Patient would like assistance with his physical, mental and chemical health for creating a balanced and healthy lifestyle. Patient would like assistance with SSDI and social service assistance to aid with county benefitsto increase his financial stability. Patient would like assistance with reliable transportation for his family to get to appointments, run errands and get out into the community.        [facility] Core Service Provided:  Comprehensive Care Management: utilized the electronic medical record / patient registry to identify and support patient's health conditions / needs more effectively   Care Transitions: focused on the coordinated and seamless movement of patient between or within different levels of care or settings  Care Coordination: provided care management services/referrals necessary to ensure patient and their identified supports have access to medical, behavioral health, pharmacology and recovery support  services.  Ensured that patient's care is integrated across all settings and services.   Individual and Family Support: aimed to help clients reduce barriers to achieving goals, increase health literacy and knowledge about chronic condition(s), increase self-efficacy skills, and improve health outcomes  Referral to Community and Social Support Services: Coordinated / Confirmed patient's appointment time or referral and transportation plan  Followed-up with patient on whether or not they completed a referral    Current Stressors / Issues / Care Plan Objective Addressed Today:  SW and patient were able to meet together via telehealth visit for Behavioral Health Home ([facility]) monthly check-in.    1. Patient reports he is going via [employer] to [place] for the holidays to visit his dad and step-mother with his daughter. Patient reports he was able to get his daughter's birth certificate and her social security card. Patient reports he will be able to keep appointment on the 21st.    2. Patient reports he will be starting DBT through [facility] and will continue to meet one-on-one at least once a week with his [facility] counselor. Patient reports he will continue to meet with his [facility] worker weekly. Patient reports his psychiatric prescriber changed a few of his medications and this has been working for him over the weekend. Patient reports his mood calmer, sleeping better and he is able to focus and think more clearly. Patient reports he will have follow-up appointment with psychiatric presciber in Jan.    3. Patient continues to report the mother of his ex-girlfriend continues to cause drama in their relationships. [facility] provided support/empathy, coping skills and healthy boundaries/relationships.          Intervention:  Motivational Interviewing: Expressed Empathy/Understanding, Supported Autonomy, Collaboration, Evocation, Permission to raise concern or advise, Open-ended questions, Reflections: simple and complex  and Reframe   Target Behavior(s): Explored thoughts about taking an anti-depressant, Explored and resolved challenges related to taking anti-depressants as prescribed, Explored thoughts and readiness to participate in individual therapy, Explored and resolved challenges to attending appointments as scheduled, Explored current social supports and reinforced opportunities to increase engagement and Explored current sleep hygeine and patient's perception and knowledge of relationship to mood    Assessment: (Progress on Goals / Homework):  Patient would benefit from continued coordination in reaching their goals set for the Behavioral Health Home ([facility]) program. SWCC reviewed Health Action Plan goals and will continue to monitor progress and work with patient and their care team.      Plan: (Homework, other):  Patient was encouraged to continue to seek condition-related information and education.      Scheduled a Phone follow up appointment with ANGI EDUARDO in 1 week     Patient has set self-identified goals and will monitor progress until the next appointment on: 12/21/2020.    SARA Irwin [facility]  Behavioral Health Home ([facility])   [facility]  [phone]  December 14, 2020  9:55 AM

## 2020-12-14 NOTE — Clinical Note
Hello - wanted to let you know since change in meds. He is reporting calmer mood, sleeping better and able to focus and think more clearly.    [place]

## 2020-12-16 ENCOUNTER — VIRTUAL VISIT (OUTPATIENT)
Dept: PSYCHOLOGY | Facility: CLINIC | Age: 30
End: 2020-12-16
Attending: NURSE PRACTITIONER
Payer: COMMERCIAL

## 2020-12-16 DIAGNOSIS — F15.21 METHAMPHETAMINE USE DISORDER, MODERATE, IN EARLY REMISSION (H): Primary | ICD-10-CM

## 2020-12-16 PROCEDURE — 99207 PR NO BILLABLE SERVICE THIS VISIT: CPT | Mod: 95 | Performed by: PSYCHOLOGIST

## 2020-12-16 NOTE — PROGRESS NOTES
Progress Note - Initial Visit    Client Name:  Rao Leavitt Date: 2020         Service Type: Individual     Visit Start Time: 905  Visit End Time: 955    Visit #: 1    Attendees: Client    Service Modality:  Video Visit:      Provider verified identity through the following two step process.  Patient provided:  Patient     Telemedicine Visit: The patient's condition can be safely assessed and treated via synchronous audio and visual telemedicine encounter.      Reason for Telemedicine Visit: Services only offered telehealth    Originating Site (Patient Location): Patient's home    Distant Site (Provider Location): Provider Remote Setting    Consent:  The patient/guardian has verbally consented to: the potential risks and benefits of telemedicine (video visit) versus in person care; bill my insurance or make self-payment for services provided; and responsibility for payment of non-covered services.     Patient would like the video invitation sent by: Send to e-mail at: [email or web address]    Mode of Communication:  Video Conference via [employer]    As the provider I attest to compliance with applicable laws and regulations related to telemedicine.       DATA:   Interactive Complexity: No   Crisis: No     Presenting Concerns/  Current Stressors:   General Psychological Evaluation    ASSESSMENT:  Mental Status Assessment:  Appearance:   Appropriate   Eye Contact:   Fair   Psychomotor Behavior: Restless   Attitude:   Cooperative   Orientation:   All  Speech   Rate / Production: Normal/ Responsive   Volume:  Normal   Mood:    Anxious   Affect:    Appropriate   Thought Content:  Clear   Thought Form:  Goal Directed   Insight:    Fair       Safety Issues and Plan for Safety and Risk Management:     [place] Suicide Severity Rating Scale (Short Version)  [place] Suicide Severity Rating (Short Version) 2019 10/29/2019 10/30/2019 2019 12/3/2019 3/17/2020 2020   Over the  past 2 weeks have you felt down, depressed, or hopeless? no no no no no no no   Over the past 2 weeks have you had thoughts of killing yourself? no no no no no no no   Have you ever attempted to kill yourself? no no no no no no no   Q1 Wished to be Dead (Past Month) - - - - - - -   Q2 Suicidal Thoughts (Past Month) - - - - - - -   Screening Not Complete - - - - - - -   Q6 Suicide Behavior (Lifetime) - - - - - - -        Diagnostic Criteria:  Stimulant Use Disorder, In partial Remission  Hx of Bipolar I Disorder  Personality Features  Rule Out PTSD    WHODAS 2.0 (12 item):   WHODAS 2.0 Total Score 9/24/2019   Total Score 34     Intervention:  During today's session this writer outlined the purpose and expectations for the Diagnostic Assessment. Mr. Leavitt discussed his reason for referral and symptoms. This writer began gathering his background information including developmental, education, occupational and relational. He acknowledged a history of substance use. He believed he has PTSD and disclosed a history of trauma. He presented with mood dysregulation and a personality disorder. A second session was scheduled to complete the assessment.   Collateral Reports Completed:  Routed note to PCP      PLAN: (Homework, other):    1. Provider will continue Diagnostic Assessment.      2. Complete symptoms screeners    Nadege Proctor, PhD LP  December 16, 2020

## 2020-12-21 ENCOUNTER — ALLIED HEALTH/NURSE VISIT (OUTPATIENT)
Dept: BEHAVIORAL HEALTH | Facility: CLINIC | Age: 30
End: 2020-12-21
Payer: COMMERCIAL

## 2020-12-21 DIAGNOSIS — R69 DIAGNOSIS DEFERRED: Primary | ICD-10-CM

## 2020-12-21 NOTE — PROGRESS NOTES
Behavioral Health Home Services  [facility]: [place]        Social Work Care Navigator Note      Patient: Rao Colonzsaeid  Date: December 21, 2020  Preferred Name: Rao    Previous PHQ-9:   PHQ-9 SCORE 9/3/2020 11/2/2020 12/16/2020   PHQ-9 Total Score MyChart - - 16 (Moderately severe depression)   PHQ-9 Total Score 8 12 16   Some encounter information is confidential and restricted. Go to Review Flowsheets activity to see all data.     Previous AAYUSH-7:   AAYUSH-7 SCORE 11/2/2020 12/10/2020 12/16/2020   Total Score - - 21 (severe anxiety)   Total Score 17 12 21   Some encounter information is confidential and restricted. Go to Review Flowsheets activity to see all data.     NOMAN LEVEL:  No flowsheet data found.    Preferred Contact:  Need for : No  Preferred Contact: Cell      Type of Contact Today: Phone call (patient / identified key support person reached)      Data: (subjective / Objective):  Recent ED/IP Admission or Discharge?   None    Patient Goals:  Goal Areas: Health;Mental Health;Chemical Health;Financial and Social Service Benefits;Transportation  Patient stated goals: Patient would like assistance with his physical, mental and chemical health for creating a balanced and healthy lifestyle. Patient would like assistance with SSDI and social service assistance to aid with [employer] benefitsto increase his financial stability. Patient would like assistance with reliable transportation for his family to get to appointments, run errands and get out into the community.        [facility] Core Service Provided:  Comprehensive Care Management: utilized the electronic medical record / patient registry to identify and support patient's health conditions / needs more effectively   Care Transitions: focused on the coordinated and seamless movement of patient between or within different levels of care or settings  Care Coordination: provided care management services/referrals necessary to ensure patient and  their identified supports have access to medical, behavioral health, pharmacology and recovery support services.  Ensured that patient's care is integrated across all settings and services.   Individual and Family Support: aimed to help clients reduce barriers to achieving goals, increase health literacy and knowledge about chronic condition(s), increase self-efficacy skills, and improve health outcomes  Referral to Community and Social Support Services: Coordinated / Confirmed patient's appointment time or referral and transportation plan  Followed-up with patient on whether or not they completed a referral    Current Stressors / Issues / Care Plan Objective Addressed Today:  SWCC and patient were able to meet today for Behavioral Health [place] ([facility]) monthly via telehealth visit today.    1. Patient reports he was able to meet with psychology this past week and will have one more appointment to complete assessment.    2. Patient reports he and his daughter safely made their way to [place] for the holidays to spend time with his family, father, mother-in-law and sister. Patient reports he and his daughter are having a good visit.    3. Discussed mood and sobriety. Patient reports his mood is stable and continues to check in daily with his [facility] counselor while on vacation.    4. Patient continues to struggle with healthy relationships and boundaries with significant other and her family. CC provided support/empathy, coping skills and healthy boundaries/relationships.    Intervention:  Motivational Interviewing: Expressed Empathy/Understanding, Supported Autonomy, Collaboration, Evocation, Permission to raise concern or advise, Open-ended questions, Reflections: simple and complex and Change talk (evoked)   Target Behavior(s): Explored thoughts and readiness to participate in individual therapy, Explored and resolved challenges to attending appointments as scheduled, Explored current social supports and  reinforced opportunities to increase engagement and Explored current sleep hygeine and patient's perception and knowledge of relationship to mood    Assessment: (Progress on Goals / Homework):  Patient would benefit from continued coordination in reaching their goals set for the Behavioral Health Home ([facility]) program. SWCC reviewed Health Action Plan goals and will continue to monitor progress and work with patient and their care team.      Plan: (Homework, other):  Patient was encouraged to continue to seek condition-related information and education.      Scheduled a Phone follow up appointment with ANGI EDUARDO in 2 weeks     Patient has set self-identified goals and will monitor progress until the next appointment on: 01/04/2020.         SARA Irwin [facility]  Behavioral Health Home ([facility])   [facility]  [phone]  December 21, 2020  10:48 AM

## 2020-12-23 ENCOUNTER — VIRTUAL VISIT (OUTPATIENT)
Dept: PSYCHOLOGY | Facility: CLINIC | Age: 30
End: 2020-12-23
Payer: COMMERCIAL

## 2020-12-23 DIAGNOSIS — F39 UNSPECIFIED MOOD (AFFECTIVE) DISORDER (H): ICD-10-CM

## 2020-12-23 DIAGNOSIS — F10.21 ALCOHOL USE DISORDER, MODERATE, IN EARLY REMISSION (H): ICD-10-CM

## 2020-12-23 DIAGNOSIS — F15.21 METHAMPHETAMINE USE DISORDER, MODERATE, IN EARLY REMISSION (H): Primary | ICD-10-CM

## 2020-12-23 PROCEDURE — 99207 PR NO BILLABLE SERVICE THIS VISIT: CPT | Mod: 95 | Performed by: PSYCHOLOGIST

## 2020-12-23 NOTE — PROGRESS NOTES
Progress Note - Initial Visit    Client Name:  Rao Leavitt Date: 2020         Service Type: Individual     Visit Start Time: 1104  Visit End Time: 1200    Visit #: 2    Attendees: Client    Service Modality:  Video Visit:      Provider verified identity through the following two step process.  Patient provided:  Patient     Telemedicine Visit: The patient's condition can be safely assessed and treated via synchronous audio and visual telemedicine encounter.      Reason for Telemedicine Visit: Services only offered telehealth    Originating Site (Patient Location): Patient's other : father's home    Distant Site (Provider Location): Provider Remote Setting    Consent:  The patient/guardian has verbally consented to: the potential risks and benefits of telemedicine (video visit) versus in person care; bill my insurance or make self-payment for services provided; and responsibility for payment of non-covered services.     Patient would like the video invitation sent by: Text to cell phone: See chart    Mode of Communication:  Video Conference via Amwell    As the provider I attest to compliance with applicable laws and regulations related to telemedicine.       DATA:   Interactive Complexity: No   Crisis: No     Presenting Concerns/  Current Stressors:   General Psychological Evaluation    ASSESSMENT:  Mental Status Assessment:  Appearance:   Appropriate   Eye Contact:   Fair   Psychomotor Behavior: Restless   Attitude:   Cooperative   Orientation:   All  Speech   Rate / Production: Talkative   Volume:  Normal   Mood:    Anxious  Depressed   Affect:    Restricted   Thought Content:  Clear   Thought Form:  Logical   Insight:    Fair  and Poor       Safety Issues and Plan for Safety and Risk Management:     [place] Suicide Severity Rating Scale (Short Version)  [place] Suicide Severity Rating (Short Version) 2019 10/29/2019 10/30/2019 2019 12/3/2019 3/17/2020 2020    Over the past 2 weeks have you felt down, depressed, or hopeless? no no no no no no no   Over the past 2 weeks have you had thoughts of killing yourself? no no no no no no no   Have you ever attempted to kill yourself? no no no no no no no   Q1 Wished to be Dead (Past Month) - - - - - - -   Q2 Suicidal Thoughts (Past Month) - - - - - - -   Screening Not Complete - - - - - - -   Q6 Suicide Behavior (Lifetime) - - - - - - -     Patient denies current fears or concerns for personal safety.  Patient denies current or recent suicidal ideation or behaviors.  Patient denies current or recent homicidal ideation or behaviors.  Patient denies current or recent self injurious behavior or ideation.  Patient denies other safety concerns.  Recommended that patient call 911 or go to the local ED should there be a change in any of these risk factors.  Patient reports there are no firearms in the house.     Diagnostic Criteria:  Unspecified Mood Disorder   -Previously diagnosed with Bipolar yet symptoms don't meet full criterion based on client's report   -Hx of depression    Hx of Trauma  Stimulant Use Disorder  Alcohol Use Disorder    WHODAS 2.0 (12 item):   WHODAS 2.0 Total Score 9/24/2019 12/16/2020   Total Score 34 42   Total Score MyChart - 42     Intervention:   During today's session, this writer interviewed Mr. Leavitt regarding his mental health symptoms. His PHQ-9, AAYUSH-7 and WHODAS scores were reviewed. The Review of Symptoms checklist was completed. He outlined symptoms of depression and anxiety as well as bipolar. His symptoms of tom did not meet full criterion for the disorder yet he struggles to accurately provide information. When discussing a manic episode, he initially described an experience of emotional dysregulation. He also lacks insight into his behaviors and struggles. A third session was scheduled to complete the interview process.   Collateral Reports Completed:  Routed note to PCP      PLAN:  (Homework, other):  1. Provider will continue Diagnostic Assessment.      Dr. Hitesh PsyD LP   12/23/2020

## 2020-12-27 ENCOUNTER — HEALTH MAINTENANCE LETTER (OUTPATIENT)
Age: 30
End: 2020-12-27

## 2020-12-28 ENCOUNTER — VIRTUAL VISIT (OUTPATIENT)
Dept: PSYCHOLOGY | Facility: CLINIC | Age: 30
End: 2020-12-28
Payer: COMMERCIAL

## 2020-12-28 DIAGNOSIS — F39 UNSPECIFIED MOOD (AFFECTIVE) DISORDER (H): ICD-10-CM

## 2020-12-28 DIAGNOSIS — F60.3 BORDERLINE PERSONALITY DISORDER (H): Primary | ICD-10-CM

## 2020-12-28 DIAGNOSIS — F10.21 ALCOHOL USE DISORDER, MODERATE, IN EARLY REMISSION (H): ICD-10-CM

## 2020-12-28 DIAGNOSIS — F43.10 POSTTRAUMATIC STRESS DISORDER: ICD-10-CM

## 2020-12-28 DIAGNOSIS — F15.21 METHAMPHETAMINE USE DISORDER, MODERATE, IN EARLY REMISSION (H): ICD-10-CM

## 2020-12-28 DIAGNOSIS — F12.21 CANNABIS USE DISORDER, MODERATE, IN EARLY REMISSION (H): ICD-10-CM

## 2020-12-28 PROCEDURE — 90791 PSYCH DIAGNOSTIC EVALUATION: CPT | Mod: 95 | Performed by: PSYCHOLOGIST
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
## 2021-01-04 ENCOUNTER — ALLIED HEALTH/NURSE VISIT (OUTPATIENT)
Dept: BEHAVIORAL HEALTH | Facility: CLINIC | Age: 31
End: 2021-01-04
Payer: COMMERCIAL

## 2021-01-04 ENCOUNTER — VIRTUAL VISIT (OUTPATIENT)
Dept: PSYCHOLOGY | Facility: CLINIC | Age: 31
End: 2021-01-04
Payer: COMMERCIAL

## 2021-01-04 DIAGNOSIS — F15.21 METHAMPHETAMINE USE DISORDER, MODERATE, IN EARLY REMISSION (H): ICD-10-CM

## 2021-01-04 DIAGNOSIS — F10.21 ALCOHOL USE DISORDER, MODERATE, IN EARLY REMISSION (H): ICD-10-CM

## 2021-01-04 DIAGNOSIS — F31.9 BIPOLAR 1 DISORDER (H): Primary | ICD-10-CM

## 2021-01-04 DIAGNOSIS — F60.3 BORDERLINE PERSONALITY DISORDER (H): ICD-10-CM

## 2021-01-04 DIAGNOSIS — F43.10 POSTTRAUMATIC STRESS DISORDER: ICD-10-CM

## 2021-01-04 DIAGNOSIS — R69 DIAGNOSIS DEFERRED: Primary | ICD-10-CM

## 2021-01-04 PROCEDURE — 99215 OFFICE O/P EST HI 40 MIN: CPT | Mod: 95 | Performed by: COUNSELOR

## 2021-01-04 NOTE — PROGRESS NOTES
[facility]al [facility] Home Services  [facility]: [place]        Social Work Care Navigator Note      Patient: Rao Colonzsaeid  Date: January 4, 2021  Preferred Name: Rao    Previous PHQ-9:   PHQ-9 SCORE 9/3/2020 11/2/2020 12/16/2020   PHQ-9 Total Score MyChart - - 16 (Moderately severe depression)   PHQ-9 Total Score 8 12 16   Some encounter information is confidential and restricted. Go to Review Flowsheets activity to see all data.     Previous AAYUSH-7:   AAYUSH-7 SCORE 11/2/2020 12/10/2020 12/16/2020   Total Score - - 21 (severe anxiety)   Total Score 17 12 21   Some encounter information is confidential and restricted. Go to Review Flowsheets activity to see all data.     NOMAN LEVEL:  No flowsheet data found.    Preferred Contact:  Need for : No  Preferred Contact: Cell      Type of Contact Today: Phone call (patient / identified key support person reached)      Data: (subjective / Objective):    Patient came in to complete the comprehensive wellness assessment update for Behavioral Health Home Services.  See [facility] Flowsheets for details on the assessment.  See [facility], [facility] for a copy of the patient's care plan.    Patient stated Goal Areas:   Health;Mental Health;Chemical Health;Financial and Social Service Benefits;Transportation     Patient stated goals:   Patient would like assistance with his physical, mental and chemical health for creating a balanced and healthy lifestyle. Patient would like assistance with housing, SSDI and social service assistance to aid with county benefits to increase his financial stability. Patient would like assistance with reliable transportation for his family to get to appointments, run errands and get out into the community.     Patient would like assistance with his physical, mental and chemical health for creating a balanced and healthy lifestyle. Patient states he is currently in outpt treatment for CD with [facility] and attends three  "times per week plus has weekly individual supervision with Leslye Westfall. Patients states his sobriety and mental health are a priority for him right now. Patient states he appreciates [facility] to assist with care coordination, as patient states he has a lot of providers right now. Patient states his mood has been up and down but better with his new medication. Patient recently had an updated Diagnostic Assessment with two additional diagnosis being added. Patient will be meeting with neurologist on Jan. 12th. [facility] reminded patient that he has an appointment with his psychiatry prescriber on Jan 18th to discuss recent medications changes, but if symptoms were to worsen, patient to contact psychiatry office.       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 Patient states he can now think and has a \"sober brain.\" Patient reports he had a recent set back in the past 6 months with his sobriety, but has been able to regain his sobriety for the past few months. Patient continues to work with CD outpt program and [facility] therapist. Patient states this has improved his relationships with family, friends and professionals. Patient states he he has completed his probation. Patient states he has worked hard for this. Patient states his intent once his outpt CD treatment program ends is to continue with counseling and CD supports. [facility] and patient to work together to manage this. Patient reports he continues to work weekly with his [employer] worker and they are focusing on housing, as his relationship with his s.o. is coming to an end.     Patient would like assistance with housing, SSDI and social service assistance to aid with [facility] benefits to increase his financial stability.  Patient states due to his recovery, treatment, mental and physical health concerns he has not been able to work for almost a year. Patient states he and his daughter will be looking for a new place to stay, as he an his s.o. have " decided he will need to find a new place to live soon. Patient and [facility] will be working on this weekly. Patient states has applied for SSDI and his  has just filed an appeal. Patient states he has applied for additional county assistance and started receiving additional SNAP benefits. 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  [facility] will provide education, support/empathy and resources.      Patient would like assistance with reliable transportation for his family to get to appointments, run errands and get out into the community. Patient states it can be difficult for he and his significant other to afford regular maintenance on their car and are concerned about larger bills if something were to happen. [facility] provided patient with phone number for Michael at [phone] and mailed out low cost vehicle maintenance and repair facilities.        Patient stated strengths:     Patient states his strengths are being a good dad; he has grown a lot since he has been in treatment; and he is somewhat understanding of others.          Next 5 appointments (look out 90 days)    Jan 05, 2021 10:40 AM  SHORT with Suzi Jaime NP  [facility]  Arrive at: [facility] [address] [facility] [address]  [phone]        ADDENDUM:  Health Action Plan approved by Behavioral Health Clinician 1/4/2021. [facility] sent patient a copy of approved HAP in the mail. Next Health Action Plan review due in July of 2021.    ARCELIA Irwin  1/4/2021  1:05 PM

## 2021-01-04 NOTE — Clinical Note
Hello - I was able to meet with the patient and update his Health & Wellness Assessment with him.     FYI - no action needed.    SARA Irwin [facility]  [facility] [place] [facility]   [facility]  [phone]  January 4, 2021  11:08 AM

## 2021-01-04 NOTE — LETTER
Behavioral Health Home ([facility]): Health Action Plan  [facility] Clinic: [place]    [facility]      Name: Rao BLANCAS Isaiahfelix  Preferred Name: Rao  : 1990  MRN: [id number]      My Goals  Goal Areas: Health;Mental Health;Chemical Health;Financial and Social Service Benefits;Transportation    Patient stated goals: Patient would like assistance with his physical, mental and chemical health for creating a balanced and healthy lifestyle. Patient would like assistance with housing, SSDI and social service assistance to aid with county benefits to increase his financial stability. Patient would like assistance with reliable transportation for his family to get to appointments, run errands and get out into the community.    Strengths related to each goal: Patient states his strengths are being a good dad; he has grown a lot since he has been in treatment; and he is somewhat understanding of others.  Services and Supports Needed: The [facility] Team will provide monthly contact with patient in order to monitor progress towards goals.    Activities / Actions of Team to support goal(s): [facility] team will locate appropriate resources that align with patient's goals and promote health and wellness.    Activities / Actions of Patient / Parent / Guardian to support goal(s): It is a requirement that patient's primary care physician is through the [facility] system and that they are on Medical Assistance/Medicaid. If either of these were to change or if patient needs any type of assistance, they are to reach out to their Behavioral Health Home ([facility]) team.        Recommended Referral  Tobacco cessation referrals made?: No  Mental Health / Chemical Dependency Referrals: No  Substance Use Referrals: Not Applicable  Mental Health Referrals: None  Community Health Referrals: [facility] ;Other (see comments)        My Team Members and Their Contact Information  Patient Care Team       Relationship  Specialty Notifications Start End    Suzi Jaiem NP PCP - General Nurse Practitioner - Family  9/24/19     Phone: [phone] Fax: [phone] [address] [facility] [address]    Suzi Jaime NP Assigned PCP   1/19/20     Phone: [phone] Fax: [phone] [address] [facility] [address]    Bree Grullon NP Nurse Practitioner Nurse Practitioner Admissions 7/30/20     Psychiatric prescriber     Phone: [phone] Fax: [phone] [address] [place] [address]    Varghese Alvarez LSW  Clinic Admissions 7/30/20     [facility] [phone]    Leslye Westfall Therapist Chemical Dependency  7/30/20     [employer] CD Therapist    Phone: [phone]         Rita Cosby Probation/   8/11/20     [facility]  until Oct 1, 2020    Phone: [phone]         Kanwal Tyler - [facility] [employer] [facility] worker   10/6/20     [facility] [employer] [facility] - [facility] now [facility] worker    Phone: [phone] Fax: [phone]        Bree Grullon NP Assigned Behavioral Health Provider   10/23/20     Phone: [phone] Fax: [phone]         [address] [place] [address]          My Wellness Plan  Safety Concerns: None Reported / Observed  Recommendations / Plan for safety concerns: A safety and risk management plan has not been developed at this time, however patient was encouraged to call [facility] should there be a change in any of these risk factors.  Crisis Plan (emergencies / when urgent support needed): Patient states he feels comfortable contacting his mother Renetta or calling 911 in a crisis or emergency situation.          Rao Leavitt co-developed the Health Action Plan with the [facility] Team and received a copy of this document.  Date Health Action Plan Completed/Updated: 01/04/21

## 2021-01-04 NOTE — LETTER
"Behavioral Health Home (formerly [facility]): Health Action Plan  [facility] Clinic: [place]    [facility]      Name: Rao MAGALIS Leavitt  Preferred Name: Rao  : 1990  MRN: [id number]    2021    [facility]  [address] [place] [address]    Dear Rao,    I have enclosed the Health Action Plan (HAP) that we completed together that includes your goals for Behavioral Health [place] ([facility]) Services. As you continue being enrolled in Behavioral Health Home ([facility]) services, I wanted to give you some information so you know what to expect moving forward.    What to Expect on a Monthly Basis: It is a requirement that I make contact with you on a monthly basis (by phone or meeting with you in clinic) to check in on how things are going and if you need any assistance. Please feel free to reach out to your [facility] team between these contacts if you need assistance or have any questions.    What to Expect every Six Months: Every six months, it is a requirement that we complete a Health Action Plan (HAP) review. During this in-clinic appointment, we will monitor our progress towards existing goals and set new goals for the next 6 month time period.    What kinds of support can [facility] offer now that I am enrolled?   - Housing Coordination  - Transportation Resources  - Financial Resources  - Coordination with the [facility] for Benefits (MA, SNAP benefits, etc)  - Disability Eligibility and Benefits  - Employment and Education Coordination  - Disability Related Information and Education Resources  - Referrals for mental health services, chemical dependency assessment/treatment, etc     If you or someone you know is experiencing a mental health crisis and you need help, the following crisis hotlines are available to help.    If you are in immediate danger, call 911.  Suicide Prevention Lifeline: 0-995-900-TALK (9220)  Crisis Text Line Service: Text \"[place]\" to 044166.    [facility]" [facility] Emergency Department - Behavioral Emergency Center  [address], [place] [address]  [phone] [facility] [employer] [facility] Crisis Response Services (Adults & Children)  [phone]   [facility] - Acute Psychiatric Services (APS)  Assessment & Referral: [phone]  Suicide Hotline: 764.581.8602 [facility] Crisis Team  [phone]   [facility] Adult Mental Health Services   [phone]  Referrals: [phone]  Crisis: [phone] [facility] - Emergency Department  [address] [place] [address]  [phone]   [place] [facility]  7-126-DxokClx ([phone]) [facility] Mental Health and Resource Crisis Line  [phone]   [facility] - Community Outreach for Psychiatric Emergencies  [phone] [facility] Crisis Services - [facility] Adult Mental Health Services - Crisis Services (24/7)  Information & Referrals: [phone]  Crisis Line: [phone]   [facility] (24/7)  [phone] TDD Crisis Help Line Serving [facility]  [phone]  or [facility]  to [phone]    [facility] and Human [facility]  Mental Health  [address] [place] [address]  [phone]  [address] [place] [address]  [phone]  web: [email or web address].  Mental-Health    [facility]  Mental Health  [address] [place] [address]  [phone]  web: [facility] Family General acute hospital  Family Services  [address], [place] [address]  [phone]  web: [facility] Family [facility]   Family Services  [address] [place] [address]  [phone]  web: [email or web address] mental health    [facility] and Human Services Department  [address], [place]  [address]  [phone]  [address] [place] [address]  [phone]  Toll Free: [phone]  web: [email or web address] and human services     Please let me know if you have any questions or if there is anything that we can assist with. I can be reached by phone, Digicompanionhart message, or by email. I look forward to continuing to work with you!    Sincerely,          SARA Irwin [facility]  Behavioral Health [place] ([facility])   [phone]  [email or web address]

## 2021-01-05 ENCOUNTER — OFFICE VISIT (OUTPATIENT)
Dept: FAMILY MEDICINE | Facility: CLINIC | Age: 31
End: 2021-01-05
Payer: COMMERCIAL

## 2021-01-05 VITALS
TEMPERATURE: 98.3 F | HEIGHT: 68 IN | OXYGEN SATURATION: 99 % | BODY MASS INDEX: 26.57 KG/M2 | SYSTOLIC BLOOD PRESSURE: 134 MMHG | WEIGHT: 175.3 LBS | RESPIRATION RATE: 17 BRPM | DIASTOLIC BLOOD PRESSURE: 88 MMHG | HEART RATE: 63 BPM

## 2021-01-05 DIAGNOSIS — F43.10 PTSD (POST-TRAUMATIC STRESS DISORDER): ICD-10-CM

## 2021-01-05 DIAGNOSIS — Z79.899 LITHIUM USE: ICD-10-CM

## 2021-01-05 DIAGNOSIS — F60.3 BORDERLINE PERSONALITY DISORDER (H): ICD-10-CM

## 2021-01-05 DIAGNOSIS — F41.1 GENERALIZED ANXIETY DISORDER: ICD-10-CM

## 2021-01-05 DIAGNOSIS — F19.20 CHEMICAL DEPENDENCY (H): ICD-10-CM

## 2021-01-05 DIAGNOSIS — F31.9 BIPOLAR 1 DISORDER (H): Primary | ICD-10-CM

## 2021-01-05 DIAGNOSIS — Z79.899 HIGH RISK MEDICATION USE: ICD-10-CM

## 2021-01-05 PROCEDURE — 99214 OFFICE O/P EST MOD 30 MIN: CPT | Performed by: NURSE PRACTITIONER

## 2021-01-05 RX ORDER — TOPIRAMATE 25 MG/1
25 TABLET, FILM COATED ORAL 2 TIMES DAILY
Qty: 60 TABLET | Refills: 1 | Status: SHIPPED | OUTPATIENT
Start: 2021-01-05 | End: 2021-01-18 | Stop reason: DRUGHIGH

## 2021-01-05 RX ORDER — PRAZOSIN HYDROCHLORIDE 1 MG/1
CAPSULE ORAL
Qty: 42 CAPSULE | Refills: 0 | Status: SHIPPED | OUTPATIENT
Start: 2021-01-05 | End: 2021-01-18

## 2021-01-05 RX ORDER — PROPRANOLOL HYDROCHLORIDE 40 MG/1
40 TABLET ORAL 2 TIMES DAILY
Qty: 60 TABLET | Refills: 1 | Status: SHIPPED | OUTPATIENT
Start: 2021-01-05 | End: 2021-02-11

## 2021-01-05 ASSESSMENT — ANXIETY QUESTIONNAIRES
5. BEING SO RESTLESS THAT IT IS HARD TO SIT STILL: MORE THAN HALF THE DAYS
GAD7 TOTAL SCORE: 13
2. NOT BEING ABLE TO STOP OR CONTROL WORRYING: SEVERAL DAYS
3. WORRYING TOO MUCH ABOUT DIFFERENT THINGS: SEVERAL DAYS
6. BECOMING EASILY ANNOYED OR IRRITABLE: NEARLY EVERY DAY
7. FEELING AFRAID AS IF SOMETHING AWFUL MIGHT HAPPEN: MORE THAN HALF THE DAYS
IF YOU CHECKED OFF ANY PROBLEMS ON THIS QUESTIONNAIRE, HOW DIFFICULT HAVE THESE PROBLEMS MADE IT FOR YOU TO DO YOUR WORK, TAKE CARE OF THINGS AT HOME, OR GET ALONG WITH OTHER PEOPLE: VERY DIFFICULT
1. FEELING NERVOUS, ANXIOUS, OR ON EDGE: MORE THAN HALF THE DAYS

## 2021-01-05 ASSESSMENT — PATIENT HEALTH QUESTIONNAIRE - PHQ9
5. POOR APPETITE OR OVEREATING: MORE THAN HALF THE DAYS
SUM OF ALL RESPONSES TO PHQ QUESTIONS 1-9: 10

## 2021-01-05 ASSESSMENT — MIFFLIN-ST. JEOR: SCORE: 1721.72

## 2021-01-05 NOTE — PROGRESS NOTES
"  Assessment & Plan     Bipolar 1 disorder (H)   Stable - continue medications.  Follow-up with Psychiatry as planned mid Jan.    - topiramate (TOPAMAX) 25 MG tablet; Take 1 tablet (25 mg) by mouth 2 times daily    Chemical dependency (H)  Sober for 2 months per patient.  Continue counseling and outpatient CD treatment.      Borderline personality disorder (H)   New diagnosis per patient - should discuss with Psychiatry - will send message to Psych for medication changes if needed prior to appointment.    - propranolol (INDERAL) 40 MG tablet; Take 1 tablet (40 mg) by mouth 2 times daily  - topiramate (TOPAMAX) 25 MG tablet; Take 1 tablet (25 mg) by mouth 2 times daily    Generalized anxiety disorder     - propranolol (INDERAL) 40 MG tablet; Take 1 tablet (40 mg) by mouth 2 times daily  - topiramate (TOPAMAX) 25 MG tablet; Take 1 tablet (25 mg) by mouth 2 times daily    High risk medication use       Lithium use       PTSD (post-traumatic stress disorder)  Added Prazosin for reported PTSD nightmares.  Follow-up with Psych for recheck.    - prazosin (MINIPRESS) 1 MG capsule; Take 1 capsule (1 mg) by mouth At Bedtime for 14 days, THEN 2 capsules (2 mg) At Bedtime for 14 days.    Review of external notes as documented above     Discussion of management or test interpretation with external physician/other qualified healthcare professional/appropriate source - [facility] NP  10 minutes spent on the date of the encounter doing chart review, history and exam, documentation and further activities as noted above          Tobacco Cessation:   reports that he has been smoking. He has been smoking about 1.00 pack per day. He has quit using smokeless tobacco.  Tobacco Cessation Action Plan: Information offered: Patient not interested at this time    BMI:   Estimated body mass index is 27.05 kg/m  as calculated from the following:    Height as of this encounter: 1.715 m (5' 7.5\").    Weight as of this encounter: 79.5 kg " (175 lb 4.8 oz).       Depression Screening Follow Up    PHQ 1/5/2021   PHQ-9 Total Score 10   Q9: Thoughts of better off dead/self-harm past 2 weeks Not at all     Last PHQ-9 1/5/2021   1.  Little interest or pleasure in doing things 2   2.  Feeling down, depressed, or hopeless 0   3.  Trouble falling or staying asleep, or sleeping too much 2   4.  Feeling tired or having little energy 2   5.  Poor appetite or overeating 0   6.  Feeling bad about yourself 0   7.  Trouble concentrating 2   8.  Moving slowly or restless 2   Q9: Thoughts of better off dead/self-harm past 2 weeks 0   PHQ-9 Total Score 10   Difficulty at work, home, or with people Very difficult       Follow Up Actions Taken  Patient counseled, no additional follow up at this time.       Patient Instructions     Patient Education     Treating Post-Traumatic Stress Disorder (PTSD) with Medicine    Post-traumatic stress disorder (PTSD) can happen after you go through a severe trauma. This may include physical abuse, rape, a natural disaster, a car accident, the death of a loved one, or  combat. Symptoms of PTSD involve intense anxiety that keeps coming back. Nightmares, intrusive memories, and flashbacks (vivid memories that seem real) related to the trauma may also occur. But you don t have to suffer anymore. Treatment is available. Along with therapy (also called counseling), medicine may help manage your symptoms.  Medicines  Certain medicines may be prescribed to help relieve your symptoms. As a result, you may feel less anxious or depressed. You may also feel able to move forward with therapy. At first, medicines and dosages may need to be adjusted to find what works best for you. Try to be patient. Tell your doctor how a medicine makes you feel. This way, you can work together to find the treatment that s best for you. Keep in mind that medicines can have side effects. Talk to your doctor about any side effects that are bothering you.  Changing the dose or type of medicine may help. Don t stop taking medicine on your own because it can cause symptoms to come back.    Anti-Anxiety medicine: This medicine relieves symptoms and helps you relax. Your doctor and pharmacist will explain when and how to use it. It may be prescribed for use before entering situations that make you anxious. Or, you may be told to take it on a regular schedule. Anti-anxiety medicine may make you feel a little sleepy or  out of it.  Don t drive a car or operate machinery while on this medicine, until you know how it affects you.  Caution  Never use alcohol or other drugs with anti-anxiety medicine. This could result in sedation, lack of muscular control, coma or death. Also, use only the amount of medicine prescribed to you. If you think you may have taken too much, get emergency care right away.    Antidepressant medicine: This kind of medicine is often used to treat anxiety, even if you aren t depressed. An antidepressant alters the levels of brain chemicals. This helps keep anxiety under control. This medicine is taken on a schedule. It takes a few weeks to start working. If you don t notice a change at first, you may just need more time. But if you don t notice results after the first few weeks, tell your doctor.  Keep taking medicines as prescribed  Never change your dosage or stop taking your medicine without talking to your doctor first. Keep the following in mind:    Some medicine must be taken on a schedule. Make this part of your daily routine. For instance, always take your pill before brushing your teeth. A pillbox can help you remember if you ve taken your medicine each day.    Medicines are often taken for 6 to12 months. Your doctor will then decide if you need to keep taking them. Many people who have also had therapy may no longer require medicine to manage anxiety.    You may need to stop taking medicine slowly to give your body time to adjust. When it s  time to stop, your doctor will tell you more.    If symptoms return, you may need to start taking medicine again. This isn t your fault. It s just the nature of your anxiety disorder. If symptoms return, seek care as soon as possible. If symptoms return, seek care as soon as possible.  Special concerns    Side effects: Medicines may cause side effects. Ask your doctor or pharmacist what you can expect. They may have ideas for avoiding some side effects.    Sexual problems: Some antidepressants can affect your desire for sex or your ability to have an orgasm. A change in dosage or medicine often solves the problem. If you have a sexual side effect that concerns you, tell your doctor.    Addiction: If you have history of addiction, you may need to avoid certain medicines. Be honest with your provider about any past history of drug use. This will ensure that you receive the safest possible medicines for your PTSD symptoms. Anti-anxiety medicine carry a risk of addiction, and no matter the reason for medicine or the type of drug, your provider will want to prescribe medicines based on a complete knowledge of your medical history.  Resources    National [place] of Mental Health  www.nimh.nih.gov/topics/rpugs-fdll-qwhz.shtml    The American Academy of Experts in Traumatic Stress  419.397.9969  www.aaets.org  [employer] last reviewed this educational content on 2/1/2017 2000-2020 [employer]. [address] [place] [address]. All rights reserved. This information is not intended as a substitute for professional medical care. Always follow your healthcare professional's instructions.               Return in about 4 weeks (around 2/2/2021) for Recheck symptoms.    Suzi Jaime NP  [facility]     Rao Leavitt is a 30 year old male who presents to clinic today for the following health issues  accompanied by his self:    HPI     Anxiety  Follow-Up    How are you doing with your anxiety since your last visit? No change    Are you having other symptoms that might be associated with anxiety? No    Have you had a significant life event? OTHER: found out that his ex girlfriend is dating a girl, daughter is in the middle of that. Been talking with his support teams to help cope with this, he is worried about daughter.      Are you feeling depressed? No    Do you have any concerns with your use of alcohol or other drugs? 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 Admits even going through a rough time right now he is doing ok.     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 Go to Review Flowsheets activity to see all data.     PHQ 11/2/2020 12/10/2020 12/16/2020   PHQ-9 Total Score 12 - 16   Q9: Thoughts of better off dead/self-harm past 2 weeks Not at all Not at all Not at all   Some encounter information is confidential and restricted. Go to Review Flowsheets activity to see all data.     Last PHQ-9 12/16/2020   1.  Little interest or pleasure in doing things 1   2.  Feeling down, depressed, or hopeless 1   3.  Trouble falling or staying asleep, or sleeping too much 3   4.  Feeling tired or having little energy 3   5.  Poor appetite or overeating 0   6.  Feeling bad about yourself 2   7.  Trouble concentrating 3   8.  Moving slowly or restless 3   Q9: Thoughts of better off dead/self-harm past 2 weeks 0   PHQ-9 Total  Score 16   Difficulty at work, home, or with people -     AAYUSH-7  12/16/2020   1. Feeling nervous, anxious, or on edge 3   2. Not being able to stop or control worrying 3   3. Worrying too much about different things 3   4. Trouble relaxing 3   5. Being so restless that it is hard to sit still 3   6. Becoming easily annoyed or irritable 3   7. Feeling afraid, as if something awful might happen 3   AAYUSH-7 Total Score 21   If you checked any problems, how difficult have they made it for you to do your work, take care of things at home, or get along with other people? -         How many servings of fruits and vegetables do you eat daily?  2-3    On average, how many sweetened beverages do you drink each day (Examples: soda, juice, sweet tea, etc.  Do NOT count diet or artificially sweetened beverages)?   1    How many days per week do you exercise enough to make your heart beat faster? 3 or less    How many minutes a day do you exercise enough to make your heart beat faster? 9 or less    How many days per week do you miss taking your medication? 0      Patient states that he was recently dx with borderline personality disorder, addiction to alcohol drugs and ptsd by Dr. Proctor at a recent visit. He states that he is unable to see Bree Ellis until 01/18/21 so he wants to discuss medications.     Also wants to discuss lowering lithium dose and getting something for borderline personality disorder.     Taking Topriamate for bipolar disorder - states that it does cause dry mouth though.     States that all his medications together are working well, states that since last year he has seen a huge improvement.   Doing drug testing weekly and testing negative.    Review of Systems   Constitutional, HEENT, cardiovascular, pulmonary, GI, , musculoskeletal, neuro, skin, endocrine and psych systems are negative, except as otherwise noted.      Objective    /88 (BP Location: Left arm, Patient Position: Sitting, Cuff  "Size: Adult Regular)   Pulse 63   Temp 98.3  F (36.8  C) (Tympanic)   Resp 17   Ht 1.715 m (5' 7.5\")   Wt 79.5 kg (175 lb 4.8 oz)   SpO2 99%   BMI 27.05 kg/m    Body mass index is 27.05 kg/m .  Physical Exam   GENERAL: healthy, alert and no distress  RESP: lungs clear to auscultation - no rales, rhonchi or wheezes  CV: regular rate and rhythm, normal S1 S2, no S3 or S4, no murmur, click or rub, no peripheral edema and peripheral pulses strong  NEURO: Normal strength and tone, mentation intact and speech normal  PSYCH: mentation appears normal, anxious, speech pressured and appearance well groomed        "

## 2021-01-05 NOTE — PATIENT INSTRUCTIONS
Patient Education     Treating Post-Traumatic Stress Disorder (PTSD) with Medicine    Post-traumatic stress disorder (PTSD) can happen after you go through a severe trauma. This may include physical abuse, rape, a natural disaster, a car accident, the death of a loved one, or  combat. Symptoms of PTSD involve intense anxiety that keeps coming back. Nightmares, intrusive memories, and flashbacks (vivid memories that seem real) related to the trauma may also occur. But you don t have to suffer anymore. Treatment is available. Along with therapy (also called counseling), medicine may help manage your symptoms.  Medicines  Certain medicines may be prescribed to help relieve your symptoms. As a result, you may feel less anxious or depressed. You may also feel able to move forward with therapy. At first, medicines and dosages may need to be adjusted to find what works best for you. Try to be patient. Tell your doctor how a medicine makes you feel. This way, you can work together to find the treatment that s best for you. Keep in mind that medicines can have side effects. Talk to your doctor about any side effects that are bothering you. Changing the dose or type of medicine may help. Don t stop taking medicine on your own because it can cause symptoms to come back.    Anti-Anxiety medicine: This medicine relieves symptoms and helps you relax. Your doctor and pharmacist will explain when and how to use it. It may be prescribed for use before entering situations that make you anxious. Or, you may be told to take it on a regular schedule. Anti-anxiety medicine may make you feel a little sleepy or  out of it.  Don t drive a car or operate machinery while on this medicine, until you know how it affects you.  Caution  Never use alcohol or other drugs with anti-anxiety medicine. This could result in sedation, lack of muscular control, coma or death. Also, use only the amount of medicine prescribed to you. If you think  you may have taken too much, get emergency care right away.    Antidepressant medicine: This kind of medicine is often used to treat anxiety, even if you aren t depressed. An antidepressant alters the levels of brain chemicals. This helps keep anxiety under control. This medicine is taken on a schedule. It takes a few weeks to start working. If you don t notice a change at first, you may just need more time. But if you don t notice results after the first few weeks, tell your doctor.  Keep taking medicines as prescribed  Never change your dosage or stop taking your medicine without talking to your doctor first. Keep the following in mind:    Some medicine must be taken on a schedule. Make this part of your daily routine. For instance, always take your pill before brushing your teeth. A pillbox can help you remember if you ve taken your medicine each day.    Medicines are often taken for 6 to12 months. Your doctor will then decide if you need to keep taking them. Many people who have also had therapy may no longer require medicine to manage anxiety.    You may need to stop taking medicine slowly to give your body time to adjust. When it s time to stop, your doctor will tell you more.    If symptoms return, you may need to start taking medicine again. This isn t your fault. It s just the nature of your anxiety disorder. If symptoms return, seek care as soon as possible. If symptoms return, seek care as soon as possible.  Special concerns    Side effects: Medicines may cause side effects. Ask your doctor or pharmacist what you can expect. They may have ideas for avoiding some side effects.    Sexual problems: Some antidepressants can affect your desire for sex or your ability to have an orgasm. A change in dosage or medicine often solves the problem. If you have a sexual side effect that concerns you, tell your doctor.    Addiction: If you have history of addiction, you may need to avoid certain medicines. Be honest  with your provider about any past history of drug use. This will ensure that you receive the safest possible medicines for your PTSD symptoms. Anti-anxiety medicine carry a risk of addiction, and no matter the reason for medicine or the type of drug, your provider will want to prescribe medicines based on a complete knowledge of your medical history.  Resources    National [place] [employer] [place] [employer]  www.nimh.nih.gov/topics/caiwo-ztuy-uinb.shtml    The American Academy of Experts in Traumatic Stress  274.191.8102  www.aaets.org  [employer] last reviewed this educational content on 2/1/2017 2000-2020 [employer]. [address] [place] [address], [place] [address]. All rights reserved. This information is not intended as a substitute for professional medical care. Always follow your healthcare professional's instructions.

## 2021-01-05 NOTE — PROGRESS NOTES
Progress Note    Patient Name: Rao Colonzsandraki  Date: 1/4/2021         Service Type: Individual      Session Start Time: 11:00am  Session End Time: 11:50am     Session Length: 50minutes    Session #: 7    Attendees: Client    Service Modality:  Video Visit:    Telemedicine Visit: The patient's condition can be safely assessed and treated via synchronous audio and visual telemedicine encounter.      Reason for Telemedicine Visit: Services only offered telehealth    Originating Site (Patient Location): Patient's home    Distant Site (Provider Location): Provider Remote Setting    Consent:  The patient/guardian has verbally consented to: the potential risks and benefits of telemedicine (video visit) versus in person care; bill my insurance or make self-payment for services provided; and responsibility for payment of non-covered services.     Mode of Communication:  Video Conference via [employer]    As the provider I attest to compliance with applicable laws and regulations related to telemedicine.     Treatment Plan Last Reviewed: 09/25/2020  PHQ-9 / AAYUSH-7 :     DATA  Interactive Complexity: No  Crisis: No       Progress Since Last Session (Related to Symptoms / Goals / Homework):   Symptoms: No change Reported that he has maintained sobriety but that it has been challenging due to outside influences causing temptation. Recently learned information about daughter's mother that caused stress.    Homework: Partially completed      Episode of Care Goals: Minimal progress - PREPARATION (Decided to change - considering how); Intervened by negotiating a change plan and determining options / strategies for behavior change, identifying triggers, exploring social supports, and working towards setting a date to begin behavior change     Current / Ongoing Stressors and Concerns:   Recently learned that ex-girlfriend is in a relationship with another female. He does not agree  with this and is trying to take her to court to change custody/parenting time. Has been maintaining sobriety since last session. Noticing increase in trauma responses to different activities/triggers, and has been processing how it impacts his daily functioning.     Treatment Objective(s) Addressed in This Session:   identify at least 2 example(s) of how drinking has resulted in an experience that interferes with person values or goals  identify patterns of escalation  (i.e. tightness in chest, flushed face, increased heart rate, clenched hands, etc.)     Intervention:   Motivational Interviewing: Working on improving himself and getting involved in other treatment modalities in order to maintain his sobriety, learn more effective coping skills, and involvement in additional supports. Will be starting a DBT program now that he has been formally assessed and diagnosed with PTSD and BPD.  Motivational Interviewing    MI Intervention: Expressed Empathy/Understanding and Open-ended questions     Change Talk Expressed by the Patient: Committment to change    Provider Response to Change Talk: A - Affirmed patient's thoughts, decisions, or attempts at behavior change          ASSESSMENT: Current Emotional / Mental Status (status of significant symptoms):   Risk status (Self / Other harm or suicidal ideation)   Patient denies current fears or concerns for personal safety.   Patient denies current or recent suicidal ideation or behaviors.   Patient denies current or recent homicidal ideation or behaviors.   Patient denies current or recent self injurious behavior or ideation.   Patient denies other safety concerns.   Patient reports there has been no change in risk factors since their last session.     Patient reports there has been no change in protective factors since their last session.     Recommended that patient call 911 or go to the local ED should there be a change in any of these risk  factors.     Appearance:   Appropriate    Eye Contact:   Good    Psychomotor Behavior: Normal    Attitude:   Cooperative    Orientation:   All   Speech    Rate / Production: Hyperverbal  Talkative    Volume:  Normal    Mood:    Euthymic   Affect:    Appropriate    Thought Content:  Clear    Thought Form:  Circumstantial   Insight:    Poor      Medication Review:   Changes to psychiatric medications, see updated Medication List in EPIC.      Medication Compliance:   Yes     Changes in Health Issues:   None reported     Chemical Use Review:   Substance Use: Chemical use reviewed, no active concerns identified      Tobacco Use: No current tobacco use.      Diagnosis:  1. Bipolar 1 disorder (H)    2. Methamphetamine use disorder, moderate, in early remission (H)    3. Alcohol use disorder, moderate, in early remission (H)    4. Posttraumatic stress disorder    5. Borderline personality disorder (H)        Collateral Reports Completed:   Not Applicable    PLAN: (Patient Tasks / Therapist Tasks / Other)  Continue with individual therapy. Homework to continue harm reduction to maintain sobriety.        Cherie Christina, [facility]                                                           Treatment Plan    Client's Name: Rao Leavitt  YOB: 1990    Date: 1/4/2021    DSM-V Diagnoses: 296.42 Bipolar I Disorder Current or Most Recent Episode Manic, Moderate  or Substance-Related & Addictive Disorders Alcohol Use Disorder   303.90 (F10.20) Moderate In early remission,   Psychosocial / Contextual Factors: probation, history of incarceration, in substance abuse treatment, relationship struggles, financial stressors, vocational stress  WHODAS:     Referral / Collaboration:  The following referral(s) will be initiated: seeking assessment for PTSD, [facility].    Anticipated number of session or this episode of care: 26-30      MeasurableTreatment Goal(s) related to diagnosis / functional impairment(s)  Goal 1: Client will  work on decreasing anger response.    Objective #A (Client Action)    Client will identify patterns of escalation  (i.e. tightness in chest, flushed face, increased heart rate, clenched hands, etc.).  Status: Continued - Date(s):1/4/2021       Intervention(s)  Therapist will teach emotional recognition/identification. Identifying anger episodes.    Objective #B  Client will identify at least 3-5 techniques for intervening on the escalation.  Status: Continued - Date(s):1/4/2021     Intervention(s)  Therapist will teach emotional regulation skills. DBT and CBT.    Objective #C  Client will learn and demonstrate 2 assertiveness skill(s).  Status: Continued - Date(s):1/4/2021       Intervention(s)  Therapist will role-play conflict management.      Goal 2: Client will process previous traumatic events.    Objective #A (Client Action)    Status: Continued - Date(s):1/4/2021       Client will process childhood trauma.    Intervention(s)  Therapist will provide Emotion Focused therapy.    Objective #B  Client will process trauma of incarceration.    Status: Continued - Date(s):1/4/2021       Intervention(s)  Therapist will EFT and TFCBT techniques.    Objective #C  Client will identify patterns of escalation  (i.e. tightness in chest, flushed face, increased heart rate, clenched hands, etc.).  Status: Continued - Date(s):1/4/2021       Intervention(s)  Therapist will help connect trauma history to current bejaviors.      Goal 3: Client will continue working on sobriety and harm reduction.    Objective #A (Client Action)    Status:Continued - Date(s):1/4/2021       Client will maintain sobriety and harm reduction.    Intervention(s)  Therapist will use harm reduction.    Objective #B  Client will identify at least 3 example(s) of how drinking has resulted in an experience that interferes with person values or goals.    Status: Continued - Date(s):1/4/2021     Intervention(s)  Therapist will make referrals to AA and  NA.        Patient has reviewed and agreed to the above plan. Due to COVID, treatment plan was not signed, as appt was telehealth      Cherie Christina, COURT  January 5, 2021

## 2021-01-06 ASSESSMENT — ANXIETY QUESTIONNAIRES: GAD7 TOTAL SCORE: 13

## 2021-01-07 ENCOUNTER — PRE VISIT (OUTPATIENT)
Dept: NEUROLOGY | Facility: CLINIC | Age: 31
End: 2021-01-07

## 2021-01-07 NOTE — TELEPHONE ENCOUNTER
FUTURE VISIT INFORMATION:    Date: 1/12/21    Time:  0840    Location: [facility]   REFERRAL INFORMATION:    Referring provider:  Bree Grullon NP    Referring providers clinic: [facility]    Reason for visit/diagnosis:  Impulse Control Disorder/ Difficulty Controlling Behavior as Late Effect of TBI       NOTES (FOR ALL VISITS) STATUS DETAILS   OFFICE NOTE from referring provider Printed 9/24/19, 6/16/20, 7/22/20,    OFFICE NOTE from other specialist Printed  JAXSON Crowley () 3/16/17  HEMANT Christina () 8/24/20, 9/3/20, 11/2/20, 12/10/20  CALEB Alvarez () 10/6/20  DORA Proctor () 12/28/20     DISCHARGE SUMMARY from hospital       DISCHARGE REPORT from the ER     MEDICATION LIST In Epic     IMAGING  (FOR ALL VISITS)       EMG       EEG       MRI (HEAD, NECK, SPINE) Printed  Images in PACs CT head 11/8/19 (requested images)   CARDIAC STUDIES        FORMAL COGNITIVE TESTING    None   OTHER                  Order CT images from [facility] for [facility]

## 2021-01-12 ENCOUNTER — ALLIED HEALTH/NURSE VISIT (OUTPATIENT)
Dept: BEHAVIORAL HEALTH | Facility: CLINIC | Age: 31
End: 2021-01-12
Payer: COMMERCIAL

## 2021-01-12 ENCOUNTER — TELEPHONE (OUTPATIENT)
Dept: BEHAVIORAL HEALTH | Facility: CLINIC | Age: 31
End: 2021-01-12

## 2021-01-12 ENCOUNTER — OFFICE VISIT (OUTPATIENT)
Dept: NEUROLOGY | Facility: CLINIC | Age: 31
End: 2021-01-12
Attending: NURSE PRACTITIONER
Payer: COMMERCIAL

## 2021-01-12 VITALS
TEMPERATURE: 98 F | HEART RATE: 64 BPM | RESPIRATION RATE: 16 BRPM | SYSTOLIC BLOOD PRESSURE: 120 MMHG | DIASTOLIC BLOOD PRESSURE: 67 MMHG

## 2021-01-12 DIAGNOSIS — F99 CO-OCCURRENCE OF MULTIPLE PSYCHIATRIC DISORDERS: ICD-10-CM

## 2021-01-12 DIAGNOSIS — F19.90 SUBSTANCE USE: ICD-10-CM

## 2021-01-12 DIAGNOSIS — Z87.820 HISTORY OF TRAUMATIC BRAIN INJURY: Primary | ICD-10-CM

## 2021-01-12 DIAGNOSIS — R69 DIAGNOSIS DEFERRED: Primary | ICD-10-CM

## 2021-01-12 DIAGNOSIS — R41.3 MEMORY PROBLEM: ICD-10-CM

## 2021-01-12 DIAGNOSIS — F90.9 ATTENTION DEFICIT HYPERACTIVITY DISORDER (ADHD), UNSPECIFIED ADHD TYPE: ICD-10-CM

## 2021-01-12 PROCEDURE — 99204 OFFICE O/P NEW MOD 45 MIN: CPT | Performed by: PSYCHIATRY & NEUROLOGY

## 2021-01-12 ASSESSMENT — MONTREAL COGNITIVE ASSESSMENT (MOCA)
13. ORIENTATION SUBSCORE: 6
7. [VIGILENCE] TAP WHEN HEARING DESIGNATED LETTER: 1
10. [FLUENCY] NAME WORDS STARTING WITH DESIGNATED LETTER: 0
9. REPEAT EACH SENTENCE: 0
11. FOR EACH PAIR OF WORDS, WHAT CATEGORY DO THEY BELONG TO (OUT OF 2): 2
6. READ LIST OF DIGITS [FORWARD/BACKWARD]: 0
VISUOSPATIAL/EXECUTIVE SUBSCORE: 3
12. MEMORY INDEX SCORE: 0
WHAT LEVEL OF EDUCATION WAS ATTAINED: 0
4. NAME EACH OF THE THREE ANIMALS SHOWN: 2
8. SERIAL SUBTRACTION OF 7S: 0
WHAT IS THE TOTAL SCORE (OUT OF 30): 14
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
## 2021-01-12 NOTE — PATIENT INSTRUCTIONS
Plan:  -- MRI Brain.  -- Sleep-deprived electroencephalogram.  -- Referral for Neuropsych evaluation (formal cognitive testing).  -- Return to Neurology  after the tests are completed, to review the results.  Virtual visit is okay.
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
## 2021-01-12 NOTE — NURSING NOTE
"Initial /67 (BP Location: Left arm, Patient Position: Sitting, Cuff Size: Adult Regular)   Pulse 64   Temp 98  F (36.7  C) (Tympanic)   Resp 16  Estimated body mass index is 27.05 kg/m  as calculated from the following:    Height as of 1/5/21: 1.715 m (5' 7.5\").    Weight as of 1/5/21: 79.5 kg (175 lb 4.8 oz).     Sonya DUGGANA      "

## 2021-01-14 ENCOUNTER — ALLIED HEALTH/NURSE VISIT (OUTPATIENT)
Dept: BEHAVIORAL HEALTH | Facility: CLINIC | Age: 31
End: 2021-01-14
Payer: COMMERCIAL

## 2021-01-14 DIAGNOSIS — R69 DIAGNOSIS DEFERRED: Primary | ICD-10-CM

## 2021-01-14 NOTE — PROGRESS NOTES
Behavioral Health Home Services  [facility]: [facility]        Social Work Care Navigator Note      Patient: Rao Leavitt  Date: January 14, 2021  Preferred Name: Rao    Previous PHQ-9:   PHQ-9 SCORE 11/2/2020 12/16/2020 1/5/2021   PHQ-9 Total Score MyChart - 16 (Moderately severe depression) -   PHQ-9 Total Score 12 16 10   Some encounter information is confidential and restricted. Go to Review Flowsheets activity to see all data.     Previous AAYUSH-7:   AAYUSH-7 SCORE 12/10/2020 12/16/2020 1/5/2021   Total Score - 21 (severe anxiety) -   Total Score 12 21 13   Some encounter information is confidential and restricted. Go to Review Flowsheets activity to see all data.     NOMAN LEVEL:  No flowsheet data found.    Preferred Contact:  Need for : No  Preferred Contact: Cell      Type of Contact Today: Phone call (patient / identified key support person reached)      Data: (subjective / Objective):  Recent ED/IP Admission or Discharge?   None    Patient Goals:  Goal Areas: Health;Mental Health;Chemical Health;Financial and Social Service Benefits;Transportation  Patient stated goals: Patient would like assistance with his physical, mental and chemical health for creating a balanced and healthy lifestyle. Patient would like assistance with housing, SSDI and social service assistance to aid with county benefits to increase his financial stability. Patient would like assistance with reliable transportation for his family to get to appointments, run errands and get out into the community.        [facility] Core Service Provided:  Comprehensive Care Management: utilized the electronic medical record / patient registry to identify and support patient's health conditions / needs more effectively   Care Transitions: focused on the coordinated and seamless movement of patient between or within different levels of care or settings  Care Coordination: provided care management services/referrals necessary to ensure  patient and their identified supports have access to medical, behavioral health, pharmacology and recovery support services.  Ensured that patient's care is integrated across all settings and services.   Individual and Family Support: aimed to help clients reduce barriers to achieving goals, increase health literacy and knowledge about chronic condition(s), increase self-efficacy skills, and improve health outcomes  Referral to Community and Social Support Services: Assisted in scheduling an appointment to a referral / service (see plan section)  Followed-up with patient on whether or not they completed a referral    Current Stressors / Issues / Care Plan Objective Addressed Today:  SWCC and patient met today for Behavioral Health Home ([facility]) monthly check-in via telehealth visit.    1. Discussed housing resources. Patient reports he has not checked his mail but will be going to post office today or tomorrow to  housing resources packet. Patient reports wanting to move out of current living situation.     Patient reports he is not interested in sober housing, as he believes this may be a trigger instead of a plus for his sobriety. Patient reports possible interest in group home living, patient reports he will think about this option. If patient chooses this option [facility] will contact the [facility] for additional assistance with placement.     Patient reports he will be sending in the rest of his SSDI paperwork by Friday. Patient reports if SSDI is approved he would like to move to low income housing, CC mailed resources. Patient and SWCC will meet again on Monday once patient has reviewed housing packet to discuss housing stabilization case management services.    Intervention:  Motivational Interviewing: Expressed Empathy/Understanding, Supported Autonomy, Collaboration, Evocation, Permission to raise concern or advise, Open-ended questions, Reflections: simple and complex and Reframe   Target Behavior(s):  Explored and resolved challenges to attending appointments as scheduled and Explored current social supports and reinforced opportunities to increase engagement    Assessment: (Progress on Goals / Homework):  Patient would benefit from continued coordination in reaching their goals set for the Behavioral Health Home ([facility]) program. [facility] goals and will continue to monitor progress and work with patient and their care team.      Plan: (Homework, other):  Patient was encouraged to continue to seek condition-related information and education.      Scheduled a Phone follow up appointment with [facility] in 1 week     Patient has set self-identified goals and will monitor progress until the next appointment on: 01/18/2021.        SARA Irwin [facility]  Behavioral Health Home ([facility])   [facility]  [phone]  January 14, 2021  1:33 PM                  Next 5 appointments (look out 90 days)    Feb 02, 2021  8:00 AM  SHORT with Suzi Jaime NP  [facility] ([facility])  Arrive at: Clinic [address] [facility] [address]  [phone]

## 2021-01-18 ENCOUNTER — ALLIED HEALTH/NURSE VISIT (OUTPATIENT)
Dept: BEHAVIORAL HEALTH | Facility: CLINIC | Age: 31
End: 2021-01-18
Payer: COMMERCIAL

## 2021-01-18 ENCOUNTER — MYC REFILL (OUTPATIENT)
Dept: FAMILY MEDICINE | Facility: CLINIC | Age: 31
End: 2021-01-18

## 2021-01-18 ENCOUNTER — VIRTUAL VISIT (OUTPATIENT)
Dept: PSYCHOLOGY | Facility: CLINIC | Age: 31
End: 2021-01-18
Payer: COMMERCIAL

## 2021-01-18 DIAGNOSIS — F43.10 PTSD (POST-TRAUMATIC STRESS DISORDER): ICD-10-CM

## 2021-01-18 DIAGNOSIS — F41.1 GENERALIZED ANXIETY DISORDER: ICD-10-CM

## 2021-01-18 DIAGNOSIS — F31.9 BIPOLAR 1 DISORDER (H): Primary | ICD-10-CM

## 2021-01-18 DIAGNOSIS — R69 DIAGNOSIS DEFERRED: Primary | ICD-10-CM

## 2021-01-18 PROCEDURE — 99214 OFFICE O/P EST MOD 30 MIN: CPT | Mod: 95 | Performed by: NURSE PRACTITIONER

## 2021-01-18 RX ORDER — DIAZEPAM 2 MG
2 TABLET ORAL SEE ADMIN INSTRUCTIONS
Qty: 1 TABLET | Refills: 0 | Status: SHIPPED | OUTPATIENT
Start: 2021-01-18 | End: 2021-02-01

## 2021-01-18 RX ORDER — TOPIRAMATE 50 MG/1
50 TABLET, FILM COATED ORAL 2 TIMES DAILY
Qty: 60 TABLET | Refills: 1 | Status: SHIPPED | OUTPATIENT
Start: 2021-01-18 | End: 2021-02-11

## 2021-01-18 ASSESSMENT — ANXIETY QUESTIONNAIRES
3. WORRYING TOO MUCH ABOUT DIFFERENT THINGS: SEVERAL DAYS
5. BEING SO RESTLESS THAT IT IS HARD TO SIT STILL: MORE THAN HALF THE DAYS
2. NOT BEING ABLE TO STOP OR CONTROL WORRYING: MORE THAN HALF THE DAYS
GAD7 TOTAL SCORE: 12
6. BECOMING EASILY ANNOYED OR IRRITABLE: MORE THAN HALF THE DAYS
7. FEELING AFRAID AS IF SOMETHING AWFUL MIGHT HAPPEN: MORE THAN HALF THE DAYS
1. FEELING NERVOUS, ANXIOUS, OR ON EDGE: MORE THAN HALF THE DAYS

## 2021-01-18 ASSESSMENT — PATIENT HEALTH QUESTIONNAIRE - PHQ9
5. POOR APPETITE OR OVEREATING: SEVERAL DAYS
SUM OF ALL RESPONSES TO PHQ QUESTIONS 1-9: 13

## 2021-01-18 NOTE — PROGRESS NOTES
Behavioral Health Home Services  [facility]: [place]        Social Work Care Navigator Note      Patient: Rao Leavitt  Date: January 18, 2021  Preferred Name: Rao    Previous PHQ-9:   PHQ-9 SCORE 12/16/2020 1/5/2021 1/18/2021   PHQ-9 Total Score MyChart 16 (Moderately severe depression) - -   PHQ-9 Total Score 16 10 13   Some encounter information is confidential and restricted. Go to Review Flowsheets activity to see all data.     Previous AAYUSH-7:   AAYUSH-7 SCORE 12/16/2020 1/5/2021 1/18/2021   Total Score 21 (severe anxiety) - -   Total Score 21 13 12   Some encounter information is confidential and restricted. Go to Review Flowsheets activity to see all data.     NOMAN LEVEL:  No flowsheet data found.    Preferred Contact:  Need for : No  Preferred Contact: Cell      Type of Contact Today: Phone call (patient / identified key support person reached)      Data: (subjective / Objective):  Recent ED/IP Admission or Discharge?   None    Patient Goals:  Goal Areas: Health;Mental Health;Chemical Health;Financial and Social Service Benefits;Transportation  Patient stated goals: Patient would like assistance with his physical, mental and chemical health for creating a balanced and healthy lifestyle. Patient would like assistance with housing, SSDI and social service assistance to aid with county benefits to increase his financial stability. Patient would like assistance with reliable transportation for his family to get to appointments, run errands and get out into the community.        [facility] Core Service Provided:  Comprehensive Care Management: utilized the electronic medical record / patient registry to identify and support patient's health conditions / needs more effectively   Care Transitions: focused on the coordinated and seamless movement of patient between or within different levels of care or settings  Care Coordination: provided care management services/referrals necessary to ensure  patient and their identified supports have access to medical, behavioral health, pharmacology and recovery support services.  Ensured that patient's care is integrated across all settings and services.   Individual and Family Support: aimed to help clients reduce barriers to achieving goals, increase health literacy and knowledge about chronic condition(s), increase self-efficacy skills, and improve health outcomes  Referral to Community and Social Support Services: Coordinated / Confirmed patient's appointment time or referral and transportation plan  Followed-up with patient on whether or not they completed a referral    Current Stressors / Issues / Care Plan Objective Addressed Today:  CC and patient were able to meet today for Behavioral Health Home ([facility]) monthly check-in via telehealth visit. All required ROIs have been filed with HIM/patient chart.    1. Patient reports he was able to meet with his mental health providers for additional supports last Friday and today. Patient report she was able to meet with [facility] on Friday and psychiatrist today with medication changes. Patient reports he understands he should not be adjusting his own medication and should be consulting with providers before doing so.    Patient reports increased outbursts. Psychiatric provider increased medications to help improve outbursts and increase patient wellness.    Patient reports he is having short term memory loss issues that have been impacting his daily life. Patient reports he is having imaging on Thursday. Patient reports he was able to have his psychiatric provider prescribe medications for his anxiety for his upcoming procedure. Patient reports he will be following up with Neurology once results of imaging have been completed/reviewed.    2. Patient reports he did receive [facility] housing resources. Patient reports he will be contacting [employer] [facility] for housing stabilization case management services. Patient reports he already  receives services at [facility]. Patient will be working with [facility] to fill out paperwork and connect with case management services.    Patient reports his s.o. has applied to rent a mobile home trailer. Patient reports he is not sure if he will be moving with his s.o.    Intervention:  Motivational Interviewing: Expressed Empathy/Understanding, Supported Autonomy, Collaboration, Evocation, Permission to raise concern or advise, Open-ended questions, Reflections: simple and complex, Change talk (evoked) and Reframe   Target Behavior(s): Explored thoughts about taking an anti-depressant, Explored and resolved challenges related to taking anti-depressants as prescribed, Explored thoughts and readiness to participate in individual therapy, Explored and resolved challenges to attending appointments as scheduled, Explored current social supports and reinforced opportunities to increase engagement and Explored patient's perception of how alcohol and / or drugs influences mood    Assessment: (Progress on Goals / Homework):  Patient would benefit from continued coordination in reaching their goals set for the Behavioral Health Home ([facility]) program. [facility] reviewed Health Action Plan goals and will continue to monitor progress and work with patient and their care team.      Plan: (Homework, other):  Patient was encouraged to continue to seek condition-related information and education.      Scheduled a Phone follow up appointment with SW  in 2 weeks     Patient has set self-identified goals and will monitor progress until the next appointment on: 02/01/2021.        SARA Irwin [facility]  Behavioral Health Home ([facility])   [facility]  [phone]  January 18, 2021  12:01 PM                  Next 5 appointments (look out 90 days)    Feb 02, 2021  8:00 AM  SHORT with Suzi Jaime NP  [facility]  Arrive at: Clinic [address]  [place]  [facility] [address]  [phone]

## 2021-01-18 NOTE — PATIENT INSTRUCTIONS
1. Continue lithium 600 mg twice daily    2. Continue propranolol 40 mg twice daily    3. Topiramate increased to 50 mg twice daily for mood regulation    4. Continue prazosin at 2 mg at bedtime for nightmares    5. Added Valium 2 mg tablet to be picked up on January 21 before MRI that scheduled at 1 PM    6. Continue following with Leslye for substance use treatment and continued random UAs    7. Continue behavioral health home care services      Continue all other medications as reviewed per electronic medical record today.     Safety plan reviewed. To the Emergency Department as needed or call after hours crisis line at [phone] or [phone]. [place] Crisis Text Line. Text [place] to 552491 or Suicide LifeLine Chat: Wattage.org/chat/    To schedule individual or family therapy, call [facility] at [phone].    Schedule an appointment with me in February or sooner as needed. Call [facility] at [phone] to schedule.    Follow up with primary care provider as planned or for acute medical concerns.    Call the psychiatric nurse line with medication questions or concerns at [phone].    Cranewaret may be used to communicate with your provider, but this is not intended to be used for emergencies.    Crisis Resources:    National Suicide Prevention Lifeline: 106.298.8412 (TTY: 515.311.5184). Call anytime for help.  (www.suicidepreventionlifeline.org)  National [place] on Mental Illness (www.nadege.org): 689.843.9476 or 047-882-7863.   Mental Health Association (www.mentalhealth.org): 751.297.7317 or 148-021-0958.  [place] Crisis Text Line: Text [place] to 210573  Suicide LifeLine Chat: Wattage.org/chat

## 2021-01-18 NOTE — PROGRESS NOTES
"    Outpatient Psychiatric Progress Note    Name: Rao Leavitt   : 1990                    Primary Care Provider: Suzi Jaime NP   Therapist: Leslye     Chief Complaint   Patient presents with     Medication Follow-up     LOV: 12/10/20 - Pt states that the medications have been \"ok\". He states that he has recently been dx with PTSD and Borderline Personality Disorder. He was put on Prazosin which he would like to increase to 2 mg and he would also like to increase his other medications to help with his mood swings. He also has his MMR and EEG scheduled which he would like a single valium Rx due to minor claustrophobia.           PHQ-9 scores:  PHQ-9 SCORE 2020   PHQ-9 Total Score MyChart - 16 (Moderately severe depression) -   PHQ-9 Total Score 12 16 10   Some encounter information is confidential and restricted. Go to Review Flowsheets activity to see all data.       AAYUSH-7 scores:  AAYUSH-7 SCORE 12/10/2020 2020 2021   Total Score - 21 (severe anxiety) -   Total Score 12 21 13   Some encounter information is confidential and restricted. Go to Review Flowsheets activity to see all data.       Patient Identification:    Patient is a 31 year old year old, partnered / significant other  White American male  who presents for return visit with me.  Patient is currently unemployed. Patient attended the session alone. Patient prefers to be called: \"Philipp\".    Interim History:    I last saw Rao Leavitt for outpatient psychiatry Return Visit on December 10, 2020.     During that appointment, he reported maintaining his sobriety for the past 2 months with regular drug screens.  Since stopping the methamphetamines, he has felt an increase in his restlessness.  He also has experienced continuing of heightened mood outbursts.  I added topiramate 25 mg twice daily to help him slow these things down.  He had taken it upon himself to increase his propranolol to 40 mg " twice daily and reports that he is feeling more calm.  That we will continue.  He finds the lithium helpful at 600 mg twice daily to help with mood regulation.  Philipp is appreciative of all of the community supports he has in place with plans to begin DBT therapy soon..    .     Current medications include:      lithium (ESKALITH) 600 MG capsule, Take 1 capsule (600 mg) by mouth 2 times daily (with meals)       prazosin (MINIPRESS) 1 MG capsule, Take 1 capsule (1 mg) by mouth At Bedtime for 14 days, THEN 2 capsules (2 mg) At Bedtime for 14 days.       propranolol (INDERAL) 40 MG tablet, Take 1 tablet (40 mg) by mouth 2 times daily       topiramate (TOPAMAX) 25 MG tablet, Take 1 tablet (25 mg) by mouth 2 times daily       Apple Ralph Vn-Grn Tea-Bit Or-Cr (APPLE CIDER VINEGAR PLUS) TABS,        multivitamin, therapeutic (THERA-VIT) TABS tablet, Take 1 tablet by mouth daily       vitamin B-Complex, Take 1 tablet by mouth daily    No current facility-administered medications on file prior to visit.        The [place] Prescription Monitoring Program has been reviewed and there are no concerns about diversionary activity for controlled substances at this time.      I was able to review most recent Primary Care Provider, specialty provider, and therapy visit notes that I have access to.     Today, patient reports He is still having mood swings but are better.  He likes the topiramate.  PTSD medication working well.  MRI and EEG scheduled for January.  He is having psychological testing pending.  He is applying for disability.  He made an appeal.  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   He last used alcohol 9 months ago and drugs 3 months ago with random drug screens twice a week.       has no past medical history on file.    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 He is waiting for appeal on his disability hearing. He  continues to receive behavioral home health care services.    Substance use updates:    He has been sober from alcohol for 9 months  Tobacco use: Yes Cigarettes  Ready to quit?  No  Nicotine Replacement Therapy tried: None    Vital Signs:   There were no vitals taken for this visit.    Labs:    Most recent laboratory results reviewed and no new labs.     Review of Systems:  10 systems (general, cardiovascular, respiratory, eyes, ENT, endocrine, GI, , M/S, neurological) were reviewed. Most pertinent finding(s) is/are: He reports no headaches, some cognitive decline with forgetfulness, no pain symptoms to report, no skin rashes. The remaining systems are all unremarkable.    Mental Status Examination:  Appearance:  awake, alert and casually dressed  Attitude:  cooperative   Eye Contact:  adequate  Gait and Station: No assistive Devices used and No dizziness or falls  Psychomotor Behavior:  no evidence of tardive dyskinesia, dystonia, or tics, fidgeting and intact station, gait and muscle tone  Oriented to:  time, person, and place  Attention Span and Concentration:  Fair  Speech:   pressured speech and Speaks: English  Mood:  anxious and depressed  Affect:  intensity is heightened  Associations:  no loose associations  Thought Process:  goal oriented and tangental  Thought Content:  no evidence of suicidal ideation or homicidal ideation, no auditory hallucinations present and no visual hallucinations present  Recent and Remote Memory:  intact Not formally assessed. No amnesia.  Fund of Knowledge: appropriate  Insight:  fair  Judgment:  fair  Impulse Control:  fair    Suicide Risk Assessment:  Today Rao BLANCAS Dagmar reports that he is having no thoughts to want to end his life or to harm other people. In addition, there are notable risk factors for self-harm, including anxiety, anger/rage and mood change. However, risk is mitigated by commitment to family, history of seeking help when needed, future oriented,  denies suicidal intent or plan and denies homicidal ideation, intent, or plan. Therefore, based on all available evidence including the factors cited above, Rao Leavitt does not appear to be at imminent risk for self-harm, does not meet criteria for a 72-hr hold, and therefore remains appropriate for ongoing outpatient level of care.  A thorough assessment of risk factors related to suicide and self-harm have been reviewed and are noted above. The patient convincingly denies suicidality on several occasions. Local community safety resources printed and reviewed for patient to use if needed. There was no deceit detected, and the patient presented in a manner that was believable.     DSM5 Diagnosis:  296.40 Bipolar I Disorder Current or Most Recent Episode Hypomanic  309.81 (F43.10) Posttraumatic Stress Disorder (includes Posttraumatic Stress Disorder for Children 6 Years and Younger)  Without dissociative symptoms  Substance-Related & Addictive Disorders Alcohol Use Disorder   303.90 (F10.20) Moderate In early remission,   Stimulant Use Disorder:  In early remission, , Specify current severity:  Moderate  304.40 (F15.20) Moderate, Amphetamine type substance  301.83 (F60.3) Borderline Personality Disorder    Medical comorbidities include:   Patient Active Problem List    Diagnosis Date Noted     PTSD (post-traumatic stress disorder) 01/05/2021     Priority: Medium     Borderline personality disorder (H) 01/05/2021     Priority: Medium     Chemical dependency (H) 09/10/2020     Priority: Medium     Lithium use 05/15/2020     Priority: Medium     Lumbar spine tumor 11/27/2019     Priority: Medium     High risk medication use 10/05/2017     Priority: Medium     Bipolar 1 disorder (H) 03/16/2017     Priority: Medium     Generalized anxiety disorder 03/16/2017     Priority: Medium       Assessment:    Rao Leavitt reported today that he has continued mood swings but they are less intense since he took it  upon himself to increase his Topamax dose. That we will continue at 50 mg twice daily. He now has increased his prazosin to 2 mg at bedtime to help with nightmares despite having a nightmare last week that was intense. He is awaiting a disability hearing to get finances for his mental health condition. He also is continuing with neurology with plans to have an MRI on January 21 along with an EEG. Valium 2 mg tablet has been ordered to  only prior to the MRI procedure. He will continue with his lithium at its current dose as he finds that helpful at 600 mg twice daily. Propranolol continues for anxiety at 40 mg twice daily. He continues seeing Leslye his therapist and has random UAs that are consistently negative..    Medication side effects and alternatives were reviewed. Health promotion activities recommended and reviewed today. All questions addressed. Education and counseling completed regarding risks and benefits of medications and psychotherapy options.    Treatment Plan:        1. Continue lithium 600 mg twice daily    2. Continue propranolol 40 mg twice daily    3. Topiramate increased to 50 mg twice daily for mood regulation    4. Continue prazosin at 2 mg at bedtime for nightmares    5. Added Valium 2 mg tablet to be picked up on January 21 before MRI that scheduled at 1 PM    6. Continue following with Leslye for substance use treatment and continued random UAs    7. Continue behavioral health home care services      Continue all other medications as reviewed per electronic medical record today.     Safety plan reviewed. To the Emergency Department as needed or call after hours crisis line at [phone] or [phone]. [place] Crisis Text Line. Text [place] to 486483 or Suicide LifeLine Chat: suicidepreventionlifeline.org/chat/    To schedule individual or family therapy, call [facility] at [phone].    Schedule an appointment with me in February or sooner as needed. Call  [facility] at [phone] to schedule.    Follow up with primary care provider as planned or for acute medical concerns.    Call the psychiatric nurse line with medication questions or concerns at [phone].    Fileforcehart may be used to communicate with your provider, but this is not intended to be used for emergencies.    Crisis Resources:    National Suicide Prevention Lifeline: 933.564.5555 (TTY: 745.998.4698). Call anytime for help.  (www.suicidepreventionlifeline.org)  National [place] on Mental Illness (www.nadege.org): 375.375.1593 or 454-982-7050.   Mental Health Association (www.mentalhealth.org): 950.322.4426 or 697-710-3627.  [place] Crisis Text Line: Text [place] to 848047  Suicide LifeLine Chat: suicideHashdocline.org/chat    Administrative Billing:   Time spent with patient was 30 minutes and greater than 50% of time or 20 minutes was spent in counseling and coordination of care regarding above diagnoses and treatment plan.    Patient Status:  Patient will continue to be seen for ongoing consultation and stabilization.    Signed:   DUYEN Carrington-BC   Psychiatry

## 2021-01-19 ASSESSMENT — ANXIETY QUESTIONNAIRES: GAD7 TOTAL SCORE: 12

## 2021-01-20 ENCOUNTER — VIRTUAL VISIT (OUTPATIENT)
Dept: PSYCHOLOGY | Facility: CLINIC | Age: 31
End: 2021-01-20
Payer: COMMERCIAL

## 2021-01-20 ENCOUNTER — TELEPHONE (OUTPATIENT)
Dept: PSYCHIATRY | Facility: CLINIC | Age: 31
End: 2021-01-20

## 2021-01-20 DIAGNOSIS — F41.1 GENERALIZED ANXIETY DISORDER: ICD-10-CM

## 2021-01-20 DIAGNOSIS — F60.3 BORDERLINE PERSONALITY DISORDER (H): ICD-10-CM

## 2021-01-20 DIAGNOSIS — F15.21 METHAMPHETAMINE USE DISORDER, MODERATE, IN EARLY REMISSION (H): ICD-10-CM

## 2021-01-20 DIAGNOSIS — F31.9 BIPOLAR 1 DISORDER (H): Primary | ICD-10-CM

## 2021-01-20 DIAGNOSIS — F10.21 ALCOHOL USE DISORDER, MODERATE, IN EARLY REMISSION (H): ICD-10-CM

## 2021-01-20 PROCEDURE — 90832 PSYTX W PT 30 MINUTES: CPT | Mod: 95 | Performed by: COUNSELOR

## 2021-01-20 NOTE — TELEPHONE ENCOUNTER
Reason for Call:  Other call back    Detailed comments: Client called has sleep deprived EEG buddy   Want to know if he should take his HS prazosin  As it makes him tired.     He also states he contacted:   [facility] - [place], [place] - [address] Phone:  [phone]   Fax:  [phone]        For refill on that med. and they do not have re-fill       Phone Number Patient can be reached at: Home number on file [phone] (home)    Best Time: please call today to discuss med's prior to his test    Can we leave a detailed message on this number? YES    Call taken on 1/20/2021 at 8:42 AM by Rashawn Mahoney

## 2021-01-20 NOTE — TELEPHONE ENCOUNTER
Returned call to patient. There was no answer. Left voicemail message directing patient to call the sleep study clinic to ask about which medications to take before the study. Also directed patient to contact his pharmacy to request refills if he needs any.

## 2021-01-21 ENCOUNTER — HOSPITAL ENCOUNTER (OUTPATIENT)
Dept: MRI IMAGING | Facility: CLINIC | Age: 31
Discharge: HOME | End: 2021-01-21
Attending: PSYCHIATRY & NEUROLOGY | Admitting: PSYCHIATRY & NEUROLOGY
Payer: COMMERCIAL

## 2021-01-21 DIAGNOSIS — Z87.820 HISTORY OF TRAUMATIC BRAIN INJURY: ICD-10-CM

## 2021-01-21 DIAGNOSIS — R41.3 MEMORY PROBLEM: ICD-10-CM

## 2021-01-21 DIAGNOSIS — F19.90 SUBSTANCE USE: ICD-10-CM

## 2021-01-21 PROCEDURE — 70553 MRI BRAIN STEM W/O & W/DYE: CPT

## 2021-01-21 PROCEDURE — A9585 GADOBUTROL INJECTION: HCPCS | Performed by: PSYCHIATRY & NEUROLOGY

## 2021-01-21 PROCEDURE — 255N000002 HC RX 255 OP 636: Performed by: PSYCHIATRY & NEUROLOGY

## 2021-01-21 RX ORDER — GADOBUTROL 604.72 MG/ML
7 INJECTION INTRAVENOUS ONCE
Status: COMPLETED | OUTPATIENT
Start: 2021-01-21 | End: 2021-01-21

## 2021-01-21 RX ADMIN — GADOBUTROL 7 ML: 604.72 INJECTION INTRAVENOUS at 13:28

## 2021-01-21 NOTE — TELEPHONE ENCOUNTER
Patient requesting new script of Minipress. PLease update script to reflect 2 mg at bedtime if agree.   Pended at 2 mg daily.   Routing refill request to provider for review/approval because:  Requires provider review.

## 2021-01-22 ENCOUNTER — ALLIED HEALTH/NURSE VISIT (OUTPATIENT)
Dept: BEHAVIORAL HEALTH | Facility: CLINIC | Age: 31
End: 2021-01-22
Payer: COMMERCIAL

## 2021-01-22 DIAGNOSIS — R69 DIAGNOSIS DEFERRED: Primary | ICD-10-CM

## 2021-01-22 RX ORDER — PRAZOSIN HYDROCHLORIDE 1 MG/1
2 CAPSULE ORAL AT BEDTIME
Qty: 60 CAPSULE | Refills: 3 | Status: SHIPPED | OUTPATIENT
Start: 2021-01-22 | End: 2021-04-15

## 2021-01-22 NOTE — PROGRESS NOTES
Behavioral Health Home Services  [facility]: [place]        Social Work Care Navigator Note      Patient: Rao Leavitt  Date: January 22, 2021  Preferred Name: Rao    Previous PHQ-9:   PHQ-9 SCORE 12/16/2020 1/5/2021 1/18/2021   PHQ-9 Total Score MyChart 16 (Moderately severe depression) - -   PHQ-9 Total Score 16 10 13   Some encounter information is confidential and restricted. Go to Review Flowsheets activity to see all data.     Previous AAYUSH-7:   AAYUSH-7 SCORE 12/16/2020 1/5/2021 1/18/2021   Total Score 21 (severe anxiety) - -   Total Score 21 13 12   Some encounter information is confidential and restricted. Go to Review Flowsheets activity to see all data.     NOMAN LEVEL:  No flowsheet data found.    Preferred Contact:  Need for : No  Preferred Contact: Cell      Type of Contact Today: Phone call (patient / identified key support person reached)      Data: (subjective / Objective):  Recent ED/IP Admission or Discharge?   None    Patient Goals:  Goal Areas: Health;Mental Health;Chemical Health;Financial and Social Service Benefits;Transportation  Patient stated goals: Patient would like assistance with his physical, mental and chemical health for creating a balanced and healthy lifestyle. Patient would like assistance with housing, SSDI and social service assistance to aid with county benefits to increase his financial stability. Patient would like assistance with reliable transportation for his family to get to appointments, run errands and get out into the community.        [facility] Core Service Provided:  Comprehensive Care Management: utilized the electronic medical record / patient registry to identify and support patient's health conditions / needs more effectively   Care Transitions: focused on the coordinated and seamless movement of patient between or within different levels of care or settings  Care Coordination: provided care management services/referrals necessary to ensure  patient and their identified supports have access to medical, behavioral health, pharmacology and recovery support services.  Ensured that patient's care is integrated across all settings and services.   Individual and Family Support: aimed to help clients reduce barriers to achieving goals, increase health literacy and knowledge about chronic condition(s), increase self-efficacy skills, and improve health outcomes  Referral to Community and Social Support Services: Coordinated / Confirmed patient's appointment time or referral and transportation plan  Followed-up with patient on whether or not they completed a referral    Current Stressors / Issues / Care Plan Objective Addressed Today:  SWCC and patient were able to meet today for Behavioral Health Home ([facility]) monthly check-in via telehealth visit. All required ROIs have been filed with HIM/patient chart.    1. Patient called to check in about MRI scan yesterday. Patient reports scan went well and medications he took before procedure were very helpful and he was able to complete scan. Patient reports he is having difficulty getting neuropsych assessment scheduled, but will continue.     2. Patient reports he has been a bit down lately and is tired. Patient reports he understands that in keeping his sobriety is very important to him and wants to be careful of the medications he takes. [facility] provided support/empathy, coping skills and motivational interviewing. Patient reports that Topamax has been helpful with managing his racing thoughts. Patient reports headaches and is taking ibuprofen and tylenol to help with this and is able to manage them with current medications so far.     3. Patient continues to meet with [facility] and ARM worker at least once a week for additional support. Patient reports he is distancing himself from his current s.o. and is working with his [facility] worker, Berta to find housing.    Intervention:  Motivational Interviewing: Expressed  Empathy/Understanding, Supported Autonomy, Collaboration, Evocation, Permission to raise concern or advise, Open-ended questions, Reflections: simple and complex, Change talk (evoked) and Reframe   Target Behavior(s): Explored thoughts about taking an anti-depressant, Explored and resolved challenges related to taking anti-depressants as prescribed, Explored thoughts and readiness to participate in individual therapy, Explored and resolved challenges to attending appointments as scheduled and Explored current social supports and reinforced opportunities to increase engagement    Assessment: (Progress on Goals / Homework):  Patient would benefit from continued coordination in reaching their goals set for the Behavioral Health Home ([facility]) program. [facility] reviewed Health Action Plan goals and will continue to monitor progress and work with patient and their care team.      Plan: (Homework, other):  Patient was encouraged to continue to seek condition-related information and education.      Scheduled a Phone follow up appointment with ANGI  in 2 weeks     Patient has set self-identified goals and will monitor progress until the next appointment on: 02/01/2021.        SARA Irwin [facility]  Behavioral Health [place] ([facility])   [facility]  [phone]  January 22, 2021  9:48 AM                  Next 5 appointments (look out 90 days)    Feb 02, 2021  8:00 AM  SHORT with Suzi Jaime NP  [facility] ([facility])  Arrive at: Clinic [address] [facility] [address]  [phone]

## 2021-01-22 NOTE — RESULT ENCOUNTER NOTE
Please advise Rao Leavitt,  1990, that his brain MRI is normal.   [phone] (home)   Meg Miranda MD

## 2021-01-22 NOTE — PROGRESS NOTES
Progress Note    Patient Name: Rao Colonzsaeid  Date: 1/20/2021         Service Type: Individual      Session Start Time: 1:00pm  Session End Time: 1:20pm     Session Length: 20minutes    Session #: 8    Attendees: Client    Service Modality:  Video Visit:    Telemedicine Visit: The patient's condition can be safely assessed and treated via synchronous audio and visual telemedicine encounter.      Reason for Telemedicine Visit: Services only offered telehealth    Originating Site (Patient Location): Patient's home    Distant Site (Provider Location): Provider Remote Setting    Consent:  The patient/guardian has verbally consented to: the potential risks and benefits of telemedicine (video visit) versus in person care; bill my insurance or make self-payment for services provided; and responsibility for payment of non-covered services.     Mode of Communication:  Video Conference via [employer]    As the provider I attest to compliance with applicable laws and regulations related to telemedicine.     Treatment Plan Last Reviewed: 09/25/2020  PHQ-9 / AAYUSH-7 :     DATA  Interactive Complexity: No  Crisis: No       Progress Since Last Session (Related to Symptoms / Goals / Homework):   Symptoms: No change Reported that he has maintained sobriety but that it has been challenging due to outside influences causing temptation. Recently learned information about daughter's mother that caused stress.    Homework: Partially completed      Episode of Care Goals: Minimal progress - PREPARATION (Decided to change - considering how); Intervened by negotiating a change plan and determining options / strategies for behavior change, identifying triggers, exploring social supports, and working towards setting a date to begin behavior change     Current / Ongoing Stressors and Concerns:   Recently learned that ex-girlfriend is in a relationship with another female. He does not agree  with this and is trying to take her to court to change custody/parenting time. Double booked himself and needed to end session early      Treatment Objective(s) Addressed in This Session:   maintain sobriety continue working with services     Intervention:   Motivational Interviewing: Working on improving himself and staying on track with sobriety and treatments. Looking at ways that he can continue improving and what changes he is capable of making over the next few weeks in order to move on with life and focus on self.  Motivational Interviewing    MI Intervention: Expressed Empathy/Understanding and Open-ended questions     Change Talk Expressed by the Patient: Committment to change    Provider Response to Change Talk: A - Affirmed patient's thoughts, decisions, or attempts at behavior change          ASSESSMENT: Current Emotional / Mental Status (status of significant symptoms):   Risk status (Self / Other harm or suicidal ideation)   Patient denies current fears or concerns for personal safety.   Patient denies current or recent suicidal ideation or behaviors.   Patient denies current or recent homicidal ideation or behaviors.   Patient denies current or recent self injurious behavior or ideation.   Patient denies other safety concerns.   Patient reports there has been no change in risk factors since their last session.     Patient reports there has been no change in protective factors since their last session.     Recommended that patient call 911 or go to the local ED should there be a change in any of these risk factors.     Appearance:   Appropriate    Eye Contact:   Good    Psychomotor Behavior: Normal    Attitude:   Cooperative    Orientation:   All   Speech    Rate / Production: Hyperverbal  Talkative    Volume:  Normal    Mood:    Euthymic   Affect:    Appropriate    Thought Content:  Clear    Thought Form:  Circumstantial   Insight:    Poor      Medication Review:   Changes to psychiatric medications,  see updated Medication List in EPIC.      Medication Compliance:   Yes     Changes in Health Issues:   None reported     Chemical Use Review:   Substance Use: Chemical use reviewed, no active concerns identified      Tobacco Use: No current tobacco use.      Diagnosis:  1. Bipolar 1 disorder (H)    2. Generalized anxiety disorder    3. Methamphetamine use disorder, moderate, in early remission (H)    4. Alcohol use disorder, moderate, in early remission (H)    5. Borderline personality disorder (H)        Collateral Reports Completed:   Not Applicable    PLAN: (Patient Tasks / Therapist Tasks / Other)  Continue with individual therapy. Homework to continue harm reduction to maintain sobriety.        Cherie Christina, [facility]                                                           Treatment Plan    Client's Name: Rao Leavitt  YOB: 1990    Date: 1/4/2021    DSM-V Diagnoses: 296.42 Bipolar I Disorder Current or Most Recent Episode Manic, Moderate  or Substance-Related & Addictive Disorders Alcohol Use Disorder   303.90 (F10.20) Moderate In early remission,   Psychosocial / Contextual Factors: probation, history of incarceration, in substance abuse treatment, relationship struggles, financial stressors, vocational stress  WHODAS:     Referral / Collaboration:  The following referral(s) will be initiated: seeking assessment for PTSD, [facility].    Anticipated number of session or this episode of care: 26-30      MeasurableTreatment Goal(s) related to diagnosis / functional impairment(s)  Goal 1: Client will work on decreasing anger response.    Objective #A (Client Action)    Client will identify patterns of escalation  (i.e. tightness in chest, flushed face, increased heart rate, clenched hands, etc.).  Status: Continued - Date(s):1/4/2021       Intervention(s)  Therapist will teach emotional recognition/identification. Identifying anger episodes.    Objective #B  Client will identify at least 3-5  techniques for intervening on the escalation.  Status: Continued - Date(s):1/4/2021     Intervention(s)  Therapist will teach emotional regulation skills. DBT and CBT.    Objective #C  Client will learn and demonstrate 2 assertiveness skill(s).  Status: Continued - Date(s):1/4/2021       Intervention(s)  Therapist will role-play conflict management.      Goal 2: Client will process previous traumatic events.    Objective #A (Client Action)    Status: Continued - Date(s):1/4/2021       Client will process childhood trauma.    Intervention(s)  Therapist will provide Emotion Focused therapy.    Objective #B  Client will process trauma of incarceration.    Status: Continued - Date(s):1/4/2021       Intervention(s)  Therapist will EFT and TFCBT techniques.    Objective #C  Client will identify patterns of escalation  (i.e. tightness in chest, flushed face, increased heart rate, clenched hands, etc.).  Status: Continued - Date(s):1/4/2021       Intervention(s)  Therapist will help connect trauma history to current bejaviors.      Goal 3: Client will continue working on sobriety and harm reduction.    Objective #A (Client Action)    Status:Continued - Date(s):1/4/2021       Client will maintain sobriety and harm reduction.    Intervention(s)  Therapist will use harm reduction.    Objective #B  Client will identify at least 3 example(s) of how drinking has resulted in an experience that interferes with person values or goals.    Status: Continued - Date(s):1/4/2021     Intervention(s)  Therapist will make referrals to AA and NA.        Patient has reviewed and agreed to the above plan. Due to COVID, treatment plan was not signed, as appt was telehealth      Cherie Christina LPC  January 22, 2021

## 2021-01-25 ENCOUNTER — ALLIED HEALTH/NURSE VISIT (OUTPATIENT)
Dept: BEHAVIORAL HEALTH | Facility: CLINIC | Age: 31
End: 2021-01-25
Payer: COMMERCIAL

## 2021-01-25 DIAGNOSIS — R69 DIAGNOSIS DEFERRED: Primary | ICD-10-CM

## 2021-01-25 NOTE — PROGRESS NOTES
Behavioral Health Home Services  [facility]: [place]        Social Work Care Navigator Note      Patient: Rao Leavitt  Date: January 25, 2021  Preferred Name: Rao    Previous PHQ-9:   PHQ-9 SCORE 12/16/2020 1/5/2021 1/18/2021   PHQ-9 Total Score MyChart 16 (Moderately severe depression) - -   PHQ-9 Total Score 16 10 13   Some encounter information is confidential and restricted. Go to Review Flowsheets activity to see all data.     Previous AAYUSH-7:   AAYUSH-7 SCORE 12/16/2020 1/5/2021 1/18/2021   Total Score 21 (severe anxiety) - -   Total Score 21 13 12   Some encounter information is confidential and restricted. Go to Review Flowsheets activity to see all data.     NOMAN LEVEL:  No flowsheet data found.    Preferred Contact:  Need for : No  Preferred Contact: Cell      Type of Contact Today: Phone call (patient / identified key support person reached)      Data: (subjective / Objective):  Recent ED/IP Admission or Discharge?   None    Patient Goals:  Goal Areas: Health;Mental Health;Chemical Health;Financial and Social Service Benefits;Transportation  Patient stated goals: Patient would like assistance with his physical, mental and chemical health for creating a balanced and healthy lifestyle. Patient would like assistance with housing, SSDI and social service assistance to aid with county benefits to increase his financial stability. Patient would like assistance with reliable transportation for his family to get to appointments, run errands and get out into the community.        [facility] Core Service Provided:  Comprehensive Care Management: utilized the electronic medical record / patient registry to identify and support patient's health conditions / needs more effectively   Care Transitions: focused on the coordinated and seamless movement of patient between or within different levels of care or settings  Care Coordination: provided care management services/referrals necessary to ensure  patient and their identified supports have access to medical, behavioral health, pharmacology and recovery support services.  Ensured that patient's care is integrated across all settings and services.   Individual and Family Support: aimed to help clients reduce barriers to achieving goals, increase health literacy and knowledge about chronic condition(s), increase self-efficacy skills, and improve health outcomes  Referral to Community and Social Support Services: Assisted in scheduling an appointment to a referral / service (see plan section)  Coordinated / Confirmed patient's appointment time or referral and transportation plan  Followed-up with patient on whether or not they completed a referral    Current Stressors / Issues / Care Plan Objective Addressed Today:  SWCC and patient were able to meet today for Behavioral Health Home ([facility]) monthly check-in via telehealth visit. All required ROIs have been filed with HIM/patient chart.    1. SWCC and patient processed weekend with past difficult week. Patient reports he retains his sobriety. Patient reports he had a night terror last night and woke up sweating. Patient reports he with his medications the night terrors seem to be lessened in intensity. Patient reports he is stressed about his relationship with his s.o. Patient reports he and his [facility] worker are working on housing supports. Patient reports he is open to the idea of sober housing and will be working on facilities with [facility] worker. [facility] provided support/empathy, coping skills and motivational interviewing.    2. Patient reports he and his s.o. are unable to renew their lease and will need to move at the end of March. Patient continues to receive professional support from providers, psychiatry, [facility] counselor, Behavioral Health Home ([facility]) program and [facility].    3. Patient reports he and his [facility] counselor will be connecting [facility]. [facility] to monitor and provide  additional support and resources.    Intervention:  Motivational Interviewing: Expressed Empathy/Understanding, Supported Autonomy, Collaboration, Evocation, Permission to raise concern or advise, Open-ended questions, Change talk (evoked) and Reframe   Target Behavior(s): Explored thoughts about taking an anti-depressant, Explored and resolved challenges related to taking anti-depressants as prescribed, Explored thoughts and readiness to participate in individual therapy, Explored and resolved challenges to attending appointments as scheduled, Explored current social supports and reinforced opportunities to increase engagement and Explored patient's perception of how alcohol and / or drugs influences mood    Assessment: (Progress on Goals / Homework):  Patient would benefit from continued coordination in reaching their goals set for the Behavioral Health Home ([facility]) program. [facility] reviewed Health Action Plan goals and will continue to monitor progress and work with patient and their care team.      Plan: (Homework, other):  Patient was encouraged to continue to seek condition-related information and education.      Scheduled a Phone follow up appointment with [facility] in 1 week     Patient has set self-identified goals and will monitor progress until the next appointment on: 01/25/2021.        SARA Irwin [facility]  Behavioral Health [place] ([facility])   [facility]  [phone]  January 25, 2021  11:27 AM                  Next 5 appointments (look out 90 days)    Feb 02, 2021  8:00 AM  SHORT with Suzi Jaime NP  [facility] ([facility])  Arrive at: Clinic [address] [facility] [address]  [phone]

## 2021-01-26 ENCOUNTER — DOCUMENTATION ONLY (OUTPATIENT)
Dept: BEHAVIORAL HEALTH | Facility: CLINIC | Age: 31
End: 2021-01-26

## 2021-01-26 NOTE — PROGRESS NOTES
DOCUMENTATION ONLY:  Upon patients enrollment into [facility] services, [facility] did not allow for the appropriate selection for [facility] location for utilization on the ERV dashboard. Updated [facility] brief needs flowsheet to reflect this on the new Power BI dashboard.    SARA Irwin [facility]  Behavioral Health Home ([facility])   [facility]  [phone]  January 26, 2021  10:59 AM

## 2021-01-27 ENCOUNTER — ALLIED HEALTH/NURSE VISIT (OUTPATIENT)
Dept: BEHAVIORAL HEALTH | Facility: CLINIC | Age: 31
End: 2021-01-27
Payer: COMMERCIAL

## 2021-01-27 DIAGNOSIS — R69 DIAGNOSIS DEFERRED: Primary | ICD-10-CM

## 2021-01-27 NOTE — PROGRESS NOTES
Behavioral Health Home Services  [facility]: [place]        Social Work Care Navigator Note      Patient: Rao Leavitt  Date: January 27, 2021  Preferred Name: Rao    Previous PHQ-9:   PHQ-9 SCORE 12/16/2020 1/5/2021 1/18/2021   PHQ-9 Total Score MyChart 16 (Moderately severe depression) - -   PHQ-9 Total Score 16 10 13   Some encounter information is confidential and restricted. Go to Review Flowsheets activity to see all data.     Previous AAYUSH-7:   AAYUSH-7 SCORE 12/16/2020 1/5/2021 1/18/2021   Total Score 21 (severe anxiety) - -   Total Score 21 13 12   Some encounter information is confidential and restricted. Go to Review Flowsheets activity to see all data.     NOMAN LEVEL:  No flowsheet data found.    Preferred Contact:  Need for : No  Preferred Contact: Cell      Type of Contact Today: Phone call (patient / identified key support person reached)      Data: (subjective / Objective):  Recent ED/IP Admission or Discharge?   None    Patient Goals:  Goal Areas: Health;Mental Health;Chemical Health;Financial and Social Service Benefits;Transportation  Patient stated goals: Patient would like assistance with his physical, mental and chemical health for creating a balanced and healthy lifestyle. Patient would like assistance with housing, SSDI and social service assistance to aid with county benefits to increase his financial stability. Patient would like assistance with reliable transportation for his family to get to appointments, run errands and get out into the community.        [facility] Core Service Provided:  Comprehensive Care Management: utilized the electronic medical record / patient registry to identify and support patient's health conditions / needs more effectively   Care Transitions: focused on the coordinated and seamless movement of patient between or within different levels of care or settings  Care Coordination: provided care management services/referrals necessary to ensure patient  and their identified supports have access to medical, behavioral health, pharmacology and recovery support services.  Ensured that patient's care is integrated across all settings and services.   Individual and Family Support: aimed to help clients reduce barriers to achieving goals, increase health literacy and knowledge about chronic condition(s), increase self-efficacy skills, and improve health outcomes  Referral to Community and Social Support Services: Coordinated / Confirmed patient's appointment time or referral and transportation plan  Followed-up with patient on whether or not they completed a referral    Current Stressors / Issues / Care Plan Objective Addressed Today:  CC and patient were able to meet today for Behavioral Health Home ([facility]) monthly check-in via telehealth visit. All required ROIs have been filed with HIM/patient chart.    1. Patient reports he was able to meet with his [employer] worker yesterday to complete phone call to [facility] financial assistance. Patient reports he has been approved for cash assistance, SNAP and is working to get approved for EA.     2. Patient reports he met with his [facility] counselor and she placed referral to [facility] Housing Stabilization services.     3. Patient reports he starts [facility] DBT therapy with individual therapist next week.    4. Patient reports his \[facility]\""I  reports they received the paperwork patient sent in last week and are now working on his second appeal.    5. 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 [facility] will check in with patient's providers to coordinate this once patient is getting closer to discharge from treatment program.    6. Patient reports he will be going to [place] to visit his mom in March.      Intervention:  Motivational Interviewing: Expressed Empathy/Understanding, Supported Autonomy, Collaboration, Evocation, Permission to raise concern  or advise, Open-ended questions, Reflections: simple and complex, Change talk (evoked) and Reframe   Target Behavior(s): Explored thoughts about taking an anti-depressant, Explored and resolved challenges related to taking anti-depressants as prescribed, Explored thoughts and readiness to participate in individual therapy, Explored and resolved challenges to attending appointments as scheduled, Explored current social supports and reinforced opportunities to increase engagement and Explored patient's perception of how alcohol and / or drugs influences mood    Assessment: (Progress on Goals / Homework):  Patient would benefit from continued coordination in reaching their goals set for the Behavioral Health Home [facility]. [facility] reviewed Health Action Plan goals and will continue to monitor progress and work with patient and their care team.      Plan: (Homework, other):  Patient was encouraged to continue to seek condition-related information and education.      Scheduled a Phone follow up appointment with ANGI  in 1 week     Patient has set self-identified goals and will monitor progress until the next appointment on: 02/01/2021.    SARA Irwin [facility]  Behavioral Health [place] ([facility]   [facility]  [phone]  January 27, 2021  10:41 AM              Next 5 appointments (look out 90 days)    Feb 02, 2021  8:00 AM  SHORT with Suzi Jaime NP  [facility] ([facility])  Arrive at: Clinic [address] [facility] [address]  [phone]

## 2021-01-28 ENCOUNTER — HOSPITAL ENCOUNTER (OUTPATIENT)
Dept: NEUROLOGY | Facility: CLINIC | Age: 31
Discharge: HOME | End: 2021-01-28
Attending: PSYCHIATRY & NEUROLOGY | Admitting: PSYCHIATRY & NEUROLOGY
Payer: COMMERCIAL

## 2021-01-28 DIAGNOSIS — R41.3 MEMORY PROBLEM: ICD-10-CM

## 2021-01-28 DIAGNOSIS — Z87.820 HISTORY OF TRAUMATIC BRAIN INJURY: ICD-10-CM

## 2021-01-28 PROCEDURE — 95812 EEG 41-60 MINUTES: CPT | Mod: 26 | Performed by: PSYCHIATRY & NEUROLOGY

## 2021-01-28 PROCEDURE — 95812 EEG 41-60 MINUTES: CPT

## 2021-01-29 ENCOUNTER — ALLIED HEALTH/NURSE VISIT (OUTPATIENT)
Dept: BEHAVIORAL HEALTH | Facility: CLINIC | Age: 31
End: 2021-01-29
Payer: COMMERCIAL

## 2021-01-29 DIAGNOSIS — R69 DIAGNOSIS DEFERRED: Primary | ICD-10-CM

## 2021-01-29 NOTE — PROGRESS NOTES
Behavioral Health Home Services  [facility]: [place]        Social Work Care Navigator Note      Patient: Rao Leavitt  Date: January 29, 2021  Preferred Name: Rao    Previous PHQ-9:   PHQ-9 SCORE 12/16/2020 1/5/2021 1/18/2021   PHQ-9 Total Score MyChart 16 (Moderately severe depression) - -   PHQ-9 Total Score 16 10 13   Some encounter information is confidential and restricted. Go to Review Flowsheets activity to see all data.     Previous AAYUSH-7:   AAYUSH-7 SCORE 12/16/2020 1/5/2021 1/18/2021   Total Score 21 (severe anxiety) - -   Total Score 21 13 12   Some encounter information is confidential and restricted. Go to Review Flowsheets activity to see all data.     NOMAN LEVEL:  No flowsheet data found.    Preferred Contact:  Need for : No  Preferred Contact: Cell      Type of Contact Today: Phone call (patient / identified key support person reached)      Data: (subjective / Objective):  Recent ED/IP Admission or Discharge?   None    Patient Goals:  Goal Areas: Health;Mental Health;Chemical Health;Financial and Social Service Benefits;Transportation  Patient stated goals: Patient would like assistance with his physical, mental and chemical health for creating a balanced and healthy lifestyle. Patient would like assistance with housing, SSDI and social service assistance to aid with county benefits to increase his financial stability. Patient would like assistance with reliable transportation for his family to get to appointments, run errands and get out into the community.        [facility] Core Service Provided:  Comprehensive Care Management: utilized the electronic medical record / patient registry to identify and support patient's health conditions / needs more effectively   Care Transitions: focused on the coordinated and seamless movement of patient between or within different levels of care or settings  Care Coordination: provided care management services/referrals necessary to ensure patient  and their identified supports have access to medical, behavioral health, pharmacology and recovery support services.  Ensured that patient's care is integrated across all settings and services.   Individual and Family Support: aimed to help clients reduce barriers to achieving goals, increase health literacy and knowledge about chronic condition(s), increase self-efficacy skills, and improve health outcomes  Referral to Community and Social Support Services: Coordinated / Confirmed patient's appointment time or referral and transportation plan  Followed-up with patient on whether or not they completed a referral    Current Stressors / Issues / Care Plan Objective Addressed Today:  [facility] and patient were able to meet today for Behavioral Health Home ([facility]) monthly check-in via telehealth visit. All required ROIs have been filed with HIM/patient chart.    1. Patient report his EEG went well yesterday and he has follow-up appointment to address his sleep and headaches with neurologist on Monday. [facility] will continue to monitor and follow-up with patient.    2. Patient reports his cash assistance has been stopped due to living with his s.o. and daughter, as they received benefits. Patient reports if he does move out this benefit will be able to be reapplied for.    3. Patient reports s.o. has been approved for a new apartment and is not sure if he will be moving in with s.o. Patient reports he and s.o. are talking about this and working this out. [facility] provided patient with support/empathy, coping skills and motivational interviewing.    Intervention:  Motivational Interviewing: Expressed Empathy/Understanding, Supported Autonomy, Collaboration, Evocation, Permission to raise concern or advise, Open-ended questions, Reflections: simple and complex, Change talk (evoked) and Reframe   Target Behavior(s): Explored thoughts about taking an anti-depressant, Explored and resolved challenges related to taking anti-depressants as  prescribed, Explored thoughts and readiness to participate in individual therapy, Explored and resolved challenges to attending appointments as scheduled, Explored current social supports and reinforced opportunities to increase engagement, Explored current sleep hygeine and patient's perception and knowledge of relationship to mood and Explored patient's perception of how alcohol and / or drugs influences mood    Assessment: (Progress on Goals / Homework):  Patient would benefit from continued coordination in reaching their goals set for the Behavioral Health Home ([facility]) program. [facility] reviewed Health Action Plan goals and will continue to monitor progress and work with patient and their care team.      Plan: (Homework, other):  Patient was encouraged to continue to seek condition-related information and education.      Scheduled a Phone follow up appointment with SW  in 1 week     Patient has set self-identified goals and will monitor progress until the next appointment on: 02/01/2021.         SARA Iwrin [facility]  Behavioral Health [place] (formerly [facility])   [facility]  [phone]  January 29, 2021  11:54 AM          Next 5 appointments (look out 90 days)    Feb 02, 2021  8:00 AM  SHORT with Suzi Jaime NP  [facility] ([facility])  Arrive at: Clinic [address] [facility] [address]  [phone]

## 2021-02-01 ENCOUNTER — ALLIED HEALTH/NURSE VISIT (OUTPATIENT)
Dept: BEHAVIORAL HEALTH | Facility: CLINIC | Age: 31
End: 2021-02-01
Payer: COMMERCIAL

## 2021-02-01 ENCOUNTER — VIRTUAL VISIT (OUTPATIENT)
Dept: NEUROLOGY | Facility: CLINIC | Age: 31
End: 2021-02-01
Payer: COMMERCIAL

## 2021-02-01 DIAGNOSIS — F90.9 ATTENTION DEFICIT HYPERACTIVITY DISORDER (ADHD), UNSPECIFIED ADHD TYPE: ICD-10-CM

## 2021-02-01 DIAGNOSIS — R69 DIAGNOSIS DEFERRED: Primary | ICD-10-CM

## 2021-02-01 DIAGNOSIS — R94.01 EEG ABNORMALITY: ICD-10-CM

## 2021-02-01 DIAGNOSIS — Z87.820 HISTORY OF TRAUMATIC BRAIN INJURY: ICD-10-CM

## 2021-02-01 DIAGNOSIS — R41.3 MEMORY PROBLEM: Primary | ICD-10-CM

## 2021-02-01 DIAGNOSIS — Z72.820 POOR SLEEP: ICD-10-CM

## 2021-02-01 PROCEDURE — 99214 OFFICE O/P EST MOD 30 MIN: CPT | Mod: 95 | Performed by: PSYCHIATRY & NEUROLOGY

## 2021-02-01 NOTE — PATIENT INSTRUCTIONS
Patient Instructions:  -- Neuropsych test, as ascheduled: 2/16/21.  -- Referral to Sleep clinic for consultation regarding poor sleep and twitching.  -- Continue the Topamax at the current dose for now. This medication also has anti-seizure properties.  -- Return to Neurology after the above consultations are completed (6 weeks).

## 2021-02-01 NOTE — PROGRESS NOTES
Behavioral Health Home Services  [facility]: [place]        Social Work Care Navigator Note      Patient: Rao Leavitt  Date: February 1, 2021  Preferred Name: Rao    Previous PHQ-9:   PHQ-9 SCORE 12/16/2020 1/5/2021 1/18/2021   PHQ-9 Total Score MyChart 16 (Moderately severe depression) - -   PHQ-9 Total Score 16 10 13   Some encounter information is confidential and restricted. Go to Review Flowsheets activity to see all data.     Previous AAYUSH-7:   AAYUSH-7 SCORE 12/16/2020 1/5/2021 1/18/2021   Total Score 21 (severe anxiety) - -   Total Score 21 13 12   Some encounter information is confidential and restricted. Go to Review Flowsheets activity to see all data.     NOMAN LEVEL:  No flowsheet data found.    Preferred Contact:  Need for : No  Preferred Contact: Cell      Type of Contact Today: Phone call (patient / identified key support person reached)      Data: (subjective / Objective):  Recent ED/IP Admission or Discharge?   None    Patient Goals:  Goal Areas: Health;Mental Health;Chemical Health;Financial and Social Service Benefits;Transportation  Patient stated goals: Patient would like assistance with his physical, mental and chemical health for creating a balanced and healthy lifestyle. Patient would like assistance with housing, SSDI and social service assistance to aid with county benefits to increase his financial stability. Patient would like assistance with reliable transportation for his family to get to appointments, run errands and get out into the community.        [facility] Core Service Provided:  Comprehensive Care Management: utilized the electronic medical record / patient registry to identify and support patient's health conditions / needs more effectively   Care Transitions: focused on the coordinated and seamless movement of patient between or within different levels of care or settings  Care Coordination: provided care management services/referrals necessary to ensure patient  and their identified supports have access to medical, behavioral health, pharmacology and recovery support services.  Ensured that patient's care is integrated across all settings and services.   Individual and Family Support: aimed to help clients reduce barriers to achieving goals, increase health literacy and knowledge about chronic condition(s), increase self-efficacy skills, and improve health outcomes  Referral to Community and Social Support Services: Assisted in scheduling an appointment to a referral / service (see plan section)  Coordinated / Confirmed patient's appointment time or referral and transportation plan  Followed-up with patient on whether or not they completed a referral    Current Stressors / Issues / Care Plan Objective Addressed Today:  [facility] and patient were able to meet today for Behavioral Health Home ([facility]) monthly check-in via telehealth visit. All required ROIs have been filed with HIM/patient chart.    1. Discussed neurology visit and follow-up appointments today. Patient reports he is concerned about his sleeping and memory issues but understands it is a process. Neurology recommending additional neuropsych testing, referral to sleep clinic and possible adding AED or increase in Topamax. Patient will need to follow-up in 6 weeks with neurology.     2. Patient and SW[facility] processed feelings around not being able to work and moving forward with SSDI. Patient reports he will be contacting them to update office.    3. Patient reports his symptoms are manageable right now and appreciates the support of his providers. Patient reports his sleep is up & down, trying to keep a routine and napping 1-2 hours per day and is still tired. Patient reports he is taking his medications as directed.    4. Patient reports he has unemployment court today via phone. [facility] to follow-up with patient at next visit.    Intervention:  Motivational Interviewing: Expressed Empathy/Understanding, Supported Autonomy,  Collaboration, Evocation, Permission to raise concern or advise, Open-ended questions, Reflections: simple and complex, Change talk (evoked) and Reframe   Target Behavior(s): Explored thoughts about taking an anti-depressant, Explored and resolved challenges related to taking anti-depressants as prescribed, Explored thoughts and readiness to participate in individual therapy, Explored and resolved challenges to attending appointments as scheduled, Explored current social supports and reinforced opportunities to increase engagement, Explored current sleep hygeine and patient's perception and knowledge of relationship to mood and Explored patient's perception of how alcohol and / or drugs influences mood    Assessment: (Progress on Goals / Homework):  Patient would benefit from continued coordination in reaching their goals set for the Behavioral Health Home ([facility]) program. [facility] reviewed Health Action Plan goals and will continue to monitor progress and work with patient and their care team.      Plan: (Homework, other):  Patient was encouraged to continue to seek condition-related information and education.      Scheduled a Phone follow up appointment with SW  in 2 weeks     Patient has set self-identified goals and will monitor progress until the next appointment on: 02/15/2021.         SARA Irwin [facility]  Behavioral Health Home ([facility])   [facility]  [phone]  February 1, 2021  10:27 AM                  Next 5 appointments (look out 90 days)    Feb 02, 2021  8:00 AM  SHORT with Suzi Jaime NP  [facility] ([facility])  Arrive at: Clinic [address] [facility] [address]  [phone]
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
## 2021-02-02 ENCOUNTER — OFFICE VISIT (OUTPATIENT)
Dept: FAMILY MEDICINE | Facility: CLINIC | Age: 31
End: 2021-02-02
Payer: COMMERCIAL

## 2021-02-02 VITALS
HEIGHT: 68 IN | RESPIRATION RATE: 13 BRPM | HEART RATE: 66 BPM | WEIGHT: 173.6 LBS | OXYGEN SATURATION: 100 % | DIASTOLIC BLOOD PRESSURE: 58 MMHG | TEMPERATURE: 97.3 F | BODY MASS INDEX: 26.31 KG/M2 | SYSTOLIC BLOOD PRESSURE: 106 MMHG

## 2021-02-02 DIAGNOSIS — F43.10 PTSD (POST-TRAUMATIC STRESS DISORDER): Primary | ICD-10-CM

## 2021-02-02 DIAGNOSIS — F19.20 CHEMICAL DEPENDENCY (H): ICD-10-CM

## 2021-02-02 DIAGNOSIS — F31.9 BIPOLAR 1 DISORDER (H): ICD-10-CM

## 2021-02-02 DIAGNOSIS — F60.3 BORDERLINE PERSONALITY DISORDER (H): ICD-10-CM

## 2021-02-02 DIAGNOSIS — F41.1 GENERALIZED ANXIETY DISORDER: ICD-10-CM

## 2021-02-02 PROCEDURE — 99213 OFFICE O/P EST LOW 20 MIN: CPT | Performed by: NURSE PRACTITIONER

## 2021-02-02 ASSESSMENT — ANXIETY QUESTIONNAIRES
7. FEELING AFRAID AS IF SOMETHING AWFUL MIGHT HAPPEN: NEARLY EVERY DAY
IF YOU CHECKED OFF ANY PROBLEMS ON THIS QUESTIONNAIRE, HOW DIFFICULT HAVE THESE PROBLEMS MADE IT FOR YOU TO DO YOUR WORK, TAKE CARE OF THINGS AT HOME, OR GET ALONG WITH OTHER PEOPLE: VERY DIFFICULT
3. WORRYING TOO MUCH ABOUT DIFFERENT THINGS: NEARLY EVERY DAY
GAD7 TOTAL SCORE: 20
6. BECOMING EASILY ANNOYED OR IRRITABLE: NEARLY EVERY DAY
5. BEING SO RESTLESS THAT IT IS HARD TO SIT STILL: NEARLY EVERY DAY
1. FEELING NERVOUS, ANXIOUS, OR ON EDGE: NEARLY EVERY DAY
2. NOT BEING ABLE TO STOP OR CONTROL WORRYING: MORE THAN HALF THE DAYS

## 2021-02-02 ASSESSMENT — PATIENT HEALTH QUESTIONNAIRE - PHQ9
SUM OF ALL RESPONSES TO PHQ QUESTIONS 1-9: 17
5. POOR APPETITE OR OVEREATING: NEARLY EVERY DAY

## 2021-02-02 ASSESSMENT — MIFFLIN-ST. JEOR: SCORE: 1709

## 2021-02-02 NOTE — PROGRESS NOTES
Assessment & Plan     PTSD (post-traumatic stress disorder)   Improved with use of Prazosin 2 mg at bedtime.  Continue to follow up with Elieser Giron.    Chemical dependency (H)   Sober - working with outpatient chem dep.    Bipolar 1 disorder (H)       Borderline personality disorder (H)       Generalized anxiety disorder  Improved.                25 minutes spent on the date of the encounter doing chart review, history and exam, documentation and further activities as noted above          Depression Screening Follow Up    PHQ 2/2/2021   PHQ-9 Total Score 17   Q9: Thoughts of better off dead/self-harm past 2 weeks Not at all     Last PHQ-9 2/2/2021   1.  Little interest or pleasure in doing things 2   2.  Feeling down, depressed, or hopeless 2   3.  Trouble falling or staying asleep, or sleeping too much 3   4.  Feeling tired or having little energy 3   5.  Poor appetite or overeating 3   6.  Feeling bad about yourself 0   7.  Trouble concentrating 3   8.  Moving slowly or restless 1   Q9: Thoughts of better off dead/self-harm past 2 weeks 0   PHQ-9 Total Score 17   Difficulty at work, home, or with people Very difficult       Follow Up Actions Taken  Patient counseled, no additional follow up at this time.  Depression Action Plan reviewed with patient.       Patient Instructions   Continue medications at current doses - would not change anything today.  Will recheck Prazosin dose after sleep study and follow up with Neurology.    Follow-up with Neurology and Psychiatry as planned this February.    Follow up with me in 2-3 months.    MARSHAL Gaxiola        Patient Education     Understanding Posttraumatic Stress Disorder (PTSD)   You may have posttraumatic stress disorder (PTSD) if you ve been through a traumatic event and are having trouble dealing with it. Such events may include the death of a loved one, a car crash, rape, domestic violence,  combat, or violent crime. While it's normal to have  some anxiety after such an event, it usually goes away in time. But with PTSD, the anxiety is more intense and keeps coming back. And the trauma is relived through nightmares, intrusive memories, and flashbacks (vivid memories that seem real). The symptoms of PTSD can cause problems with relationships and make it hard to cope with daily life. But it can be treated. With help, you can feel better.     How does it feel?  Symptoms of PTSD often start within a few months of the event. Here are some common symptoms:     You startle more easily, and feel anxious and on edge all the time. This can lead to sleep problems. It a can cause you to feel overwhelmed or become angry or upset more easily. Panic attacks (sudden, intense feelings of terror and a strong need to escape from wherever you are) can also occur.    You relive the event through nightmares and flashbacks. During these, you may feel strong emotions and as though you re reliving the event all over again.    You stay away from people, places, or activities that remind you of the trauma. You may hold in your feelings and become emotionally numb. It may be hard to focus at work or school or to relax with friends. You may be afraid to let people get close to you.    You may also have trouble remembering parts of the traumatic event.  Negative thoughts about oneself and feelings of guilt and shame are also common PTSD symptoms.  Who does it affect?  Not everyone who survives a trauma will have PTSD. But many will. In fact, millions of people have the condition. PTSD can happen to anyone, but it most often develops after a person feels his or her or another s life is threatened.   You re at risk for PTSD if you have experienced or witnessed:    A rape or sexual abuse    A mugging or carjacking    A car accident or plane crash    A life-threatening illness    War    Domestic violence    Childhood abuse    Natural disasters such as earthquakes, floods, or  "hurricanes    The sudden death of a loved one  Finding help  The first step is to talk with a trusted counselor or healthcare provider. He or she can help you take the next step to treatment. This may include therapy (also called counseling) and medicine. Many therapists now practice what is called \"trauma-informed care.\" These therapists use specific interventions that acknowledge and address trauma s consequences and help people heal.   Are you having suicidal thoughts?  You may be feeling helpless, hopeless, and that you can t go on. You may even have thoughts of suicide. But help is available. There are ways to ease this pain and manage the problems in your life.   If you are thinking about harming or killing yourself, please tell your healthcare provider or someone you care about immediately or go to the nearest crisis walk-in center or emergency room.   You can also call, toll-free:    800[employer]SUICIDE (872-457-6555)     686-516-NCQP ([phone])  To learn more    American Psychiatric Association [phone] www.psychiatry.org/patients-families    American Psychological Association www.apa.org/helpcenter    Anxiety and Depression Association of [place] www.adaa.org    Mental Health [place] www.nmha.org    National Center for PTSD www.ptsd.va.gov    National [place] of Mental Health www.nimh.nih.gov/topics/whovo-xloy-ewkc.shtml    National Suicide Prevention Lifeline 066-365-EVQE (974-450-3133) www.suicidepreventionlifeline.org    StayWell last reviewed this educational content on 4/1/2020 2000-2020 [employer]. [address] [place] [address], [place] [address]. All rights reserved. This information is not intended as a substitute for professional medical care. Always follow your healthcare professional's instructions.               Return in about 3 months (around 5/2/2021) for Medication Follow up, Recheck symptoms.    Suzi Jaime NP  [facility]    Subjective "     Rao is a 31 year old who presents to clinic today for the following health issues     HPI       Anxiety Follow-Up    How are you doing with your anxiety since your last visit? Worsened - new EEG results - states he is having trouble comprehending this.     Are you having other symptoms that might be associated with anxiety? No    Have you had a significant life event? No     Are you feeling depressed? No. Has his days but manageable.     Do you have any concerns with your use of alcohol or other drugs? No Still getting drug tested weekly, states holding accountability helps him. No thoughts of drug use.     Bree IBARRA did up his topiramate at his last visit.     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 Go to Review Flowsheets activity to see all data.     PHQ 12/16/2020 1/5/2021 1/18/2021   PHQ-9 Total Score 16 10 13   Q9: Thoughts of better off dead/self-harm past 2 weeks Not at all Not at all Not at all   Some encounter information is confidential and restricted. Go to Review Flowsheets activity to see all data.     Last PHQ-9 2/2/2021   1.  Little interest or pleasure in doing things 2   2.  Feeling down, depressed, or hopeless 2   3.  Trouble falling or staying asleep, or sleeping too much 3   4.  Feeling tired or having little energy 3   5.  Poor appetite or overeating 3   6.  Feeling bad about yourself 0   7.  Trouble concentrating 3   8.  Moving slowly or restless 1   Q9: Thoughts of  better off dead/self-harm past 2 weeks 0   PHQ-9 Total Score 17   Difficulty at work, home, or with people Very difficult     AAYUSH-7  2/2/2021   1. Feeling nervous, anxious, or on edge 3   2. Not being able to stop or control worrying 2   3. Worrying too much about different things 3   4. Trouble relaxing 3   5. Being so restless that it is hard to sit still 3   6. Becoming easily annoyed or irritable 3   7. Feeling afraid, as if something awful might happen 3   AAYUSH-7 Total Score 20   If you checked any problems, how difficult have they made it for you to do your work, take care of things at home, or get along with other people? Very difficult         How many servings of fruits and vegetables do you eat daily?  0-1    On average, how many sweetened beverages do you drink each day (Examples: soda, juice, sweet tea, etc.  Do NOT count diet or artificially sweetened beverages)?   0    How many days per week do you exercise enough to make your heart beat faster? 3 or less    How many minutes a day do you exercise enough to make your heart beat faster? 60 or more has a two year old child.     How many days per week do you miss taking your medication? Maybe once a week. Because he was required to not take his Prazosin for the testing.     Taking Topiramate 50 mg twice daily.  Was taking 25 mg twice daily prior to this.  Using this for anti - seizure properties as well.  Follow-up with Elieser Giron is Feb 11, 2021.      Medication Followup of Prazosin for PTSD    Taking Medication as prescribed: yes    Side Effects:  None    Medication Helping Symptoms:  NO-or unsure. States that he is going through a lot of testing right now due to an abnormal EEG. Upcoming sleep study to determine if he is having seizures at night. Goes in for a psych test on 02/16/21.     States he would not like to make any medication changes until after testing is complete.     Taking 2 mg at bedtime - started this at last visit for PSTD.   This has  "improved night terrors.  Still doing counseling.     Saw Neurology and did MRI 01/2021 and was normal.  Neuropsych test, as ascheduled: 2/16/21.  -- Referral to Sleep clinic for consultation regarding poor sleep and twitching.  -- Continue the Topamax at the current dose for now. This medication also has anti-seizure properties.  -- Return to Neurology after the above consultations are completed (6 weeks).    Review of Systems   Constitutional, HEENT, cardiovascular, pulmonary, GI, , musculoskeletal, neuro, skin, endocrine and psych systems are negative, except as otherwise noted.  POS for short term memory loss, irritability, sleep disorder, mood disorder.      Objective    /58 (BP Location: Left arm, Patient Position: Sitting, Cuff Size: Adult Regular)   Pulse 66   Temp 97.3  F (36.3  C) (Tympanic)   Resp 13   Ht 1.715 m (5' 7.5\")   Wt 78.7 kg (173 lb 9.6 oz)   SpO2 100%   BMI 26.79 kg/m    Body mass index is 26.79 kg/m .  Physical Exam   GENERAL: alert and no distress  NECK: no adenopathy, no asymmetry, masses, or scars and thyroid normal to palpation  RESP: lungs clear to auscultation - no rales, rhonchi or wheezes  CV: regular rate and rhythm, normal S1 S2, no S3 or S4, no murmur, click or rub, no peripheral edema and peripheral pulses strong  ABDOMEN: soft, nontender, no hepatosplenomegaly, no masses and bowel sounds normal  MS: no gross musculoskeletal defects noted, no edema  NEURO: Normal strength and tone, mentation intact and speech normal  PSYCH: mentation appears normal, affect flat, anxious fatigued and judgement and insight intact    Orders Only on 07/27/2020   Component Date Value Ref Range Status     Lithium Level 07/27/2020 0.25* 0.60 - 1.20 mmol/L Final              "

## 2021-02-02 NOTE — PATIENT INSTRUCTIONS
Continue medications at current doses - would not change anything today.  Will recheck Prazosin dose after sleep study and follow up with Neurology.    Follow-up with Neurology and Psychiatry as planned this February.    Follow up with me in 2-3 months.    MARSHAL Gaxiola        Patient Education     Understanding Posttraumatic Stress Disorder (PTSD)   You may have posttraumatic stress disorder (PTSD) if you ve been through a traumatic event and are having trouble dealing with it. Such events may include the death of a loved one, a car crash, rape, domestic violence,  combat, or violent crime. While it's normal to have some anxiety after such an event, it usually goes away in time. But with PTSD, the anxiety is more intense and keeps coming back. And the trauma is relived through nightmares, intrusive memories, and flashbacks (vivid memories that seem real). The symptoms of PTSD can cause problems with relationships and make it hard to cope with daily life. But it can be treated. With help, you can feel better.     How does it feel?  Symptoms of PTSD often start within a few months of the event. Here are some common symptoms:     You startle more easily, and feel anxious and on edge all the time. This can lead to sleep problems. It a can cause you to feel overwhelmed or become angry or upset more easily. Panic attacks (sudden, intense feelings of terror and a strong need to escape from wherever you are) can also occur.    You relive the event through nightmares and flashbacks. During these, you may feel strong emotions and as though you re reliving the event all over again.    You stay away from people, places, or activities that remind you of the trauma. You may hold in your feelings and become emotionally numb. It may be hard to focus at work or school or to relax with friends. You may be afraid to let people get close to you.    You may also have trouble remembering parts of the traumatic event.   "Negative thoughts about oneself and feelings of guilt and shame are also common PTSD symptoms.  Who does it affect?  Not everyone who survives a trauma will have PTSD. But many will. In fact, millions of people have the condition. PTSD can happen to anyone, but it most often develops after a person feels his or her or another s life is threatened.   You re at risk for PTSD if you have experienced or witnessed:    A rape or sexual abuse    A mugging or carjacking    A car accident or plane crash    A life-threatening illness    War    Domestic violence    Childhood abuse    Natural disasters such as earthquakes, floods, or hurricanes    The sudden death of a loved one  Finding help  The first step is to talk with a trusted counselor or healthcare provider. He or she can help you take the next step to treatment. This may include therapy (also called counseling) and medicine. Many therapists now practice what is called \"trauma-informed care.\" These therapists use specific interventions that acknowledge and address trauma s consequences and help people heal.   Are you having suicidal thoughts?  You may be feeling helpless, hopeless, and that you can t go on. You may even have thoughts of suicide. But help is available. There are ways to ease this pain and manage the problems in your life.   If you are thinking about harming or killing yourself, please tell your healthcare provider or someone you care about immediately or go to the nearest crisis walk-in center or emergency room.   You can also call, toll-free:    678-SUICIDE ([phone])     189-713-LDAA ([phone])  To learn more    American Psychiatric Association [phone] www.psychiatry.org/patients-families    American Psychological Association www.apa.org/helpcenter    Anxiety and Depression Association of [place] www.adaa.org    Mental Health [place] www.nmha.org    National Center for PTSD www.ptsd.va.gov    National [place] of Mental Health " www.[facility].nih.gov/topics/xxvbg-pcdr-ufyw.shtml    National Suicide Prevention Lifeline 585-523-KUSG (670-243-1809) www.suicidepreventionlifeline.org    StayWell last reviewed this educational content on 4/1/2020 2000-2020 The [employer]. [address] [place] [address], [place] [address]. All rights reserved. This information is not intended as a substitute for professional medical care. Always follow your healthcare professional's instructions.

## 2021-02-03 ASSESSMENT — ANXIETY QUESTIONNAIRES: GAD7 TOTAL SCORE: 20

## 2021-02-04 ENCOUNTER — ALLIED HEALTH/NURSE VISIT (OUTPATIENT)
Dept: BEHAVIORAL HEALTH | Facility: CLINIC | Age: 31
End: 2021-02-04
Payer: COMMERCIAL

## 2021-02-04 DIAGNOSIS — R69 DIAGNOSIS DEFERRED: Primary | ICD-10-CM

## 2021-02-04 NOTE — PROGRESS NOTES
Behavioral Health Home Services  [facility]: [place]        Social Work Care Navigator Note      Patient: Rao Leavitt  Date: February 4, 2021  Preferred Name: Rao    Previous PHQ-9:   PHQ-9 SCORE 1/5/2021 1/18/2021 2/2/2021   PHQ-9 Total Score MyChart - - -   PHQ-9 Total Score 10 13 17   Some encounter information is confidential and restricted. Go to Review Flowsheets activity to see all data.     Previous AAYUSH-7:   AAYUSH-7 SCORE 1/5/2021 1/18/2021 2/2/2021   Total Score - - -   Total Score 13 12 20   Some encounter information is confidential and restricted. Go to Review Flowsheets activity to see all data.     NOMAN LEVEL:  No flowsheet data found.    Preferred Contact:  Need for : No  Preferred Contact: Cell      Type of Contact Today: Phone call (patient / identified key support person reached)      Data: (subjective / Objective):  Recent ED/IP Admission or Discharge?   None    Patient Goals:  Goal Areas: Health;Mental Health;Chemical Health;Financial and Social Service Benefits;Transportation  Patient stated goals: Patient would like assistance with his physical, mental and chemical health for creating a balanced and healthy lifestyle. Patient would like assistance with housing, SSDI and social service assistance to aid with [employer] benefits to increase his financial stability. Patient would like assistance with reliable transportation for his family to get to appointments, run errands and get out into the community.        [facility] Core Service Provided:  Comprehensive Care Management: utilized the electronic medical record / patient registry to identify and support patient's health conditions / needs more effectively   Care Transitions: focused on the coordinated and seamless movement of patient between or within different levels of care or settings  Care Coordination: provided care management services/referrals necessary to ensure patient and their identified supports have access to medical,  behavioral health, pharmacology and recovery support services.  Ensured that patient's care is integrated across all settings and services.   Individual and Family Support: aimed to help clients reduce barriers to achieving goals, increase health literacy and knowledge about chronic condition(s), increase self-efficacy skills, and improve health outcomes  Referral to Community and Social Support Services: Followed-up with patient on whether or not they completed a referral    Current Stressors / Issues / Care Plan Objective Addressed Today:  [facility] and patient were able to meet today for Behavioral Health Home ([facility]) monthly check-in via telehealth visit. All required ROIs have been filed with HIM/patient chart.    1. Patient reports his disability has now gone into his third appeal. Patient reports he and [employer] working with 's office to move forward with this. Patient reports he may have to go to court for this appeal. [facility] provided support/empathy and motivational interviewing.    2. Patient reports he was able to attend his unemployment court date. Patient reports they will be mailing decision about benefits. [facility] will continue to monitor and follow-up with patient. Patient may need resources for Disability Hub depending on outcome.    3. Patient reports he is unsure of his new DBT therapist. Patient reports he will be talking to his LADC about different provider.    4. Patient reports he is working with Sarah at [facility]. Patient reports no other housing supports available at this time but will continue to work with services.    5. Patient reports he most likely will be moving to [place] with his s.o. and daughter. [facility] will monitor and provide resources as requested/needed. Patient reports his relationship improving with his s.o. and she most likely will apply to be his PCA.     Intervention:  Motivational Interviewing: Expressed Empathy/Understanding, Supported Autonomy,  Collaboration, Evocation, Permission to raise concern or advise, Open-ended questions, Reflections: simple and complex, Change talk (evoked) and Reframe   Target Behavior(s): Explored thoughts and readiness to participate in individual therapy, Explored and resolved challenges to attending appointments as scheduled, Explored current social supports and reinforced opportunities to increase engagement and Explored patient's perception of how alcohol and / or drugs influences mood    Assessment: (Progress on Goals / Homework):  Patient would benefit from continued coordination in reaching their goals set for the Behavioral Health Home ([facility]) program. [facility] reviewed Health Action Plan goals and will continue to monitor progress and work with patient and their care team.      Plan: (Homework, other):  Patient was encouraged to continue to seek condition-related information and education.      Scheduled a Phone follow up appointment with ANGI EDUARDO in 2 weeks.    Patient has set self-identified goals and will monitor progress until the next appointment on: 02/15/2021.        SARA Irwin [facility]  Behavioral Health Home ([facility])   [facility]  [phone]  February 4, 2021  2:38 PM

## 2021-02-08 ENCOUNTER — TELEPHONE (OUTPATIENT)
Dept: PSYCHIATRY | Facility: CLINIC | Age: 31
End: 2021-02-08

## 2021-02-08 NOTE — TELEPHONE ENCOUNTER
Returned call to patient and told him that he has a refill available at the pharmacy. Also told him that this medication was prescribed by his PCP.

## 2021-02-08 NOTE — TELEPHONE ENCOUNTER
Reason for Call:  Medication refill:    Name of the pharmacy and phone number for the current request:  [facility] in [place] [phone]    Name of the medication requested: Propranolol     Other request: None    Can we leave a detailed message on this number? Yes    Phone number patient can be reached at: [phone]    Best Time: Anytime     Call taken on 2/8/2021 at 10:18 AM by Aníbal Luther

## 2021-02-10 ENCOUNTER — VIRTUAL VISIT (OUTPATIENT)
Dept: PSYCHOLOGY | Facility: CLINIC | Age: 31
End: 2021-02-10
Payer: COMMERCIAL

## 2021-02-10 DIAGNOSIS — F10.21 ALCOHOL USE DISORDER, MODERATE, IN EARLY REMISSION (H): ICD-10-CM

## 2021-02-10 DIAGNOSIS — F31.9 BIPOLAR 1 DISORDER (H): Primary | ICD-10-CM

## 2021-02-10 DIAGNOSIS — F43.10 POSTTRAUMATIC STRESS DISORDER: ICD-10-CM

## 2021-02-10 DIAGNOSIS — F60.3 BORDERLINE PERSONALITY DISORDER (H): ICD-10-CM

## 2021-02-10 DIAGNOSIS — F15.21 METHAMPHETAMINE USE DISORDER, MODERATE, IN EARLY REMISSION (H): ICD-10-CM

## 2021-02-10 PROCEDURE — 90834 PSYTX W PT 45 MINUTES: CPT | Mod: 95 | Performed by: COUNSELOR

## 2021-02-10 NOTE — PROGRESS NOTES
Progress Note    Patient Name: Rao Colonzsandraki  Date: 2/10/2021         Service Type: Individual      Session Start Time: 11:00am  Session End Time: 11:50am     Session Length: 50minutes    Session #: 9    Attendees: Client    Service Modality:  Video Visit:    Telemedicine Visit: The patient's condition can be safely assessed and treated via synchronous audio and visual telemedicine encounter.      Reason for Telemedicine Visit: Services only offered telehealth    Originating Site (Patient Location): Patient's home    Distant Site (Provider Location): Provider Remote Setting    Consent:  The patient/guardian has verbally consented to: the potential risks and benefits of telemedicine (video visit) versus in person care; bill my insurance or make self-payment for services provided; and responsibility for payment of non-covered services.     Mode of Communication:  Video Conference via ZIIBRA    As the provider I attest to compliance with applicable laws and regulations related to telemedicine.     Treatment Plan Last Reviewed: 1/4/2020  PHQ-9 / AAYUSH-7 :     DATA  Interactive Complexity: No  Crisis: No       Progress Since Last Session (Related to Symptoms / Goals / Homework):   Symptoms: No change Reported that he has maintained sobriety but that it has been challenging due to outside influences causing temptation. Recently learned information about daughter's mother that caused stress.    Homework: Partially completed      Episode of Care Goals: Minimal progress - PREPARATION (Decided to change - considering how); Intervened by negotiating a change plan and determining options / strategies for behavior change, identifying triggers, exploring social supports, and working towards setting a date to begin behavior change     Current / Ongoing Stressors and Concerns:   Gave girlfriend ultimatum about their relationship and her relationship with a female. They have  been kicked out of their previous place and had to move. Recently moved into a new place together and girlfriend ended relationship with female. Some frustration with all the services that he is receiving and not feeling that one provider is getting behind support for [facility] therapist, and trying to make him give up [facility]. He is not willing to do that, as she is most consistent therapist and has best relationship with her.      Treatment Objective(s) Addressed in This Session:   maintain sobriety continue working with services     Intervention:   Motivational Interviewing: Validated him having services from multiple providers, as they are all specialized to treat specific issues that not all therapists can provide, and how he has established a great alliance with his [facility] therapist. Encouraged continued services with whomever he feels he is getting the best support from.  Motivational Interviewing    MI Intervention: Expressed Empathy/Understanding and Open-ended questions     Change Talk Expressed by the Patient: Committment to change    Provider Response to Change Talk: A - Affirmed patient's thoughts, decisions, or attempts at behavior change          ASSESSMENT: Current Emotional / Mental Status (status of significant symptoms):   Risk status (Self / Other harm or suicidal ideation)   Patient denies current fears or concerns for personal safety.   Patient denies current or recent suicidal ideation or behaviors.   Patient denies current or recent homicidal ideation or behaviors.   Patient denies current or recent self injurious behavior or ideation.   Patient denies other safety concerns.   Patient reports there has been no change in risk factors since their last session.     Patient reports there has been no change in protective factors since their last session.     Recommended that patient call 911 or go to the local ED should there be a change in any of these risk factors.     Appearance:   Appropriate    Eye  Contact:   Good    Psychomotor Behavior: Normal    Attitude:   Cooperative    Orientation:   All   Speech    Rate / Production: Hyperverbal  Talkative    Volume:  Normal    Mood:    Euthymic   Affect:    Appropriate    Thought Content:  Clear    Thought Form:  Circumstantial   Insight:    Poor      Medication Review:   Changes to psychiatric medications, see updated Medication List in EPIC.      Medication Compliance:   Yes     Changes in Health Issues:   None reported     Chemical Use Review:   Substance Use: Chemical use reviewed, no active concerns identified      Tobacco Use: No current tobacco use.      Diagnosis:  1. Bipolar 1 disorder (H)    2. Methamphetamine use disorder, moderate, in early remission (H)    3. Alcohol use disorder, moderate, in early remission (H)    4. Posttraumatic stress disorder    5. Borderline personality disorder (H)        Collateral Reports Completed:   Not Applicable    PLAN: (Patient Tasks / Therapist Tasks / Other)  Continue with individual therapy. Homework to continue harm reduction to maintain sobriety.        Cherie Christina, [facility]                                                           Treatment Plan    Client's Name: Rao Leavitt  YOB: 1990    Date: 1/4/2021    DSM-V Diagnoses: 296.42 Bipolar I Disorder Current or Most Recent Episode Manic, Moderate  or Substance-Related & Addictive Disorders Alcohol Use Disorder   303.90 (F10.20) Moderate In early remission,   Psychosocial / Contextual Factors: probation, history of incarceration, in substance abuse treatment, relationship struggles, financial stressors, vocational stress  WHODAS:     Referral / Collaboration:  The following referral(s) will be initiated: seeking assessment for PTSD, [facility].    Anticipated number of session or this episode of care: 26-30      MeasurableTreatment Goal(s) related to diagnosis / functional impairment(s)  Goal 1: Client will work on decreasing anger  response.    Objective #A (Client Action)    Client will identify patterns of escalation  (i.e. tightness in chest, flushed face, increased heart rate, clenched hands, etc.).  Status: Continued - Date(s):1/4/2021       Intervention(s)  Therapist will teach emotional recognition/identification. Identifying anger episodes.    Objective #B  Client will identify at least 3-5 techniques for intervening on the escalation.  Status: Continued - Date(s):1/4/2021     Intervention(s)  Therapist will teach emotional regulation skills. DBT and CBT.    Objective #C  Client will learn and demonstrate 2 assertiveness skill(s).  Status: Continued - Date(s):1/4/2021       Intervention(s)  Therapist will role-play conflict management.      Goal 2: Client will process previous traumatic events.    Objective #A (Client Action)    Status: Continued - Date(s):1/4/2021       Client will process childhood trauma.    Intervention(s)  Therapist will provide Emotion Focused therapy.    Objective #B  Client will process trauma of incarceration.    Status: Continued - Date(s):1/4/2021       Intervention(s)  Therapist will EFT and TFCBT techniques.    Objective #C  Client will identify patterns of escalation  (i.e. tightness in chest, flushed face, increased heart rate, clenched hands, etc.).  Status: Continued - Date(s):1/4/2021       Intervention(s)  Therapist will help connect trauma history to current bejaviors.      Goal 3: Client will continue working on sobriety and harm reduction.    Objective #A (Client Action)    Status:Continued - Date(s):1/4/2021       Client will maintain sobriety and harm reduction.    Intervention(s)  Therapist will use harm reduction.    Objective #B  Client will identify at least 3 example(s) of how drinking has resulted in an experience that interferes with person values or goals.    Status: Continued - Date(s):1/4/2021     Intervention(s)  Therapist will make referrals to AA and NA.        Patient has reviewed  and agreed to the above plan. Due to COVID, treatment plan was not signed, as appt was telehealth      Cherie Christina, COURT  February 10, 2021

## 2021-02-11 ENCOUNTER — VIRTUAL VISIT (OUTPATIENT)
Dept: PSYCHOLOGY | Facility: CLINIC | Age: 31
End: 2021-02-11
Payer: COMMERCIAL

## 2021-02-11 DIAGNOSIS — F60.3 BORDERLINE PERSONALITY DISORDER (H): ICD-10-CM

## 2021-02-11 DIAGNOSIS — F31.9 BIPOLAR 1 DISORDER (H): ICD-10-CM

## 2021-02-11 DIAGNOSIS — F41.1 GENERALIZED ANXIETY DISORDER: ICD-10-CM

## 2021-02-11 PROCEDURE — 99214 OFFICE O/P EST MOD 30 MIN: CPT | Mod: 95 | Performed by: NURSE PRACTITIONER

## 2021-02-11 RX ORDER — PROPRANOLOL HYDROCHLORIDE 40 MG/1
40 TABLET ORAL 2 TIMES DAILY
Qty: 60 TABLET | Refills: 1 | Status: SHIPPED | OUTPATIENT
Start: 2021-02-11 | End: 2021-03-25

## 2021-02-11 RX ORDER — TOPIRAMATE 50 MG/1
50 TABLET, FILM COATED ORAL 2 TIMES DAILY
Qty: 60 TABLET | Refills: 1 | Status: SHIPPED | OUTPATIENT
Start: 2021-02-11 | End: 2021-03-11

## 2021-02-11 ASSESSMENT — ANXIETY QUESTIONNAIRES
GAD7 TOTAL SCORE: 9
IF YOU CHECKED OFF ANY PROBLEMS ON THIS QUESTIONNAIRE, HOW DIFFICULT HAVE THESE PROBLEMS MADE IT FOR YOU TO DO YOUR WORK, TAKE CARE OF THINGS AT HOME, OR GET ALONG WITH OTHER PEOPLE: VERY DIFFICULT
2. NOT BEING ABLE TO STOP OR CONTROL WORRYING: SEVERAL DAYS
5. BEING SO RESTLESS THAT IT IS HARD TO SIT STILL: MORE THAN HALF THE DAYS
3. WORRYING TOO MUCH ABOUT DIFFERENT THINGS: SEVERAL DAYS
7. FEELING AFRAID AS IF SOMETHING AWFUL MIGHT HAPPEN: SEVERAL DAYS
1. FEELING NERVOUS, ANXIOUS, OR ON EDGE: SEVERAL DAYS
6. BECOMING EASILY ANNOYED OR IRRITABLE: MORE THAN HALF THE DAYS

## 2021-02-11 ASSESSMENT — PATIENT HEALTH QUESTIONNAIRE - PHQ9
SUM OF ALL RESPONSES TO PHQ QUESTIONS 1-9: 10
5. POOR APPETITE OR OVEREATING: SEVERAL DAYS

## 2021-02-11 NOTE — PROGRESS NOTES
"    Outpatient Psychiatric Progress Note    Name: Rao BLANCAS Dagmar   : 1990                    Rao is a 31 year old who is being evaluated via a billable video visit.      How would you like to obtain your AVS? MyChart  If the video visit is dropped, the invitation should be resent by: Text to cell phone: [phone]  Will anyone else be joining your video visit? No      Video Start Time: 8:53 AM    Chief Complaint   Patient presents with     Recheck Medication     patient states the medication is working well, he would discuss the results of his EEG .     PHQ-9 score:    PHQ 2021   PHQ-9 Total Score 10   Q9: Thoughts of better off dead/self-harm past 2 weeks Not at all   Some encounter information is confidential and restricted. Go to Review [employer] activity to see all data.          Video-Visit Details    Type of service:  Video Visit    Video End Time:9:21 AM    Originating Location (pt. Location): Home    Distant Location (provider location):  [facility]     Platform used for Video Visit: Mapluck    Primary Care Provider: Suzi Jaime NP   Therapist: Leslye     PHQ-9 scores:  PHQ-9 SCORE 2021   PHQ-9 Total Score MyChart - - -   PHQ-9 Total Score 13 17 10   Some encounter information is confidential and restricted. Go to Review Player X activity to see all data.       AAYUSH-7 scores:  AAYUSH-7 SCORE 2021   Total Score - - -   Total Score 12 20 9   Some encounter information is confidential and restricted. Go to Review Player X activity to see all data.       Patient Identification:    Patient is a 31 year old year old, partnered / significant other  White American male  who presents for return visit with me.  Patient is currently unemployed. Patient attended the session alone. Patient prefers to be called: \"Philipp\".    Interim History:    I last saw Rao MAGALIS Dagmar for " outpatient psychiatry Return Visit on January 18, 2021.     During that appointment, he reported experiencing continued mood swings but they are less intense since he took it upon himself to increase his Topamax dose. That we will continue at 50 mg twice daily. He now has increased his prazosin to 2 mg at bedtime to help with nightmares despite having a nightmare last week that was intense. He is awaiting a disability hearing to get finances for his mental health condition. He also is continuing with neurology with plans to have an MRI on January 21 along with an EEG. Valium 2 mg tablet has been ordered to  only prior to the MRI procedure. He will continue with his lithium at its current dose as he finds that helpful at 600 mg twice daily. Propranolol continues for anxiety at 40 mg twice daily. He continues seeing Leslye his therapist and has random UAs that are consistently negative..    Current medications include:      Apple Ralph Vn-Grn Tea-Bit Or-Cr (APPLE CIDER VINEGAR PLUS) TABS,        lithium (ESKALITH) 600 MG capsule, Take 1 capsule (600 mg) by mouth 2 times daily (with meals)       multivitamin, therapeutic (THERA-VIT) TABS tablet, Take 1 tablet by mouth daily       prazosin (MINIPRESS) 1 MG capsule, Take 2 capsules (2 mg) by mouth At Bedtime       propranolol (INDERAL) 40 MG tablet, Take 1 tablet (40 mg) by mouth 2 times daily       topiramate (TOPAMAX) 50 MG tablet, Take 1 tablet (50 mg) by mouth 2 times daily       vitamin B-Complex, Take 1 tablet by mouth daily    No current facility-administered medications on file prior to visit.        The [place] Prescription Monitoring Program has been reviewed and there are no concerns about diversionary activity for controlled substances at this time.      I was able to review most recent Primary Care Provider, specialty provider, and therapy visit notes that I have access to.     Today, patient reports that he got the EEG finished.  More testing is  needed.  He is having seizures.  His sleep pattern is disrupted.  He is having memory loss.  Psychological testing is pending  On February 16th.  He also is required to have a sleep study.      He is worried about going to sleep and not waking up.  He wants his daughter to remember him as a good person.  Yet Yovani denies having any suicide thinking.  He goes to bed late and feels tired during the day.  He moved over the weekend.  His neurology issues, he tells me, began when he sustained a traumatic brain injury at 10 years of age.     has a past medical history of Depressive disorder.    Social history updates:    He is appealing again for disability.  He is waiting to hear back from unemployment.  Philipp is receiving Behavioral health home care services.  He started DBT last week.  He has ongoing drug testing.      Substance use updates:    He is sober from alcohol and cannabis  Tobacco use: Yes Cigarettes  Ready to quit?  No  Nicotine Replacement Therapy tried: None    Vital Signs:   There were no vitals taken for this visit.    Labs:    Most recent laboratory results reviewed and no new labs.     Review of Systems:  10 systems (general, cardiovascular, respiratory, eyes, ENT, endocrine, GI, , M/S, neurological) were reviewed. Most pertinent finding(s) is/are: General malaise, no headaches, no reports of chest pain, no skin rashes, no tremors observed. The remaining systems are all unremarkable.    Mental Status Examination:  Appearance:  awake, alert, mild distress and casually dressed  Attitude:  cooperative   Eye Contact:  adequate  Gait and Station: No assistive Devices used and No dizziness or falls  Psychomotor Behavior:  fidgeting  Oriented to:  time, person, and place  Attention Span and Concentration:  Fair  Speech:   clear, coherent and Speaks: English  Mood:  anxious and depressed  Affect:  intensity is heightened  Associations:  no loose associations  Thought Process:  disorganized and  tangental  Thought Content:  no evidence of suicidal ideation or homicidal ideation, no auditory hallucinations present and no visual hallucinations present  Recent and Remote Memory:  fair Not formally assessed. No amnesia.  Fund of Knowledge: appropriate  Insight:  fair  Judgment:  fair  Impulse Control:  fair    Suicide Risk Assessment:  Today Rao Leavitt reports that he is having no thoughts to want to end his life or to harm other people. In addition, there are notable risk factors for self-harm, including anxiety, substance abuse, anger/rage, wrecklessness and mood change. However, risk is mitigated by commitment to family, history of seeking help when needed, future oriented, denies suicidal intent or plan and denies homicidal ideation, intent, or plan. Therefore, based on all available evidence including the factors cited above, Rao Leavitt does not appear to be at imminent risk for self-harm, does not meet criteria for a 72-hr hold, and therefore remains appropriate for ongoing outpatient level of care.  A thorough assessment of risk factors related to suicide and self-harm have been reviewed and are noted above. The patient convincingly denies suicidality on several occasions. Local community safety resources printed and reviewed for patient to use if needed. There was no deceit detected, and the patient presented in a manner that was believable.     DSM5 Diagnosis:  296.40 Bipolar I Disorder Current or Most Recent Episode Hypomanic  300.02 (F41.1) Generalized Anxiety Disorder  309.81 (F43.10) Posttraumatic Stress Disorder (includes Posttraumatic Stress Disorder for Children 6 Years and Younger)  Without dissociative symptoms  780.52 (G47.00) Insomnia Disorder   With ohter medical comorbidity  Recurrent      Medical comorbidities include:   Patient Active Problem List    Diagnosis Date Noted     PTSD (post-traumatic stress disorder) 01/05/2021     Priority: Medium     Borderline  personality disorder (H) 01/05/2021     Priority: Medium     Chemical dependency (H) 09/10/2020     Priority: Medium     Lithium use 05/15/2020     Priority: Medium     Lumbar spine tumor 11/27/2019     Priority: Medium     High risk medication use 10/05/2017     Priority: Medium     Bipolar 1 disorder (H) 03/16/2017     Priority: Medium     Generalized anxiety disorder 03/16/2017     Priority: Medium       Assessment:    Rao J Dagmar reported today that his EEG was abnormal suggesting a possible epileptic activity.  Further testing is pending.  Initially he had felt depressed but now is feeling more accepting of the diagnosis.  He is planning to make an appointment to get his sleep study done as it was also detected he only reached stage II sleep patterns.  Psychological testing is pending for diagnosis clarification.  In the meantime he will continue lithium 600 mg twice daily for mood regulation.  Topamax is also continuing at 50 mg twice daily.  Propranolol will continue to help with his anxiety at 40 mg twice daily.  Prazosin will continue at 2 mg at bedtime for nightmares as he reports that they occur less often.  He will continue talk therapies with Leslye as well as access to behavioral health home care services.  Lucio continues to undergo random UAs screenings for drug use and alcohol use, both of which he tells me he is sober from.    Medication side effects and alternatives were reviewed. Health promotion activities recommended and reviewed today. All questions addressed. Education and counseling completed regarding risks and benefits of medications and psychotherapy options.    Treatment Plan:        1.  Continue lithium 600 mg twice daily    2.  Continue topiramate 50 mg twice daily    3.  Continue propranolol 40 mg twice daily  4.  Continue prazosin 2 mg at bedtime  5.  Complete psychological testing  6.  Complete sleep study      Continue all other medications as reviewed per electronic  medical record today.     Safety plan reviewed. To the Emergency Department as needed or call after hours crisis line at [phone] or [phone]. [place] Crisis Text Line. Text [place] to 911030 or Suicide LifeLine Chat: suicideGiftiki.org/chat/    To schedule individual or family therapy, call [facility] at [phone].    Schedule an appointment with me in 6 weeks or sooner as needed. Call [facility] at [phone] to schedule.    Follow up with primary care provider as planned or for acute medical concerns.    Call the psychiatric nurse line with medication questions or concerns at [phone].    Yovigo may be used to communicate with your provider, but this is not intended to be used for emergencies.    Crisis Resources:    National Suicide Prevention Lifeline: 326.155.2153 (TTY: 300.508.9410). Call anytime for help.  (www.suicidepreventionlifeline.org)  National [place] on Mental Illness (www.nadege.org): 811.530.8435 or 612-537-9938.   Mental Health Association (www.mentalhealth.org): 109.367.9663 or 585-106-0071.  [place] Crisis Text Line: Text [place] to 505117  Suicide LifeLine Chat: suicideGiftiki.org/chat    Administrative Billing:   Time spent with patient was 30 minutes and greater than 50% of time or 20 minutes was spent in counseling and coordination of care regarding above diagnoses and treatment plan.    Patient Status:  Patient will continue to be seen for ongoing consultation and stabilization.    Signed:   DUYEN Carrington-BC   Psychiatry

## 2021-02-12 ASSESSMENT — ANXIETY QUESTIONNAIRES: GAD7 TOTAL SCORE: 9

## 2021-02-14 ENCOUNTER — TELEPHONE (OUTPATIENT)
Dept: FAMILY MEDICINE | Facility: CLINIC | Age: 31
End: 2021-02-14

## 2021-02-14 ENCOUNTER — MYC MEDICAL ADVICE (OUTPATIENT)
Dept: ADMISSION | Facility: CLINIC | Age: 31
End: 2021-02-14

## 2021-02-14 NOTE — TELEPHONE ENCOUNTER
Four copies of [place] Unemployment Insurance forms received.  Sent patient a My chart note to clarify if these were to be sent to four different physicians.

## 2021-02-15 ENCOUNTER — ALLIED HEALTH/NURSE VISIT (OUTPATIENT)
Dept: BEHAVIORAL HEALTH | Facility: CLINIC | Age: 31
End: 2021-02-15
Payer: COMMERCIAL

## 2021-02-15 DIAGNOSIS — R69 DIAGNOSIS DEFERRED: Primary | ICD-10-CM

## 2021-02-15 NOTE — PROGRESS NOTES
Behavioral Health Home Services  [facility]: [place]        Social Work Care Navigator Note      Patient: Rao Leavitt  Date: February 15, 2021  Preferred Name: Rao    Previous PHQ-9:   PHQ-9 SCORE 1/18/2021 2/2/2021 2/11/2021   PHQ-9 Total Score MyChart - - -   PHQ-9 Total Score 13 17 10   Some encounter information is confidential and restricted. Go to Review Flowsheets activity to see all data.     Previous AAYUSH-7:   AAYUSH-7 SCORE 1/18/2021 2/2/2021 2/11/2021   Total Score - - -   Total Score 12 20 9   Some encounter information is confidential and restricted. Go to Review Flowsheets activity to see all data.     NOMAN LEVEL:  No flowsheet data found.    Preferred Contact:  Need for : No  Preferred Contact: Cell      Type of Contact Today: Phone call (patient / identified key support person reached)      Data: (subjective / Objective):  Recent ED/IP Admission or Discharge?   None    Patient Goals:  Goal Areas: Health;Mental Health;Chemical Health;Financial and Social Service Benefits;Transportation  Patient stated goals: Patient would like assistance with his physical, mental and chemical health for creating a balanced and healthy lifestyle. Patient would like assistance with housing, SSDI and social service assistance to aid with [employer] benefits to increase his financial stability. Patient would like assistance with reliable transportation for his family to get to appointments, run errands and get out into the community.        [facility] Core Service Provided:  Comprehensive Care Management: utilized the electronic medical record / patient registry to identify and support patient's health conditions / needs more effectively   Care Transitions: focused on the coordinated and seamless movement of patient between or within different levels of care or settings  Care Coordination: provided care management services/referrals necessary to ensure patient and their identified supports have access to medical,  behavioral health, pharmacology and recovery support services.  Ensured that patient's care is integrated across all settings and services.   Individual and Family Support: aimed to help clients reduce barriers to achieving goals, increase health literacy and knowledge about chronic condition(s), increase self-efficacy skills, and improve health outcomes  Referral to Community and Social Support Services: Coordinated / Confirmed patient's appointment time or referral and transportation plan  Followed-up with patient on whether or not they completed a referral    Current Stressors / Issues / Care Plan Objective Addressed Today:  1. Patient reports he has been able to appeal his unemployment. Patient reports he is waiting for providers to fill out unemployment insurance forms. [facility] will continue to monitor and follow-up with patient. Patient and [employer] continue to apply weekly for unemployment. Patient may need resources for Disability Hub depending on outcome.    2. Patient reports his disability has now gone into his third appeal. Patient reports he and [employer] will continue to work with 's office to move forward with this. Patient reports he may have to go to court for this appeal. [facility] provided support/empathy and motivational interviewing.    3. Patient reports he, his daughter and s.o. have moved to an apartment in [place]. Patient reports he does not know his address right now but will update [facility] at next appt. [facility] will monitor and provide resources as requested/needed. Patient reports his relationship improving with his s.o. Patient reports his his s.o. has filled out the paperwork for PCA and they are waiting for her training.     4. Patient reports tomorrow is the anniversary of his grandmother's death and this is the first year he will be sober for it. [facility] provided empathy/support and coping skills. Patient reports his best friend's uncle is in the ICU with kidney failure and provided support to  him.    5. Patient reports he continues to be unsure of his new DBT therapist. Patient reports he continues to work with [facility] therapist Cheryl Christina. [facility] to monitor and check in about this.    6. Patient reports he is working with Sarah at [facility]. Patient reports no other housing supports available at this time but will continue to work with services.    Intervention:  Motivational Interviewing: Expressed Empathy/Understanding, Supported Autonomy, Collaboration, Evocation, Permission to raise concern or advise, Open-ended questions, Reflections: simple and complex, Change talk (evoked) and Reframe   Target Behavior(s): Explored thoughts about taking an anti-depressant, Explored and resolved challenges related to taking anti-depressants as prescribed, Explored thoughts and readiness to participate in individual therapy, Explored and resolved challenges to attending appointments as scheduled, Explored current social supports and reinforced opportunities to increase engagement, Explored current sleep hygeine and patient's perception and knowledge of relationship to mood and Explored patient's perception of how alcohol and / or drugs influences mood    Assessment: (Progress on Goals / Homework):  Patient would benefit from continued coordination in reaching their goals set for the Behavioral Health Home ([facility]) program. [facility] reviewed Health Action Plan goals and will continue to monitor progress and work with patient and their care team.      Plan: (Homework, other):  Patient was encouraged to continue to seek condition-related information and education.      Scheduled a Phone follow up appointment with ANGI  in 2 weeks     Patient has set self-identified goals and will monitor progress until the next appointment on: 03/01/2021.        SARA Irwin [facility]  Behavioral Health Home ([facility])   [facility]  [phone]  February 15, 2021  10:55  AM

## 2021-02-16 ENCOUNTER — OFFICE VISIT (OUTPATIENT)
Dept: NEUROLOGY | Facility: CLINIC | Age: 31
End: 2021-02-16
Attending: PSYCHIATRY & NEUROLOGY
Payer: COMMERCIAL

## 2021-02-16 DIAGNOSIS — Z87.820 HISTORY OF TRAUMATIC BRAIN INJURY: Primary | ICD-10-CM

## 2021-02-16 NOTE — PROGRESS NOTES
Patient was seen for neuropsychological evaluation at the request of Dr. Meg Miranda, for the purposes of diagnostic clarification and treatment planning.  2 hrs 17 min of test administration and scoring were provided by this writer, Lynn Urrutia.  Please see Dr. Neo Mandujano's report for a full interpretation of the findings.

## 2021-02-16 NOTE — LETTER
2021       RE: Rao Leavitt  : 1990   MRN: [id number]      Dear Colleague,    Thank you for referring your patient, Rao Leavitt, to the [facility] EPILEPSY [facility]ARE at [facility]. Please see a copy of my visit note below.    Patient was seen for neuropsychological evaluation at the request of Dr. Meg Miranda, for the purposes of diagnostic clarification and treatment planning.  2 hrs 17 min of test administration and scoring were provided by this writer, Lynn Urrutia.  Please see Dr. Neo Mandujano's report for a full interpretation of the findings.      Mr. Leavitt needed to leave this appointment before all testing was completed. He returned on 2021 to complete the evaluation. Please see my report from that date.     Neo Mandujano, Ph.D., L.P., ABPP-CN    Department of Rehabilitation Medicine  Division of Adult Neuropsychology  [facility]    There is no billing associated with this encounter.

## 2021-02-17 ENCOUNTER — OFFICE VISIT (OUTPATIENT)
Dept: NEUROLOGY | Facility: CLINIC | Age: 31
End: 2021-02-17
Payer: COMMERCIAL

## 2021-02-17 ENCOUNTER — TELEPHONE (OUTPATIENT)
Dept: NEUROLOGY | Facility: CLINIC | Age: 31
End: 2021-02-17

## 2021-02-17 DIAGNOSIS — F06.8 OTHER SPECIFIED MENTAL DISORDERS DUE TO KNOWN PHYSIOLOGICAL CONDITION: ICD-10-CM

## 2021-02-17 DIAGNOSIS — Z87.820 HISTORY OF TRAUMATIC BRAIN INJURY: Primary | ICD-10-CM

## 2021-02-17 NOTE — TELEPHONE ENCOUNTER
Please ask Rao to schedule a follow-up visit with me to discuss his neuropsych test results.  This visit can be virtual if needed.    Thank you,   Dr. Miranda
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
## 2021-02-17 NOTE — PROGRESS NOTES
Mr. Leavitt needed to leave this appointment before all testing was completed. He returned on 02/17/2021 to complete the evaluation. Please see my report from that date.     Neo Mandujano, Ph.D., L.P., ABPP-CN    Department of Rehabilitation Medicine  Division of Adult Neuropsychology  [facility]    There is no billing associated with this encounter.
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
## 2021-02-17 NOTE — PROGRESS NOTES
Name: Rao Leavitt MRN: [id number]  : 1990 SOTO: 2021  Staff: LINDA Tech: HH Age: 31  Sex: Male Hand: Right Educ: 12  Vision: 20/20 ?with correction / ?without correction    ORIENTATION     Time  -0     Place  0 /2     Personal info     3 /4    WAIS-IV     Raw SSa     Similarities  11 3     Block Design  28 7     Digit Span  13 2 RDS= 4     Arithmetic  7 4     Coding  35 4    MARLENA-O    Raw    T %ile     Copy    27.0     ?1     Time to Copy  208      >16    WRAT4   SS      Word Reading  70     COWAT (FAS)   Raw: 3   T: 14    Semantic Fluency/Animals   Raw:  9  T-score:  17    BOSTON NAMING TEST   Raw: 34   T: 24     COMPLEX IDEATIONAL MATERIAL   Raw:  T: 12    TRAILS  Raw  Err T    A 49  2 26   B 130  2 26    STROOP Raw T   Word 44 <20   Color  28 <20       Color/Word  17 22    AGUILAR FACIAL RECOGNITION   Raw: 25  Interp: Severe Impairment    GROOVED PEGBOARD    Raw  T Drops   RH  85  29 0   LH 86  33 0    BDI-II  Raw:  12  Interpretation: Minimal    RICHY  Raw:  3  Interpretation: Minimal      WMS-IV  Raw SS / %ile     LM I  7 1     LM II  0 1     LM Recog. 19 3rd-9th    HVLT-R Form 1     Trial 1 2 3      2 2 2  Raw T     Total Learning (1-3) 6 ?20     Delayed Recall  0 ?20     Percent Retention 0 ?20     Recognition Hits/FP 3/0 ?20    BVMT-R Form 1     Trial 1 2 3      2 2 2  Raw T / %ile     Total Learning (1-3) 6 <20     Delayed Recall  0 <20     Percent Retention 0 <1st     Recognition Hits/FP 1/0 <1st      TOMM T1: 32  T2: 21 T3: 25    DCT E-score: 23    MMPI-2-RF   RCd: 73 VRIN-r:  58   RC1: 77 THOMAS-r:  73T   RC2: 54 F-r:  120   RC3: 74 Fp-r:  94   RC4: 87 Fs:  107   RC6: 100 FBS-r:  83   RC7: 88 RBS:  109   RC8: 86 L-r:  71   RC9: 56 K-r:  35

## 2021-02-19 ENCOUNTER — ALLIED HEALTH/NURSE VISIT (OUTPATIENT)
Dept: BEHAVIORAL HEALTH | Facility: CLINIC | Age: 31
End: 2021-02-19
Payer: COMMERCIAL

## 2021-02-19 DIAGNOSIS — R69 DIAGNOSIS DEFERRED: Primary | ICD-10-CM

## 2021-02-19 NOTE — PROGRESS NOTES
Behavioral Health Home Services  [facility]: [place]        Social Work Care Navigator Note      Patient: Rao Leavitt  Date: February 19, 2021  Preferred Name: Rao    Previous PHQ-9:   PHQ-9 SCORE 1/18/2021 2/2/2021 2/11/2021   PHQ-9 Total Score MyChart - - -   PHQ-9 Total Score 13 17 10   Some encounter information is confidential and restricted. Go to Review Flowsheets activity to see all data.     Previous AAYUSH-7:   AAYUSH-7 SCORE 1/18/2021 2/2/2021 2/11/2021   Total Score - - -   Total Score 12 20 9   Some encounter information is confidential and restricted. Go to Review Flowsheets activity to see all data.     NOMAN LEVEL:  No flowsheet data found.    Preferred Contact:  Need for : No  Preferred Contact: Cell      Type of Contact Today: Phone call (patient / identified key support person reached)      Data: (subjective / Objective):  Recent ED/IP Admission or Discharge?   None    Patient Goals:  Goal Areas: Health;Mental Health;Chemical Health;Financial and Social Service Benefits;Transportation  Patient stated goals: Patient would like assistance with his physical, mental and chemical health for creating a balanced and healthy lifestyle. Patient would like assistance with housing, SSDI and social service assistance to aid with "[employer] benefits to increase his financial stability. Patient would like assistance with reliable transportation for his family to get to appointments, run errands and get out into the community.        [facility] Core Service Provided:  Comprehensive Care Management: utilized the electronic medical record / patient registry to identify and support patient's health conditions / needs more effectively   Care Transitions: focused on the coordinated and seamless movement of patient between or within different levels of care or settings  Care Coordination: provided care management services/referrals necessary to ensure patient and their identified supports have access to medical,  behavioral health, pharmacology and recovery support services.  Ensured that patient's care is integrated across all settings and services.   Individual and Family Support: aimed to help clients reduce barriers to achieving goals, increase health literacy and knowledge about chronic condition(s), increase self-efficacy skills, and improve health outcomes  Referral to Community and Social Support Services: Assisted in scheduling an appointment to a referral / service (see plan section)  Coordinated / Confirmed patient's appointment time or referral and transportation plan  Followed-up with patient on whether or not they completed a referral    Current Stressors / Issues / Care Plan Objective Addressed Today:  SWCC and patient were able to meet today for [facility] [place] ([facility]) monthly check-in via telehealth visit. All required ROIs have been filed with HIM/patient chart.    1. Patient left message and SWCC returned phone call to support patient and processed neuropsych eval.  SWCC processed feelings around increase in mental health and physical symptoms related to his cognitive processing, memory and sleeping. Patient reports he felt shamed by neuropsych evaluator when told he did not score well on some of his testing. Patient felt like he was being open and honest with evaluator. Patient reports he felt dismissed and not heard.     Patient reports he takes his sobriety and mental health seriously. Patient reports he appreciates the support of his care team.     Intervention:  Motivational Interviewing: Expressed Empathy/Understanding, Supported Autonomy, Collaboration, Evocation, Permission to raise concern or advise, Open-ended questions, Reflections: simple and complex, Change talk (evoked) and Reframe   Target Behavior(s): Explored thoughts about taking an anti-depressant, Explored and resolved challenges related to taking anti-depressants as prescribed, Explored thoughts and readiness to participate in  individual therapy, Explored and resolved challenges to attending appointments as scheduled and Explored current social supports and reinforced opportunities to increase engagement    Assessment: (Progress on Goals / Homework):  Patient would benefit from continued coordination in reaching their goals set for the Behavioral Health Home ([facility]) program. [facility] reviewed Health Action Plan goals and will continue to monitor progress and work with patient and their care team.      Plan: (Homework, other):  Patient was encouraged to continue to seek condition-related information and education.      Scheduled a Phone follow up appointment with [facility] in 2 weeks     Patient has set self-identified goals and will monitor progress until the next appointment on: 03/01/2021.         [facility]  Behavioral Health Home ([facility])   [facility]  [phone]  February 19, 2021  11:01 AM            Next 5 appointments (look out 90 days)    Mar 09, 2021 12:40 PM  Return Visit with Meg Miranda MD  [facility] ([facility] ) [address] [address]  [phone]   Apr 15, 2021 10:00 AM  SHORT with Suzi Jaime NP  [facility] ([facility] )  Arrive at: Clinic [address] [address]  [phone]

## 2021-02-22 ENCOUNTER — ALLIED HEALTH/NURSE VISIT (OUTPATIENT)
Dept: BEHAVIORAL HEALTH | Facility: CLINIC | Age: 31
End: 2021-02-22
Payer: COMMERCIAL

## 2021-02-22 DIAGNOSIS — R69 DIAGNOSIS DEFERRED: Primary | ICD-10-CM

## 2021-02-22 NOTE — TELEPHONE ENCOUNTER
I left a message on patient's voicemail asking him to call to schedule a 40 minute follow up appointment with us.  Phone numbers were provided.

## 2021-02-22 NOTE — PROGRESS NOTES
Behavioral Health Home Services  [facility]: [place]        Social Work Care Navigator Note      Patient: Rao Leavitt  Date: February 22, 2021  Preferred Name: Rao    Previous PHQ-9:   PHQ-9 SCORE 1/18/2021 2/2/2021 2/11/2021   PHQ-9 Total Score MyChart - - -   PHQ-9 Total Score 13 17 10   Some encounter information is confidential and restricted. Go to Review Flowsheets activity to see all data.     Previous AAYUSH-7:   AAYUSH-7 SCORE 1/18/2021 2/2/2021 2/11/2021   Total Score - - -   Total Score 12 20 9   Some encounter information is confidential and restricted. Go to Review Flowsheets activity to see all data.     NOMAN LEVEL:  No flowsheet data found.    Preferred Contact:  Need for : No  Preferred Contact: Cell      Type of Contact Today: Phone call (patient / identified key support person reached)      Data: (subjective / Objective):  Recent ED/IP Admission or Discharge?   None    Patient Goals:  Goal Areas: Health;Mental Health;Chemical Health;Financial and Social Service Benefits;Transportation  Patient stated goals: Patient would like assistance with his physical, mental and chemical health for creating a balanced and healthy lifestyle. Patient would like assistance with housing, SSDI and social service assistance to aid with [employer] benefits to increase his financial stability. Patient would like assistance with reliable transportation for his family to get to appointments, run errands and get out into the community.        [facility] Core Service Provided:  Comprehensive Care Management: utilized the electronic medical record / patient registry to identify and support patient's health conditions / needs more effectively   Care Transitions: focused on the coordinated and seamless movement of patient between or within different levels of care or settings  Care Coordination: provided care management services/referrals necessary to ensure patient and their identified supports have access to medical,  behavioral health, pharmacology and recovery support services.  Ensured that patient's care is integrated across all settings and services.   Individual and Family Support: aimed to help clients reduce barriers to achieving goals, increase health literacy and knowledge about chronic condition(s), increase self-efficacy skills, and improve health outcomes  Referral to Community and Social Support Services: Coordinated / Confirmed patient's appointment time or referral and transportation plan  Followed-up with patient on whether or not they completed a referral    Current Stressors / Issues / Care Plan Objective Addressed Today:  SWCC and patient were able to meet today for Behavioral Health Home ([facility]) monthly check-in via telehealth visit. All required ROIs have been filed with HIM/patient chart.    1. CC provided support/empathy over passing and death of uncle due to ETOH this weekend. Patient reports this has been a trigger for him. Patient reports he continues to be sober and appreciates the extra support. SWCC encouraged patient to reach out to other providers for additional support. Patient received extra support from his family this weekend, such as his brother. Patient reports the support from his brother can be trigger, as he has alcohol issues himself.    2. Patient reports that his s.o.'s daughter may be going to live with her biological father in the next week. Patient reports his s.o. is looking forward to daughter going to live with her father, as she is feeling overwhelmed by care taking of her daughter. Patient reports he is worried about s.o. now needing to pay child support. Patient worries about the affect of all the changes with school and their moving and how it will it affect her. Patient report s.o.'s daughter does want to go live with her biological father. 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 [facility] provided support/empathy and coping  skills.    3. Patient reports he had a nightmare the other night that woke him and left him feeling not ok the rest of the day. Patient reports he is only getting 3-4 hours a night.    Intervention:  Motivational Interviewing: Expressed Empathy/Understanding, Supported Autonomy, Collaboration, Evocation, Permission to raise concern or advise, Open-ended questions, Reflections: simple and complex, Change talk (evoked) and Reframe   Target Behavior(s): Explored thoughts about taking an anti-depressant, Explored and resolved challenges related to taking anti-depressants as prescribed, Explored thoughts and readiness to participate in individual therapy, Explored and resolved challenges to attending appointments as scheduled, Explored current social supports and reinforced opportunities to increase engagement, Explored current sleep hygeine and patient's perception and knowledge of relationship to mood and Explored patient's perception of how alcohol and / or drugs influences mood    Assessment: (Progress on Goals / Homework):  Patient would benefit from continued coordination in reaching their goals set for the Behavioral Health Home ([facility]) program. [facility] reviewed Health Action Plan goals and will continue to monitor progress and work with patient and their care team.      Plan: (Homework, other):  Patient was encouraged to continue to seek condition-related information and education.      Scheduled a Phone follow up appointment with SW  in 1 week     Patient has set self-identified goals and will monitor progress until the next appointment on: 03/01/2021.         [facility]  Behavioral Health [place] ([facility])   [facility]  [phone]  February 22, 2021  8:03 AM                  Next 5 appointments (look out 90 days)    Mar 09, 2021 12:40 PM  Return Visit with Meg Miranda MD  [facility] ([facility] )  [address] [address]  [phone]   Apr 15, 2021 10:00 AM  SHORT with Suzi Jaime NP  [facility] ([facility] [place] )  Arrive at: Clinic A [address] [address]  [phone]

## 2021-02-24 ENCOUNTER — TELEPHONE (OUTPATIENT)
Dept: NEUROLOGY | Facility: CLINIC | Age: 31
End: 2021-02-24

## 2021-02-24 NOTE — TELEPHONE ENCOUNTER
Another copy of form has arrived in office.   [place] [employer]employment form started and routed to Suzi Jaime for review and signature.   Other form left on CSS/RN counter to routed to Bree Grullon for completion.    Another copy of form routed to Neurology DR. June Miranda for completion.     2 other forms are left complete as I have not been able to reach patient to determine what is to be done with these copies

## 2021-02-25 ENCOUNTER — ALLIED HEALTH/NURSE VISIT (OUTPATIENT)
Dept: BEHAVIORAL HEALTH | Facility: CLINIC | Age: 31
End: 2021-02-25
Payer: COMMERCIAL

## 2021-02-25 DIAGNOSIS — R69 DIAGNOSIS DEFERRED: Primary | ICD-10-CM

## 2021-02-25 NOTE — PROGRESS NOTES
Behavioral Health Home Services  [facility]: [place]        Social Work Care Navigator Note      Patient: Rao Leavitt  Date: February 25, 2021  Preferred Name: Rao    Previous PHQ-9:   PHQ-9 SCORE 1/18/2021 2/2/2021 2/11/2021   PHQ-9 Total Score MyChart - - -   PHQ-9 Total Score 13 17 10   Some encounter information is confidential and restricted. Go to Review Flowsheets activity to see all data.     Previous AAYUSH-7:   AAYUSH-7 SCORE 1/18/2021 2/2/2021 2/11/2021   Total Score - - -   Total Score 12 20 9   Some encounter information is confidential and restricted. Go to Review Flowsheets activity to see all data.     NOMAN LEVEL:  No flowsheet data found.    Preferred Contact:  Need for : No  Preferred Contact: Cell      Type of Contact Today: Phone call (patient / identified key support person reached)      Data: (subjective / Objective):  Recent ED/IP Admission or Discharge?   None    Patient Goals:  Goal Areas: Health;Mental Health;Chemical Health;Financial and Social Service Benefits;Transportation  Patient stated goals: Patient would like assistance with his physical, mental and chemical health for creating a balanced and healthy lifestyle. Patient would like assistance with housing, SSDI and social service assistance to aid with [employer] benefits to increase his financial stability. Patient would like assistance with reliable transportation for his family to get to appointments, run errands and get out into the community.        [facility] Core Service Provided:  Comprehensive Care Management: utilized the electronic medical record / patient registry to identify and support patient's health conditions / needs more effectively   Care Transitions: focused on the coordinated and seamless movement of patient between or within different levels of care or settings  Care Coordination: provided care management services/referrals necessary to ensure patient and their identified supports have access to medical,  behavioral health, pharmacology and recovery support services.  Ensured that patient's care is integrated across all settings and services.   Individual and Family Support: aimed to help clients reduce barriers to achieving goals, increase health literacy and knowledge about chronic condition(s), increase self-efficacy skills, and improve health outcomes  Referral to Community and Social Support Services: Coordinated / Confirmed patient's appointment time or referral and transportation plan  Followed-up with patient on whether or not they completed a referral    Current Stressors / Issues / Care Plan Objective Addressed Today:  SWCC and patient were able to meet today for Behavioral Health Home ([facility]) monthly check-in via telehealth visit. All required ROIs have been filed with HIM/patient chart.    1. Patient reports he almost relapsed on Monday due to stressors at home and the loss of his uncle. Patient reports he was able to meet with his [facility] counselor and has retained his sobriety. Patient reports he is learning to let go of things he cannot control and step back from situations that cause him additional stressors. Patient reports another uncle of his is scheduled to have open heart surgery this week. [facility] provided support/empathy, motivational interviewing and coping skills.    2. Patient reports that his s.o.'s daughter is going to live with her biological father on Friday. Patient reports sadness over this and concern for s.o.'s daughter if this is really the right place for her right now. Patient reports his s.o. feels overwhelmed by full custody of her dtr at this time. SWCC and patient processed feeling around s.o.'s statements and how this may be a reflection of how she feels about their own daughter.    3. Patient reports he has been approved for three hours a week of PCA services and his s.o. will be his PCA at this time.    Intervention:  Motivational Interviewing: Expressed Empathy/Understanding,  Supported Autonomy, Collaboration, Evocation, Permission to raise concern or advise, Open-ended questions, Reflections: simple and complex, Change talk (evoked) and Reframe   Target Behavior(s): Explored thoughts about taking an anti-depressant, Explored and resolved challenges related to taking anti-depressants as prescribed, Explored thoughts and readiness to participate in individual therapy, Explored and resolved challenges to attending appointments as scheduled, Explored current social supports and reinforced opportunities to increase engagement and Explored patient's perception of how alcohol and / or drugs influences mood    Assessment: (Progress on Goals / Homework):  Patient would benefit from continued coordination in reaching their goals set for the Behavioral Health Home ([facility]) program. [facility] reviewed Health Action Plan goals and will continue to monitor progress and work with patient and their care team.      Plan: (Homework, other):  Patient was encouraged to continue to seek condition-related information and education.      Scheduled a Phone follow up appointment with ANGI  in 1 week     Patient has set self-identified goals and will monitor progress until the next appointment on: 03/01/2021.        SRAA [facility]  Behavioral Health [place] ([facility])   [facility]  [phone]  February 25, 2021  10:46 AM                  Next 5 appointments (look out 90 days)    Mar 09, 2021 12:40 PM  Return Visit with Meg Miranda MD  [facility] Neurology [facility] ([facility] ) [address] [address]  [phone]   Apr 15, 2021 10:00 AM  SHORT with Suzi Jaime NP  [facility] ([facility] )  Arrive at: Clinic A [address] [address]  [phone]

## 2021-03-01 ENCOUNTER — ALLIED HEALTH/NURSE VISIT (OUTPATIENT)
Dept: BEHAVIORAL HEALTH | Facility: CLINIC | Age: 31
End: 2021-03-01
Payer: COMMERCIAL

## 2021-03-01 VITALS — BODY MASS INDEX: 27.25 KG/M2 | HEIGHT: 67 IN | WEIGHT: 173.6 LBS

## 2021-03-01 DIAGNOSIS — R69 DIAGNOSIS DEFERRED: Primary | ICD-10-CM

## 2021-03-01 PROBLEM — F31.9 BIPOLAR 1 DISORDER (H): Chronic | Status: ACTIVE | Noted: 2017-03-16

## 2021-03-01 PROBLEM — F43.10 PTSD (POST-TRAUMATIC STRESS DISORDER): Chronic | Status: ACTIVE | Noted: 2021-01-05

## 2021-03-01 PROBLEM — F90.9 ATTENTION-DEFICIT HYPERACTIVITY DISORDER: Status: ACTIVE | Noted: 2021-03-01

## 2021-03-01 PROBLEM — F19.20 CHEMICAL DEPENDENCY (H): Chronic | Status: ACTIVE | Noted: 2020-09-10

## 2021-03-01 PROBLEM — F60.3 BORDERLINE PERSONALITY DISORDER (H): Chronic | Status: ACTIVE | Noted: 2021-01-05

## 2021-03-01 PROBLEM — F41.1 GENERALIZED ANXIETY DISORDER: Chronic | Status: ACTIVE | Noted: 2017-03-16

## 2021-03-01 ASSESSMENT — MIFFLIN-ST. JEOR: SCORE: 1708.69

## 2021-03-01 NOTE — PATIENT INSTRUCTIONS
Your BMI is Body mass index is 26.8 kg/m .  Weight management is a personal decision.  If you are interested in exploring weight loss strategies, the following discussion covers the approaches that may be successful. Body mass index (BMI) is one way to tell whether you are at a healthy weight, overweight, or obese. It measures your weight in relation to your height.  A BMI of 18.5 to 24.9 is in the healthy range. A person with a BMI of 25 to 29.9 is considered overweight, and someone with a BMI of 30 or greater is considered obese. More than two-thirds of American adults are considered overweight or obese.  Being overweight or obese increases the risk for further weight gain. Excess weight may lead to heart disease and diabetes.  Creating and following plans for healthy eating and physical activity may help you improve your health.  Weight control is part of healthy lifestyle and includes exercise, emotional health, and healthy eating habits. Careful eating habits lifelong are the mainstay of weight control. Though there are significant health benefits from weight loss, long-term weight loss with diet alone may be very difficult to achieve- studies show long-term success with dietary management in less than 10% of people. Attaining a healthy weight may be especially difficult to achieve in those with severe obesity. In some cases, medications, devices and surgical management might be considered.  What can you do?  If you are overweight or obese and are interested in methods for weight loss, you should discuss this with your provider.     Consider reducing daily calorie intake by 500 calories.     Keep a food journal.     Avoiding skipping meals, consider cutting portions instead.    Diet combined with exercise helps maintain muscle while optimizing fat loss. Strength training is particularly important for building and maintaining muscle mass. Exercise helps reduce stress, increase energy, and improves fitness.  Increasing exercise without diet control, however, may not burn enough calories to loose weight.       Start walking three days a week 10-20 minutes at a time    Work towards walking thirty minutes five days a week     Eventually, increase the speed of your walking for 1-2 minutes at time    In addition, we recommend that you review healthy lifestyles and methods for weight loss available through the National Institutes of Health patient information sites:  http://win.niddk.nih.gov/publications/index.htm    And look into health and wellness programs that may be available through your health insurance provider, employer, local community center, or [employer] club.    Weight management plan: Patient was referred to their PCP to discuss a diet and exercise plan.  MY CONTACT NUMBERS ARE:   DOCTOR : CARMEN Bajwa  SLEEP CENTER :   CPAP EQUIPMENT :    IF I HAVE SLEEP APNEA.....  WHERE CAN I FIND MORE INFORMATION?    American Academy of Sleep Medicine Patient information on sleep disorders:  http://yoursleep.aasmnet.org    THINGS TO REMEMBER  In most situations, sleep apnea is a lifelong disease that must be managed with daily therapy. Untreated disease, when severe, may result in an increased risk for an array of problems from heart disease to mood changes, car accidents and shorter lifespan.    CPAP -  WHY AND HOW?  Continuous positive airway pressure, or CPAP, is the most effective treatment for obstructive sleep apnea. A decision to use CPAP is a major step forward in the pursuit of a healthier life. The successful use of CPAP will help you breathe easier, sleep better and live healthier. Using CPAP can be a positive experience if you keep these castillo points in mind:  1. Commitment  CPAP is not a quick fix for your problem. It involves a long-term commitment to improve your sleep and your health.    2. Communication  Stay in close communication with both your sleep doctor and your CPAP supplier. Ask lots of questions and  "seek help when you need it.    3. Consistency  Use CPAP all night, every night and for every nap. You will receive the maximum health benefits from CPAP when you use it every time that you sleep. This will also make it easier for your body to adjust to the treatment.    4. Correction  The first machine and mask that you try may not be the best ones for you. Work with your sleep doctor and your CPAP supplier to make corrections to your equipment selection. Ask about trying a different type of machine or mask if you have ongoing problems. Make sure that your mask is a good fit and learn to use your equipment properly.    5. Challenge  Tell a family member or close friend to ask you each morning if you used your CPAP the previous night. Have someone to challenge you to give it your best effort.    6. Connection   Your adjustment to CPAP will be easier if you are able to connect with others who use the same treatment. Ask your sleep doctor if there is a support group in your area for people who have sleep apnea, or look for one on the Internet.    7. Comfort   Increase your level of comfort by using a saline spray, decongestant or heated humidifier if CPAP irritates your nose, mouth or throat. Use your unit's \"ramp\" setting to slowly get used to the air pressure level. There may be soft pads you can buy that will fit over your mask straps. Look on www.CPAP.com for accessories such as these straps, a pillow contoured for side-sleeping with CPAP, longer hoses, hose covers to reduce condensation, or stands to keep the hose out of your way.                                 8. Cleaning   Clean your mask, tubing and headgear on a regular basis. Put this time in your schedule so that you don't forget to do it. Check and replace the filters for your CPAP unit and humidifier.    9. Completion   Although you are never finished with CPAP therapy, you should reward yourself by celebrating the completion of your first month of " treatment. Expect this first month to be your hardest period of adjustment. It will involve some trial and error as you find the machine, mask and pressure settings that are right for you.    Continuation  After your first month of treatment, continue to make a daily commitment to use your CPAP all night, every night and for every nap.    CPAP-Tips to starting with success:  Begin using your CPAP for short periods of time during the day while you watch TV or read.    Use CPAP every night and for every nap. Using it less often reduces the health benefits and makes it harder for your body to get used to it.    Newer CPAP models are virtually silent; however, if you find the sound of your CPAP machine to be bothersome, place the unit under your bed to dampen the sound.     Make small adjustments to your mask, tubing, straps and headgear until you get the right fit. Tightening the mask may actually worsen the leak.  If it leaves significant marks on your face or irritates the bridge of your nose, it may not be the best mask for you.  Speak with the person who supplied the mask and consider trying other masks.    Use a saline nasal spray to ease mild nasal congestion. Neti-Pot or saline nasal rinses may also help. Nasal gel sprays can help reduce nasal dryness.  Biotene mouthwash can be helpful to protect your teeth if you experience frequent dry mouth.  Dry mouth may be a sign of air escaping out of your mouth or out of the mask in the case of a full face mask.  Speak with your provider if you expect that is the case.     Take a nasal decongestant to relieve more severe nasal or sinus congestion.  Do not use Afrin (oxymetazoline) nasal spray more than 3 days in a row.  Speak with your sleep doctor if your nasal congestion is chronic.    Use a heated humidifier that fits your CPAP model to enhance your breathing comfort. Adjust the heat setting up if you get a dry nose or throat, down if you get condensation in the hose  or mask.  Position the CPAP lower than you so that any condensation in the hose drains back into the machine rather than towards the mask.    Try a system that uses nasal pillows if traditional masks give you problems.    Clean your mask, tubing and headgear once a week. Make sure the equipment dries fully.    Regularly check and replace the filters for your CPAP unit and humidifier.    Work closely with your sleep doctor and your CPAP supplier to make sure that you have the machine, mask and air pressure setting that works best for you.    BESIDES CPAP, WHAT OTHER THERAPIES ARE THERE?    Postioning devices if you only have the problem on your back    Dental devices if your condition is mild    Nasal valves may be effective though experience is limited    Tongue Retaining Device if missing teeth precludes the use of a dental device    Weight loss if you are overweight    Surgery in limited cases where devices are not acceptable or there are problems with structures in the nose and throat  If treated with one of these alternative options, further evaluation is necessary to ensure that the therapy is effective. This may require some form of testing.     Healthy Lifestyle:  Healthy diet, exercise and Limit alcohol: Not only will excessive alcohol increase your weight over time, but it irritates the throat tissues and make them swell, shrinking the airway and causing snoring. Drinking alcohol should be limited and stopped within 3-4 hours before going to bed.   Stop smoking: (Red swollen throat, heat, nicotine), also irritates and swells the airway, among numerous other negative health consequences.    Positioning Device  This example shows a pillow that straps around the waist. It may be appropriate for those whose sleep study shows milder sleep apnea that occurs primarily when lying flat on one's back. Preliminary studies have shown benefit but effectiveness at home should be verified.    Nasal Valves               Nasal valves may not be effective if you have frequent nasal congestion or have difficulty breathing through your nose. They may be an option for mild apnea if other options are not well tolerated. The efficacy of these devices is generally less than CPAP or oral appliances.  Oral Appliance  These are examples of two of many custom-made devices that are more likely to work in mild sleep apnea  Oral appliances are dental mouth pieces that fit very much like a sports mouth guards or removable orthodontic retainers. They are used to treat snoring  And obstructive sleep apnea . The device prevents the airway from collapsing by either holding the tongue or supporting the jaw in a forward position. Since oral appliances are non-invasive and easy to use, they may be considered as an early treatment option. Oral appliance therapy (OAT) involves the customization, selection, fabrication, fitting, adjustments and long-term follow-up care of specially designed oral devices, worn during sleep, which reposition the lower jaw and tongue base forward to maintain an open airway.  Custom made oral appliances are proven to be more effective than over-the-counter devices. Therefore, the over-the-counter devices are recommended not to be used as a screening tool nor as a therapeutic option.  Who gets a dental device?  Oral appliance therapy can be used as an alternative to  CPAP therapy for the treatment of mild to moderate sleep apnea and for those patients who prefer OAT to CPAP. Oral appliance therapy is a first line therapy for the treatment of primary snoring. Additionally, OAT is an option for those that cannot tolerate CPAP as therapy or who have experienced insufficient surgical results.    Possible side effects?  Frequent but minor side effects include: excessive salivation, dry mouth, discomfort of teeth and jaw and temporary changes in the patient s bite.  Potential complications include: jaw pain, permanent occlusal  changes and TMJ symptoms.  The above mentioned side effects and complications can be recognized and managed by dentists trained in dental sleep medicine.    Finding a dentist that practices dental sleep medicine  Specific training is available through the American Academy of Dental Sleep Medicine for dentists interested in working in the field of sleep. To find a dentist who is educated in the field of sleep and the use of oral appliances, near you, visit the Web site of the American Academy of Dental Sleep Medicine; also see http://www.accpstorage.org/newOrganization/patients/oralAppliances.pdf   To search for a dentist certified in these practices:  Http://aadsm.org/FindADentist.aspx?1  Http://www.accpstorage.org/newOrganization/patients/oralAppliances.pdf    Tongue Retaining Device               Tongue Retaining Devices are devices that generally  suction cup  onto the tongue preventing it from falling back into the back of the throat during sleep.  They may be an option for people missing teeth, but can be uncomfortable. This particular device can be purchased online, but similar devices made by dentists fit more precisely and may be tolerated better. In general, they are rarely effective and often not very well tolerated.    Weight Loss:  Some patients may experience reduction or elimination of sleep apnea with weight loss.  Though there are significant health benefits from weight loss, long-term weight loss is very difficult to achieve- studies show success with dietary management in less that 10% of people.     If you are interested in dietary weight loss, you should review the options discussed at the National Institutes of Health patient information site:     Http:/www.health.nih.gov/topic/WeightLossDieting    Bariatric programs offer counseling in all methods of weight loss:    Http:/www.[facility].org/Specialties/WeightLossSurgeryandMedicalMgmt/htm    Surgery:  There are a number of surgeries that  "have been attempted to treat apnea. In general, surgical options are usually reserved for cases in which there is a physical abnormality contributing to obstruction or other treatment options are ineffective or not tolerated. Most surgical options are either unreliable or quite invasive. One of the more common procedures is:  Uvulopalatopharyngoplasty: In this procedure, the uvula (the finger-like tissue that hangs in the back of the throat), part of the soft palate (the tissue that the uvula is attached to), and sometimes the tonsils or adenoids are removed. The efficacy of this surgery is around 30-50% .  After surgery, complications may include:  Sleepiness and sleep apnea related to post-surgery medication   Swelling, infection and bleeding   A sore throat and/or difficulty swallowing   Drainage of secretions into the nose and a nasal quality to the voice. English language speech does not seem to be affected by this surgery.   Narrowing of the airway in the nose and throat (hence constricting breathing) snoring and even iatrogenically caused sleep apnea. By cutting the tissues, excess scar tissue can \"tighten\" the airway and make it even smaller than it was before UPPP.  Patients who have had the uvula removed will become unable to correctly speak [place]an or any other language that has a uvular 'r' phoneme.    Surgeries to help resolve nasal congestion may help reduce the severity of apnea slightly. Nasal congestion does not cause apnea on its own, so these surgeries are usually not performed just for DISHA.  They may be worth considering if the nasal congestion is significantly bothersome independent of apnea.      "

## 2021-03-01 NOTE — PROGRESS NOTES
Behavioral Health Home Services  [facility]: [place]        Social Work Care Navigator Note      Patient: Rao Leavitt  Date: March 1, 2021  Preferred Name: Rao    Previous PHQ-9:   PHQ-9 SCORE 1/18/2021 2/2/2021 2/11/2021   PHQ-9 Total Score MyChart - - -   PHQ-9 Total Score 13 17 10   Some encounter information is confidential and restricted. Go to Review Flowsheets activity to see all data.     Previous AAYUSH-7:   AAYUSH-7 SCORE 1/18/2021 2/2/2021 2/11/2021   Total Score - - -   Total Score 12 20 9   Some encounter information is confidential and restricted. Go to Review Flowsheets activity to see all data.     NOMAN LEVEL:  No flowsheet data found.    Preferred Contact:  Need for : No  Preferred Contact: Cell      Type of Contact Today: Phone call (patient / identified key support person reached)      Data: (subjective / Objective):  Recent ED/IP Admission or Discharge?   None    Patient Goals:  Goal Areas: Health;Mental Health;Chemical Health;Financial and Social Service Benefits;Transportation  Patient stated goals: Patient would like assistance with his physical, mental and chemical health for creating a balanced and healthy lifestyle. Patient would like assistance with housing, SSDI and social service assistance to aid with [employer] benefits to increase his financial stability. Patient would like assistance with reliable transportation for his family to get to appointments, run errands and get out into the community.        [facility] Core Service Provided:  Comprehensive Care Management: utilized the electronic medical record / patient registry to identify and support patient's health conditions / needs more effectively   Care Transitions: focused on the coordinated and seamless movement of patient between or within different levels of care or settings  Care Coordination: provided care management services/referrals necessary to ensure patient and their identified supports have access to medical,  "behavioral health, pharmacology and recovery support services.  Ensured that patient's care is integrated across all settings and services.   Individual and Family Support: aimed to help clients reduce barriers to achieving goals, increase health literacy and knowledge about chronic condition(s), increase self-efficacy skills, and improve health outcomes  Referral to Community and Social Support Services: Assisted in scheduling an appointment to a referral / service (see plan section)  Coordinated / Confirmed patient's appointment time or referral and transportation plan  Followed-up with patient on whether or not they completed a referral    Current Stressors / Issues / Care Plan Objective Addressed Today:  [facility] and patient were able to meet today for Behavioral Health Home ([facility]) monthly check-in via telehealth visit. All required ROIs have been filed with HIM/patient chart.    1. Patient reports he was out of his Topamax medication one day this weekend but was able to get a refill. Patient reports he is not sleeping well, maybe 2-3 hours per night. Patient reports appetite is poor and is finding it difficult to concentrate. Patient reports he is \"feeling zoned out.\"    2. Patient reports he is meeting weekly with DBT Therapist, Sander at [facility] and continues to see his [facility] counselor twice a week, as well as monthly support from [facility] therapist.    3. Patient reports his week stressful last week, as s.o.'s daughter went to live with her bio father, grieving the loss of his uncle, concerned about his other uncle's upcoming open heart surgery and just found out his youngest sister is pregnant. Patient reports he continues to process his feelings. [facility] provided support/empathy, coping skills and motivational interviewing.    Intervention:  Motivational Interviewing: Expressed Empathy/Understanding, Supported Autonomy, Collaboration, Evocation, Permission to raise concern or advise, Open-ended questions, Change " talk (evoked) and Reframe   Target Behavior(s): Explored thoughts about taking an anti-depressant, Explored and resolved challenges related to taking anti-depressants as prescribed, Explored thoughts and readiness to participate in individual therapy, Explored and resolved challenges to attending appointments as scheduled, Explored current social supports and reinforced opportunities to increase engagement, Explored current sleep hygeine and patient's perception and knowledge of relationship to mood and coping skills to navigate stressfull live events.    Assessment: (Progress on Goals / Homework):  Patient would benefit from continued coordination in reaching their goals set for the Behavioral Health Home ([facility]) program. [facility] reviewed Health Action Plan goals and will continue to monitor progress and work with patient and their care team.      Plan: (Homework, other):  Patient was encouraged to continue to seek condition-related information and education.      Scheduled a Phone follow up appointment with ANGI  in 2 weeks     Patient has set self-identified goals and will monitor progress until the next appointment on: 03/15/2021.        SARA Irwin [facility]  Behavioral Health [place] ([facility])   [facility]  [phone]  March 1, 2021  11:25 AM                  Next 5 appointments (look out 90 days)    Mar 02, 2021  9:00 AM  Telephone Visit with CARMEN Menon  [facility] [place] ([facility] ) [address] [place] [id number]  [phone]   Mar 09, 2021 12:40 PM  Return Visit with Meg Miranda MD  [facility] [place] ([facility] ) [address] [place] [id number]  [phone]   Apr 15, 2021 10:00 AM  SHORT with Suzi Jaime NP  [facility] ([facility]  )  Arrive at: Clinic [address] [facility] [id number] [phone]

## 2021-03-01 NOTE — PROGRESS NOTES
"Rao is a 31 year old who is being evaluated via a billable telephone visit.      What phone number would you like to be contacted at? [phone]  How would you like to obtain your AVS? Javierraimundo    Telephone start time: 9:09  Telephone end time:  Phone call duration: 9:50 minutes      Sleep Consultation:    Date on this visit: 3/2/2021    Rao Leavitt is sent by Meg Mirandafor a sleep consultation regarding poor sleep.    Primary Physician: Suzi Jaime     CC: \"Tossing and turning, waking in the middle of the night ready to go\"    HPI: Rao Leavitt is a 31 year old male with medical history remarkable for bipoler 1 disorder, traumatic brain injury, PTSD, AAYUSH, chemical dependency, ADHD and abnormal EEG (on 1/28/2021- EEG changes that resemble generalized paroxysmal fast activity, that could be potentially epileptiform and seen in generalized epilepsies).     Rao goes to sleep between 10 and 11 PM every night. He wakes up at 6 AM without an alarm. He falls asleep in 120 minutes.  Rao has difficulty falling asleep. He stays in bed when he cannot fall sleep. He sometime goes out to the living room to watch TV and ends up falling asleep in front of the television.  He wakes up 3-4 times a night for 5-120 minutes before falling back to sleep.  Rao wakes up to uncertain reasons.  Patient gets  \"2-4 hours\" of sleep per night. He does not feel refreshed.     Patient does use electronics in bed.     Rao does not do shift work. He is on disability.     Rao does snore every night and snoring is loud. Patient does have a regular bed partner. There is report of snoring, gasping and occasional twitching (eyes and arms, patient unable to elaborate so unclear if it stereotypical).  He does have witnessed apneas. They frequently sleep separately.  Patient sleeps on his back, side and stomach. He denies morning headaches, morning confusion and restless legs. Rao " denies any bruxism, sleep talking, dream enactment, sleep walking, sleep paralysis, cataplexy and hypnogogic/hypnopompic hallucinations.    Rao denies difficulty breathing through his nose.      Rao has gained 0-5 pounds in the last year.  Patient describes themself as is morning and night person.  He would prefer to go to sleep at 9:00 PM and wake up at 4:00 AM.  Patient's [place] Sleepiness score 19/24 consistent with excessive daytime sleepiness.      Rao he reports that he lays down, but cannot fall sleep. He takes some inadvertant naps.  He denies falling asleep while driving.  Patient was counseled on the importance of driving while alert, to pull over if drowsy, or nap before getting into the vehicle if sleepy.  He uses no caffeine. He is sober from alcohol for ~15 months and about 4 months off of Methamphetamines.     Allergies:    Allergies   Allergen Reactions     Tramadol Nausea     Vicodin [Hydrocodone-Acetaminophen] Rash       Medications:    Current Outpatient Medications   Medication Sig Dispense Refill     Apple Ralph Vn-Grn Tea-Bit Or-Cr (APPLE CIDER VINEGAR PLUS) TABS        lithium (ESKALITH) 600 MG capsule Take 1 capsule (600 mg) by mouth 2 times daily (with meals) 60 capsule 3     multivitamin, therapeutic (THERA-VIT) TABS tablet Take 1 tablet by mouth daily       prazosin (MINIPRESS) 1 MG capsule Take 2 capsules (2 mg) by mouth At Bedtime 60 capsule 3     propranolol (INDERAL) 40 MG tablet Take 1 tablet (40 mg) by mouth 2 times daily 60 tablet 1     topiramate (TOPAMAX) 50 MG tablet Take 1 tablet (50 mg) by mouth 2 times daily 60 tablet 1     vitamin B-Complex Take 1 tablet by mouth daily         Problem List:  Patient Active Problem List    Diagnosis Date Noted     History of traumatic brain injury 03/02/2021     Priority: Medium     Attention-deficit hyperactivity disorder 03/01/2021     Priority: Medium     PTSD (post-traumatic stress disorder) 01/05/2021     Priority: Medium      Borderline personality disorder (H) 01/05/2021     Priority: Medium     Chemical dependency (H) 09/10/2020     Priority: Medium     Lithium use 05/15/2020     Priority: Medium     Lumbar spine tumor 11/27/2019     Priority: Medium     High risk medication use 10/05/2017     Priority: Medium     Bipolar 1 disorder (H) 03/16/2017     Priority: Medium     Generalized anxiety disorder 03/16/2017     Priority: Medium        Past Medical/Surgical History:  Past Medical History:   Diagnosis Date     Depressive disorder      Past Surgical History:   Procedure Laterality Date     ESOPHAGOSCOPY, GASTROSCOPY, DUODENOSCOPY (EGD), COMBINED N/A 4/16/2019    Procedure: COMBINED ESOPHAGOSCOPY, GASTROSCOPY, DUODENOSCOPY (EGD), BIOPSY SINGLE OR MULTIPLE;  Surgeon: Kj Thomas MD;  [facility]     PE tube         Social History:  Social History     Socioeconomic History     Marital status: Single     Spouse name: Not on file     Number of children: Not on file     Years of education: Not on file     Highest education level: Not on file   Occupational History     Not on file   Social Needs     Financial resource strain: Not on file     Food insecurity     Worry: Not on file     Inability: Not on file     Transportation needs     Medical: Not on file     Non-medical: Not on file   Tobacco Use     Smoking status: Current Every Day Smoker     Packs/day: 0.50     Smokeless tobacco: Former User   Substance and Sexual Activity     Alcohol use: Not Currently     Frequency: 2-3 times a week     Drinks per session: 10 or more     Binge frequency: Weekly     Comment: 6 months alcohol free     Drug use: No     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     Sexual activity: Not Currently     Partners: Female   Lifestyle     Physical activity     Days per week: Not on file     Minutes per session: Not on file     Stress: Not on file   Relationships     Social connections     Talks on phone: Not on file     Gets  "together: Not on file     Attends Sikh service: Not on file     Active member of club or organization: Not on file     Attends meetings of clubs or organizations: Not on file     Relationship status: Not on file     Intimate partner violence     Fear of current or ex partner: Not on file     Emotionally abused: Not on file     Physically abused: Not on file     Forced sexual activity: Not on file   Other Topics Concern     Parent/sibling w/ CABG, MI or angioplasty before 65F 55M? Not Asked   Social History Narrative     Not on file       Family History:  Family History   Problem Relation Age of Onset     Migraines Mother      Post-Traumatic Stress Disorder (PTSD) Father      Bipolar Disorder Paternal Grandfather        Review of Systems:  A complete review of systems reviewed by me is negative with the exeption of what has been mentioned in the history of present illness.      Physical Examination:  Vitals: Ht 1.714 m (5' 7.48\")   Wt 78.7 kg (173 lb 9.6 oz)   BMI 26.80 kg/m    BMI= Body mass index is 26.8 kg/m .         [place] Total Score 3/1/2021   Total score - Lacon 19       CHARITO Total Score: 28 (03/01/21 1000)    Last Comprehensive Metabolic Panel:  Sodium   Date Value Ref Range Status   05/18/2020 140 133 - 144 mmol/L Final     Potassium   Date Value Ref Range Status   05/18/2020 3.7 3.4 - 5.3 mmol/L Final     Chloride   Date Value Ref Range Status   05/18/2020 109 94 - 109 mmol/L Final     Carbon Dioxide   Date Value Ref Range Status   05/18/2020 25 20 - 32 mmol/L Final     Anion Gap   Date Value Ref Range Status   05/18/2020 6 3 - 14 mmol/L Final     Glucose   Date Value Ref Range Status   05/18/2020 105 (H) 70 - 99 mg/dL Final     Urea Nitrogen   Date Value Ref Range Status   05/18/2020 12 7 - 30 mg/dL Final     Creatinine   Date Value Ref Range Status   05/18/2020 0.73 0.66 - 1.25 mg/dL Final     GFR Estimate   Date Value Ref Range Status   05/18/2020 >90 >60 mL/min/[1.73_m2] Final     Comment:     " Non  GFR Calc  Starting 12/18/2018, serum creatinine based estimated GFR (eGFR) will be   calculated using the Chronic Kidney Disease Epidemiology Collaboration   (CKD-EPI) equation.       Calcium   Date Value Ref Range Status   05/18/2020 8.9 8.5 - 10.1 mg/dL Final     TSH   Date Value Ref Range Status   05/18/2020 0.93 0.40 - 4.00 mU/L Final     Impression/Plan:  Patient has features and risk factors for possible obstructive sleep apnea including: loud snoring, gasping, witnessed apnea, non-refreshing sleep, daytime sleepiness (ESS 19), difficulty maintaining sleep and family history of DISHA. The STOP-BANG score is 4/8.  Recommend Polysomnogram (using 4% desaturation/Medicare/ AASM 1B scoring rules) to evaluate for obstructive sleep apnea. Will add a seizure montage in light of recent abnormal EEG and reported twitching during sleep. Ambien if needed to facilitate sleep.   Insomnia, sleep onset and sleep maintenance, likely due to a variety of factors including inadequate sleep hygiene and conditioned hyperarousal. Co-occurring anxiety and depression identified and insomnia might be a secondary symptom. Untreated obstructive sleep apnea may be implicated in sleep maintenance difficulties. We discussed various treatment options available for treatment of chronic insomnia, including the pros and cons of cognitive behavioral treatment (CBT) and pharmacological approaches. Patient is interested in CBT-I and referral placed.  Literature provided regarding sleep apnea.      He will follow up with me in approximately two weeks after his sleep study has been competed to review the results and discuss plan of care.       Polysomnography reviewed.  Obstructive sleep apnea reviewed.  Complications of untreated sleep apnea were reviewed.    Willie Pryor PA-C    CC: Meg Miranda

## 2021-03-02 ENCOUNTER — VIRTUAL VISIT (OUTPATIENT)
Dept: SLEEP MEDICINE | Facility: CLINIC | Age: 31
End: 2021-03-02
Attending: PSYCHIATRY & NEUROLOGY
Payer: COMMERCIAL

## 2021-03-02 DIAGNOSIS — R06.89 DYSPNEA AND RESPIRATORY ABNORMALITY: Primary | ICD-10-CM

## 2021-03-02 DIAGNOSIS — Z87.820 HISTORY OF TRAUMATIC BRAIN INJURY: ICD-10-CM

## 2021-03-02 DIAGNOSIS — R94.01 ABNORMAL EEG: ICD-10-CM

## 2021-03-02 DIAGNOSIS — Z72.820 LACK OF ADEQUATE SLEEP: ICD-10-CM

## 2021-03-02 DIAGNOSIS — Z72.820 POOR SLEEP: ICD-10-CM

## 2021-03-02 DIAGNOSIS — G47.19 EXCESSIVE DAYTIME SLEEPINESS: ICD-10-CM

## 2021-03-02 DIAGNOSIS — R06.00 DYSPNEA AND RESPIRATORY ABNORMALITY: Primary | ICD-10-CM

## 2021-03-02 DIAGNOSIS — G47.30 SLEEP APNEA, UNSPECIFIED TYPE: ICD-10-CM

## 2021-03-02 DIAGNOSIS — G47.00 INSOMNIA, UNSPECIFIED TYPE: ICD-10-CM

## 2021-03-02 PROCEDURE — 99203 OFFICE O/P NEW LOW 30 MIN: CPT | Mod: 95 | Performed by: PHYSICIAN ASSISTANT

## 2021-03-02 RX ORDER — ZOLPIDEM TARTRATE 5 MG/1
TABLET ORAL
Qty: 1 TABLET | Refills: 0 | Status: SHIPPED | OUTPATIENT
Start: 2021-03-02 | End: 2021-03-25

## 2021-03-03 NOTE — TELEPHONE ENCOUNTER
Patient has dropped off another copy of his [place] Unemployment form.      Four copies were previously dropped off.   1 was completed by Suzi Jaime and mailed to patient with 2 blank copies.   Another copy was dropped off a few days later and was interofficed to Dr. June Miranda.   He has now dropped off another copy of the form and I'm not sure where to send form.  Patient has not read or responded to my previous My chart notes.  Can we contact patient during the day to see who he would like to send this copy to?  Thank you,  Shanika

## 2021-03-04 ENCOUNTER — ALLIED HEALTH/NURSE VISIT (OUTPATIENT)
Dept: BEHAVIORAL HEALTH | Facility: CLINIC | Age: 31
End: 2021-03-04
Payer: COMMERCIAL

## 2021-03-04 DIAGNOSIS — R69 DIAGNOSIS DEFERRED: Primary | ICD-10-CM

## 2021-03-04 NOTE — PROGRESS NOTES
Behavioral Health Home Services  [facility]: [place]        Social Work Care Navigator Note      Patient: Rao Leavitt  Date: March 4, 2021  Preferred Name: Rao    Previous PHQ-9:   PHQ-9 SCORE 1/18/2021 2/2/2021 2/11/2021   PHQ-9 Total Score MyChart - - -   PHQ-9 Total Score 13 17 10   Some encounter information is confidential and restricted. Go to Review Flowsheets activity to see all data.     Previous AAYUSH-7:   AAYUSH-7 SCORE 1/18/2021 2/2/2021 2/11/2021   Total Score - - -   Total Score 12 20 9   Some encounter information is confidential and restricted. Go to Review Flowsheets activity to see all data.     NOMAN LEVEL:  No flowsheet data found.    Preferred Contact:  Need for : No  Preferred Contact: Cell      Type of Contact Today: Phone call (patient / identified key support person reached)      Data: (subjective / Objective):  Recent ED/IP Admission or Discharge?   None    Patient Goals:  Goal Areas: Health;Mental Health;Chemical Health;Financial and Social Service Benefits;Transportation  Patient stated goals: Patient would like assistance with his physical, mental and chemical health for creating a balanced and healthy lifestyle. Patient would like assistance with housing, SSDI and social service assistance to aid with [employer] benefits to increase his financial stability. Patient would like assistance with reliable transportation for his family to get to appointments, run errands and get out into the community.        [facility] Core Service Provided:  Comprehensive Care Management: utilized the electronic medical record / patient registry to identify and support patient's health conditions / needs more effectively   Care Transitions: focused on the coordinated and seamless movement of patient between or within different levels of care or settings  Care Coordination: provided care management services/referrals necessary to ensure patient and their identified supports have access to medical,  behavioral health, pharmacology and recovery support services.  Ensured that patient's care is integrated across all settings and services.   Individual and Family Support: aimed to help clients reduce barriers to achieving goals, increase health literacy and knowledge about chronic condition(s), increase self-efficacy skills, and improve health outcomes  Referral to Community and Social Support Services: Provided patient with referrals (see plan section)  Assisted in scheduling an appointment to a referral / service (see plan section)  Coordinated / Confirmed patient's appointment time or referral and transportation plan  Followed-up with patient on whether or not they completed a referral    Current Stressors / Issues / Care Plan Objective Addressed Today:  SWCC and patient were able to meet today for Behavioral Health Home ([facility]) monthly check-in via telehealth visit. All required ROIs have been filed with HIM/patient chart.    1. Patient called today frustrated with trying to contact clinic to find out if forms were completed for unemployment. [facility] was able to confirm PCP has filled out forms, neurologist deferred and CC messaged psychiatric provider to find out status.    2. Patient reports he is trying to quit smoking and has started chewing. Patient reports this is a process and he is hoping to quit soon.    3. Patient reports he was able to get car insurance yesterday after he was stopped by the police and given a ticket for no insurance and  tabs.    4. Patient reports he is working on scheduling sleep study. [facility] to monitor.    5. Patient reports SSDI has sent him a letter to set his court date.    6. Patient is going to [place] to visit his mother .    Intervention:  Motivational Interviewing: Expressed Empathy/Understanding, Supported Autonomy, Collaboration, Evocation, Permission to raise concern or advise, Open-ended questions, Reflections: simple and complex, Change talk (evoked) and  Reframe   Target Behavior(s): Explored and resolved challenges to attending appointments as scheduled, Explored current social supports and reinforced opportunities to increase engagement and Explored current sleep hygeine and patient's perception and knowledge of relationship to mood    Assessment: (Progress on Goals / Homework):  Patient would benefit from continued coordination in reaching their goals set for the Behavioral Health Home ([facility]) program. CC reviewed Health Action Plan goals and will continue to monitor progress and work with patient and their care team.      Plan: (Homework, other):  Patient was encouraged to continue to seek condition-related information and education.      Scheduled a Phone follow up appointment with SW  in 2 weeks     Patient has set self-identified goals and will monitor progress until the next appointment on: 03/15/2021.         SARA Irwin [facility]  Behavioral Health Home ([facility])   [facility]  [phone]  March 4, 2021  11:22 AM                  Next 5 appointments (look out 90 days)    Mar 09, 2021 12:40 PM  Return Visit with Meg Miranda MD  [facility] ([facility] ) [address] [address]  [phone]   Apr 15, 2021 10:00 AM  SHORT with Suzi Jaime NP  [facility] ([facility] )  Arrive at: Clinic A [address] [address]  [phone]

## 2021-03-08 ENCOUNTER — ALLIED HEALTH/NURSE VISIT (OUTPATIENT)
Dept: BEHAVIORAL HEALTH | Facility: CLINIC | Age: 31
End: 2021-03-08
Payer: COMMERCIAL

## 2021-03-08 DIAGNOSIS — R69 DIAGNOSIS DEFERRED: Primary | ICD-10-CM

## 2021-03-08 NOTE — PROGRESS NOTES
Behavioral Health Home Services  [facility]: [place]        Social Work Care Navigator Note      Patient: Rao Leavitt  Date: March 8, 2021  Preferred Name: Rao    Previous PHQ-9:   PHQ-9 SCORE 1/18/2021 2/2/2021 2/11/2021   PHQ-9 Total Score MyChart - - -   PHQ-9 Total Score 13 17 10   Some encounter information is confidential and restricted. Go to Review Flowsheets activity to see all data.     Previous AAYUSH-7:   AAYUSH-7 SCORE 1/18/2021 2/2/2021 2/11/2021   Total Score - - -   Total Score 12 20 9   Some encounter information is confidential and restricted. Go to Review Flowsheets activity to see all data.     NOMAN LEVEL:  No flowsheet data found.    Preferred Contact:  Need for : No  Preferred Contact: Cell      Type of Contact Today: Phone call (patient / identified key support person reached)      Data: (subjective / Objective):  Recent ED/IP Admission or Discharge?   None    Patient Goals:  Goal Areas: Health;Mental Health;Chemical Health;Financial and Social Service Benefits;Transportation  Patient stated goals: Patient would like assistance with his physical, mental and chemical health for creating a balanced and healthy lifestyle. Patient would like assistance with housing, SSDI and social service assistance to aid with [employer] benefits to increase his financial stability. Patient would like assistance with reliable transportation for his family to get to appointments, run errands and get out into the community.        [facility] Core Service Provided:  Comprehensive Care Management: utilized the electronic medical record / patient registry to identify and support patient's health conditions / needs more effectively   Care Transitions: focused on the coordinated and seamless movement of patient between or within different levels of care or settings  Care Coordination: provided care management services/referrals necessary to ensure patient and their identified supports have access to medical,  behavioral health, pharmacology and recovery support services.  Ensured that patient's care is integrated across all settings and services.   Individual and Family Support: aimed to help clients reduce barriers to achieving goals, increase health literacy and knowledge about chronic condition(s), increase self-efficacy skills, and improve health outcomes  Referral to Community and Social Support Services: Assisted in scheduling an appointment to a referral / service (see plan section)  Coordinated / Confirmed patient's appointment time or referral and transportation plan  Followed-up with patient on whether or not they completed a referral    Current Stressors / Issues / Care Plan Objective Addressed Today:  SWCC and patient were able to meet today for Behavioral Health Home ([facility]) monthly check-in via telehealth visit. All required ROIs have been filed with HIM/patient chart.    1. Patient reports he received notice of his appeal for unemployment. Patient reports he is being re-considered for unemployment.    2. Patient reports he just completed and sent in his SSDI paperwork.    3. Patient reports high social anxiety when in stores and public spaces. Patient reports he had an anxiety attack at the mall this weekend.    4. Patient reports s.o.'s daughter over this weekend and caused some stressors with him and his s.o. this weekend.    5. Patient reports PCA paperwork and intake completed.    6. [facility] followed up with patient about scheduling sleep study. Patient reports he has not made appointment. [facility] provided patient with scheduling phone number.    Intervention:  Motivational Interviewing: Expressed Empathy/Understanding, Supported Autonomy, Collaboration, Evocation, Permission to raise concern or advise, Open-ended questions, Reflections: simple and complex, Rolled with resistance: Emphasized patient autonomy, Simple reflection, Reframed sustain talk in the direction of change and Evoked patient agenda and  Reframe   Target Behavior(s): Explored thoughts about taking an anti-depressant, Explored and resolved challenges related to taking anti-depressants as prescribed, Explored thoughts and readiness to participate in individual therapy, Explored and resolved challenges to attending appointments as scheduled, Explored current social supports and reinforced opportunities to increase engagement and Explored current sleep hygeine and patient's perception and knowledge of relationship to mood    Assessment: (Progress on Goals / Homework):  Patient would benefit from continued coordination in reaching their goals set for the Behavioral Health Home ([facility]) program. [facility] reviewed Health Action Plan goals and will continue to monitor progress and work with patient and their care team.      Plan: (Homework, other):  Patient was encouraged to continue to seek condition-related information and education.      Scheduled a Phone follow up appointment with ANGI  in 1 week     Patient has set self-identified goals and will monitor progress until the next appointment on: 02/22/2021.        SARA Irwin LGSW Behavioral Health [place] ([facility])   [facility]  [phone]  March 8, 2021  3:09 PM                  Next 5 appointments (look out 90 days)    Mar 09, 2021 12:40 PM  Return Visit with Meg Miranda MD  [facility] ([facility] ) [address] [address]  [phone]   Apr 15, 2021 10:00 AM  SHORT with Suzi Jaime NP  [facility] ([facility] )  Arrive at: Clinic A [address] [address]  [phone]

## 2021-03-09 ENCOUNTER — OFFICE VISIT (OUTPATIENT)
Dept: NEUROLOGY | Facility: CLINIC | Age: 31
End: 2021-03-09
Payer: COMMERCIAL

## 2021-03-09 VITALS — HEART RATE: 51 BPM | SYSTOLIC BLOOD PRESSURE: 100 MMHG | DIASTOLIC BLOOD PRESSURE: 50 MMHG | TEMPERATURE: 98.1 F

## 2021-03-09 DIAGNOSIS — R40.4 SPELL OF ALTERED CONSCIOUSNESS: ICD-10-CM

## 2021-03-09 DIAGNOSIS — R41.3 POOR SHORT TERM MEMORY: ICD-10-CM

## 2021-03-09 DIAGNOSIS — F07.81 POST CONCUSSIVE SYNDROME: Primary | ICD-10-CM

## 2021-03-09 DIAGNOSIS — R94.01 NONSPECIFIC ABNORMAL ELECTROENCEPHALOGRAM (EEG): ICD-10-CM

## 2021-03-09 DIAGNOSIS — Z79.899 ENCOUNTER FOR LONG-TERM (CURRENT) USE OF MEDICATIONS: ICD-10-CM

## 2021-03-09 DIAGNOSIS — F99 CO-OCCURRENCE OF MULTIPLE PSYCHIATRIC DISORDERS: ICD-10-CM

## 2021-03-09 PROCEDURE — 99215 OFFICE O/P EST HI 40 MIN: CPT | Performed by: PSYCHIATRY & NEUROLOGY

## 2021-03-09 PROCEDURE — 99000 SPECIMEN HANDLING OFFICE-LAB: CPT | Performed by: PSYCHIATRY & NEUROLOGY

## 2021-03-09 PROCEDURE — 80201 ASSAY OF TOPIRAMATE: CPT | Mod: 90 | Performed by: PSYCHIATRY & NEUROLOGY

## 2021-03-09 PROCEDURE — 36415 COLL VENOUS BLD VENIPUNCTURE: CPT | Performed by: PSYCHIATRY & NEUROLOGY

## 2021-03-09 NOTE — PATIENT INSTRUCTIONS
Plan:  -- Labs today: Topiramate level.  I am thinking about increasing your dose, at least the evening dose, of this medication.  We will await the test results to decide.  -- Referral for cognitive rehabilitation at [facility].  Please let us know if you have trouble getting this set up.  -- Continue you to work with your mental health providers with regards to mood.  -- I agree with your plan to follow-up with further sleep assessment.  -- Return to Neurology in 3 months.

## 2021-03-09 NOTE — PROGRESS NOTES
ESTABLISHED PATIENT NEUROLOGY NOTE    DATE OF VISIT: 3/9/2021  MRN: [id number]  PATIENT NAME: Rao Leavitt  YOB: 1990    Chief Complaint   Patient presents with     RECHECK     memory.     SUBJECTIVE:                                                      HISTORY OF PRESENT ILLNESS:  Rao is here for follow up review his Neuropsych test results.  The patient has history of remote concussion, polysubstance use, ADD and multiple psychiatric disorders.  He was initially referred to neurology for possible postconcussive syndrome, in particular impulse control issues.  I had ordered a brain MRI which was unremarkable.  EEG showed some paroxysmal fast activity, but was otherwise unremarkable.  As mentioned, the purpose for today's visit is to discuss his recent neuropsych test results.    Assessment as documented by Dr. Mandujano (2.17.21):  IMPRESSIONS  Mr. Leavitt did not or was unable to provide full and consistent engagement with testing in this exam.  There are two leading explanations for inconsistent engagement with testing.  A first explanation would be a willful attempt at doing poorly at some or all of the measures in an exam, which can be seen in the setting of potential for secondary gain.  A second explanation for variable engagement with testing is seen in a setting where a patient is unable to focus fully on testing due to factors such as poor sleep, pain, and psychological distress.  Indeed, he is reporting chronically poor sleep, pain, and has a longstanding history of significant mental health issues.  Unfortunately, the data acquired in this evaluation are likely an underestimate of his cognitive capacity.  He obtained a normal score on one measure of visual problem-solving.  The remainder of his scores are low, likely attributable to poor engagement. He did not report elevated symptoms of depression or anxiety on screening measures. A lengthy measure of personality and emotional  well-being was invalid due to over-reporting of symptoms.      RECOMMENDATIONS  Preliminary results and recommendations were provided to the patient on the second evaluation date, and all questions were answered.      1.  Continued treatment of his mental health is recommended. He has a several providers for psychotherapy type services. It is generally advisable that a patient have only one psychotherapy provider (or two, if there are group therapies that are being completed), so as not to receive mixed-messages from multiple providers.      2.  If he continues to have difficulties with memory, routine use of a memory notebook or other assistive device could be of benefit.     3. Follow-up neuropsychological evaluation could be considered in the future, if clinically indicated.  It will be critical that he provide full and consistent engagement on a future exam.     He did have a sleep consultation next week, as recommended by me.  And is for PSG and inclusion of seizure montage due to the recent abnormal EEG.  Note he is on Topamax through his psychiatric provider.     Rao says that he he was somewhat frustrated after the neuropsych testing.  He felt that the results relied too much on his past.  He still feels that he has problems with short-term memory day-to-day.  Keep a calendar, to help remind him about appointments and has notably always shown up for our appointments.  He says people are starting to notice some zoning out. He says this tends to last a few minutes. He says that he has recently started to hear and see things more and more. He says that this seems to have increased with his sobriety. He describes shadows and hearing things.     He does plan to follow-up with the Sleep specialist's recommendations. He says that he is waking up lately sweating, a few nights per week. He says he did mention this to the Sleep specialist. He reminds me that his father has sleep apnea.     He says that he has not  "noticed the Topamax making him feel more foggy. He says that he missed a day of dosing and he felt like his mind was racing.  He does feel that this medication is helpful for mood.  He has not noticed any cognitive side effects, as mentioned.    He has been trying to workout.  He tells me that overall, considering everything, he is feeling like he is doing \"so-so.\"    CURRENT MEDICATIONS:   Apple Ralph Vn-Grn Tea-Bit Or-Cr (APPLE CIDER VINEGAR PLUS) TABS,   lithium (ESKALITH) 600 MG capsule, Take 1 capsule (600 mg) by mouth 2 times daily (with meals)  multivitamin, therapeutic (THERA-VIT) TABS tablet, Take 1 tablet by mouth daily  prazosin (MINIPRESS) 1 MG capsule, Take 2 capsules (2 mg) by mouth At Bedtime  propranolol (INDERAL) 40 MG tablet, Take 1 tablet (40 mg) by mouth 2 times daily  topiramate (TOPAMAX) 50 MG tablet, Take 1 tablet (50 mg) by mouth 2 times daily  vitamin B-Complex, Take 1 tablet by mouth daily  zolpidem (AMBIEN) 5 MG tablet, Take tablet by mouth 15 minutes prior to sleep, for Sleep Study (Patient not taking: Reported on 3/9/2021)    No current facility-administered medications on file prior to visit.       RECENT DIAGNOSTIC STUDIES:   Labs: No results found for any visits on 21.      REVIEW OF SYSTEMS:                                                      10-point review of systems is negative except as mentioned above in HPI.    EXAM:                                                      Physical Exam:   Vitals: /50 (BP Location: Left arm, Patient Position: Sitting, Cuff Size: Adult Regular)   Pulse 51   Temp 98.1  F (36.7  C) (Tympanic)   BMI= There is no height or weight on file to calculate BMI.  GENERAL: NAD.  HEENT: NC/AT.   CV: RRR. S1, S2.   NECK: No bruits.  PULM: Non-labored breathing.   Neurologic:  MENTAL STATUS: Alert, attentive. Speech is fluent. Tangential at times. Normal comprehension.  Mini co/5 (still 3 words on recall). Good concentration.  Fair fund of " knowledge.   CRANIAL NERVES: Discs technically difficult to visualize.  Visual fields intact to confrontation. Pupils equally, round and reactive to light. Facial sensation and movement normal. EOM full. Hearing intact to conversation. Trapezius strength intact. Palate moves symmetrically. Tongue midline.  MOTOR: 5/5 in proximal and distal muscle groups of upper and lower extremities. Tone and bulk normal.   DTRs: Intact and symmetric in biceps, BR, patellae.  Babinski down-going bilaterally.   SENSATION: Normal light touch throughout.    COORDINATION: Normal fin motor movements.  STATION AND GAIT: Casual gait is normal.   Right hand-dominant.    ASSESSMENT and PLAN:                                                      Assessment:    ICD-10-CM    1. Post concussive syndrome  F07.81 OCCUPATIONAL THERAPY REFERRAL   2. Encounter for long-term (current) use of medications  Z79.899    3. Poor short term memory  R41.3 OCCUPATIONAL THERAPY REFERRAL   4. Nonspecific abnormal electroencephalogram (EEG)  R94.01    5. Co-occurrence of multiple psychiatric disorders  F99 Topiramate Level   6. Spell of altered consciousness  R40.4 Topiramate Level        Mr. Leavitt is a pleasant 31-year-old man with history of remote concussion, polysubstance use, ADD and multiple psychiatric disorders who is followed in Neurology for possible postconcussive symptoms.  The main purpose of today's visit was to review the results from neuropsych and his sleep consultation.  I explained to Rao that it is reassuring that there is no clear evidence of an organic neurodegenerative process going on.  I think it would be helpful for him to go through some cognitive rehabilitation, and he is open to this.  He feels that the Topamax is quite helpful with his mood and thinking, so I am considering increasing the dose.  We will first check a level.  I did explain to Rao that this can have an effect on cognition, but he does not feel that that  has been the case, in fact he feels that it has been helpful in his thinking.  He does plan to follow-up with the sleep recommendations, which I encouraged him to do.  I would like to see Rao back in clinic in about 3 months.  He understands and agrees with the plan.    Plan:  -- Labs today: Topiramate level.  I am thinking about increasing your dose, at least the evening dose, of this medication.  We will await the test results to decide.  -- Referral for cognitive rehabilitation at [facility].  Please let us know if you have trouble getting this set up.  -- Continue you to work with your mental health providers with regards to mood.  -- I agree with your plan to follow-up with further sleep assessment.  -- Return to Neurology in 3 months.    Total Time: 40 minutes were spent with the patient and in chart review/documentation. More than 50% of the time spent on counseling (as described above in Assessment and Plan)/coordinating the care.    Meg Miranda MD  Neurology    CC Suzi Jaime NP and Bree Grullon, CNP

## 2021-03-09 NOTE — LETTER
3/9/2021         RE: Rao Leavitt  [address]  [place] [address]        Dear Colleague,    Thank you for referring your patient, Rao Leavitt, to the [facility] [place]. Please see a copy of my visit note below.    ESTABLISHED PATIENT NEUROLOGY NOTE    DATE OF VISIT: 3/9/2021  MRN: [id number]  PATIENT NAME: Rao Leavitt  YOB: 1990    Chief Complaint   Patient presents with     RECHECK     memory.     SUBJECTIVE:                                                      HISTORY OF PRESENT ILLNESS:  Rao is here for follow up review his Neuropsych test results.  The patient has history of remote concussion, polysubstance use, ADD and multiple psychiatric disorders.  He was initially referred to neurology for possible postconcussive syndrome, in particular impulse control issues.  I had ordered a brain MRI which was unremarkable.  EEG showed some paroxysmal fast activity, but was otherwise unremarkable.  As mentioned, the purpose for today's visit is to discuss his recent neuropsych test results.    Assessment as documented by Dr. Mandujano (2.17.21):  IMPRESSIONS  Mr. Leavitt did not or was unable to provide full and consistent engagement with testing in this exam.  There are two leading explanations for inconsistent engagement with testing.  A first explanation would be a willful attempt at doing poorly at some or all of the measures in an exam, which can be seen in the setting of potential for secondary gain.  A second explanation for variable engagement with testing is seen in a setting where a patient is unable to focus fully on testing due to factors such as poor sleep, pain, and psychological distress.  Indeed, he is reporting chronically poor sleep, pain, and has a longstanding history of significant mental health issues.  Unfortunately, the data acquired in this evaluation are likely an underestimate of his cognitive capacity.  He  obtained a normal score on one measure of visual problem-solving.  The remainder of his scores are low, likely attributable to poor engagement. He did not report elevated symptoms of depression or anxiety on screening measures. A lengthy measure of personality and emotional well-being was invalid due to over-reporting of symptoms.      RECOMMENDATIONS  Preliminary results and recommendations were provided to the patient on the second evaluation date, and all questions were answered.      1.  Continued treatment of his mental health is recommended. He has a several providers for psychotherapy type services. It is generally advisable that a patient have only one psychotherapy provider (or two, if there are group therapies that are being completed), so as not to receive mixed-messages from multiple providers.      2.  If he continues to have difficulties with memory, routine use of a memory notebook or other assistive device could be of benefit.     3. Follow-up neuropsychological evaluation could be considered in the future, if clinically indicated.  It will be critical that he provide full and consistent engagement on a future exam.     He did have a sleep consultation next week, as recommended by me.  And is for PSG and inclusion of seizure montage due to the recent abnormal EEG.  Note he is on Topamax through his psychiatric provider.     Rao says that he he was somewhat frustrated after the neuropsych testing.  He felt that the results relied too much on his past.  He still feels that he has problems with short-term memory day-to-day.  Keep a calendar, to help remind him about appointments and has notably always shown up for our appointments.  He says people are starting to notice some zoning out. He says this tends to last a few minutes. He says that he has recently started to hear and see things more and more. He says that this seems to have increased with his sobriety. He describes shadows and hearing  "things.     He does plan to follow-up with the Sleep specialist's recommendations. He says that he is waking up lately sweating, a few nights per week. He says he did mention this to the Sleep specialist. He reminds me that his father has sleep apnea.     He says that he has not noticed the Topamax making him feel more foggy. He says that he missed a day of dosing and he felt like his mind was racing.  He does feel that this medication is helpful for mood.  He has not noticed any cognitive side effects, as mentioned.    He has been trying to workout.  He tells me that overall, considering everything, he is feeling like he is doing \"so-so.\"    CURRENT MEDICATIONS:   Apple Ralph Vn-Grn Tea-Bit Or-Cr (APPLE CIDER VINEGAR PLUS) TABS,   lithium (ESKALITH) 600 MG capsule, Take 1 capsule (600 mg) by mouth 2 times daily (with meals)  multivitamin, therapeutic (THERA-VIT) TABS tablet, Take 1 tablet by mouth daily  prazosin (MINIPRESS) 1 MG capsule, Take 2 capsules (2 mg) by mouth At Bedtime  propranolol (INDERAL) 40 MG tablet, Take 1 tablet (40 mg) by mouth 2 times daily  topiramate (TOPAMAX) 50 MG tablet, Take 1 tablet (50 mg) by mouth 2 times daily  vitamin B-Complex, Take 1 tablet by mouth daily  zolpidem (AMBIEN) 5 MG tablet, Take tablet by mouth 15 minutes prior to sleep, for Sleep Study (Patient not taking: Reported on 3/9/2021)    No current facility-administered medications on file prior to visit.       RECENT DIAGNOSTIC STUDIES:   Labs: No results found for any visits on 03/09/21.      REVIEW OF SYSTEMS:                                                      10-point review of systems is negative except as mentioned above in HPI.    EXAM:                                                      Physical Exam:   Vitals: /50 (BP Location: Left arm, Patient Position: Sitting, Cuff Size: Adult Regular)   Pulse 51   Temp 98.1  F (36.7  C) (Tympanic)   BMI= There is no height or weight on file to calculate BMI.  GENERAL: " NAD.  HEENT: NC/AT.   CV: RRR. S1, S2.   NECK: No bruits.  PULM: Non-labored breathing.   Neurologic:  MENTAL STATUS: Alert, attentive. Speech is fluent. Tangential at times. Normal comprehension.  Mini co/5 (still 3 words on recall). Good concentration.  Fair fund of knowledge.   CRANIAL NERVES: Discs technically difficult to visualize.  Visual fields intact to confrontation. Pupils equally, round and reactive to light. Facial sensation and movement normal. EOM full. Hearing intact to conversation. Trapezius strength intact. Palate moves symmetrically. Tongue midline.  MOTOR: 5/5 in proximal and distal muscle groups of upper and lower extremities. Tone and bulk normal.   DTRs: Intact and symmetric in biceps, BR, patellae.  Babinski down-going bilaterally.   SENSATION: Normal light touch throughout.    COORDINATION: Normal fin motor movements.  STATION AND GAIT: Casual gait is normal.   Right hand-dominant.    ASSESSMENT and PLAN:                                                      Assessment:    ICD-10-CM    1. Post concussive syndrome  F07.81 OCCUPATIONAL THERAPY REFERRAL   2. Encounter for long-term (current) use of medications  Z79.899    3. Poor short term memory  R41.3 OCCUPATIONAL THERAPY REFERRAL   4. Nonspecific abnormal electroencephalogram (EEG)  R94.01    5. Co-occurrence of multiple psychiatric disorders  F99 Topiramate Level   6. Spell of altered consciousness  R40.4 Topiramate Level        Mr. Leavitt is a pleasant 31-year-old man with history of remote concussion, polysubstance use, ADD and multiple psychiatric disorders who is followed in Neurology for possible postconcussive symptoms.  The main purpose of today's visit was to review the results from neuropsych and his sleep consultation.  I explained to Rao that it is reassuring that there is no clear evidence of an organic neurodegenerative process going on.  I think it would be helpful for him to go through some cognitive  rehabilitation, and he is open to this.  He feels that the Topamax is quite helpful with his mood and thinking, so I am considering increasing the dose.  We will first check a level.  I did explain to Rao that this can have an effect on cognition, but he does not feel that that has been the case, in fact he feels that it has been helpful in his thinking.  He does plan to follow-up with the sleep recommendations, which I encouraged him to do.  I would like to see Rao back in clinic in about 3 months.  He understands and agrees with the plan.    Plan:  -- Labs today: Topiramate level.  I am thinking about increasing your dose, at least the evening dose, of this medication.  We will await the test results to decide.  -- Referral for cognitive rehabilitation at [facility].  Please let us know if you have trouble getting this set up.  -- Continue you to work with your mental health providers with regards to mood.  -- I agree with your plan to follow-up with further sleep assessment.  -- Return to Neurology in 3 months.    Total Time: 40 minutes were spent with the patient and in chart review/documentation. More than 50% of the time spent on counseling (as described above in Assessment and Plan)/coordinating the care.    Meg Miranda MD  Neurology    CC Suzi Jaime NP and Bree Grullon CNP                      Again, thank you for allowing me to participate in the care of your patient.        Sincerely,        Meg Miranda MD

## 2021-03-10 LAB — TOPIRAMATE SERPL-MCNC: 3.5 UG/ML (ref 5–20)

## 2021-03-11 DIAGNOSIS — F31.9 BIPOLAR 1 DISORDER (H): ICD-10-CM

## 2021-03-11 RX ORDER — TOPIRAMATE 50 MG/1
TABLET, FILM COATED ORAL
Qty: 90 TABLET | Refills: 1 | Status: SHIPPED | OUTPATIENT
Start: 2021-03-11 | End: 2021-05-17

## 2021-03-11 NOTE — TELEPHONE ENCOUNTER
Please send it over to the [facility] as I will be there next Wednesday.  Thanks    Message text

## 2021-03-11 NOTE — RESULT ENCOUNTER NOTE
Please let Rao know that his Topamax level is in the low range as expected with the current dosing.  I recommend that he increase his evening dose to 100mg, as we discussed in clinic this week.  Continue 50mg in the morning.  I will put in a new prescription for his pharmacy ([facility], [place]) reflecting the new dosing.    Thank you,  Dr. Miranda

## 2021-03-11 NOTE — TELEPHONE ENCOUNTER
It is at kodi desk. She is VRT. She only comes in every so often unsure when she will next.    Angela Saldivar CSS on 3/11/2021 at 9:25 AM

## 2021-03-15 ENCOUNTER — TELEPHONE (OUTPATIENT)
Dept: BEHAVIORAL HEALTH | Facility: CLINIC | Age: 31
End: 2021-03-15

## 2021-03-15 NOTE — TELEPHONE ENCOUNTER
[facility]  [facility]: [place]        Social Work Care Navigator Note      Patient: Rao Leavitt  Date: March 15, 2021  Preferred Name: Rao    Previous PHQ-9:   PHQ-9 SCORE 1/18/2021 2/2/2021 2/11/2021   PHQ-9 Total Score MyChart - - -   PHQ-9 Total Score 13 17 10   Some encounter information is confidential and restricted. Go to Review Flowsheets activity to see all data.     Previous AAYUSH-7:   AAYUSH-7 SCORE 1/18/2021 2/2/2021 2/11/2021   Total Score - - -   Total Score 12 20 9   Some encounter information is confidential and restricted. Go to Review Flowsheets activity to see all data.     NOMAN LEVEL:  No flowsheet data found.    Preferred Contact:  Need for : No  Preferred Contact: Cell      Type of Contact Today: Phone call (not reached/unavailable)      Data: (subjective / Objective):  Attempted to reach patient for Behavior[facility] ([facility] Monthly Check-In, but was unsuccessful.  Plan to attempt again.      SARA Irwin [facility]  [facility] [place] ([facility]   [facility]  [phone]  March 15, 2021  11:58 AM            Next 5 appointments (look out 90 days)    Apr 15, 2021 10:00 AM  SHORT with Suzi Jaime NP  [facility] ([facility] )  Arrive at: Clinic [address] [facility] [address]  [phone]

## 2021-03-16 ENCOUNTER — TELEPHONE (OUTPATIENT)
Dept: PSYCHOLOGY | Facility: CLINIC | Age: 31
End: 2021-03-16

## 2021-03-23 ENCOUNTER — TELEPHONE (OUTPATIENT)
Dept: SLEEP MEDICINE | Facility: CLINIC | Age: 31
End: 2021-03-23

## 2021-03-23 ENCOUNTER — ALLIED HEALTH/NURSE VISIT (OUTPATIENT)
Dept: BEHAVIORAL HEALTH | Facility: CLINIC | Age: 31
End: 2021-03-23
Payer: COMMERCIAL

## 2021-03-23 DIAGNOSIS — R69 DIAGNOSIS DEFERRED: Primary | ICD-10-CM

## 2021-03-23 NOTE — PROGRESS NOTES
Behavioral Health Home Services  [facility]: [place]        Social Work Care Navigator Note      Patient: Rao Leavitt  Date: March 23, 2021  Preferred Name: Rao    Previous PHQ-9:   PHQ-9 SCORE 1/18/2021 2/2/2021 2/11/2021   PHQ-9 Total Score MyChart - - -   PHQ-9 Total Score 13 17 10   Some encounter information is confidential and restricted. Go to Review Flowsheets activity to see all data.     Previous AAYUSH-7:   AAYUSH-7 SCORE 1/18/2021 2/2/2021 2/11/2021   Total Score - - -   Total Score 12 20 9   Some encounter information is confidential and restricted. Go to Review Flowsheets activity to see all data.     NOMAN LEVEL:  No flowsheet data found.    Preferred Contact:  Need for : No  Preferred Contact: Cell      Type of Contact Today: Phone call (patient / identified key support person reached)      Data: (subjective / Objective):  Recent ED/IP Admission or Discharge?   None    Patient Goals:  Goal Areas: Health;Mental Health;Chemical Health;Financial and Social Service Benefits;Transportation  Patient stated goals: Patient would like assistance with his physical, mental and chemical health for creating a balanced and healthy lifestyle. Patient would like assistance with housing, SSDI and social service assistance to aid with [employer] benefits to increase his financial stability. Patient would like assistance with reliable transportation for his family to get to appointments, run errands and get out into the community.        [facility] Core Service Provided:  Comprehensive Care Management: utilized the electronic medical record / patient registry to identify and support patient's health conditions / needs more effectively   Care Transitions: focused on the coordinated and seamless movement of patient between or within different levels of care or settings  Care Coordination: provided care management services/referrals necessary to ensure patient and their identified supports have access to medical,  behavioral health, pharmacology and recovery support services.  Ensured that patient's care is integrated across all settings and services.   Individual and Family Support: aimed to help clients reduce barriers to achieving goals, increase health literacy and knowledge about chronic condition(s), increase self-efficacy skills, and improve health outcomes  Referral to Community and Social Support Services: Provided patient with referrals (see plan section)  Assisted in scheduling an appointment to a referral / service (see plan section)  Coordinated / Confirmed patient's appointment time or referral and transportation plan  Followed-up with patient on whether or not they completed a referral    Current Stressors / Issues / Care Plan Objective Addressed Today:  SWCC and patient were able to meet today for Behavioral Health Home ([facility]) monthly check-in via telehealth visit. All required ROIs have been filed with HIM/patient chart.    1. Patient reports he received notice of his appeal for unemployment, they ruled in his favor. Patient reports unemployment may pay lump sum from June until March or April of this year. Patient reports he is unsure of timing and if company will block this.     2. Patient reports he has completed and sent in his SSDI paperwork. Patient reports he may hear something by the end of April to May.     3. Patient reports his sleeping medications have been increased in the past week. Patient reports his sleep continues to be up and down with night terrors. Patient reports his mood is not as controlled as he would like. Patient reports he is not sure if it is stress or just feeling overwhelmed with his family life. Patient reports he has been taking walks to help manage his emotional instability. Patient reports he does not want to become overmedicated. [facility] provided support/empathy and messaged provider today.     4. Patient reports his s.o.'s daughter continues to live with her biological father.  Patient reports stress over this. Patient reports his s.o. has been working a lot and he is caring full time for their daughter.     5. Patient reports he continues to meet weekly with his [employer] worker. Patient reports he missed his last therapy appt. [facility] was able to schedule appt today with provider.     6. [facility] followed up with patient about scheduling sleep study. Patient reports he has not made appointment. [facility] provided patient with scheduling phone number. Patient reports he will be calling to schedule appt after his visit with his mom.    7. Patient reports he is going to [place] to visit his mom the 1st of April through the 6th. Patient reports he will have a UA when he returns. Patient reports he continues to meet with his [facility] counselor twice a week. [facility] and patient discussed how seeing family and friends can be a trigger.    Intervention:  Motivational Interviewing: Expressed Empathy/Understanding, Supported Autonomy, Collaboration, Evocation, Permission to raise concern or advise, Open-ended questions, Reflections: simple and complex, Change talk (evoked) and Reframe   Target Behavior(s): Explored thoughts about taking an anti-depressant, Explored and resolved challenges related to taking anti-depressants as prescribed, Explored thoughts and readiness to participate in individual therapy, Explored and resolved challenges to attending appointments as scheduled, Explored patient's knowledge of the impact of exercise on mood, Explored current social supports and reinforced opportunities to increase engagement, Explored current sleep hygeine and patient's perception and knowledge of relationship to mood and Explored patient's perception of how alcohol and / or drugs influences mood    Assessment: (Progress on Goals / Homework):  Patient would benefit from continued coordination in reaching their goals set for the Behavioral Health Home ([facility]) program. [facility] reviewed Health Action Plan goals and will continue to  monitor progress and work with patient and their care team.      Plan: (Homework, other):  Patient was encouraged to continue to seek condition-related information and education.      Scheduled a Phone follow up appointment with ANGI EDUARDO in 2 weeks     Patient has set self-identified goals and will monitor progress until the next appointment on: 04/05/2021.         SARA Irwin [facility]  Behavioral Health [place] ([facility])   [facility]  [phone]  March 23, 2021  10:55 AM                  Next 5 appointments (look out 90 days)    Apr 15, 2021 10:00 AM  SHORT with Suzi Jaime NP  [facility] ([facility] )  Arrive at: Clinic [address] [facility] [address]  [phone]

## 2021-03-23 NOTE — TELEPHONE ENCOUNTER
Reason for call:  Other   Patient called regarding (reason for call): call back  Additional comments: patient is calling to schedule his sleep study. Please call back    Phone number to reach patient:  Home number on file [phone] (home)    Best Time:  Any time    Can we leave a detailed message on this number?  YES    Travel screening: Not Applicable

## 2021-03-25 ENCOUNTER — VIRTUAL VISIT (OUTPATIENT)
Dept: PSYCHIATRY | Facility: CLINIC | Age: 31
End: 2021-03-25
Payer: COMMERCIAL

## 2021-03-25 DIAGNOSIS — F31.9 BIPOLAR 1 DISORDER (H): Primary | ICD-10-CM

## 2021-03-25 DIAGNOSIS — F41.1 GENERALIZED ANXIETY DISORDER: ICD-10-CM

## 2021-03-25 DIAGNOSIS — F60.3 BORDERLINE PERSONALITY DISORDER (H): ICD-10-CM

## 2021-03-25 PROCEDURE — 99214 OFFICE O/P EST MOD 30 MIN: CPT | Mod: 95 | Performed by: NURSE PRACTITIONER

## 2021-03-25 RX ORDER — PROPRANOLOL HYDROCHLORIDE 40 MG/1
40 TABLET ORAL 2 TIMES DAILY
Qty: 60 TABLET | Refills: 1 | Status: SHIPPED | OUTPATIENT
Start: 2021-03-25 | End: 2021-04-15

## 2021-03-25 RX ORDER — LITHIUM CARBONATE 600 MG/1
600 CAPSULE ORAL 2 TIMES DAILY WITH MEALS
Qty: 60 CAPSULE | Refills: 3 | Status: SHIPPED | OUTPATIENT
Start: 2021-03-25 | End: 2021-08-11 | Stop reason: DRUGHIGH

## 2021-03-25 RX ORDER — OLANZAPINE 5 MG/1
5 TABLET ORAL AT BEDTIME
Qty: 30 TABLET | Refills: 1 | Status: SHIPPED | OUTPATIENT
Start: 2021-03-25 | End: 2021-04-26

## 2021-03-25 ASSESSMENT — ANXIETY QUESTIONNAIRES
7. FEELING AFRAID AS IF SOMETHING AWFUL MIGHT HAPPEN: SEVERAL DAYS
3. WORRYING TOO MUCH ABOUT DIFFERENT THINGS: SEVERAL DAYS
GAD7 TOTAL SCORE: 9
IF YOU CHECKED OFF ANY PROBLEMS ON THIS QUESTIONNAIRE, HOW DIFFICULT HAVE THESE PROBLEMS MADE IT FOR YOU TO DO YOUR WORK, TAKE CARE OF THINGS AT HOME, OR GET ALONG WITH OTHER PEOPLE: EXTREMELY DIFFICULT
5. BEING SO RESTLESS THAT IT IS HARD TO SIT STILL: SEVERAL DAYS
6. BECOMING EASILY ANNOYED OR IRRITABLE: SEVERAL DAYS
2. NOT BEING ABLE TO STOP OR CONTROL WORRYING: SEVERAL DAYS
1. FEELING NERVOUS, ANXIOUS, OR ON EDGE: NEARLY EVERY DAY

## 2021-03-25 ASSESSMENT — PATIENT HEALTH QUESTIONNAIRE - PHQ9
SUM OF ALL RESPONSES TO PHQ QUESTIONS 1-9: 10
5. POOR APPETITE OR OVEREATING: SEVERAL DAYS

## 2021-03-25 NOTE — PATIENT INSTRUCTIONS
1.  Add olanzapine 5 mg at bedtime    2.  Continue lithium 600 mg twice daily    3.  Continue propranolol 40 mg twice daily    5.  Continue prazosin 2 mg at bedtime    6.  Topamax being prescribed by neurologist    7.  Continue all therapies    8.  Continue behavior health home care services      Continue all other medications as reviewed per electronic medical record today.     Safety plan reviewed. To the Emergency Department as needed or call after hours crisis line at [phone] or [phone]. [place] Crisis Text Line. Text [place] to 935618 or Suicide LifeLine Chat: suicideInCast.org/chat/    To schedule individual or family therapy, call [facility] at [phone].    Schedule an appointment with me in April or sooner as needed. Call [facility] at [phone] to schedule.    Follow up with primary care provider as planned or for acute medical concerns.    Call the psychiatric nurse line with medication questions or concerns at [phone].    [employer] may be used to communicate with your provider, but this is not intended to be used for emergencies.    Crisis Resources:    National Suicide Prevention Lifeline: 603.692.6563 (TTY: 462.321.1061). Call anytime for help.  (www.suicidepreventionlifeline.org)  National [place] on Mental Illness (www.nadege.org): 137.429.5743 or 951-401-2007.   Mental Health Association (www.mentalhealth.org): 431.338.2306 or 316-822-8830.  [place] Crisis Text Line: Text [place] to 623934  Suicide LifeLine Chat: suicideInCast.org/chat

## 2021-03-26 ENCOUNTER — ALLIED HEALTH/NURSE VISIT (OUTPATIENT)
Dept: BEHAVIORAL HEALTH | Facility: CLINIC | Age: 31
End: 2021-03-26
Payer: COMMERCIAL

## 2021-03-26 DIAGNOSIS — R69 DIAGNOSIS DEFERRED: Primary | ICD-10-CM

## 2021-03-26 ASSESSMENT — ANXIETY QUESTIONNAIRES: GAD7 TOTAL SCORE: 9

## 2021-03-26 NOTE — PROGRESS NOTES
Behavioral Health Home Services  [facility]: [place]        Social Work Care Navigator Note      Patient: Rao Leavitt  Date: March 26, 2021  Preferred Name: Rao    Previous PHQ-9:   PHQ-9 SCORE 2/2/2021 2/11/2021 3/25/2021   PHQ-9 Total Score MyChart - - -   PHQ-9 Total Score 17 10 10   Some encounter information is confidential and restricted. Go to Review Flowsheets activity to see all data.     Previous AAYUSH-7:   AAYUSH-7 SCORE 2/2/2021 2/11/2021 3/25/2021   Total Score - - -   Total Score 20 9 9   Some encounter information is confidential and restricted. Go to Review Flowsheets activity to see all data.     NOMAN LEVEL:  No flowsheet data found.    Preferred Contact:  Need for : No  Preferred Contact: Cell      Type of Contact Today: Phone call (patient / identified key support person reached)      Data: (subjective / Objective):  Recent ED/IP Admission or Discharge?   None    Patient Goals:  Goal Areas: Health;Mental Health;Chemical Health;Financial and Social Service Benefits;Transportation  Patient stated goals: Patient would like assistance with his physical, mental and chemical health for creating a balanced and healthy lifestyle. Patient would like assistance with housing, SSDI and social service assistance to aid with [employer] benefits to increase his financial stability. Patient would like assistance with reliable transportation for his family to get to appointments, run errands and get out into the community.        [facility] Core Service Provided:  Comprehensive Care Management: utilized the electronic medical record / patient registry to identify and support patient's health conditions / needs more effectively   Care Transitions: focused on the coordinated and seamless movement of patient between or within different levels of care or settings  Care Coordination: provided care management services/referrals necessary to ensure patient and their identified supports have access to medical,  behavioral health, pharmacology and recovery support services.  Ensured that patient's care is integrated across all settings and services.   Individual and Family Support: aimed to help clients reduce barriers to achieving goals, increase health literacy and knowledge about chronic condition(s), increase self-efficacy skills, and improve health outcomes  Referral to Community and Social Support Services: Provided patient with referrals (see plan section)  Assisted in scheduling an appointment to a referral / service (see plan section)  Coordinated / Confirmed patient's appointment time or referral and transportation plan  Followed-up with patient on whether or not they completed a referral    Current Stressors / Issues / Care Plan Objective Addressed Today:  SWCC and patient were able to meet today for Behavioral Health [place] ([facility]) monthly check-in via telehealth visit. All required ROIs have been filed with HIM/patient chart.    1. Patient called to discuss unemployment status and paperwork. [facility] provided patient with education and answered questions today. Patient reports understanding and appreciates the clarification about his forms.    2. Patient reports he met with psychiatric provider and discussed medication changes. Patient will be following up next month with provider to discuss.    3. Patient continues to meet with [facility] provider weekly and is working with [facility] .    Intervention:  Motivational Interviewing: Expressed Empathy/Understanding, Supported Autonomy, Collaboration, Evocation, Permission to raise concern or advise, Open-ended questions, Reflections: simple and complex, Change talk (evoked) and Reframe   Target Behavior(s): Explored thoughts about taking an anti-depressant, Explored and resolved challenges related to taking anti-depressants as prescribed, Explored thoughts and readiness to participate in individual therapy, Explored and resolved challenges to attending appointments as  scheduled, Explored current social supports and reinforced opportunities to increase engagement, Explored current sleep hygeine and patient's perception and knowledge of relationship to mood and Explored patient's perception of how alcohol and / or drugs influences mood    Assessment: (Progress on Goals / Homework):  Patient would benefit from continued coordination in reaching their goals set for the Behavioral Health Home ([facility]) program. SW[facility] reviewed Health Action Plan goals and will continue to monitor progress and work with patient and their care team.      Plan: (Homework, other):  Patient was encouraged to continue to seek condition-related information and education.      Scheduled a Phone follow up appointment with SW  in 2 weeks     Patient has set self-identified goals and will monitor progress until the next appointment on: 04/05/2021.        SARA Irwin [facility]  Behavioral Health [place] ([facility])   [facility]  [phone]  March 26, 2021  9:21 AM                  Next 5 appointments (look out 90 days)    Apr 15, 2021 10:00 AM  SHORT with Suzi Jaime NP  [facility] ([facility] )  Arrive at: Clinic [address] [facility] [address]  [phone]

## 2021-04-05 ENCOUNTER — ALLIED HEALTH/NURSE VISIT (OUTPATIENT)
Dept: BEHAVIORAL HEALTH | Facility: CLINIC | Age: 31
End: 2021-04-05
Payer: COMMERCIAL

## 2021-04-05 DIAGNOSIS — R69 DIAGNOSIS DEFERRED: Primary | ICD-10-CM

## 2021-04-05 NOTE — PROGRESS NOTES
Behavioral Health Home Services  [facility]: [place]        Social Work Care Navigator Note      Patient: Rao Leavitt  Date: April 5, 2021  Preferred Name: Rao    Previous PHQ-9:   PHQ-9 SCORE 2/2/2021 2/11/2021 3/25/2021   PHQ-9 Total Score MyChart - - -   PHQ-9 Total Score 17 10 10   Some encounter information is confidential and restricted. Go to Review Flowsheets activity to see all data.     Previous AAYUSH-7:   AAYUSH-7 SCORE 2/2/2021 2/11/2021 3/25/2021   Total Score - - -   Total Score 20 9 9   Some encounter information is confidential and restricted. Go to Review Flowsheets activity to see all data.     NOMAN LEVEL:  No flowsheet data found.    Preferred Contact:  Need for : No  Preferred Contact: Cell      Type of Contact Today: Phone call (patient / identified key support person reached)      Data: (subjective / Objective):  Recent ED/IP Admission or Discharge?   None    Patient Goals:  Goal Areas: Health;Mental Health;Chemical Health;Financial and Social Service Benefits;Transportation  Patient stated goals: Patient would like assistance with his physical, mental and chemical health for creating a balanced and healthy lifestyle. Patient would like assistance with housing, SSDI and social service assistance to aid with [employer] benefits to increase his financial stability. Patient would like assistance with reliable transportation for his family to get to appointments, run errands and get out into the community.        [facility] Core Service Provided:  Comprehensive Care Management: utilized the electronic medical record / patient registry to identify and support patient's health conditions / needs more effectively   Care Transitions: focused on the coordinated and seamless movement of patient between or within different levels of care or settings  Care Coordination: provided care management services/referrals necessary to ensure patient and their identified supports have access to medical,  behavioral health, pharmacology and recovery support services.  Ensured that patient's care is integrated across all settings and services.   Individual and Family Support: aimed to help clients reduce barriers to achieving goals, increase health literacy and knowledge about chronic condition(s), increase self-efficacy skills, and improve health outcomes  Referral to Community and Social Support Services: Assisted in scheduling an appointment to a referral / service (see plan section)  Coordinated / Confirmed patient's appointment time or referral and transportation plan  Followed-up with patient on whether or not they completed a referral    Current Stressors / Issues / Care Plan Objective Addressed Today:  CC and patient were able to meet today for Behavioral Health Home ([facility]) monthly check-in via telehealth visit. All required ROIs have been filed with HIM/patient chart.    1. Patient reports he is visiting his mother and had his sobriety party on Saturday. Patient reports he is proud of his status. Patient reports he was triggered by his brother showing up intoxicated and visiting with past friends. Patient reports he will be meeting with his [facility] therapist on Wed to process.    2. Patient reports his ex spouse contacted him. Patient reports he does not want to initiate communication with her. [facility] supported patient with support/encouragement and education on healthy relationships/boundaries.    3. Patient reports his mood continues to be up and down. Patient reports sleep continues to be an issue with a night terror last night.    4. Patient will be resuming weekly meetings with [facility] worker after his vacation to his mother's.    5. Patient reports he has his sleep study scheduled for April 14th.  Patient reports he may be going through [facility] for updated neuropsych eval. Patient report he met with his new DBT therapist, Damaris. Patient reports he is hopeful it will be a good fit for  him.        Intervention:  Motivational Interviewing: Expressed Empathy/Understanding, Supported Autonomy, Collaboration, Evocation, Permission to raise concern or advise, Open-ended questions, Reflections: simple and complex, Rolled with resistance: Emphasized patient autonomy, Simple reflection, Shifted topic to defuse resistance, Reframed sustain talk in the direction of change and Evoked patient agenda, Change talk (evoked) and Reframe   Target Behavior(s): Explored thoughts about taking an anti-depressant, Explored and resolved challenges related to taking anti-depressants as prescribed, Explored thoughts and readiness to participate in individual therapy, Explored and resolved challenges to attending appointments as scheduled, Explored current social supports and reinforced opportunities to increase engagement, Explored current sleep hygeine and patient's perception and knowledge of relationship to mood and Explored patient's perception of how alcohol and / or drugs influences mood    Assessment: (Progress on Goals / Homework):  Patient would benefit from continued coordination in reaching their goals set for the Behavioral Health Home ([facility]) program. [facility] reviewed Health Action Plan goals and will continue to monitor progress and work with patient and their care team.      Plan: (Homework, other):  Patient was encouraged to continue to seek condition-related information and education.      Scheduled a Phone follow up appointment with SW  in 2 weeks     Patient has set self-identified goals and will monitor progress until the next appointment on: 04/19/2021.         SARA Irwin [facility]  Behavioral Health Home ([facility])   [facility]  [phone]  April 5, 2021  11:30 AM                  Next 5 appointments (look out 90 days)    Apr 15, 2021 10:00 AM  SHORT with Suzi Jaime NP  [facility] ([facility] )  Arrive at:  [facility] [address] [place] [address] [phone]

## 2021-04-14 ENCOUNTER — TELEPHONE (OUTPATIENT)
Dept: SLEEP MEDICINE | Facility: CLINIC | Age: 31
End: 2021-04-14

## 2021-04-14 NOTE — TELEPHONE ENCOUNTER
Reason for call:  Other   Patient called regarding (reason for call): appointment  Additional comments: Patient has a sleep study tonight 8pm at [facility] but will not be able to make it.  He would like a call back to reschedule the sleep study.  Tried transferring call to sleep clinic but call came back to me so sending a TE instead.    Phone number to reach patient:  Cell number on file:    Telephone Information:   Mobile [phone]       Best Time:  Anytime    Can we leave a detailed message on this number?  YES    Travel screening: Not Applicable

## 2021-04-15 ENCOUNTER — OFFICE VISIT (OUTPATIENT)
Dept: FAMILY MEDICINE | Facility: CLINIC | Age: 31
End: 2021-04-15
Payer: COMMERCIAL

## 2021-04-15 VITALS
HEIGHT: 68 IN | TEMPERATURE: 97.9 F | BODY MASS INDEX: 26.92 KG/M2 | WEIGHT: 177.6 LBS | DIASTOLIC BLOOD PRESSURE: 64 MMHG | SYSTOLIC BLOOD PRESSURE: 108 MMHG | OXYGEN SATURATION: 99 % | HEART RATE: 73 BPM

## 2021-04-15 DIAGNOSIS — F60.3 BORDERLINE PERSONALITY DISORDER (H): ICD-10-CM

## 2021-04-15 DIAGNOSIS — F41.1 GENERALIZED ANXIETY DISORDER: ICD-10-CM

## 2021-04-15 DIAGNOSIS — F43.10 PTSD (POST-TRAUMATIC STRESS DISORDER): ICD-10-CM

## 2021-04-15 PROCEDURE — 99214 OFFICE O/P EST MOD 30 MIN: CPT | Performed by: NURSE PRACTITIONER

## 2021-04-15 RX ORDER — PRAZOSIN HYDROCHLORIDE 1 MG/1
2 CAPSULE ORAL AT BEDTIME
Qty: 60 CAPSULE | Refills: 6 | Status: SHIPPED | OUTPATIENT
Start: 2021-04-15 | End: 2021-06-08 | Stop reason: ALTCHOICE

## 2021-04-15 RX ORDER — PROPRANOLOL HYDROCHLORIDE 40 MG/1
40 TABLET ORAL 2 TIMES DAILY
Qty: 60 TABLET | Refills: 6 | Status: SHIPPED | OUTPATIENT
Start: 2021-04-15 | End: 2021-06-08 | Stop reason: DRUGHIGH

## 2021-04-15 ASSESSMENT — ANXIETY QUESTIONNAIRES
GAD7 TOTAL SCORE: 16
IF YOU CHECKED OFF ANY PROBLEMS ON THIS QUESTIONNAIRE, HOW DIFFICULT HAVE THESE PROBLEMS MADE IT FOR YOU TO DO YOUR WORK, TAKE CARE OF THINGS AT HOME, OR GET ALONG WITH OTHER PEOPLE: VERY DIFFICULT
2. NOT BEING ABLE TO STOP OR CONTROL WORRYING: SEVERAL DAYS
3. WORRYING TOO MUCH ABOUT DIFFERENT THINGS: NEARLY EVERY DAY
1. FEELING NERVOUS, ANXIOUS, OR ON EDGE: NEARLY EVERY DAY
7. FEELING AFRAID AS IF SOMETHING AWFUL MIGHT HAPPEN: MORE THAN HALF THE DAYS
5. BEING SO RESTLESS THAT IT IS HARD TO SIT STILL: MORE THAN HALF THE DAYS
6. BECOMING EASILY ANNOYED OR IRRITABLE: MORE THAN HALF THE DAYS

## 2021-04-15 ASSESSMENT — PATIENT HEALTH QUESTIONNAIRE - PHQ9
SUM OF ALL RESPONSES TO PHQ QUESTIONS 1-9: 16
5. POOR APPETITE OR OVEREATING: NEARLY EVERY DAY

## 2021-04-15 ASSESSMENT — MIFFLIN-ST. JEOR: SCORE: 1727.15

## 2021-04-15 NOTE — PATIENT INSTRUCTIONS
Patient Education     Insomnia  Insomnia is repeated difficulty going to sleep or staying asleep, or both. Whether you have insomnia is not defined by a specific amount of sleep. Different people need different amounts of sleep, and you may need more or less sleep at different times of your life.  There are 3 major types of insomnia: short-term, chronic, and  other.  Short-term, or acute insomnia lasts less than 3 months. The symptoms are temporary and can be linked directly to a stressor, such as the death of a loved one, financial problems, or a new physical problem. Short-term insomnia stops when the stressor resolves or the person adapts to its presence.  Chronic insomnia occurs at least 3 times a week and lasts longer than 3 months. Chronic insomnia can occur when either the cause of the sleeping problem is not clear, or the insomnia does not get better when the stressor is resolved. A number of other criteria are also used to make the diagnosis of chronic insomnia.    Other insomnia  is the third type of insomnia-related sleep disorders. This description applies to people who have problems getting to sleep or staying asleep, but don't meet all of the factors that describe either short-term or chronic insomnia.    Many things cause insomnia. Different people may have different causes. It can be from an underlying medical or psychological condition, or lifestyle. It can also be primary insomnia, which means no cause can be found.  Causes of insomnia include:    Chronic medical problems- heart disease, gastrointestinal problems, hormonal changes, breathing problems    Anxiety    Stress    Depression    Pain    Work schedule    Sleep apnea    Illegal drugs    Certain medicines  Many different medicines can affect your sleep, such as stimulants, caffeine, alcohol, some decongestants, and diet pills. Other medicines may include some types of blood pressure pills, steroids, asthma medicines, antihistamines,  antidepressants, seizure medicines and statins. Not all of these will affect your sleep, and they shouldn t be stopped without talking to your doctor.  Symptoms of insomnia can include:    Lying awake for long periods at night before falling asleep    Waking up several times during the night    Waking up early in the morning and not being able to get back to sleep    Feeling tired and not refreshed by sleep    Not being able to function properly during the day and finding it hard to concentrate    Irritability    Tiredness and fatigue during the day  Home care    Review your medicines with your doctor or pharmacist to find out if they can cause insomnia. Not all medicines will affect your sleep, but they shouldn't be stopped without reviewing them with your doctor. There may be serious side effects and consequences from suddenly stopping your medicines. Not taking them may cause strokes, heart attacks, and many other problems.    Caffeine, smoking, and alcohol also affect sleep. Limit your daily use and don't use these before bedtime. Alcohol may make you sleepy at first, but as its effects wear off, you may awaken a few hours later and have trouble returning to sleep.    Don't exercise, eat or drink large amounts of liquid within 2 hours of your bedtime.    Improve your sleep habits. Have a fixed bed and wake-up time. Try to keep noise, light and heat in your bedroom at a comfortable level. Try using earplugs or eyeshades if needed.     Don't watch TV in bed.    If you don't fall asleep within 30 minutes, try to relax by reading or listening to soft music.    Limit daytime napping to one 30 minute period, early in the day.    Get regular exercise. Find other ways to lessen your stress level.    If a medicine was prescribed to help reset your sleep patterns, take it as directed. Sleeping pills are intended for short-term use, only. If taken for too long, the effect wears off while the risk of physical addiction and  psychological dependence increases.  Sleep diary  If the cause isn t obvious and it is not improving, try keeping a  sleep diary  for a couple of weeks. Include in it:    The time you go to bed    How long it takes to fall asleep    How many times you wake up    What time you wake up    Your meal times and what you eat    What time you drink alcohol and caffeine    Your exercise habits and times  Follow-up care  Follow up with your healthcare provider, or as advised. If an X-ray or CT scan was done, you will be notified if there is a change in the reading, especially if it affects treatment.  Call 911  Call 911 if any of these occur:    Trouble breathing    Confusion or trouble waking    Fainting or loss of consciousness    Rapid heart rate    New chest, arm, shoulder, neck or upper back pain    Trouble with speech or vision, weakness of an arm or leg    Trouble walking or talking, loss of balance, numbness or weakness in one side of your body, facial droop  When to seek medical advice  Call your healthcare provider right away if any of these occur:    Extreme restlessness or irritability    Confusion or hallucinations (seeing or hearing things that are not there)    Anxiety, depression    Several days without sleeping  [employer] last reviewed this educational content on 5/1/2018 2000-2021 [employer]. All rights reserved. This information is not intended as a substitute for professional medical care. Always follow your healthcare professional's instructions.

## 2021-04-15 NOTE — PROGRESS NOTES
Assessment & Plan     Borderline personality disorder (H)  Continue medications as prescribed.  Follow up with Psychiatry as planned.    - propranolol (INDERAL) 40 MG tablet  Dispense: 60 tablet; Refill: 6    PTSD (post-traumatic stress disorder)     - prazosin (MINIPRESS) 1 MG capsule  Dispense: 60 capsule; Refill: 6    Generalized anxiety disorder     - propranolol (INDERAL) 40 MG tablet  Dispense: 60 tablet; Refill: 6          Depression Screening Follow Up    PHQ 4/15/2021   PHQ-9 Total Score 16   Q9: Thoughts of better off dead/self-harm past 2 weeks Not at all     Last PHQ-9 4/15/2021   1.  Little interest or pleasure in doing things 3   2.  Feeling down, depressed, or hopeless 1   3.  Trouble falling or staying asleep, or sleeping too much 2   4.  Feeling tired or having little energy 2   5.  Poor appetite or overeating 2   6.  Feeling bad about yourself 1   7.  Trouble concentrating 3   8.  Moving slowly or restless 2   Q9: Thoughts of better off dead/self-harm past 2 weeks 0   PHQ-9 Total Score 16   Difficulty at work, home, or with people Very difficult       Follow Up Actions Taken  Crisis resource information provided in After Visit Summary  Mental Health Referral placed  Referred patient back to current mental health provider.     See Patient Instructions    Return in about 3 months (around 7/15/2021) for Medication Follow up, Recheck symptoms.    Suzi Jaime NP  [facility] MARTIN Yeh is a 31 year old who presents for the following health issues     HPI     Anxiety Follow-Up    How are you doing with your anxiety since your last visit? Some days are good and others are not. Depends on what he is dealing with on a regular basis.     Are you having other symptoms that might be associated with anxiety? Yes:  panic attacks, lots of energy somedays and others he is run down     Have you had a significant life event? OTHER: he is not on good terms with his  "mother.  Cut her out of his life due to her wanting to still drink and him trying to stay sober.      Are you feeling depressed? Yes:  off and on     Do you have any concerns with your use of alcohol or other drugs? Yes:  alcohol. it comes in spurts. Pt is still sober; he gets cravings.     Was advised to complete sleep study - had abnormal EEG.  Scheduled for May 5th.  Reports less nightmares with use of Prazosin 2 mg.  Taking Propranolol twice daily for anxiety.  Lithium for mood regulation. Along with Topamax.  Undergoing random UA for drug use and alcohol - he reports sobriety.    Currently in talk therapy with Leslye - reports \"they think I may have Schizophrenia\"    He has an [facility] worker, Behavioral Health Home Care services, , individual counseling    Seeing Neurologist - prescribing Topamax - awaiting sleep study.    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  Little interest or pleasure in doing things 1   2.  Feeling down, depressed, or hopeless 1   3.  Trouble falling or staying asleep, or sleeping too much 3   4.  Feeling tired or having little energy 2   5.  Poor appetite or overeating 0   6.  Feeling bad about yourself 0   7.  Trouble concentrating 2   8.  Moving slowly or restless " "1   Q9: Thoughts of better off dead/self-harm past 2 weeks 0   PHQ-9 Total Score 10   Difficulty at work, home, or with people Very difficult     AAYUSH-7  3/25/2021   1. Feeling nervous, anxious, or on edge 3   2. Not being able to stop or control worrying 1   3. Worrying too much about different things 1   4. Trouble relaxing 1   5. Being so restless that it is hard to sit still 1   6. Becoming easily annoyed or irritable 1   7. Feeling afraid, as if something awful might happen 1   AAYUSH-7 Total Score 9   If you checked any problems, how difficult have they made it for you to do your work, take care of things at home, or get along with other people? Extremely difficult         How many servings of fruits and vegetables do you eat daily?  2-3    On average, how many sweetened beverages do you drink each day (Examples: soda, juice, sweet tea, etc.  Do NOT count diet or artificially sweetened beverages)?   0    How many days per week do you exercise enough to make your heart beat faster? 7    How many minutes a day do you exercise enough to make your heart beat faster? 30 - 60    How many days per week do you miss taking your medication? 0   }    Review of Systems   Constitutional, HEENT, cardiovascular, pulmonary, GI, , musculoskeletal, neuro, skin, endocrine and psych systems are negative, except as otherwise noted.      Objective    /64   Pulse 73   Temp 97.9  F (36.6  C) (Tympanic)   Ht 1.715 m (5' 7.5\")   Wt 80.6 kg (177 lb 9.6 oz)   SpO2 99%   BMI 27.41 kg/m    Body mass index is 27.41 kg/m .  Physical Exam   GENERAL: healthy, alert and no distress  RESP: lungs clear to auscultation - no rales, rhonchi or wheezes  CV: regular rate and rhythm, normal S1 S2, no S3 or S4, no murmur, click or rub, no peripheral edema and peripheral pulses strong  ABDOMEN: soft, nontender, no hepatosplenomegaly, no masses and bowel sounds normal  MS: no gross musculoskeletal defects noted, no edema  PSYCH: mentation " appears normal, affect normal/bright, anxious, fatigued and judgement and insight intact     OUTPATIENT SLEEP-DEPRIVED EEG REPORT.  DATE OF RECORDING: January 28, 2021.   IMPRESSION: This outpatient sleep-deprived EEG study is abnormal during wakefulness and drowsiness due to EEG changes that resemble generalized paroxysmal fast activity, that could be potentially epileptiform and seen in generalized epilepsies.  No electrographic or clinical seizures and no paroxysmal behavioral events are recorded during this study       Total times spent with patient 30 minutes of which > 50% of the time was spent counseling and coordination of care discussion of above mental health complaints, disability, medication review, review of labs and testing, follow up and treatment plan.

## 2021-04-16 ASSESSMENT — ANXIETY QUESTIONNAIRES: GAD7 TOTAL SCORE: 16

## 2021-04-19 ENCOUNTER — ALLIED HEALTH/NURSE VISIT (OUTPATIENT)
Dept: BEHAVIORAL HEALTH | Facility: CLINIC | Age: 31
End: 2021-04-19
Payer: COMMERCIAL

## 2021-04-19 DIAGNOSIS — R69 DIAGNOSIS DEFERRED: Primary | ICD-10-CM

## 2021-04-21 NOTE — PROGRESS NOTES
Behavioral Health Home Services  [facility]: [place]        Social Work Care Navigator Note      Patient: Rao Leavitt  Date: April 20, 2021  Preferred Name: Rao    Previous PHQ-9:   PHQ-9 SCORE 2/11/2021 3/25/2021 4/15/2021   PHQ-9 Total Score MyChart - - -   PHQ-9 Total Score 10 10 16   Some encounter information is confidential and restricted. Go to Review Flowsheets activity to see all data.     Previous AAYUSH-7:   AAYUSH-7 SCORE 2/11/2021 3/25/2021 4/15/2021   Total Score - - -   Total Score 9 9 16   Some encounter information is confidential and restricted. Go to Review Flowsheets activity to see all data.     NOMAN LEVEL:  No flowsheet data found.    Preferred Contact:  Need for : No  Preferred Contact: Cell      Type of Contact Today: Phone call (patient / identified key support person reached)      Data: (subjective / Objective):  Recent ED/IP Admission or Discharge?   None    Patient Goals:  Goal Areas: Health;Mental Health;Chemical Health;Financial and Social Service Benefits;Transportation  Patient stated goals: Patient would like assistance with his physical, mental and chemical health for creating a balanced and healthy lifestyle. Patient would like assistance with housing, SSDI and social service assistance to aid with [employer] benefits to increase his financial stability. Patient would like assistance with reliable transportation for his family to get to appointments, run errands and get out into the community.        [facility] Core Service Provided:  Comprehensive Care Management: utilized the electronic medical record / patient registry to identify and support patient's health conditions / needs more effectively   Care Transitions: focused on the coordinated and seamless movement of patient between or within different levels of care or settings  Care Coordination: provided care management services/referrals necessary to ensure patient and their identified supports have access to medical,  behavioral health, pharmacology and recovery support services.  Ensured that patient's care is integrated across all settings and services.   Individual and Family Support: aimed to help clients reduce barriers to achieving goals, increase health literacy and knowledge about chronic condition(s), increase self-efficacy skills, and improve health outcomes  Referral to Community and Social Support Services: Provided patient with referrals (see plan section)  Assisted in scheduling an appointment to a referral / service (see plan section)  Coordinated / Confirmed patient's appointment time or referral and transportation plan  Followed-up with patient on whether or not they completed a referral    Current Stressors / Issues / Care Plan Objective Addressed Today:  [facility] and patient were able to meet today for Behavioral Health Home ([facility]) monthly check-in via telehealth visit. All required ROIs have been filed with HIM/patient chart.    1. Patient reports he is visit with his mother did not end well. Patient reports she does not support his sobriety. Patient reports he was able to maintain his sobriety while visiting but felt his mother and brother were triggering for him. Patient reports his dad continues to be supportive of his sobriety. Patient reports he continues to be proud of his sober status (6-months).  Patient reports he was able to message his [facility] counselor and met with her as soon as he got back into town. [facility] provided support/empathy and coping skills today.    Patient reports he has not smoked in the past three months and continues to not use tobacco.     2. Patient reports his relationship with his s.o. continues to be just ok. Patient reports s.o. recently got a promotion at work and this has made it difficult for him to get to his appts. [facility] reminded patient that he has access to [facility] through his medical insurance for medical and mental health appts.     Patient reports he struggles with s.o.'s mother  and has a waylon relationship with her. [facility] provided support/empathy and discussed healthy relationships/boundaries today.     3. Patient reports his mood continues to be up and down but does report sleep has improved a bit with lessening of nightmares.     4. Patient has resumed his weekly meetings with [employer] worker after his vacation to his mother's. Patient reports his [employer] worker was with him today working on his unemployment appeal. Patient and RILEY continue to work on SSDI claim.     5. Patient reports he has his sleep study scheduled for May 5th.  Patient reports he may be going through [facility] for updated neuropsych eval. Patient reports he does not fit with his new DBT therapist, Damaris. [facility] encouraged patient to investigate group DBT, as patient may get more out of this. Patient reports agreement with this and will look into this with [facility].     Intervention:  Motivational Interviewing: Expressed Empathy/Understanding, Supported Autonomy, Collaboration, Evocation, Permission to raise concern or advise, Open-ended questions, Reflections: simple and complex, Change talk (evoked) and Reframe   Target Behavior(s): Explored thoughts about taking an anti-depressant, Explored and resolved challenges related to taking anti-depressants as prescribed, Explored thoughts and readiness to participate in individual therapy, Explored and resolved challenges to attending appointments as scheduled, Explored current social supports and reinforced opportunities to increase engagement, Explored current sleep hygeine and patient's perception and knowledge of relationship to mood and Explored patient's perception of how alcohol and / or drugs influences mood    Assessment: (Progress on Goals / Homework):  Patient would benefit from continued coordination in reaching their goals set for the Behavioral Health Home ([facility]) program. [facility] reviewed Health Action Plan goals and will continue to monitor progress and work  with patient and their care team.      Plan: (Homework, other):  Patient was encouraged to continue to seek condition-related information and education.      Scheduled a Phone follow up appointment with ANGI EDUARDO in 3 weeks     Patient has set self-identified goals and will monitor progress until the next appointment on: 05/10/2021.        SARA Irwin [facility]  Behavioral Health Home ([facility])   [facility]  [phone]

## 2021-04-22 ENCOUNTER — ALLIED HEALTH/NURSE VISIT (OUTPATIENT)
Dept: BEHAVIORAL HEALTH | Facility: CLINIC | Age: 31
End: 2021-04-22
Payer: COMMERCIAL

## 2021-04-22 ENCOUNTER — TELEPHONE (OUTPATIENT)
Dept: PSYCHOLOGY | Facility: CLINIC | Age: 31
End: 2021-04-22

## 2021-04-22 DIAGNOSIS — R69 DIAGNOSIS DEFERRED: Primary | ICD-10-CM

## 2021-04-22 NOTE — PROGRESS NOTES
Behavioral Health Home Services  [facility]: [place]        Social Work Care Navigator Note      Patient: Rao Leavitt  Date: April 22, 2021  Preferred Name: Rao    Previous PHQ-9:   PHQ-9 SCORE 2/11/2021 3/25/2021 4/15/2021   PHQ-9 Total Score MyChart - - -   PHQ-9 Total Score 10 10 16   Some encounter information is confidential and restricted. Go to Review Flowsheets activity to see all data.     Previous AAYUSH-7:   AAYUSH-7 SCORE 2/11/2021 3/25/2021 4/15/2021   Total Score - - -   Total Score 9 9 16   Some encounter information is confidential and restricted. Go to Review Flowsheets activity to see all data.     NOMAN LEVEL:  No flowsheet data found.    Preferred Contact:  Need for : No  Preferred Contact: Cell      Type of Contact Today: Phone call (patient / identified key support person reached)      Data: (subjective / Objective):  Recent ED/IP Admission or Discharge?   None    Patient Goals:  Goal Areas: Health;Mental Health;Chemical Health;Financial and Social Service Benefits;Transportation  Patient stated goals: Patient would like assistance with his physical, mental and chemical health for creating a balanced and healthy lifestyle. Patient would like assistance with housing, SSDI and social service assistance to aid with [employer] benefits to increase his financial stability. Patient would like assistance with reliable transportation for his family to get to appointments, run errands and get out into the community.        [facility] Core Service Provided:  Comprehensive Care Management: utilized the electronic medical record / patient registry to identify and support patient's health conditions / needs more effectively   Care Transitions: focused on the coordinated and seamless movement of patient between or within different levels of care or settings  Care Coordination: provided care management services/referrals necessary to ensure patient and their identified supports have access to medical,  "behavioral health, pharmacology and recovery support services.  Ensured that patient's care is integrated across all settings and services.   Individual and Family Support: aimed to help clients reduce barriers to achieving goals, increase health literacy and knowledge about chronic condition(s), increase self-efficacy skills, and improve health outcomes  Referral to Community and Social Support Services: Provided patient with referrals (see plan section)  Assisted in scheduling an appointment to a referral / service (see plan section)  Coordinated / Confirmed patient's appointment time or referral and transportation plan  Followed-up with patient on whether or not they completed a referral    Current Stressors / Issues / Care Plan Objective Addressed Today:  [facility] and patient were able to meet today for Behavioral Health Home ([facility]) monthly check-in via telehealth visit. All required ROIs have been filed with HIM/patient chart.    Patient called today to discuss that he is having difficulty connecting with his long term therapist, as he does report benefit from meeting with provider. Patient reports he is now working part-time for [employer] and works later at night. Patient reports morning appts. \"just don't work at this time.\" Patient requesting [facility] to assist with getting patient's appt moved and changed to afternoon appt. [facility] was able to set up appt with provider in May for a later afternoon appt. Patient reports appreciation for [facility]'s help today. [facility] provided patient with support/empathy, resources, care coordination and motivational interviewing today.    Intervention:  Motivational Interviewing: Expressed Empathy/Understanding, Supported Autonomy, Collaboration, Evocation, Permission to raise concern or advise, Open-ended questions and Reflections: simple and complex   Target Behavior(s): Explored and resolved challenges to attending appointments as scheduled and Explored current social supports and " reinforced opportunities to increase engagement    Assessment: (Progress on Goals / Homework):  Patient would benefit from continued coordination in reaching their goals set for the Behavioral Health Home ([facility]) program. [facility] reviewed Health Action Plan goals and will continue to monitor progress and work with patient and their care team.      Plan: (Homework, other):  Patient was encouraged to continue to seek condition-related information and education.      Scheduled a Phone follow up appointment with [facility] in 2 weeks     Patient has set self-identified goals and will monitor progress until the next appointment on: 05/10/2021.        SARA Irwin [facility]  Behavioral Health Home ([facility])   [facility]  [phone]  April 22, 2021  2:55 PM

## 2021-04-25 ENCOUNTER — HEALTH MAINTENANCE LETTER (OUTPATIENT)
Age: 31
End: 2021-04-25

## 2021-04-26 ENCOUNTER — VIRTUAL VISIT (OUTPATIENT)
Dept: PSYCHIATRY | Facility: CLINIC | Age: 31
End: 2021-04-26
Payer: COMMERCIAL

## 2021-04-26 DIAGNOSIS — F31.9 BIPOLAR 1 DISORDER (H): ICD-10-CM

## 2021-04-26 PROCEDURE — 99213 OFFICE O/P EST LOW 20 MIN: CPT | Mod: 95 | Performed by: NURSE PRACTITIONER

## 2021-04-26 RX ORDER — OLANZAPINE 5 MG/1
5 TABLET ORAL AT BEDTIME
Qty: 30 TABLET | Refills: 1 | Status: SHIPPED | OUTPATIENT
Start: 2021-04-26 | End: 2021-06-08

## 2021-04-26 ASSESSMENT — ANXIETY QUESTIONNAIRES
2. NOT BEING ABLE TO STOP OR CONTROL WORRYING: SEVERAL DAYS
3. WORRYING TOO MUCH ABOUT DIFFERENT THINGS: SEVERAL DAYS
GAD7 TOTAL SCORE: 9
6. BECOMING EASILY ANNOYED OR IRRITABLE: NEARLY EVERY DAY
IF YOU CHECKED OFF ANY PROBLEMS ON THIS QUESTIONNAIRE, HOW DIFFICULT HAVE THESE PROBLEMS MADE IT FOR YOU TO DO YOUR WORK, TAKE CARE OF THINGS AT HOME, OR GET ALONG WITH OTHER PEOPLE: SOMEWHAT DIFFICULT
7. FEELING AFRAID AS IF SOMETHING AWFUL MIGHT HAPPEN: SEVERAL DAYS
1. FEELING NERVOUS, ANXIOUS, OR ON EDGE: SEVERAL DAYS
5. BEING SO RESTLESS THAT IT IS HARD TO SIT STILL: SEVERAL DAYS

## 2021-04-26 ASSESSMENT — PATIENT HEALTH QUESTIONNAIRE - PHQ9
5. POOR APPETITE OR OVEREATING: SEVERAL DAYS
SUM OF ALL RESPONSES TO PHQ QUESTIONS 1-9: 14

## 2021-04-26 NOTE — PROGRESS NOTES
"How would you like to obtain your AVS? MyChart  If the video visit is dropped, the invitation should be resent by: Text to cell phone: [phone]  Will anyone else be joining your video visit? No      Location of patient:  Home   [address]   [place] [address]  Any new OTC medications: No  Recent height or weight: Yes 67.5\", 177#9.6 oz from 4-15-21.  He has been gaining weight since quitting smoking.  He has been trying to change his diet and eating more protein and eating more healthy.  Trying to cut out the sugar such as with candy and chocolate.  Trying to eat less fast food.  Recent BP/HR: Yes 108/64, 73.  From 4- visit.  Alcohol use:  No current use. If yes, how many drinks per week:    Current other substance use (including Vaping):  Chewing tobacco.  Did quit smoking in 2021.  Any updates with mental health (hospital stay, ER, etc) that Bree should know about: He is scheduled for a sleep study on 2021 and an overnight facility.  Taking medications every day: No  If female: Birth control: N/A  What is the most important thing you would like addressed today: He is still having mental health issues with being up and down.  He feels this is still an issue.  Wanting to get that sleep study done to see what it shows.          Outpatient Psychiatric Progress Note    Name: Rao Leavitt   : 1990                    Primary Care Provider: Suzi Jaime NP   Therapist: Through [employer]    PHQ-9 scores:  PHQ-9 SCORE 3/25/2021 4/15/2021 2021   PHQ-9 Total Score MyChart - - -   PHQ-9 Total Score 10 16 14   Some encounter information is confidential and restricted. Go to Review Flowsheets activity to see all data.       AAYUSH-7 scores:  AAYUSH-7 SCORE 3/25/2021 4/15/2021 2021   Total Score - - -   Total Score 9 16 9   Some encounter information is confidential and restricted. Go to Review Flowsheets activity to see all data.       Patient " "Identification:    Patient is a 31 year old year old, partnered / significant other  White American male  who presents for return visit with me.  Patient is currently unemployed. Patient attended the session alone. Patient prefers to be called: \" Lucio\".    Interim History:    I last saw Rao Leavitt for outpatient psychiatry Return Visit on March 25, 2021.     During that appointment, he reported feeling sleep deprived.  A sleep study has been scheduled for April 14.  Today Philipp expressed disappointment that the psychological test results were deemed invalid.  He insisted that he was being \"myself\" throughout the testing.  He would like it repeated.  Philipp also is concerned with how he has been seeing \"shadows\" that get worse when he experiences night terrors.  I added olanzapine 5 mg at bedtime to help with this.  His neurologist increased his dose of Topamax and she will be managing that from here on out.  Philipp will continue the lithium at 600 mg twice a day, propranolol 40 mg twice a day, prazosin 2 mg at bedtime, and all therapy sessions he currently is in.  He continues probation with random drug screens.  Philipp is also working with behavioral health home care services to access community support.. .     Current medications include: Apple Ralph Vn-Grn Tea-Bit Or-Cr (APPLE CIDER VINEGAR PLUS) TABS,   lithium (ESKALITH) 600 MG capsule, Take 1 capsule (600 mg) by mouth 2 times daily (with meals)  multivitamin, therapeutic (THERA-VIT) TABS tablet, Take 1 tablet by mouth daily  OLANZapine (ZYPREXA) 5 MG tablet, Take 1 tablet (5 mg) by mouth At Bedtime  prazosin (MINIPRESS) 1 MG capsule, Take 2 capsules (2 mg) by mouth At Bedtime  propranolol (INDERAL) 40 MG tablet, Take 1 tablet (40 mg) by mouth 2 times daily  topiramate (TOPAMAX) 50 MG tablet, 50mg every morning and 100mg every afternoon.  vitamin B-Complex, Take 1 tablet by mouth daily    No current facility-administered medications on file prior to visit.    " "    The [place] Prescription Monitoring Program has been reviewed and there are no concerns about diversionary activity for controlled substances at this time.      I was able to review most recent Primary Care Provider, specialty provider, and therapy visit notes that I have access to.     Today, patient reports that he is still up and down.  He is sleeping harder.  The nightmares are less intense.  He has been irritable.  He has a hard time trusting therapists.  He would like to take the psychological testing again as he felt like the previous testing was done.  He feels like the olanzapine is working better at keeping his mood more stable.  At this point he desires no medication changes.  He continues to feel like something is \"wrong\" in his head and he wants to keep pursuing various testings to clarify his diagnosis.     has a past medical history of Depressive disorder.    Social history updates:    Paper work needs completion for unemployment.  He is in DBT therapy.      Substance use updates:    He reports being 6 months clean from drugs  Tobacco use: Yes Chewing Tobacco  Ready to quit?  No  Nicotine Replacement Therapy tried: None    Vital Signs:   There were no vitals taken for this visit.    Labs:    Most recent laboratory results reviewed and no new labs.     Review of Systems:  10 systems (general, cardiovascular, respiratory, eyes, ENT, endocrine, GI, , M/S, neurological) were reviewed. Most pertinent finding(s) is/are: He reports no chest pain, no shortness of breath, no skin rashes, no tremors observed. The remaining systems are all unremarkable.    Mental Status Examination:  Appearance:  awake, alert and casually dressed  Attitude:  cooperative   Eye Contact:  adequate  Gait and Station: No assistive Devices used and No dizziness or falls  Psychomotor Behavior:  intact station, gait and muscle tone  Oriented to:  time, person, and place  Attention Span and Concentration:  Normal  Speech:   " clear, coherent and Speaks: English  Mood:  anxious and depressed  Affect:  mood congruent  Associations:  no loose associations  Thought Process:  linear and goal oriented  Thought Content:  no evidence of suicidal ideation or homicidal ideation, no auditory hallucinations present and no visual hallucinations present  Recent and Remote Memory:  fair Not formally assessed. No amnesia.  Fund of Knowledge: appropriate  Insight:  fair  Judgment:  fair  Impulse Control:  fair    Suicide Risk Assessment:  Today Rao Leavitt reports that he is having no thoughts to want to end his life or to harm other people. In addition, there are notable risk factors for self-harm, including anxiety, substance abuse, comorbid medical condition of Seizure diagnosis and mood change. However, risk is mitigated by commitment to family, history of seeking help when needed, future oriented, denies suicidal intent or plan and denies homicidal ideation, intent, or plan. Therefore, based on all available evidence including the factors cited above, Rao Leavitt does not appear to be at imminent risk for self-harm, does not meet criteria for a 72-hr hold, and therefore remains appropriate for ongoing outpatient level of care.  A thorough assessment of risk factors related to suicide and self-harm have been reviewed and are noted above. The patient convincingly denies suicidality on several occasions. Local community safety resources printed and reviewed for patient to use if needed. There was no deceit detected, and the patient presented in a manner that was believable.     DSM5 Diagnosis:  296.51 Bipolar I Disorder Current or Most Recent Episode Depressed, Mild  300.02 (F41.1) Generalized Anxiety Disorder  780.52 (G47.00) Insomnia Disorder   With non-sleep disorder mental comorbidity  Recurrent      Medical comorbidities include:   Patient Active Problem List    Diagnosis Date Noted     History of traumatic brain injury  "03/02/2021     Priority: Medium     Abnormal EEG 03/02/2021     Priority: Medium      OUTPATIENT SLEEP-DEPRIVED EEG REPORT.  DATE OF RECORDING: January 28, 2021.   IMPRESSION: This outpatient sleep-deprived EEG study is abnormal during wakefulness and drowsiness due to EEG changes that resemble generalized paroxysmal fast activity, that could be potentially epileptiform and seen in generalized epilepsies.  No electrographic or clinical seizures and no paroxysmal behavioral events are recorded during this study.       Attention-deficit hyperactivity disorder 03/01/2021     Priority: Medium     PTSD (post-traumatic stress disorder) 01/05/2021     Priority: Medium     Borderline personality disorder (H) 01/05/2021     Priority: Medium     Chemical dependency (H) 09/10/2020     Priority: Medium     Lithium use 05/15/2020     Priority: Medium     Lumbar spine tumor 11/27/2019     Priority: Medium     High risk medication use 10/05/2017     Priority: Medium     Bipolar 1 disorder (H) 03/16/2017     Priority: Medium     Generalized anxiety disorder 03/16/2017     Priority: Medium       Assessment:    Rao Leavitt remains concerned about his ups and downs.  He tells me that the medications are working just fine and now he has to work on his \"mental health\" with his therapist.  A sleep study is pending in May.  Once that is done, he would like to repeat psychological testing as he felt misunderstood with regards to his need to receive an accurate diagnosis rather than trying to report things just to be taking Social Security money.  He continues to have night terrors but did not want to increase the prazosin at this time.  The olanzapine is helping him sleep a little better.  Lithium will remain as is at 600 mg twice daily.  He will continue the propranolol 40 mg twice daily for anxiety.  The Topamax is now being prescribed by his neurologist for newly diagnosed seizure activity..    Medication side effects and " alternatives were reviewed. Health promotion activities recommended and reviewed today. All questions addressed. Education and counseling completed regarding risks and benefits of medications and psychotherapy options.    Treatment Plan:        1.  Continue olanzapine 5 mg at bedtime    2.  Continue lithium 600 mg twice daily    3.  Continue propranolol 40 mg twice daily    5.  Continue prazosin 2 mg at bedtime    6.  Topamax being prescribed by neurologist    7.  Continue all therapies through [facility]    8.  Continue behavior health home care services      Continue all other medications as reviewed per electronic medical record today.     Safety plan reviewed. To the Emergency Department as needed or call after hours crisis line at [phone] or [phone]. [place] Crisis Text Line. Text [place] to 325931 or Suicide LifeLine Chat: Cytoguide.org/chat/    To schedule individual or family therapy, call [facility] at [phone].    Schedule an appointment with me in 6 weeks or sooner as needed. Call [facility] at [phone] to schedule.    Follow up with primary care provider as planned or for acute medical concerns.    Call the psychiatric nurse line with medication questions or concerns at [phone].    [place] [employer] may be used to communicate with your provider, but this is not intended to be used for emergencies.    Crisis Resources:    National Suicide Prevention Lifeline: 853.722.3943 (TTY: 532.451.5162). Call anytime for help.  (www.suicidepreventionlifeline.org)  National [employer] on Mental Illness (www.nadege.org): 664.748.6598 or 481-282-2368.   Mental Health Association (www.mentalhealth.org): 184.654.6674 or 215-034-3427.  [place] Crisis Text Line: Text [place] to 824865  Suicide LifeLine Chat: Cytoguide.org/chat    Administrative Billing:   Time spent with patient was 30 minutes and greater than 50% of time or 20 minutes was spent in  counseling and coordination of care regarding above diagnoses and treatment plan.    Patient Status:  Patient will continue to be seen for ongoing consultation and stabilization.    Signed:   DUYEN Carrington-BC   Psychiatry

## 2021-04-27 ASSESSMENT — ANXIETY QUESTIONNAIRES: GAD7 TOTAL SCORE: 9

## 2021-05-10 ENCOUNTER — ALLIED HEALTH/NURSE VISIT (OUTPATIENT)
Dept: BEHAVIORAL HEALTH | Facility: CLINIC | Age: 31
End: 2021-05-10
Payer: COMMERCIAL

## 2021-05-10 DIAGNOSIS — R69 DIAGNOSIS DEFERRED: Primary | ICD-10-CM

## 2021-05-10 NOTE — PROGRESS NOTES
Behavioral Health Home Services  [facility]: [place]        Social Work Care Navigator Note      Patient: Rao Leavitt  Date: May 10, 2021  Preferred Name: Rao    Previous PHQ-9:   PHQ-9 SCORE 3/25/2021 4/15/2021 4/26/2021   PHQ-9 Total Score MyChart - - -   PHQ-9 Total Score 10 16 14   Some encounter information is confidential and restricted. Go to Review Flowsheets activity to see all data.     Previous AAYUSH-7:   AAYUSH-7 SCORE 3/25/2021 4/15/2021 4/26/2021   Total Score - - -   Total Score 9 16 9   Some encounter information is confidential and restricted. Go to Review Flowsheets activity to see all data.     NOMAN LEVEL:  No flowsheet data found.    Preferred Contact:  Need for : No  Preferred Contact: Cell      Type of Contact Today: Phone call (patient / identified key support person reached)      Data: (subjective / Objective):  Recent ED/IP Admission or Discharge?   None    Patient Goals:  Goal Areas: Health;Mental Health;Chemical Health;Financial and Social Service Benefits;Transportation  Patient stated goals: Patient would like assistance with his physical, mental and chemical health for creating a balanced and healthy lifestyle. Patient would like assistance with housing, SSDI and social service assistance to aid with [employer] benefits to increase his financial stability. Patient would like assistance with reliable transportation for his family to get to appointments, run errands and get out into the community.        [facility] Service Provided:  Comprehensive Care Management: utilized the electronic medical record / patient registry to identify and support patient's health conditions / needs more effectively   Care Transitions: focused on the coordinated and seamless movement of patient between or within different levels of care or settings  Care Coordination: provided care management services/referrals necessary to ensure patient and their identified supports have access to medical,  behavioral health, pharmacology and recovery support services.  Ensured that patient's care is integrated across all settings and services.   Individual and Family Support: aimed to help clients reduce barriers to achieving goals, increase health literacy and knowledge about chronic condition(s), increase self-efficacy skills, and improve health outcomes  Referral to Community and Social Support Services: Provided patient with referrals (see plan section)  Assisted in scheduling an appointment to a referral / service (see plan section)  Coordinated / Confirmed patient's appointment time or referral and transportation plan  Followed-up with patient on whether or not they completed a referral    Current Stressors / Issues / Care Plan Objective Addressed Today:  [facility] and patient were able to meet today for Behavioral Health Home ([facility]) monthly check-in via telehealth visit. All required ROIs have been filed with HIM/patient chart.    1. Patient reports he is maintaining his sobriety and meeting with his [facility] counselor twice a week. Patient reports he continues abstinence with tobacco and marijuana. Patient does report increased triggers/cravings for alcohol.  Patient reports he is starting group DBT on Tues & Wed one week and Wed & Thursday every other week. Patient continues to meet with outpatient therapist for additional support twice a month and with [facility] services once a week.  [facility] provided support/empathy and coping skills today.          2. Patient reports his relationship with his s.o. over the past two weeks has been tumultuous. Patient reports s.o. is texting someone she used to have a relationship with and he is hurt and upset by this. Patient reports they have been arguing. Patient reports his s.o. has agreed to go to couples counseling. Patient reports she has promised this in the past and has not followed through. [facility] offered support/empathy, coping skills, resources,and discussed healthy  relationships/boundaries today.     3. Patient reports his mood continues to be up and down with emotional stressors, but continues to report sleep has improved a bit with lessening of nightmares. Patient reports no panic attacks in the past two weeks. Patient reports he is not always taking his medications as directed and needs to be more mindful about taking medications on a schedule. [facility] and patient discussed setting alarms on his phone to help him remember to take his medications on time. Patient reports agreement with this.  Patient reports to cope with stressors and mental health symptoms he has been go for walks, listening to music and using self soothing techniques.      4. 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 Patient reports he and his s.o. owe his last apartment building money and have started to pay this back with a payment program, causing an additional financial stressor. Patient has mixed emotions about working with his mental health and addiction concerns and reports he can be on edge. Patient and [facility] continue to work on SSDI claim.     5. Patient reports he has re-scheduled his sleep study for the beginning of June.  Patient reports he may be going through [facility] for updated neuropsych eval. [facility] to follow-up with patient about this at next appt.    Intervention:  Motivational Interviewing: Expressed Empathy/Understanding, Supported Autonomy, Collaboration, Evocation, Permission to raise concern or advise, Change talk (evoked) and Reframe   Target Behavior(s): Explored thoughts about taking an anti-depressant, Explored and resolved challenges related to taking anti-depressants as prescribed, Explored thoughts and readiness to participate in individual therapy, Explored and resolved challenges to attending appointments as scheduled, Explored patient's knowledge of the impact of exercise on  mood, Explored current exercise activities and thoughts about how this influences mood, Explored current social supports and reinforced opportunities to increase engagement, Explored current sleep hygeine and patient's perception and knowledge of relationship to mood and Explored patient's perception of how alcohol and / or drugs influences mood    Assessment: (Progress on Goals / Homework):  Patient would benefit from continued coordination in reaching their goals set for the Behavioral Health Home ([facility]) program. CC reviewed Health Action Plan goals and will continue to monitor progress and work with patient and their care team.      Plan: (Homework, other):  Patient was encouraged to continue to seek condition-related information and education.      Scheduled a Phone follow up appointment with SW  in 2 weeks     Patient has set self-identified goals and will monitor progress until the next appointment on: 05/24/2021.         SARA Irwin [facility]  Behavioral Health Home ([facility])   [facility]  [phone]

## 2021-05-17 DIAGNOSIS — F31.9 BIPOLAR 1 DISORDER (H): ICD-10-CM

## 2021-05-17 RX ORDER — TOPIRAMATE 50 MG/1
TABLET, FILM COATED ORAL
Qty: 90 TABLET | Refills: 2 | Status: SHIPPED | OUTPATIENT
Start: 2021-05-17 | End: 2021-06-08 | Stop reason: DRUGHIGH

## 2021-05-17 NOTE — TELEPHONE ENCOUNTER
Requested Prescriptions   Pending Prescriptions Disp Refills     topiramate (TOPAMAX) 50 MG tablet 90 tablet 1     Simg every morning and 100mg every afternoon.       There is no refill protocol information for this order        Last office visit: 3/9/2021 with prescribing provider:  Dr. Miranda   Future Office Visit:          [facility]  Specialty Clinic CSS

## 2021-05-24 ENCOUNTER — ALLIED HEALTH/NURSE VISIT (OUTPATIENT)
Dept: BEHAVIORAL HEALTH | Facility: CLINIC | Age: 31
End: 2021-05-24
Payer: COMMERCIAL

## 2021-05-24 DIAGNOSIS — R69 DIAGNOSIS DEFERRED: Primary | ICD-10-CM

## 2021-05-26 NOTE — PROGRESS NOTES
Behavioral Health Home Services  [facility]: [place]        Social Work Care Navigator Note      Patient: Rao Leavitt  Date: May 25, 2021  Preferred Name: Rao    Previous PHQ-9:   PHQ-9 SCORE 3/25/2021 4/15/2021 4/26/2021   PHQ-9 Total Score MyChart - - -   PHQ-9 Total Score 10 16 14   Some encounter information is confidential and restricted. Go to Review Flowsheets activity to see all data.     Previous AAYUSH-7:   AAYUSH-7 SCORE 3/25/2021 4/15/2021 4/26/2021   Total Score - - -   Total Score 9 16 9   Some encounter information is confidential and restricted. Go to Review Flowsheets activity to see all data.     NOMAN LEVEL:  No flowsheet data found.    Preferred Contact:  Need for : No  Preferred Contact: Cell      Type of Contact Today: Phone call (patient / identified key support person reached)      Data: (subjective / Objective):  Recent ED/IP Admission or Discharge?   None    Patient Goals:  Goal Areas: Health;Mental Health;Chemical Health;Financial and Social Service Benefits;Transportation  Patient stated goals: Patient would like assistance with his physical, mental and chemical health for creating a balanced and healthy lifestyle. Patient would like assistance with housing, SSDI and social service assistance to aid with [employer] benefits to increase his financial stability. Patient would like assistance with reliable transportation for his family to get to appointments, run errands and get out into the community.        [facility] Core Service Provided:  Comprehensive Care Management: utilized the electronic medical record / patient registry to identify and support patient's health conditions / needs more effectively   Care Transitions: focused on the coordinated and seamless movement of patient between or within different levels of care or settings  Care Coordination: provided care management services/referrals necessary to ensure patient and their identified supports have access to medical,  behavioral health, pharmacology and recovery support services.  Ensured that patient's care is integrated across all settings and services.   Individual and Family Support: aimed to help clients reduce barriers to achieving goals, increase health literacy and knowledge about chronic condition(s), increase self-efficacy skills, and improve health outcomes  Referral to Community and Social Support Services: Assisted in scheduling an appointment to a referral / service (see plan section)  Coordinated / Confirmed patient's appointment time or referral and transportation plan  Followed-up with patient on whether or not they completed a referral    Current Stressors / Issues / Care Plan Objective Addressed Today:  CC and patient were able to meet today for Behavioral Health Home ([facility]) monthly check-in via telehealth visit. All required ROIs have been filed with HIM/patient chart.    1. Patient reports he is maintaining his sobriety and meeting with his [facility] counselor once a week starting in June and then ending with therapist in August. Patient reports he continues abstinence with tobacco and marijuana. Patient reports in the past two weeks his triggers are less for CD. Patient reports he has started group [facility] DBT(for disabilities) on Mondays every week and finds this has been helpful to regulate his moods and to live in the moment, not in the past. Patient continues to meet with outpatient therapist for additional support twice a month and with [facility] services once a week.  [facility] provided support/empathy and coping skills today.      2. Patient reports his relationship with his s.o. over the past month has been tumultuous. Patient reports his s.o. has agreed to couples counseling at the end of June. Patient reports they are working on their relationship. Patient reports he is beginning to understand the damage to their relationship regarding his use and how this has affected his s.o. [facility] offered support/empathy, coping  skills, resources,and discussed healthy relationships/boundaries today.     3. Patient reports his mood continues to be up and down with emotional stressors, but continues to report sleep has improved a bit with lessening of nightmares. Patient reports no panic attacks in the past two weeks. Patient does report increasing manic symptoms with difficulty with racing thoughts, outbursts and emotional regulation.    Patient reports he has started working out to help manage his mental health symptoms. Patient reports he has been taking his medications as directed and has not missed any doses in the past 7-days. Patient does report tiredness in the afternoons at about 1pm, not sure if this is due to increased activity and working out or his medications. Patient reports to cope with stressors and mental health symptoms he has been go for walks, listening to music and using self soothing techniques.      4. 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 Patient and [facility] continue to work on SSDI claim.     5. Patient reports he has re-scheduled his sleep study for the beginning of June.  Patient reports he may be going through [facility] for updated neuropsych eval. [facility] to follow-up with patient about this at next appt.    Intervention:  Motivational Interviewing: Expressed Empathy/Understanding, Supported Autonomy, Collaboration, Evocation, Permission to raise concern or advise, Open-ended questions, Reflections: simple and complex, Rolled with resistance: Emphasized patient autonomy, Simple reflection, Shifted topic to defuse resistance, Reframed sustain talk in the direction of change and Evoked patient agenda, Change talk (evoked) and Reframe   Target Behavior(s): Explored thoughts about taking an anti-depressant, Explored and resolved challenges related to taking anti-depressants as prescribed, Explored thoughts and readiness to participate in  individual therapy, Explored and resolved challenges to attending appointments as scheduled, Explored patient's knowledge of the impact of exercise on mood, Explored current exercise activities and thoughts about how this influences mood, Explored current social supports and reinforced opportunities to increase engagement, Explored current sleep hygeine and patient's perception and knowledge of relationship to mood and Explored patient's perception of how alcohol and / or drugs influences mood    Assessment: (Progress on Goals / Homework):  Patient would benefit from continued coordination in reaching their goals set for the Behavioral Health Home ([facility]) program. [facility] reviewed Health Action Plan goals and will continue to monitor progress and work with patient and their care team.      Plan: (Homework, other):  Patient was encouraged to continue to seek condition-related information and education.      Scheduled a Phone follow up appointment with ANGI EDUARDO in 2 weeks     Patient has set self-identified goals and will monitor progress until the next appointment on: 06/07/2021.             SARA [facility]  Behavioral Health Home ([facility])   [facility]  [phone]

## 2021-05-27 ENCOUNTER — VIRTUAL VISIT (OUTPATIENT)
Dept: PSYCHOLOGY | Facility: CLINIC | Age: 31
End: 2021-05-27
Payer: COMMERCIAL

## 2021-05-27 DIAGNOSIS — F31.9 BIPOLAR 1 DISORDER (H): Primary | ICD-10-CM

## 2021-05-27 DIAGNOSIS — F15.21 METHAMPHETAMINE USE DISORDER, MODERATE, IN EARLY REMISSION (H): ICD-10-CM

## 2021-05-27 DIAGNOSIS — F12.21 CANNABIS USE DISORDER, MODERATE, IN EARLY REMISSION (H): ICD-10-CM

## 2021-05-27 DIAGNOSIS — F60.3 BORDERLINE PERSONALITY DISORDER (H): ICD-10-CM

## 2021-05-27 DIAGNOSIS — F10.21 ALCOHOL USE DISORDER, MODERATE, IN EARLY REMISSION (H): ICD-10-CM

## 2021-05-27 PROCEDURE — 90834 PSYTX W PT 45 MINUTES: CPT | Mod: 95 | Performed by: COUNSELOR

## 2021-05-27 NOTE — PROGRESS NOTES
Progress Note    Patient Name: Rao Leavitt  Date: 5/27/2021         Service Type: Individual      Session Start Time: 2:05pm  Session End Time: 2:55pm     Session Length: 50minutes    Session #: 10    Attendees: Client    Service Modality:  Video Visit:    Telemedicine Visit: The patient's condition can be safely assessed and treated via synchronous audio and visual telemedicine encounter.      Reason for Telemedicine Visit: Services only offered telehealth    Originating Site (Patient Location): Patient's home    Distant Site (Provider Location): Provider Remote Setting    Consent:  The patient/guardian has verbally consented to: the potential risks and benefits of telemedicine (video visit) versus in person care; bill my insurance or make self-payment for services provided; and responsibility for payment of non-covered services.     Mode of Communication:  Video Conference via [employer]    As the provider I attest to compliance with applicable laws and regulations related to telemedicine.     Treatment Plan Last Reviewed: 1/4/2020  PHQ-9 / AAYUSH-7 :     DATA  Interactive Complexity: No  Crisis: No       Progress Since Last Session (Related to Symptoms / Goals / Homework):   Symptoms: No change Reported that he has maintained sobriety but that it has been challenging due to outside influences causing temptation. Recently learned information about daughter's mother that caused stress.    Homework: Partially completed      Episode of Care Goals: Minimal progress - PREPARATION (Decided to change - considering how); Intervened by negotiating a change plan and determining options / strategies for behavior change, identifying triggers, exploring social supports, and working towards setting a date to begin behavior change     Current / Ongoing Stressors and Concerns:   Continuing to have interactions with girlfriend that have caused stressors in trust in their  relationship. Working on ways that he can improve communication with his girlfriend and express his thoughts and needs with her. Also looking at ways to understand all of his symptoms, including recent symptoms of eating/cravings for food/milk at night.     Treatment Objective(s) Addressed in This Session:   maintain sobriety continue working with services   Identifying stressors and ways to address them     Intervention:   Motivational Interviewing: Looking at different appointments and interventions he is working with yet still experiencing weird symptoms. Understanding barriers in his way of making certain changes (access to appointments, medications, work schedule, etc) that could be adding to his symptoms and ways to communicate need to [facility] and .  Motivational Interviewing    MI Intervention: Expressed Empathy/Understanding and Open-ended questions     Change Talk Expressed by the Patient: Committment to change    Provider Response to Change Talk: A - Affirmed patient's thoughts, decisions, or attempts at behavior change          ASSESSMENT: Current Emotional / Mental Status (status of significant symptoms):   Risk status (Self / Other harm or suicidal ideation)   Patient denies current fears or concerns for personal safety.   Patient denies current or recent suicidal ideation or behaviors.   Patient denies current or recent homicidal ideation or behaviors.   Patient denies current or recent self injurious behavior or ideation.   Patient denies other safety concerns.   Patient reports there has been no change in risk factors since their last session.     Patient reports there has been no change in protective factors since their last session.     Recommended that patient call 911 or go to the local ED should there be a change in any of these risk factors.     Appearance:   Appropriate    Eye Contact:   Good    Psychomotor Behavior: Normal    Attitude:   Cooperative     Orientation:   All   Speech    Rate / Production: Hyperverbal  Talkative    Volume:  Normal    Mood:    Euthymic   Affect:    Appropriate    Thought Content:  Clear    Thought Form:  Circumstantial   Insight:    Poor      Medication Review:   Changes to psychiatric medications, see updated Medication List in EPIC.      Medication Compliance:   Yes     Changes in Health Issues:   None reported     Chemical Use Review:   Substance Use: Chemical use reviewed, no active concerns identified      Tobacco Use: No current tobacco use.      Diagnosis:  1. Bipolar 1 disorder (H)    2. Borderline personality disorder (H)    3. Methamphetamine use disorder, moderate, in early remission (H)    4. Alcohol use disorder, moderate, in early remission (H)    5. Cannabis use disorder, moderate, in early remission (H)        Collateral Reports Completed:   Not Applicable    PLAN: (Patient Tasks / Therapist Tasks / Other)  Continue with individual therapy. Homework to continue harm reduction to maintain sobriety. Will work on communication skills with partner and rebuilding trust with her. Recommended to reach out to PCP for medical assessment to address concerning symptoms of nighttime eating    Cherie Christina, [facility]                                                           Treatment Plan    Client's Name: Rao Leavitt  YOB: 1990    Date: 5/27/2021     DSM-V Diagnoses: 296.42 Bipolar I Disorder Current or Most Recent Episode Manic, Moderate  or Substance-Related & Addictive Disorders Alcohol Use Disorder   303.90 (F10.20) Moderate In early remission,   Psychosocial / Contextual Factors: probation, history of incarceration, in substance abuse treatment, relationship struggles, financial stressors, vocational stress  WHODAS:     Referral / Collaboration:  The following referral(s) will be initiated: seeking assessment for PTSD, [facility].    Anticipated number of session or this episode of care:  26-30      MeasurableTreatment Goal(s) related to diagnosis / functional impairment(s)  Goal 1: Client will work on decreasing anger response.    Objective #A (Client Action)    Client will identify patterns of escalation  (i.e. tightness in chest, flushed face, increased heart rate, clenched hands, etc.).  Status: Restarted - Date: 5/27/2021       Intervention(s)  Therapist will teach emotional recognition/identification. Identifying anger episodes.    Objective #B  Client will identify at least 3-5 techniques for intervening on the escalation.  Status: Restarted - Date: 5/27/2021     Intervention(s)  Therapist will teach emotional regulation skills. DBT and CBT.    Objective #C  Client will learn and demonstrate 2 assertiveness skill(s).  Status: Restarted - Date: 5/27/2021       Intervention(s)  Therapist will role-play conflict management.      Goal 2: Client will process previous traumatic events.    Objective #A (Client Action)    Status: Restarted - Date: 5/27/2021       Client will process childhood trauma.    Intervention(s)  Therapist will provide Emotion Focused therapy.    Objective #B  Client will process trauma of incarceration.    Status: Restarted - Date: 5/27/2021       Intervention(s)  Therapist will EFT and TFCBT techniques.    Objective #C  Client will identify patterns of escalation  (i.e. tightness in chest, flushed face, increased heart rate, clenched hands, etc.).  Status: Restarted - Date: 5/27/2021        Intervention(s)  Therapist will help connect trauma history to current bejaviors.      Goal 3: Client will continue working on sobriety and harm reduction.    Objective #A (Client Action)    Status: Restarted - Date: 5/27/2021       Client will maintain sobriety and harm reduction.    Intervention(s)  Therapist will use harm reduction.    Objective #B  Client will identify at least 3 example(s) of how drinking has resulted in an experience that interferes with person values or  goals.    Status: Restarted - Date: 5/27/2021     Intervention(s)  Therapist will make referrals to AA and NA.        Patient has reviewed and agreed to the above plan. Due to COVID, treatment plan was not signed, as appt was telehealth      Cherie Christina LPC  May 27, 2021

## 2021-06-02 ENCOUNTER — THERAPY VISIT (OUTPATIENT)
Dept: SLEEP MEDICINE | Facility: CLINIC | Age: 31
End: 2021-06-02
Payer: COMMERCIAL

## 2021-06-02 DIAGNOSIS — R94.01 ABNORMAL EEG: ICD-10-CM

## 2021-06-02 DIAGNOSIS — R06.89 DYSPNEA AND RESPIRATORY ABNORMALITY: ICD-10-CM

## 2021-06-02 DIAGNOSIS — G47.00 INSOMNIA, UNSPECIFIED TYPE: ICD-10-CM

## 2021-06-02 DIAGNOSIS — G47.19 EXCESSIVE DAYTIME SLEEPINESS: ICD-10-CM

## 2021-06-02 DIAGNOSIS — R06.00 DYSPNEA AND RESPIRATORY ABNORMALITY: ICD-10-CM

## 2021-06-02 DIAGNOSIS — Z72.820 POOR SLEEP: ICD-10-CM

## 2021-06-02 DIAGNOSIS — Z72.820 LACK OF ADEQUATE SLEEP: ICD-10-CM

## 2021-06-02 DIAGNOSIS — G47.30 SLEEP APNEA, UNSPECIFIED TYPE: ICD-10-CM

## 2021-06-02 PROCEDURE — 95810 POLYSOM 6/> YRS 4/> PARAM: CPT | Performed by: FAMILY MEDICINE

## 2021-06-07 ENCOUNTER — ALLIED HEALTH/NURSE VISIT (OUTPATIENT)
Dept: BEHAVIORAL HEALTH | Facility: CLINIC | Age: 31
End: 2021-06-07
Payer: COMMERCIAL

## 2021-06-07 DIAGNOSIS — R69 DIAGNOSIS DEFERRED: Primary | ICD-10-CM

## 2021-06-07 NOTE — Clinical Note
José Miguel Giron,    I met with this patient today and know you are meeting with him tomorrow. See item #3 for update.    Take care,  SARA Irwin [facility]  Behavioral Health [place] ([facility])   [facility]  [phone]  June 7, 2021  10:48 AM

## 2021-06-07 NOTE — PROGRESS NOTES
"How would you like to obtain your AVS? Mail a copy  If the video visit is dropped, the invitation should be resent by: Text to cell phone: . [phone]  Will anyone else be joining your video visit? No      Any new OTC medications: No  Recent height or weight: No  Recent BP/HR: No  Alcohol use:  No If yes, how many drinks per week: zero   Current other substance use (including Vaping):  Denies  Any updates with mental health (hospital stay, ER, etc) that Bree should know about: Sleep study recently  Taking medications every day: Yes  If female: Birth control: No  What is the most important thing you would like addressed today:   Quit chewing tobacco 4 days ago  Fluctuating mood swings  Feels that one of his medications (Topamax AM dose) needs to be increased.  \"Wheels in my head are spinning again\"  Would like to discuss increasing his anxiety medications, increased panic attacks.        Outpatient Psychiatric Progress Note    Name: Rao Leavitt   : 1990                    Primary Care Provider: Suzi Jaime NP   Therapist: Leslye    PHQ-9 scores:  PHQ-9 SCORE 4/15/2021 2021 2021   PHQ-9 Total Score MyChart - - -   PHQ-9 Total Score 16 14 10   Some encounter information is confidential and restricted. Go to Review Flowsheets activity to see all data.       AAYUSH-7 scores:  AAYUSH-7 SCORE 4/15/2021 2021 2021   Total Score - - -   Total Score 16 9 9   Some encounter information is confidential and restricted. Go to Review Flowsheets activity to see all data.       Patient Identification:    Patient is a 31 year old year old, partnered / significant other  White American male  who presents for return visit with me.  Patient is currently unemployed. Patient attended the session alone. Patient prefers to be called: \" Lucio\".    Interim History:    I last saw Rao Leavitt for outpatient psychiatry Return Visit on 2021.     During that appointment, he remained " "concerned about his ups and downs.  He tells me that the medications are working just fine and now he has to work on his \"mental health\" with his therapist.  A sleep study is pending in May.  Once that is done, he would like to repeat psychological testing as he felt misunderstood with regards to his need to receive an accurate diagnosis rather than trying to report things just to be taking Social Security money.  He continues to have night terrors but did not want to increase the prazosin at this time.  The olanzapine is helping him sleep a little better.  Lithium will remain as is at 600 mg twice daily.  He will continue the propranolol 40 mg twice daily for anxiety.  The Topamax is now being prescribed by his neurologist for newly diagnosed seizure activity..    .     Current medications include: Apple Ralph Vn-Grn Tea-Bit Or-Cr (APPLE CIDER VINEGAR PLUS) TABS,   lithium (ESKALITH) 600 MG capsule, Take 1 capsule (600 mg) by mouth 2 times daily (with meals)  multivitamin, therapeutic (THERA-VIT) TABS tablet, Take 1 tablet by mouth daily  OLANZapine (ZYPREXA) 5 MG tablet, Take 1 tablet (5 mg) by mouth At Bedtime  prazosin (MINIPRESS) 1 MG capsule, Take 2 capsules (2 mg) by mouth At Bedtime  propranolol (INDERAL) 40 MG tablet, Take 1 tablet (40 mg) by mouth 2 times daily  topiramate (TOPAMAX) 50 MG tablet, 50mg every morning and 100mg every afternoon.  vitamin B-Complex, Take 1 tablet by mouth daily    No current facility-administered medications on file prior to visit.        The [place] Prescription Monitoring Program has been reviewed and there are no concerns about diversionary activity for controlled substances at this time.      I was able to review most recent Primary Care Provider, specialty provider, and therapy visit notes that I have access to.     Today, patient reports that he quit chewing tobacco because his gums were bleeding  He is asking for a precription for nicotine patch.  He sees [facility] for " therapy.  He has had fluctuating moods with outbursts.  He mostly is having verbal outbursts but feels like it in a more appropriate manner.  His mind races more in the morning.  His anxiety is worsening.  He has been sleep walking and eating in  Hs sleep.   has a past medical history of Depressive disorder.    Social history updates:    Lucio continues to be unemployed, relying on [place] assistance to pay his bills.  He lives with his girlfriend and their child.    Substance use updates:    Fact maintain sobriety  Tobacco use: Yes E-cigarettes  Ready to quit?  No  Nicotine Replacement Therapy tried: None    Vital Signs:   There were no vitals taken for this visit.    Labs:    Most recent laboratory results reviewed and no new labs.     Review of Systems:  10 systems (general, cardiovascular, respiratory, eyes, ENT, endocrine, GI, , M/S, neurological) were reviewed. Most pertinent finding(s) is/are: Denies chest pain, no shortness of breath, no observed skin rashes, mild headache pain occasionally. The remaining systems are all unremarkable.    Mental Status Examination:  Appearance:  awake, alert and casually dressed  Attitude:  cooperative   Eye Contact:  adequate  Gait and Station: No assistive Devices used and No dizziness or falls  Psychomotor Behavior:  fidgeting  Oriented to:  time, person, and place  Attention Span and Concentration:  Normal  Speech:   clear, coherent and Speaks: English  Mood:  anxious and depressed  Affect:  intensity is heightened  Associations:  no loose associations  Thought Process:  tangental  Thought Content:  no evidence of suicidal ideation or homicidal ideation, no auditory hallucinations present and no visual hallucinations present  Recent and Remote Memory:  intact Not formally assessed. No amnesia.  Fund of Knowledge: appropriate  Insight:  fair  Judgment:  fair  Impulse Control:  fair    Suicide Risk Assessment:  Today Rao Leavitt reports that he is having no  thoughts to want to end his life or to harm other people. In addition, there are notable risk factors for self-harm, including anxiety, substance abuse, anger/rage and mood change. However, risk is mitigated by commitment to family, history of seeking help when needed, future oriented, denies suicidal intent or plan and denies homicidal ideation, intent, or plan. Therefore, based on all available evidence including the factors cited above, Rao Leavitt does not appear to be at imminent risk for self-harm, does not meet criteria for a 72-hr hold, and therefore remains appropriate for ongoing outpatient level of care.  A thorough assessment of risk factors related to suicide and self-harm have been reviewed and are noted above. The patient convincingly denies suicidality on several occasions. Local community safety resources printed and reviewed for patient to use if needed. There was no deceit detected, and the patient presented in a manner that was believable.     DSM5 Diagnosis:  296.40 Bipolar I Disorder Current or Most Recent Episode Hypomanic  300.02 (F41.1) Generalized Anxiety Disorder  780.52 (G47.00) Insomnia Disorder   With non-sleep disorder mental comorbidity  Recurrent      Medical comorbidities include:   Patient Active Problem List    Diagnosis Date Noted     History of traumatic brain injury 03/02/2021     Priority: Medium     Abnormal EEG 03/02/2021     Priority: Medium      OUTPATIENT SLEEP-DEPRIVED EEG REPORT.  DATE OF RECORDING: January 28, 2021.   IMPRESSION: This outpatient sleep-deprived EEG study is abnormal during wakefulness and drowsiness due to EEG changes that resemble generalized paroxysmal fast activity, that could be potentially epileptiform and seen in generalized epilepsies.  No electrographic or clinical seizures and no paroxysmal behavioral events are recorded during this study.       Attention-deficit hyperactivity disorder 03/01/2021     Priority: Medium     PTSD  (post-traumatic stress disorder) 01/05/2021     Priority: Medium     Borderline personality disorder (H) 01/05/2021     Priority: Medium     Chemical dependency (H) 09/10/2020     Priority: Medium     Lithium use 05/15/2020     Priority: Medium     Lumbar spine tumor 11/27/2019     Priority: Medium     High risk medication use 10/05/2017     Priority: Medium     Bipolar 1 disorder (H) 03/16/2017     Priority: Medium     Generalized anxiety disorder 03/16/2017     Priority: Medium       Assessment:    Rao Leavitt reported continued concerns about anxiety and his inability to maintain employment because of that.  His propranolol was increased to 60 mg twice daily.  Lucio also suffers from mood dysregulation and he reported ongoing mood swings.  Sometimes this resulted periods of irritability.  I increased his topiramate to 100 mg twice daily.  He continues with night terrors and finds the prazosin ineffective in controlling his nightmares.  I added clomipramine 25 mg at bedtime to help decrease those episodes.  Olanzapine continues at 5 mg at bedtime as well as the lithium 600 mg twice daily for mood dysregulation.  He continues with all therapies through [facility] and I encouraged him to maintain sobriety which she tells me she has been..    Medication side effects and alternatives were reviewed. Health promotion activities recommended and reviewed today. All questions addressed. Education and counseling completed regarding risks and benefits of medications and psychotherapy options.    Treatment Plan:        1.  Discontinue prazosin    2.  Add clomipramine 25 mg at bedtime for night terrors    3.  Increase propranolol to 60 mg twice daily    4.  Increase topiramate to 100 mg twice daily    5.  Continue olanzapine 5 mg at bedtime    6.  Continue lithium 600 mg twice daily    7.  Maintain sobriety and continue all outpatient therapy services    8.  Continue community support with behavior health home care  services      Continue all other medications as reviewed per electronic medical record today.     Safety plan reviewed. To the Emergency Department as needed or call after hours crisis line at [phone] or [phone]. [place] Crisis Text [place]. Text [place] to 368210 or Suicide LifeLine Chat: suicideCHNL.org/chat/    To schedule individual or family therapy, call [facility] at [phone].    Schedule an appointment with me in August or sooner as needed. Call [facility] at [phone] to schedule.    Follow up with primary care provider as planned or for acute medical concerns.    Call the psychiatric nurse line with medication questions or concerns at [phone].    Trigg[employer]rap may be used to communicate with your provider, but this is not intended to be used for emergencies.    Crisis Resources:    National Suicide Prevention Lifeline: 230.772.5691 (TTY: 216.710.1281). Call anytime for help.  (www.suicidepreventionlifeline.org)  National [place] on Mental Illness (www.nadege.org): 670-337-9994 or 139-639-5475.   Mental Health Association (www.mentalhealth.org): 454.929.4619 or 966-523-3555.  [place] Crisis [place]: Text [place] to 390689  Suicide LifeLine Chat: suicideCHNL.org/chat    Administrative Billing:   Time spent with patient was 30 minutes and greater than 50% of time or 20 minutes was spent in counseling and coordination of care regarding above diagnoses and treatment plan.    Patient Status:  Patient will continue to be seen for ongoing consultation and stabilization.    Signed:   DUYEN Carrington-BC   Psychiatry

## 2021-06-07 NOTE — PROGRESS NOTES
Behavioral Health Home Services  [facility]: [place]        Social Work Care Navigator Note      Patient: Rao Leavitt  Date: June 7, 2021  Preferred Name: Rao    Previous PHQ-9:   PHQ-9 SCORE 3/25/2021 4/15/2021 4/26/2021   PHQ-9 Total Score MyChart - - -   PHQ-9 Total Score 10 16 14   Some encounter information is confidential and restricted. Go to Review Flowsheets activity to see all data.     Previous AAYUSH-7:   AAYUSH-7 SCORE 3/25/2021 4/15/2021 4/26/2021   Total Score - - -   Total Score 9 16 9   Some encounter information is confidential and restricted. Go to Review Flowsheets activity to see all data.     NOMAN LEVEL:  No flowsheet data found.    Preferred Contact:  Need for : No  Preferred Contact: Cell      Type of Contact Today: Phone call (patient / identified key support person reached)      Data: (subjective / Objective):  Recent ED/IP Admission or Discharge?   None    Patient Goals:  Goal Areas: Health;Mental Health;Chemical Health;Financial and Social Service Benefits;Transportation  Patient stated goals: Patient would like assistance with his physical, mental and chemical health for creating a balanced and healthy lifestyle. Patient would like assistance with housing, SSDI and social service assistance to aid with [employer] benefits to increase his financial stability. Patient would like assistance with reliable transportation for his family to get to appointments, run errands and get out into the community.        [facility] Core Service Provided:  Comprehensive Care Management: utilized the electronic medical record / patient registry to identify and support patient's health conditions / needs more effectively   Care Transitions: focused on the coordinated and seamless movement of patient between or within different levels of care or settings  Care Coordination: provided care management services/referrals necessary to ensure patient and their identified supports have access to medical,  behavioral health, pharmacology and recovery support services.  Ensured that patient's care is integrated across all settings and services.   Individual and Family Support: aimed to help clients reduce barriers to achieving goals, increase health literacy and knowledge about chronic condition(s), increase self-efficacy skills, and improve health outcomes  Referral to Community and Social Support Services: Provided patient with referrals (see plan section)  Assisted in scheduling an appointment to a referral / service (see plan section)  Coordinated / Confirmed patient's appointment time or referral and transportation plan  Followed-up with patient on whether or not they completed a referral    Current Stressors / Issues / Care Plan Objective Addressed Today:  SWCC and patient were able to meet today for Behavioral Health Home ([facility]) monthly check-in via telehealth visit. All required ROIs have been filed with HIM/patient chart.    1. Patient reports he is maintaining his sobriety and meeting with his [facility] counselor once a week starting in June and then ending with therapist in August. Patient reports he continues abstinence with tobacco and marijuana. Patient reports in the past two weeks his triggers are less for CD. Patient reports he has started group PACE DBT(for disabilities) on Mondays every week and finds this has been helpful to regulate his moods and to live in the moment, not in the past. Patient continues to meet with outpatient therapist for additional support twice a month and with [facility] services once a week.  [facility] provided support/empathy and coping skills today.      2. Patient reports his relationship with his s.o. over the past month has been tumultuous. Patient reports his s.o. has agreed to couples counseling at the end of June. Patient reports they are working on their relationship. Patient reports he is beginning to understand the damage to their relationship regarding his use and how this has  affected his s.o. [facility] offered support/empathy, coping skills, resources,and discussed healthy relationships/boundaries today.     3. Patient reports his mood and anxiety have been manageable the past two weeks, with no outbursts. Patient reports no panic attacks in the past two weeks. Patient reports he has been taking his medications as directed and has not missed any doses in the past 7-days. Patient reports he would like to talk to his psychiatrist tomorrow about increasing his Topamax in the am. [facility] messaged provider.    Patient continues to report he is working out to help manage his mental health symptoms. Patient reports to cope with stressors and mental health symptoms he has been go for walks, listening to music and using self soothing techniques.     4. 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 Patient reports he has a new  and they do not get along. Patient reports the hours he works help supplement his income. Patient and ARM continue to work on SSDI claim.     5. Patient reports he completed his sleep study for the beginning of June. Patient reports he will be going over results in a couple of weeks. Patient reports he will be working with sleep psychologist starting in July to help with sleep issues and eating in the middle of the night.  Patient reports he may be going through [facility] for updated neuropsych eval. [facility] to follow-up with patient about this at next appt.    Intervention:  Motivational Interviewing: Expressed Empathy/Understanding, Supported Autonomy, Collaboration, Evocation, Permission to raise concern or advise, Open-ended questions, Rolled with resistance: Emphasized patient autonomy, Simple reflection, Shifted topic to defuse resistance, Reframed sustain talk in the direction of change and Evoked patient agenda, Change talk (evoked) and Reframe   Target Behavior(s): Explored thoughts about taking an anti-depressant,  Explored and resolved challenges related to taking anti-depressants as prescribed, Explored thoughts and readiness to participate in individual therapy, Explored and resolved challenges to attending appointments as scheduled, Explored patient's knowledge of the impact of exercise on mood, Explored current exercise activities and thoughts about how this influences mood, Explored current social supports and reinforced opportunities to increase engagement, Explored current sleep hygeine and patient's perception and knowledge of relationship to mood and Explored patient's perception of how alcohol and / or drugs influences mood    Assessment: (Progress on Goals / Homework):  Patient would benefit from continued coordination in reaching their goals set for the Behavioral Health Home ([facility]) program. SWCC reviewed Health Action Plan goals and will continue to monitor progress and work with patient and their care team.      Plan: (Homework, other):  Patient was encouraged to continue to seek condition-related information and education.      Scheduled a Phone follow up appointment with ANGI EDUARDO in 2 weeks     Patient has set self-identified goals and will monitor progress until the next appointment on: 06/21/2021.        SARA [facility]  Behavioral Health Home ([facility])   [facility]  [phone]  June 7, 2021

## 2021-06-08 ENCOUNTER — VIRTUAL VISIT (OUTPATIENT)
Dept: PSYCHIATRY | Facility: CLINIC | Age: 31
End: 2021-06-08
Payer: COMMERCIAL

## 2021-06-08 DIAGNOSIS — F41.1 GENERALIZED ANXIETY DISORDER: Primary | ICD-10-CM

## 2021-06-08 DIAGNOSIS — F31.9 BIPOLAR 1 DISORDER (H): ICD-10-CM

## 2021-06-08 DIAGNOSIS — F51.4 NIGHT TERRORS, ADULT: ICD-10-CM

## 2021-06-08 PROCEDURE — 99214 OFFICE O/P EST MOD 30 MIN: CPT | Mod: 95 | Performed by: NURSE PRACTITIONER

## 2021-06-08 RX ORDER — PROPRANOLOL HYDROCHLORIDE 60 MG/1
60 TABLET ORAL 2 TIMES DAILY
Qty: 60 TABLET | Refills: 1 | Status: SHIPPED | OUTPATIENT
Start: 2021-06-08 | End: 2021-08-11 | Stop reason: DRUGHIGH

## 2021-06-08 RX ORDER — OLANZAPINE 5 MG/1
5 TABLET ORAL AT BEDTIME
Qty: 30 TABLET | Refills: 1 | Status: SHIPPED | OUTPATIENT
Start: 2021-06-08 | End: 2021-08-11

## 2021-06-08 RX ORDER — TOPIRAMATE 100 MG/1
100 TABLET, FILM COATED ORAL 2 TIMES DAILY
Qty: 60 TABLET | Refills: 1 | Status: SHIPPED | OUTPATIENT
Start: 2021-06-08 | End: 2021-08-18

## 2021-06-08 RX ORDER — CLOMIPRAMINE HYDROCHLORIDE 25 MG/1
25 CAPSULE ORAL AT BEDTIME
Qty: 30 CAPSULE | Refills: 1 | Status: SHIPPED | OUTPATIENT
Start: 2021-06-08 | End: 2021-08-11

## 2021-06-08 ASSESSMENT — ANXIETY QUESTIONNAIRES
2. NOT BEING ABLE TO STOP OR CONTROL WORRYING: SEVERAL DAYS
IF YOU CHECKED OFF ANY PROBLEMS ON THIS QUESTIONNAIRE, HOW DIFFICULT HAVE THESE PROBLEMS MADE IT FOR YOU TO DO YOUR WORK, TAKE CARE OF THINGS AT HOME, OR GET ALONG WITH OTHER PEOPLE: VERY DIFFICULT
5. BEING SO RESTLESS THAT IT IS HARD TO SIT STILL: SEVERAL DAYS
7. FEELING AFRAID AS IF SOMETHING AWFUL MIGHT HAPPEN: SEVERAL DAYS
3. WORRYING TOO MUCH ABOUT DIFFERENT THINGS: SEVERAL DAYS
1. FEELING NERVOUS, ANXIOUS, OR ON EDGE: NEARLY EVERY DAY
6. BECOMING EASILY ANNOYED OR IRRITABLE: SEVERAL DAYS
GAD7 TOTAL SCORE: 9

## 2021-06-08 ASSESSMENT — PATIENT HEALTH QUESTIONNAIRE - PHQ9
5. POOR APPETITE OR OVEREATING: SEVERAL DAYS
SUM OF ALL RESPONSES TO PHQ QUESTIONS 1-9: 10

## 2021-06-08 NOTE — PATIENT INSTRUCTIONS
1.  Discontinue prazosin    2.  Add clomipramine 25 mg at bedtime for night terrors    3.  Increase propranolol to 60 mg twice daily    4.  Increase topiramate to 100 mg twice daily    5.  Continue olanzapine 5 mg at bedtime    6.  Continue lithium 600 mg twice daily    7.  Maintain sobriety and continue all outpatient therapy services    8.  Continue community support with behavior health home care services      Continue all other medications as reviewed per electronic medical record today.     Safety plan reviewed. To the Emergency Department as needed or call after hours crisis line at [phone] or [phone]. [place] Crisis Text Line. Text [place] to 132063 or Suicide LifeLine Chat: [place].org/chat/    To schedule individual or family therapy, call [facility] at [phone].    Schedule an appointment with me in August or sooner as needed. Call [facility] at [phone] to schedule.    Follow up with primary care provider as planned or for acute medical concerns.    Call the psychiatric nurse line with medication questions or concerns at [phone].    Machine [employer] may be used to communicate with your provider, but this is not intended to be used for emergencies.    Crisis Resources:    National Suicide Prevention Lifeline: 521.697.4099 (TTY: 234.501.7457). Call anytime for help.  (www.suicidepreventionlifeline.org)  National [place] on Mental Illness (www.nadege.org): 680.695.7546 or 319-886-0858.   Mental Health Association (www.mentalhealth.org): 960.295.8973 or 679-904-5690.  [place] Crisis Text Line: Text [place] to 671625  Suicide LifeLine Chat: [place].org/chat

## 2021-06-09 LAB — SLPCOMP: NORMAL

## 2021-06-09 ASSESSMENT — ANXIETY QUESTIONNAIRES: GAD7 TOTAL SCORE: 9

## 2021-06-10 ENCOUNTER — VIRTUAL VISIT (OUTPATIENT)
Dept: PSYCHOLOGY | Facility: CLINIC | Age: 31
End: 2021-06-10
Payer: COMMERCIAL

## 2021-06-10 DIAGNOSIS — F15.21 METHAMPHETAMINE USE DISORDER, MODERATE, IN EARLY REMISSION (H): ICD-10-CM

## 2021-06-10 DIAGNOSIS — F31.9 BIPOLAR 1 DISORDER (H): Primary | ICD-10-CM

## 2021-06-10 DIAGNOSIS — F10.21 ALCOHOL USE DISORDER, MODERATE, IN EARLY REMISSION (H): ICD-10-CM

## 2021-06-10 DIAGNOSIS — F60.3 BORDERLINE PERSONALITY DISORDER (H): ICD-10-CM

## 2021-06-10 DIAGNOSIS — F41.1 GENERALIZED ANXIETY DISORDER: ICD-10-CM

## 2021-06-10 DIAGNOSIS — F43.10 POSTTRAUMATIC STRESS DISORDER: ICD-10-CM

## 2021-06-10 DIAGNOSIS — F12.21 CANNABIS USE DISORDER, MODERATE, IN EARLY REMISSION (H): ICD-10-CM

## 2021-06-10 PROCEDURE — 90834 PSYTX W PT 45 MINUTES: CPT | Mod: 95 | Performed by: COUNSELOR

## 2021-06-10 NOTE — PROGRESS NOTES
Progress Note    Patient Name: Rao Colonzsaeid  Date: 6/10/2021         Service Type: Individual      Session Start Time: 12:05pm  Session End Time: 12:55pm     Session Length: 50minutes    Session #: 11    Attendees: Client    Service Modality:  Video Visit:    Telemedicine Visit: The patient's condition can be safely assessed and treated via synchronous audio and visual telemedicine encounter.      Reason for Telemedicine Visit: Services only offered telehealth    Originating Site (Patient Location): Patient's home    Distant Site (Provider Location): Provider Remote Setting    Consent:  The patient/guardian has verbally consented to: the potential risks and benefits of telemedicine (video visit) versus in person care; bill my insurance or make self-payment for services provided; and responsibility for payment of non-covered services.     Mode of Communication:  Video Conference via TouchPo Android POS    As the provider I attest to compliance with applicable laws and regulations related to telemedicine.     Treatment Plan Last Reviewed: 5/2020  PHQ-9 / AAYUSH-7 :     DATA  Interactive Complexity: No  Crisis: No       Progress Since Last Session (Related to Symptoms / Goals / Homework):   Symptoms: No change Reported that he has maintained sobriety but that it has been challenging due to outside influences causing temptation.     Homework: Partially completed      Episode of Care Goals: Minimal progress - PREPARATION (Decided to change - considering how); Intervened by negotiating a change plan and determining options / strategies for behavior change, identifying triggers, exploring social supports, and working towards setting a date to begin behavior change     Current / Ongoing Stressors and Concerns:   Continuing to have interactions with girlfriend that have caused stressors in trust in their relationship. Feels that she is sneaking around behind his back to talk to her  ex-girlfriend. Also family stress with mother who ruined his sobriety party by getting drunk, now she is not talking to him because of how he responded to her at the party.      Treatment Objective(s) Addressed in This Session:   maintain sobriety continue working with services   Identifying stressors and ways to address them     Intervention:   Motivational Interviewing: Identifying barriers that he has with making repairs with his mother or improving their relationship, validating his concerns about their relationship. Identifying how the barriers impact other relationships and have potential to impact his sobriety.    Motivational Interviewing    MI Intervention: Expressed Empathy/Understanding and Open-ended questions     Change Talk Expressed by the Patient: Committment to change    Provider Response to Change Talk: A - Affirmed patient's thoughts, decisions, or attempts at behavior change          ASSESSMENT: Current Emotional / Mental Status (status of significant symptoms):   Risk status (Self / Other harm or suicidal ideation)   Patient denies current fears or concerns for personal safety.   Patient denies current or recent suicidal ideation or behaviors.   Patient denies current or recent homicidal ideation or behaviors.   Patient denies current or recent self injurious behavior or ideation.   Patient denies other safety concerns.   Patient reports there has been no change in risk factors since their last session.     Patient reports there has been no change in protective factors since their last session.     Recommended that patient call 911 or go to the local ED should there be a change in any of these risk factors.     Appearance:   Appropriate    Eye Contact:   Good    Psychomotor Behavior: Normal    Attitude:   Cooperative    Orientation:   All   Speech    Rate / Production: Hyperverbal  Talkative    Volume:  Normal    Mood:    Euthymic   Affect:    Appropriate    Thought Content:  Clear    Thought  Form:  Circumstantial   Insight:    Poor      Medication Review:   Changes to psychiatric medications, see updated Medication List in EPIC.      Medication Compliance:   Yes     Changes in Health Issues:   None reported     Chemical Use Review:   Substance Use: Chemical use reviewed, no active concerns identified      Tobacco Use: No current tobacco use.      Diagnosis:  1. Bipolar 1 disorder (H)    2. Methamphetamine use disorder, moderate, in early remission (H)    3. Alcohol use disorder, moderate, in early remission (H)    4. Cannabis use disorder, moderate, in early remission (H)    5. Posttraumatic stress disorder    6. Generalized anxiety disorder    7. Borderline personality disorder (H)        Collateral Reports Completed:   Not Applicable    PLAN: (Patient Tasks / Therapist Tasks / Other)  Continue with individual therapy. Homework to continue harm reduction to maintain sobriety.   Cherie Christina, [facility]                                                           Treatment Plan    Client's Name: Rao Leavitt  YOB: 1990    Date: 5/27/2021     DSM-V Diagnoses: 296.42 Bipolar I Disorder Current or Most Recent Episode Manic, Moderate  or Substance-Related & Addictive Disorders Alcohol Use Disorder   303.90 (F10.20) Moderate In early remission,   Psychosocial / Contextual Factors: probation, history of incarceration, in substance abuse treatment, relationship struggles, financial stressors, vocational stress  WHODAS:     Referral / Collaboration:  The following referral(s) will be initiated: seeking assessment for PTSD, [facility].    Anticipated number of session or this episode of care: 26-30      MeasurableTreatment Goal(s) related to diagnosis / functional impairment(s)  Goal 1: Client will work on decreasing anger response.    Objective #A (Client Action)    Client will identify patterns of escalation  (i.e. tightness in chest, flushed face, increased heart rate, clenched hands,  etc.).  Status: Restarted - Date: 5/27/2021       Intervention(s)  Therapist will teach emotional recognition/identification. Identifying anger episodes.    Objective #B  Client will identify at least 3-5 techniques for intervening on the escalation.  Status: Restarted - Date: 5/27/2021     Intervention(s)  Therapist will teach emotional regulation skills. DBT and CBT.    Objective #C  Client will learn and demonstrate 2 assertiveness skill(s).  Status: Restarted - Date: 5/27/2021       Intervention(s)  Therapist will role-play conflict management.      Goal 2: Client will process previous traumatic events.    Objective #A (Client Action)    Status: Restarted - Date: 5/27/2021       Client will process childhood trauma.    Intervention(s)  Therapist will provide Emotion Focused therapy.    Objective #B  Client will process trauma of incarceration.    Status: Restarted - Date: 5/27/2021       Intervention(s)  Therapist will EFT and TFCBT techniques.    Objective #C  Client will identify patterns of escalation  (i.e. tightness in chest, flushed face, increased heart rate, clenched hands, etc.).  Status: Restarted - Date: 5/27/2021        Intervention(s)  Therapist will help connect trauma history to current bejaviors.      Goal 3: Client will continue working on sobriety and harm reduction.    Objective #A (Client Action)    Status: Restarted - Date: 5/27/2021       Client will maintain sobriety and harm reduction.    Intervention(s)  Therapist will use harm reduction.    Objective #B  Client will identify at least 3 example(s) of how drinking has resulted in an experience that interferes with person values or goals.    Status: Restarted - Date: 5/27/2021     Intervention(s)  Therapist will make referrals to AA and NA.        Patient has reviewed and agreed to the above plan. Due to COVID, treatment plan was not signed, as appt was telehealth      Cherie Christina LPC  Silvina 10, 2021

## 2021-06-21 ENCOUNTER — TELEPHONE (OUTPATIENT)
Dept: BEHAVIORAL HEALTH | Facility: CLINIC | Age: 31
End: 2021-06-21

## 2021-06-21 NOTE — TELEPHONE ENCOUNTER
[facility]  [facility]: [place]        Social Work Care Navigator Note      Patient: Rao Laevitt  Date: June 21, 2021  Preferred Name: Rao    Previous PHQ-9:   PHQ-9 SCORE 4/15/2021 4/26/2021 6/8/2021   PHQ-9 Total Score MyChart - - -   PHQ-9 Total Score 16 14 10   Some encounter information is confidential and restricted. Go to Review Flowsheets activity to see all data.     Previous AAYUSH-7:   AAYUSH-7 SCORE 4/15/2021 4/26/2021 6/8/2021   Total Score - - -   Total Score 16 9 9   Some encounter information is confidential and restricted. Go to Review Flowsheets activity to see all data.     NOMAN LEVEL:  No flowsheet data found.    Preferred Contact:  Need for : No  Preferred Contact: Cell      Type of Contact Today: Phone call (not reached/unavailable)      Data: (subjective / Objective):  Attempted to reach patient (x2), but was unsuccessful, left message.  Plan to attempt again.     SARA Irwin LGSW [facility] [place] ([facility])   [facility]  [phone]  June 21, 2021  10:46 AM

## 2021-06-22 ENCOUNTER — VIRTUAL VISIT (OUTPATIENT)
Dept: SLEEP MEDICINE | Facility: CLINIC | Age: 31
End: 2021-06-22
Payer: COMMERCIAL

## 2021-06-22 VITALS — HEIGHT: 67 IN | WEIGHT: 170 LBS | BODY MASS INDEX: 26.68 KG/M2

## 2021-06-22 DIAGNOSIS — R06.00 DYSPNEA AND RESPIRATORY ABNORMALITY: Primary | ICD-10-CM

## 2021-06-22 DIAGNOSIS — R06.89 DYSPNEA AND RESPIRATORY ABNORMALITY: Primary | ICD-10-CM

## 2021-06-22 PROCEDURE — 99213 OFFICE O/P EST LOW 20 MIN: CPT | Mod: 95 | Performed by: PHYSICIAN ASSISTANT

## 2021-06-22 ASSESSMENT — MIFFLIN-ST. JEOR: SCORE: 1684.74

## 2021-06-22 NOTE — PROGRESS NOTES
Rao Leavitt is a 31 year old who is being evaluated via a billable video visit.      How would you like to obtain your AVS? MyChart  If the video visit is dropped, the invitation should be resent by: Text to cell phone: [phone]   Will anyone else be joining your video visit? No    Does patient have any form of state insurance? YES            Sally Borjas MA    Video Start Time: 10:37 AM  Video-Visit Details    Type of service:  Video Visit    Video End Time:10:50 AM    Originating Location (pt. Location): Home    Distant Location (provider location):  [email or web address]@     Platform used for Video Visit: Doximity    Sleep Study Follow-Up Visit:    Date on this visit: 6/22/2021    Rao Leavitt comes in today for follow-up of his sleep study done on 6/2/2021 at the [facility]. A diagnostic polysomnogram was performed to evaluate for sleep apnea and nocturnal epileptiform activity. A diagnostic polysomnogram was performed to evaluate for sleep apnea and nocturnal epileptiform activity.    Polysomnogram interpreted by Dr Zurita:  Polysomnogram Data: A full night polysomnogram recorded the standard physiologic parameters including EEG, EOG, EMG, ECG, nasal and oral airflow. Respiratory parameters of chest and abdominal movements were recorded with respiratory inductance plethysmography. Oxygen saturation was recorded by pulse oximetry. Hypopnea scoring rule used: 1B 4%.     Sleep Architecture: Fairly well consolidated with typical sleep and REM latencies. Typical percentages of sleep stages and supine REM was observed. The total recording time of the polysomnogram was 490.6 minutes. The total sleep time was 453.0 minutes. Sleep latency was normal at 13.3 minutes with the use of a sleep aid (presumed zolpidem, unknown dose). REM latency was 76.0 minutes. Arousal index was mildly increased at 20.1 arousals per hour. Sleep efficiency was normal at 92.3%. Wake after  sleep onset was 23.0 minutes. The patient spent 4.4% of total sleep time in Stage N1, 40.6% in Stage N2, 32.0% in Stage N3, and 23.0% in REM. Time in REM supine was 29.5 minutes. Respiration: No significant sleep disordered breathing (AHI 5.4 with obstructive AHI 4.1 and central AHI 1.3) without sleepassociated hypoxemia.     Events ? The polysomnogram revealed a presence of 17 obstructive, 10 central, and 6 mixed apneas resulting in an apnea index of 4.4 events per hour. There were 8 obstructive hypopneas and - central hypopneas resulting in an obstructive hypopnea index of 1.1 and central hypopnea index of - events per hour. The combined apnea/hypopnea index was 5.4 events per hour (central apnea/hypopnea index was 1.3 events per hour). The REM AHI was 15.6 events per hour. The supine AHI was 10.8 events per hour. The RERA index was 2.3 events per hour. The RDI was 7.7 events per hour.     Snoring - was reported as occasional, light to moderate.   Respiratory rate and pattern - was notable for normal respiratory rate and pattern.   Sustained Sleep Associated Hypoventilation - Transcutaneous carbon dioxide monitoring was not used, however significant hypoventilation was not suggested by oximetry.     Sleep Associated Hypoxemia - (Greater than 5 minutes O2 sat at or below 88%) was not present. Baseline oxygen saturation was 96.9%. Lowest oxygen saturation was 89.1%. Time spent less than or equal to 88% was 0 minutes. Time spent less than or equal to 89% was 0 minutes. Movement Activity: Rare PLM s observed. No abnormal nocturnal movements or behaviors observed. No obvious large epileptiform activity noted.     Periodic Limb Activity - There were 13 PLMs during the entire study. The PLM index was 1.7 movements per hour. The PLM Arousal Index was 0.7 per hour.     REM EMG Activity - Excessive transient/sustained muscle activity was not present.     Nocturnal Behavior - Abnormal sleep related behaviors were not noted  during/arising out of NREM / REM sleep.     Bruxism - None apparent    Cardiac Summary: Appears NSR The average pulse rate was 57.9 bpm. The minimum pulse rate was 43.9 bpm while the maximum pulse rate was 86.2 bpm.    These findings were reviewed with patient.     Past medical/surgical history, family history, social history, medications and allergies were reviewed.      Problem List:  Patient Active Problem List    Diagnosis Date Noted     History of traumatic brain injury 03/02/2021     Priority: Medium     Abnormal EEG 03/02/2021     Priority: Medium      OUTPATIENT SLEEP-DEPRIVED EEG REPORT.  DATE OF RECORDING: January 28, 2021.   IMPRESSION: This outpatient sleep-deprived EEG study is abnormal during wakefulness and drowsiness due to EEG changes that resemble generalized paroxysmal fast activity, that could be potentially epileptiform and seen in generalized epilepsies.  No electrographic or clinical seizures and no paroxysmal behavioral events are recorded during this study.       Attention-deficit hyperactivity disorder 03/01/2021     Priority: Medium     PTSD (post-traumatic stress disorder) 01/05/2021     Priority: Medium     Borderline personality disorder (H) 01/05/2021     Priority: Medium     Chemical dependency (H) 09/10/2020     Priority: Medium     Lithium use 05/15/2020     Priority: Medium     Lumbar spine tumor 11/27/2019     Priority: Medium     High risk medication use 10/05/2017     Priority: Medium     Bipolar 1 disorder (H) 03/16/2017     Priority: Medium     Generalized anxiety disorder 03/16/2017     Priority: Medium        Impression/Plan:    The patient's sleep disordered breathing did not reach a level of clinical significance (AHI 5.4 with obstructive AHI 4.1 and central AHI 1.3). This was a good study that included time in REM. Sleep architecture was fairly well consolidated with typical sleep and REM latencies. Typical percentages of sleep stages and supine REM was observed.  No  obvious large epileptiform activity noted.   Rare PLM s observed. No abnormal nocturnal movements or behaviors observed.   EKG appears NSR    He reports somnambulism since starting  Olanzapine. Patient was counseled to discuss this with his psychiatrist. Patient was also advised to make his sleeping environment to avoid injury.     Patient encouraged to follow up with Dr. Matthew for treatment of insomnia.     He will follow up with me as needed.     Willie Pryor PA-C

## 2021-06-22 NOTE — PATIENT INSTRUCTIONS
Your BMI is Body mass index is 26.63 kg/m .  Weight management is a personal decision.  If you are interested in exploring weight loss strategies, the following discussion covers the approaches that may be successful. Body mass index (BMI) is one way to tell whether you are at a healthy weight, overweight, or obese. It measures your weight in relation to your height.  A BMI of 18.5 to 24.9 is in the healthy range. A person with a BMI of 25 to 29.9 is considered overweight, and someone with a BMI of 30 or greater is considered obese. More than two-thirds of American adults are considered overweight or obese.  Being overweight or obese increases the risk for further weight gain. Excess weight may lead to heart disease and diabetes.  Creating and following plans for healthy eating and physical activity may help you improve your health.  Weight control is part of healthy lifestyle and includes exercise, emotional health, and healthy eating habits. Careful eating habits lifelong are the mainstay of weight control. Though there are significant health benefits from weight loss, long-term weight loss with diet alone may be very difficult to achieve- studies show long-term success with dietary management in less than 10% of people. Attaining a healthy weight may be especially difficult to achieve in those with severe obesity. In some cases, medications, devices and surgical management might be considered.  What can you do?  If you are overweight or obese and are interested in methods for weight loss, you should discuss this with your provider.     Consider reducing daily calorie intake by 500 calories.     Keep a food journal.     Avoiding skipping meals, consider cutting portions instead.    Diet combined with exercise helps maintain muscle while optimizing fat loss. Strength training is particularly important for building and maintaining muscle mass. Exercise helps reduce stress, increase energy, and improves fitness.  Increasing exercise without diet control, however, may not burn enough calories to loose weight.       Start walking three days a week 10-20 minutes at a time    Work towards walking thirty minutes five days a week     Eventually, increase the speed of your walking for 1-2 minutes at time    In addition, we recommend that you review healthy lifestyles and methods for weight loss available through the National Institutes of Health patient information sites:  http://win.niddk.nih.gov/publications/index.htm    And look into health and wellness programs that may be available through your health insurance provider, employer, local community center, or [employer].    Weight management plan: Patient was referred to their PCP to discuss a diet and exercise plan.

## 2021-06-25 ENCOUNTER — ALLIED HEALTH/NURSE VISIT (OUTPATIENT)
Dept: BEHAVIORAL HEALTH | Facility: CLINIC | Age: 31
End: 2021-06-25
Payer: COMMERCIAL

## 2021-06-25 DIAGNOSIS — R69 DIAGNOSIS DEFERRED: Primary | ICD-10-CM

## 2021-06-25 NOTE — PROGRESS NOTES
Behavioral Health Home Services  [facility]: [place]        Social Work Care Navigator Note      Patient: Rao Leavitt  Date: June 25, 2021  Preferred Name: Rao    Previous PHQ-9:   PHQ-9 SCORE 4/15/2021 4/26/2021 6/8/2021   PHQ-9 Total Score MyChart - - -   PHQ-9 Total Score 16 14 10   Some encounter information is confidential and restricted. Go to Review Flowsheets activity to see all data.     Previous AAYUSH-7:   AAYUSH-7 SCORE 4/15/2021 4/26/2021 6/8/2021   Total Score - - -   Total Score 16 9 9   Some encounter information is confidential and restricted. Go to Review Flowsheets activity to see all data.     NOMAN LEVEL:  No flowsheet data found.    Preferred Contact:  Need for : No  Preferred Contact: Cell      Type of Contact Today: Phone call (patient / identified key support person reached)      Data: (subjective / Objective):  Recent ED/IP Admission or Discharge?   None    Patient Goals:  Goal Areas: Health;Mental Health;Chemical Health;Financial and Social Service Benefits;Transportation  Patient stated goals: Patient would like assistance with his physical, mental and chemical health for creating a balanced and healthy lifestyle. Patient would like assistance with housing, SSDI and social service assistance to aid with [employer] [facility] benefits to increase his financial stability. Patient would like assistance with reliable transportation for his family to get to appointments, run errands and get out into the community.        [facility] Core Service Provided:  Comprehensive Care Management: utilized the electronic medical record / patient registry to identify and support patient's health conditions / needs more effectively   Care Transitions: focused on the coordinated and seamless movement of patient between or within different levels of care or settings  Care Coordination: provided care management services/referrals necessary to ensure patient and their identified supports have access to medical,  behavioral health, pharmacology and recovery support services.  Ensured that patient's care is integrated across all settings and services.   Individual and Family Support: aimed to help clients reduce barriers to achieving goals, increase health literacy and knowledge about chronic condition(s), increase self-efficacy skills, and improve health outcomes  Referral to Community and Social Support Services: Provided patient with referrals (see plan section)  Assisted in scheduling an appointment to a referral / service (see plan section)  Coordinated / Confirmed patient's appointment time or referral and transportation plan  Followed-up with patient on whether or not they completed a referral    Current Stressors / Issues / Care Plan Objective Addressed Today:  SWCC and patient were able to meet today for Behavioral Health Home ([facility]) monthly check-in via telehealth visit. All required ROIs have been filed with HIM/patient chart.    1. Patient reports he found a new job and is working 35 hours per week at a local [employer]. Patient reports he continues to work at the [employer] 10 hours per week. Patient reports he may decide not to pursue SSDI claim. [facility] provided support/encouragement and motivational interviewing today.    2. Patient reports he is really happy with the support of his care team and CD treatment to be able to put his life back together. Patient reports he continues to work with his [facility] counselor weekly. Patient reports he has stopped DBT, as he is working almost full-time. Patient reports he feels like he is managing his new job and things at home better than he expected and is proud of himself. Patient reports he continues to have UAs every two weeks.    3. Patient reports he is going to the gym and working out to help with his physical health and help to alleviate some of his mental health symptoms.    4. Patient reports he continues to meet with [facility] every week, his therapist several times  "per month and his psychiatrist every 6-8 weeks.    5. Patient reports his relationship with his s.o. continues to be \"waylon.\"  Patient reports his s.o. is now pregnant with their second child. Patient reports he is nervous about pregnancy, even though they wanted to have another child, but just not right now.     6. Patient reports his mood is stable since new medication changes. Patient reports anxiety is manageable and he is taking his medication as directed. Patient reports sleep has improved and he is sleeping better. Patient continues to attain his sobriety.    7. Patient reports sleep study went well and will now be seeing sleep psychologist to discuss insomnia and sleep walking.    Intervention:  Motivational Interviewing: Expressed Empathy/Understanding, Supported Autonomy, Collaboration, Evocation, Permission to raise concern or advise, Open-ended questions, Reflections: simple and complex, Change talk (evoked) and Reframe   Target Behavior(s): Explored thoughts about taking an anti-depressant, Explored and resolved challenges related to taking anti-depressants as prescribed, Explored thoughts and readiness to participate in individual therapy, Explored and resolved challenges to attending appointments as scheduled, Explored patient's knowledge of the impact of exercise on mood, Explored current exercise activities and thoughts about how this influences mood, Explored current social supports and reinforced opportunities to increase engagement, Explored current sleep hygeine and patient's perception and knowledge of relationship to mood and Explored patient's perception of how alcohol and / or drugs influences mood    Assessment: (Progress on Goals / Homework):  Patient would benefit from continued coordination in reaching their goals set for the Behavioral Health Home ([facility]) program. [facility] reviewed Health Action Plan goals and will continue to monitor progress and work with patient and their care " team.      Plan: (Homework, other):  Patient was encouraged to continue to seek condition-related information and education.      Scheduled a Phone follow up appointment with ANGI EDUARDO in 4 weeks     Patient has set self-identified goals and will monitor progress until the next appointment on: 07/19/2021.         SARA Irwin [facility]  Behavioral Health Home ([facility])   [facility]  [phone]  June 25, 2021  10:30 AM

## 2021-07-01 ENCOUNTER — ALLIED HEALTH/NURSE VISIT (OUTPATIENT)
Dept: BEHAVIORAL HEALTH | Facility: CLINIC | Age: 31
End: 2021-07-01
Payer: COMMERCIAL

## 2021-07-01 ENCOUNTER — TELEPHONE (OUTPATIENT)
Dept: PSYCHOLOGY | Facility: CLINIC | Age: 31
End: 2021-07-01

## 2021-07-01 DIAGNOSIS — R69 DIAGNOSIS DEFERRED: Primary | ICD-10-CM

## 2021-07-01 NOTE — PROGRESS NOTES
Behavioral Health Home Services  [facility]: [place]        Social Work Care Navigator Note      Patient: Rao Leavitt  Date: July 1, 2021  Preferred Name: Rao    Previous PHQ-9:   PHQ-9 SCORE 4/15/2021 4/26/2021 6/8/2021   PHQ-9 Total Score MyChart - - -   PHQ-9 Total Score 16 14 10   Some encounter information is confidential and restricted. Go to Review Flowsheets activity to see all data.     Previous AAYUSH-7:   AAYUSH-7 SCORE 4/15/2021 4/26/2021 6/8/2021   Total Score - - -   Total Score 16 9 9   Some encounter information is confidential and restricted. Go to Review Flowsheets activity to see all data.     NOMAN LEVEL:  No flowsheet data found.    Preferred Contact:  Need for : No  Preferred Contact: Cell      Type of Contact Today: Phone call (patient / identified key support person reached)      Data: (subjective / Objective):  Recent ED/IP Admission or Discharge?   None    Patient Goals:  Goal Areas: Health;Mental Health;Chemical Health;Financial and Social Service Benefits;Transportation  Patient stated goals: Patient would like assistance with his physical, mental and chemical health for creating a balanced and healthy lifestyle. Patient would like assistance with housing, SSDI and social service assistance to aid with "[employer] benefits to increase his financial stability. Patient would like assistance with reliable transportation for his family to get to appointments, run errands and get out into the community.        [facility] Core Service Provided:  Comprehensive Care Management: utilized the electronic medical record / patient registry to identify and support patient's health conditions / needs more effectively   Care Transitions: focused on the coordinated and seamless movement of patient between or within different levels of care or settings  Care Coordination: provided care management services/referrals necessary to ensure patient and their identified supports have access to medical,  behavioral health, pharmacology and recovery support services.  Ensured that patient's care is integrated across all settings and services.   Individual and Family Support: aimed to help clients reduce barriers to achieving goals, increase health literacy and knowledge about chronic condition(s), increase self-efficacy skills, and improve health outcomes  Referral to Community and Social Support Services: Provided patient with referrals (see plan section)  Assisted in scheduling an appointment to a referral / service (see plan section)  Coordinated / Confirmed patient's appointment time or referral and transportation plan  Followed-up with patient on whether or not they completed a referral    Current Stressors / Issues / Care Plan Objective Addressed Today:  SWCC and patient were able to meet today for Behavioral Health Home ([facility]) monthly check-in via telehealth visit. All required ROIs have been filed with HIM/patient chart.    1. Patient called today to seek assistance with community and financial supports. Patient reports due to s.o.'s pregnancy she has not been able to work as much and this has affected their ability to pay their bills and get their car repaired.    [facility] was able to place referral to [employer] for gas card and additional resources today. [facility] emailed resources for [facility], [employer], [employer], low cost repair including [employer] agency. [facility] provided support/empathy, education, resources and coping skills today.    Intervention:  Motivational Interviewing: Expressed Empathy/Understanding, Supported Autonomy, Collaboration, Evocation, Permission to raise concern or advise, Open-ended questions, Reflections: simple and complex, Change talk (evoked) and Reframe   Target Behavior(s): Explored current social supports and reinforced opportunities to increase engagement    Assessment: (Progress on Goals / Homework):  Patient would benefit from continued coordination in reaching their  goals set for the Behavioral Health Home ([facility]) program. [facility] reviewed Health Action Plan goals and will continue to monitor progress and work with patient and their care team.      Plan: (Homework, other):  Patient was encouraged to continue to seek condition-related information and education.      Scheduled a Phone follow up appointment with ANGI  in 2 weeks     Patient has set self-identified goals and will monitor progress until the next appointment on: 07/19/2021.         SARA Irwin [facility]  Behavioral Health Home ([facility])   [employer]  [phone]  July 1, 2021  2:03 PM

## 2021-07-19 ENCOUNTER — ALLIED HEALTH/NURSE VISIT (OUTPATIENT)
Dept: BEHAVIORAL HEALTH | Facility: CLINIC | Age: 31
End: 2021-07-19
Payer: COMMERCIAL

## 2021-07-19 DIAGNOSIS — R69 DIAGNOSIS DEFERRED: Primary | ICD-10-CM

## 2021-07-19 ASSESSMENT — ANXIETY QUESTIONNAIRES
2. NOT BEING ABLE TO STOP OR CONTROL WORRYING: SEVERAL DAYS
6. BECOMING EASILY ANNOYED OR IRRITABLE: SEVERAL DAYS
GAD7 TOTAL SCORE: 9
5. BEING SO RESTLESS THAT IT IS HARD TO SIT STILL: SEVERAL DAYS
4. TROUBLE RELAXING: NEARLY EVERY DAY
1. FEELING NERVOUS, ANXIOUS, OR ON EDGE: SEVERAL DAYS
3. WORRYING TOO MUCH ABOUT DIFFERENT THINGS: SEVERAL DAYS
7. FEELING AFRAID AS IF SOMETHING AWFUL MIGHT HAPPEN: SEVERAL DAYS
IF YOU CHECKED OFF ANY PROBLEMS ON THIS QUESTIONNAIRE, HOW DIFFICULT HAVE THESE PROBLEMS MADE IT FOR YOU TO DO YOUR WORK, TAKE CARE OF THINGS AT HOME, OR GET ALONG WITH OTHER PEOPLE: SOMEWHAT DIFFICULT

## 2021-07-19 ASSESSMENT — PATIENT HEALTH QUESTIONNAIRE - PHQ9: SUM OF ALL RESPONSES TO PHQ QUESTIONS 1-9: 7

## 2021-07-19 NOTE — PROGRESS NOTES
Behavioral Health Home Services  [facility] Clinic: [place]        Social Work Care Navigator Note      Patient: Rao Leavitt  Date: July 19, 2021  Preferred Name: Rao    Previous PHQ-9:   PHQ-9 SCORE 4/26/2021 6/8/2021 7/19/2021   PHQ-9 Total Score MyChart - - -   PHQ-9 Total Score 14 10 7   Some encounter information is confidential and restricted. Go to Review Flowsheets activity to see all data.     Previous AAYUSH-7:   AAYUSH-7 SCORE 4/26/2021 6/8/2021 7/19/2021   Total Score - - -   Total Score 9 9 9   Some encounter information is confidential and restricted. Go to Review Flowsheets activity to see all data.     NOMAN LEVEL:  No flowsheet data found.    Preferred Contact:  Need for : No  Preferred Contact: Cell      Type of Contact Today: Phone call (patient / identified key support person reached)      Data: (subjective / Objective):    Patient came in to complete the comprehensive wellness assessment for Behavioral Health Home Services.  See [facility] Flowsheets for details on the assessment.  See [facility], [facility] for a copy of the patient's care plan.    Patient completed PHQ-9 and AAYUSH-7 assessments today.    Patient stated Goal Areas:   Health;Mental Health;Chemical Health;Financial and Social Service Benefits     Patient stated goals:   Rao would like assistance with his physical, mental and chemical health for creating a balanced and healthy lifestyle. Rao would like assistance with financial counseling and social service assistance to aid with [facility] benefits to increase his financial stability.       Rao would like assistance with his physical, mental and chemical health for creating a balanced and healthy lifestyle. Patient states he is currently in outpt treatment for CD with [facility] and attends three times per week plus has weekly individual supervision with Leslye Westfall.  Patients states his sobriety and mental health are a priority for him right now. Update -  "Patient has completed outpt CD treatment and is meeting weekly with [facility] Leslye Westfall. Patient now receives additional community mental health support from his [facility] provider through [facility], once per week. Patient receives additional therapeutic support to manage his mental health symptoms with his [facility] therapist once a month. Patient states he appreciates [facility] to assist with care coordination, as patient states he has a lot of providers right now.     Patient continues to meet with his psychiatrist. Patient reports his mood has been stable and now that he has been sober for a year and half, he would like to talk to his psychiatrist about weaning off medications, if appropriate for him. Patient reports he feels stable on his current medications with tiredness during the day.  Patient reports he has completed GeneSight and neurologic/cognitive testing with an updated Diagnostic Assessment with two additional diagnosis being added.      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 Patient states he can now think and has a \"sober brain.\" Patient reports he had a recent set back in the past year with his sobriety, but has been able to regain his sobriety for the past 8-9 months. Patient continues to work with [facility] therapist. Patient states this has improved his relationships with family, friends and professionals. Patient states he he has completed his probation. Patient states he has worked hard for this. Patient states his intent is to continue with counseling and CD supports. [facility] and patient to work together to manage this.     Patient reports he needs to make a dental appt and does have a dentist, as it has been over a year since he has been. Patient reports he will be calling to set up appt.    Patient continues to report relational stressors with family, significant other and s.o.'s mother. Patient reports he appreciates the support/empathy, coping skills and healthy " relationship/boundaries provided by [facility]. [facility] will continue to provide support/skills/motivational interviewing.     Rao would like assistance with housing, SSDI and social service assistance to aid with county benefits to increase his financial stability.  Patient states due to his recovery, treatment, mental and physical health concerns he has not been able to work for almost a year. Patient states he and his daughter will be looking for a new place to stay, as he an his s.o. have decided he will need to find a new place to live soon. Patient and [facility] will be working on this weekly. Patient states has applied for SSDI and his  has just filed an appeal. Update - Patient has made a great deal of progress in this area and now has secure housing, employment and has started with financial counseling for debt repair. Patient reports he will be signing 12 month lease this month. Patient reports apartment is just ok but it is a start.     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 Patient reports he is working over 50 hours a week. Patient reports he is doing well with this and his daughter is receiving  with his s.o.'s mother. Patient reports they are expecting their second child and are preparing their finances, as she was on bed rest with their last child. Patient reports he has started working with a financial counselor at his bank to repair his credit.    Patient reports due to now being able to manage his personal life, sobriety/mental health, new job and secure housing, he most likely will not continue with SSDI claim.  Patient reports he has court date next week but will likely cancel this. Patient reports he continues to receive [employer] medical benefits and his s.o. and daughter receive [employer] and SNAP.  [facility] will continue to provide education, support/empathy and resources.     Transportation Goal - has been met. Patient reports they recently purchased a  newer used van that is reliable. Patient reports he is able to go to work and get out into the community.        Patient stated strengths:     Rao states his strengths are being a good dad; he has grown a lot since he has been in treatment; and he is somewhat understanding of others.    Patient has made a significant amount of progress in the past six months but would like to continue to receive Behavioral Health Home (BH) services, as job, housing, mental/physical health, relationship (pregnancy with s.o) and financial concerns are new and stressors are just starting to lessen. [facility] and patient will continue to address need/request of services moving forward, as appropriate.  Patient appreciates the continued care coordination and support of the Behavioral Health Home (BHH) program.    SARA Irwin [facility]  Behavioral Health Home ([facility])   [facility]  [phone]  July 19, 2021  1:37 PM      Health Action Plan approved by Behavioral Health Clinician 7/20/2021. [facility] sent patient a copy of approved HAP in the mail. Next Health Action Plan review due in January of 2022.    ARCELIA Irwin  7/20/2021  10:50 AM

## 2021-07-19 NOTE — LETTER
Behavioral Health Home ([facility]): Health Action Plan  [facility]: [place]    [facility]      Name: Rao Leavitt  Preferred Name: Rao  : 1990  MRN: [id number]      My Goals  Goal Areas: Health;Mental Health;Chemical Health;Financial and Social Service Benefits    Patient stated goals: Rao would like assistance with his physical, mental and chemical health for creating a balanced and healthy lifestyle. Rao would like assistance with financial counseling and social service assistance to aid with county benefits to increase his financial stability.    Strengths related to each goal: Rao states his strengths are being a good dad; he has grown a lot since he has been in treatment; and he is somewhat understanding of others.    Services and Supports Needed: The [facility] Team will provide monthly contact with patient in order to monitor progress towards goals.    Activities / Actions of Team to support goal(s): [facility] team will locate appropriate resources that align with patient's goals and promote health and wellness.    Activities / Actions of Patient / Parent / Guardian to support goal(s): It is a requirement that patient's primary care physician is through the [facility] system and that they are on Medical Assistance/Medicaid. If either of these were to change or if patient needs any type of assistance, they are to reach out to their Behavioral Health Home ([facility]) team.        Recommended Referral  Tobacco cessation referrals made?: No  Mental Health / Chemical Dependency Referrals: Yes  Substance Use Referrals: MICD  Mental Health Referrals: MH Services: [facility], [facility], Community MH Provider;[facility];Psychiatry  Community Health Referrals: [facility] ;Other (see comments)          My Team Members and Their Contact Information  Patient Care Team       Relationship Specialty Notifications Start End    Suzi Jaime NP PCP - General Nurse Practitioner -  Family  9/24/19     Phone: [phone] Fax: [phone]         [address] [address]    Suzi Jaime NP Assigned PCP   1/19/20     Phone: [phone] Fax: [phone]         [address] [address]    Bree Grullon NP Nurse Practitioner Nurse Practitioner Admissions 7/30/20     Psychiatric prescriber     Phone: [phone] Fax: [phone]         [address] [place] [address]    Varghese Alvarez LSW  Clinic Admissions 7/30/20     [facility] [phone]    Leslye Westfall Therapist Chemical Dependency  7/30/20     [email or web address] CD Therapist    Phone: [phone]         Rita Cosby Probation/   8/11/20     [facility]  until Oct 1, 2020    Phone: [phone]         Kanwal Tyler - [facility] worker   10/6/20     [facility] - [facility] worker    Phone: [phone] Fax: [phone]        Bree Grullon NP Assigned Behavioral Health Provider   10/23/20     Phone: [phone] Fax: [phone]         [address] [place] [address]    Meg Reyna MD Assigned Neuroscience Provider   1/17/21     Phone: [phone] Fax: [phone]         [address] [facility] [address]    Caesar Tellez PA-C Assigned Musculoskeletal Provider   6/20/21     Phone: [phone] Pager: [phone] Fax: [phone] [address] [facility] [address]          My Wellness Plan  Safety Concerns: None Reported / Observed  Recommendations / Plan for safety concerns: A safety and risk management plan has not been developed at this time, however patient was encouraged to call County Crisis / 911 should there be a change in any of these risk factors.  Crisis Plan (emergencies / when urgent support needed): Patient states he feels comfortable contacting his mother Renetta or calling 911 in a crisis or emergency  situation.          Rao Leavitt co-developed the Health Action Plan with the [facility] Team and received a copy of this document.  Date Health Action Plan Completed/Updated: 07/19/21

## 2021-07-19 NOTE — LETTER
"[facility] [place] ([facility]): Health Action Plan  [facility]: [place]    [facility] Beyond      Name: Rao BLANCAS Isaiahfelix  Preferred Name: Rao  : 1990  MRN: [id number]    2021    [facility]  [address] [place] [address]    Dear Rao,    I have enclosed the Health Action Plan (HAP) that we completed together that includes your goals for Behavioral Health Home ([facility]) Services. As you continue being enrolled in Behavioral Health Home ([facility]) services, I wanted to give you some information so you know what to expect moving forward.    What to Expect on a Monthly Basis: It is a requirement that I make contact with you on a monthly basis (by phone or meeting with you in clinic) to check in on how things are going and if you need any assistance. Please feel free to reach out to your [facility] team between these contacts if you need assistance or have any questions.    What to Expect every Six Months: Every six months, it is a requirement that we complete a Health Action Plan (HAP) review. During this in-clinic appointment, we will monitor our progress towards existing goals and set new goals for the next 6 month time period.    What kinds of support can [facility] offer now that I am enrolled?   - Housing Coordination  - Transportation Resources  - Financial Resources  - Coordination with the [facility] for Benefits (MA, SNAP benefits, etc)  - Disability Eligibility and Benefits  - Employment and Education Coordination  - Disability Related Information and Education Resources  - Referrals for mental health services, chemical dependency assessment/treatment, etc     If you or someone you know is experiencing a mental health crisis and you need help, the following crisis hotlines are available to help.    If you are in immediate danger, call 911.  Suicide Prevention Lifeline: 8-692-353-TALK (3675)  Crisis Text Line Service: Text \"[place]\" to 435545.    [facility]" [facility] Emergency Department - Behavioral Emergency Center  [address], [place] [address]  [phone] [facility] [employer] [facility] Crisis Response Services (Adults & Children)  [phone]   [facility] - Acute Psychiatric Services (APS)  Assessment & Referral: [phone]  Suicide Hotline: 715.665.7156 [facility]  [phone]   [facility] Adult Mental Health Services   [phone]  Referrals: [phone]  Crisis: [phone] [facility] - Emergency Department  [address] [place] [address]  [phone]   [place] LinkVet  0-883-OveoPxv (1-683.624.9164) [facility] Mental Health and Resource Crisis Line  [phone]   [facility] - Community Outreach for Psychiatric Emergencies  [phone] [facility] Crisis Services - [facility] Adult Mental Health Services - Crisis Services (24/7)  Information & Referrals: [phone]  Crisis Line: [phone]   [facility] Emergency Center (24/7)  [phone] TDD Crisis Help Line Serving [facility]  [phone]  or [facility]  to 414330    [facility] and Human [facility]  Mental Health  [address] [place] [address]  [phone]  [address] [place] [address]  [phone]  web: [email or web address].  Mental-Health    [facility]  Mental Health  [address] [place] [address]  [phone]  web: [facility]  Family Services  [address], [place] [address]  [phone]  web: [facility] Family [facility]   Family Services  [address] [place] [address]  [phone]  web: [email or web address] mental [email or web address]    [facility] and Human Services Department  [address] [facility] [address], [place]  [address]  [phone]  [address] [place] [address]  [phone]  Toll Free: [phone]  web: [email or web address].[email or web address] and human services     Please let me know if you have any questions or if there is anything that we can assist with. I can be reached by phone, iTwinhart message, or by email. I look forward to continuing to work with you!    Sincerely,          SARA [facility]  [facility] [place] ([facility])   [phone]  [email or web address]

## 2021-07-20 ASSESSMENT — ANXIETY QUESTIONNAIRES: GAD7 TOTAL SCORE: 9

## 2021-07-27 ENCOUNTER — ALLIED HEALTH/NURSE VISIT (OUTPATIENT)
Dept: BEHAVIORAL HEALTH | Facility: CLINIC | Age: 31
End: 2021-07-27
Payer: COMMERCIAL

## 2021-07-27 DIAGNOSIS — R69 DIAGNOSIS DEFERRED: Primary | ICD-10-CM

## 2021-07-27 NOTE — PROGRESS NOTES
Behavioral Health Home Services  [facility]: [place]        Social Work Care Navigator Note      Patient: Rao Leavitt  Date: July 27, 2021  Preferred Name: Rao    Previous PHQ-9:   PHQ-9 SCORE 4/26/2021 6/8/2021 7/19/2021   PHQ-9 Total Score MyChart - - -   PHQ-9 Total Score 14 10 7   Some encounter information is confidential and restricted. Go to Review Flowsheets activity to see all data.     Previous AAYUSH-7:   AAYUSH-7 SCORE 4/26/2021 6/8/2021 7/19/2021   Total Score - - -   Total Score 9 9 9   Some encounter information is confidential and restricted. Go to Review Flowsheets activity to see all data.     NOMAN LEVEL:  No flowsheet data found.    Preferred Contact:  Need for : No  Preferred Contact: Cell      Type of Contact Today: Phone call (patient / identified key support person reached)      Data: (subjective / Objective):  Recent ED/IP Admission or Discharge?   None    Patient Goals:  Goal Areas: Health;Mental Health;Chemical Health;Financial and Social Service Benefits  Patient stated goals: Rao would like assistance with his physical, mental and chemical health for creating a balanced and healthy lifestyle. Rao would like assistance with financial counseling and social service assistance to aid with county benefits to increase his financial stability.        [facility] Core Service Provided:  Comprehensive Care Management: utilized the electronic medical record / patient registry to identify and support patient's health conditions / needs more effectively   Care Transitions: focused on the coordinated and seamless movement of patient between or within different levels of care or settings  Care Coordination: provided care management services/referrals necessary to ensure patient and their identified supports have access to medical, behavioral health, pharmacology and recovery support services.  Ensured that patient's care is integrated across all settings and services.   Individual and  Family Support: aimed to help clients reduce barriers to achieving goals, increase health literacy and knowledge about chronic condition(s), increase self-efficacy skills, and improve health outcomes  Referral to Community and Social Support Services: Provided patient with referrals (see plan section)  Assisted in scheduling an appointment to a referral / service (see plan section)  Coordinated / Confirmed patient's appointment time or referral and transportation plan  Followed-up with patient on whether or not they completed a referral    Current Stressors / Issues / Care Plan Objective Addressed Today:  SWCC and patient were able to meet today for Behavioral Health Home ([facility]) monthly check-in via telehealth visit. All required ROIs have been filed with HIM/patient chart.    1. 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 Patient reports he has quit smoking/chewing. Patient reports he would like to try weaning off mental health medications to see what his new baseline might be. SWCC messaged provider today.    2. Patient reports he is working about 50 hours a week and taking pride in his work. 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    Patient reports he is excited for his s.o.'s pregnancy and looking forward to being the father of two. Patient reports relationship with his s.o. continues to be complicated. Patient reports he wishes his relationship with his s.o. would be better. SWCC provided support/empathy, coping skills and discussed healthy relationships/boundaries today.    3. Patient reports his s.o.'s ex is moving back to the [place] to move closer to their child. Patient reports he is not happy about this development but it is out of his hands. SWCC and patient reflected how much growth in the past year the patient has had with emotional regulation, being able to identify triggers for himself and  creating healthy boundaries with the people in his life.    Intervention:  Motivational Interviewing: Expressed Empathy/Understanding, Supported Autonomy, Collaboration, Evocation, Permission to raise concern or advise, Open-ended questions, Reflections: simple and complex, Rolled with resistance: Emphasized patient autonomy, Simple reflection, Complex reflection, Amplified reflection (weaker or stronger meaning), Reframed sustain talk in the direction of change and Evoked patient agenda, Change talk (evoked) and Reframe   Target Behavior(s): Explored thoughts about taking an anti-depressant, Explored and resolved challenges related to taking anti-depressants as prescribed, Explored thoughts and readiness to participate in individual therapy, Explored and resolved challenges to attending appointments as scheduled and Explored current social supports and reinforced opportunities to increase engagement    Assessment: (Progress on Goals / Homework):  Patient would benefit from continued coordination in reaching their goals set for the Behavioral Health Home ([facility]) program. ANGICC reviewed Health Action Plan goals and will continue to monitor progress and work with patient and their care team.      Plan: (Homework, other):  Patient was encouraged to continue to seek condition-related information and education.      Scheduled a Phone follow up appointment with ANGI EDUARDO in 3 weeks     Patient has set self-identified goals and will monitor progress until the next appointment on: 08/18/2021.         SARA [facility]  Behavioral Health Home ([facility])   [facility]  [phone]  July 27, 2021  8:43 AM

## 2021-08-04 ENCOUNTER — TELEPHONE (OUTPATIENT)
Dept: PSYCHOLOGY | Facility: CLINIC | Age: 31
End: 2021-08-04

## 2021-08-10 NOTE — PROGRESS NOTES
"Collaborative Care Psychiatry Service (CCPS)  August 11, 2021    Behavioral Health Clinician Progress Note    Patient Name: Rao Leavitt      Telemedicine Visit: The patient's condition can be safely assessed and treated via synchronous audio and visual telemedicine encounter.      Reason for Telemedicine Visit: Services only offered telehealth    Originating Site (Patient Location): Patient's home    Distant Site (Provider Location): Provider Remote Setting- Home Office    Consent:  The patient/guardian has verbally consented to: the potential risks and benefits of telemedicine (video visit) versus in person care; bill my insurance or make self-payment for services provided; and responsibility for payment of non-covered services.     Mode of Communication:  phone The patient has been notified of the following:      \"We have found that certain health care needs can be provided without the need for a face to face visit.  This service lets us provide the care you need with a phone conversation.       I will have full access to your [place] medical record during this entire phone call.   I will be taking notes for your medical record.      Since this is like an office visit, we will bill your insurance company for this service.       There are potential benefits and risks of telephone visits (e.g. limits to patient confidentiality) that differ from in-person visits.?  Confidentiality still applies for telephone services, and nobody will record the visit.  It is important to be in a quiet, private space that is free of distractions (including cell phone or other devices) during the visit.??      If during the course of the call I believe a telephone visit is not appropriate, you will not be charged for this service\"     Consent has been obtained for this service by care team member: Yes     As the provider I attest to compliance with applicable laws and regulations related to telemedicine.         Service " Type:  Individual      Service Location:   [employer] / Email (patient reached)     Session Start Time: 245pm  Session End Time: 310pm      Session Length: 16 - 37      Attendees: Patient    Visit Activities (Refresh list every visit): [facility] Only    Diagnostic Assessment Date: 09/24/2019  See Flowsheets for today's PHQ-9 and AAYUSH-7 results  Previous PHQ-9:   PHQ-9 SCORE 4/26/2021 6/8/2021 7/19/2021   PHQ-9 Total Score MyChart - - -   PHQ-9 Total Score 14 10 7   Some encounter information is confidential and restricted. Go to Review Flowsheets activity to see all data.       Previous AAYUSH-7:   AAYUSH-7 SCORE 4/26/2021 6/8/2021 7/19/2021   Total Score - - -   Total Score 9 9 9   Some encounter information is confidential and restricted. Go to Review Flowsheets activity to see all data.       WHODAS  WHODAS 2.0 Total Score 9/24/2019 12/16/2020   Total Score 34 42   Total Score MyChart - 42        AUDIT  No flowsheet data found.    DATA  Extended Session (60+ minutes): No  Interactive Complexity: No  Crisis: No    Medication Compliance:  Yes      Chemical Use Review:   Substance Use: Chemical use reviewed, no active concerns identified      Tobacco Use: No current tobacco use.      Current Stressors / Issues:  MH update: Sober from etoh for over a year and 10 months from drugs. Unsure his mental health dx is correct because of the influence of drugs and alcohol. States that he has no refills for his meds and is in withdrawal. Since being off lithium he's noticed that he's been pushing people away but otherwise feels better being off lithuim. He's in ELAYNE treatment at [facility] in outpt. Reports hx of concussion age 10 or 11. Reports that he has good support system ([facility], family, friends). Encouraged to use support system to help maintain sobriety.   ELAYNE: no-sober from etoh over a year and off drugs 10 months.   Preg: NA  Most important: Drowsiness. Decreasing some doses of some meds or eliminating some of the  "medication if possible. He is wondering about getting off meds altogether to better assess his mental health needs from \"scratch\".       Progress on Treatment Objective(s) / Homework:  No improvement - PREPARATION (Decided to change - considering how); Intervened by negotiating a change plan and determining options / strategies for behavior change, identifying triggers, exploring social supports, and working towards setting a date to begin behavior change    Motivational Interviewing    MI Intervention: Co-Developed Goal: ways to use support system to maintain sobriety, Expressed Empathy/Understanding, Supported Autonomy, Collaboration, Evocation, Permission to raise concern or advise, Open-ended questions, Reflections: simple and complex, Change talk (evoked) and Reframe     Change Talk Expressed by the Patient: Desire to change Ability to change Reasons to change Need to change Committment to change Activation Taking steps    Provider Response to Change Talk: E - Evoked more info from patient about behavior change, A - Affirmed patient's thoughts, decisions, or attempts at behavior change, R - Reflected patient's change talk and S - Summarized patient's change talk statements        Review of Symptoms per patient report:  Depression: No symptoms  Joy:  No Symptoms  Psychosis: No Symptoms  Anxiety: No Symptoms  Panic:  No symptoms  Post Traumatic Stress Disorder:  No Symptoms   Eating Disorder: No Symptoms  ADD / ADHD:  No symptoms  Conduct Disorder: No symptoms  Autism Spectrum Disorder: No symptoms  Obsessive Compulsive Disorder: No Symptoms      Changes in Health Issues:   None reported    Assessment: Current Emotional / Mental Status (status of significant symptoms):  Risk status (Self / Other harm or suicidal ideation)  Patient denies a history of suicidal ideation, suicide attempts, self-injurious behavior, homicidal ideation, homicidal behavior and and other safety concerns  Patient denies current fears or " concerns for personal safety.  Patient denies current or recent suicidal ideation or behaviors.  Patient denies current or recent homicidal ideation or behaviors.  Patient denies current or recent self injurious behavior or ideation.  Patient denies other safety concerns.  A safety and risk management plan has not been developed at this time, however patient was encouraged to call [place] [phone] should there be a change in any of these risk factors.    Appearance:   unable to assess (phone)   Eye Contact:   unable to assess (phone)   Psychomotor Behavior: unable to assess (phone)   Attitude:   Cooperative   Orientation:   All  Speech   Rate / Production: Normal    Volume:  Normal   Mood:    Normal  Affect:    unable to assess (phone)   Thought Content:  Clear   Thought Form:  Coherent  Logical   Insight:    Good     Diagnoses:  1. Bipolar 1 disorder (H)        Collateral Reports Completed:  [facility]    Plan: (Homework, other):  Patient was given information about behavioral services and encouraged to schedule a follow up appointment with the clinic [facility].  He was also given information about mental health symptoms and treatment options  and strategies to maintain sobriety.  CD Recommendations: CD Treatment at [facility] (currently in treatment).  Trisha RUIZ [facility]      [place]  August 11, 2021

## 2021-08-11 ENCOUNTER — VIRTUAL VISIT (OUTPATIENT)
Dept: PSYCHIATRY | Facility: CLINIC | Age: 31
End: 2021-08-11
Payer: COMMERCIAL

## 2021-08-11 ENCOUNTER — VIRTUAL VISIT (OUTPATIENT)
Dept: BEHAVIORAL HEALTH | Facility: CLINIC | Age: 31
End: 2021-08-11
Payer: COMMERCIAL

## 2021-08-11 DIAGNOSIS — F31.9 BIPOLAR 1 DISORDER (H): Primary | ICD-10-CM

## 2021-08-11 DIAGNOSIS — F41.1 GENERALIZED ANXIETY DISORDER: ICD-10-CM

## 2021-08-11 PROCEDURE — 99214 OFFICE O/P EST MOD 30 MIN: CPT | Mod: 95 | Performed by: NURSE PRACTITIONER

## 2021-08-11 PROCEDURE — 90832 PSYTX W PT 30 MINUTES: CPT | Mod: 95 | Performed by: SOCIAL WORKER

## 2021-08-11 RX ORDER — LITHIUM CARBONATE 300 MG
300 TABLET ORAL
Qty: 60 TABLET | Refills: 3 | Status: SHIPPED | OUTPATIENT
Start: 2021-08-11 | End: 2021-10-18

## 2021-08-11 RX ORDER — PROPRANOLOL HYDROCHLORIDE 40 MG/1
40 TABLET ORAL 2 TIMES DAILY
Qty: 60 TABLET | Refills: 3 | Status: SHIPPED | OUTPATIENT
Start: 2021-08-11 | End: 2021-10-18 | Stop reason: DRUGHIGH

## 2021-08-11 NOTE — PATIENT INSTRUCTIONS
1.  Olanzapine discontinued    2.  Clomipramine discontinued    3.  Lithium decreased to 300 mg twice daily    4.  Propranolol decreased to 40 mg twice daily    5.  Speak with your neurologist about decreasing your dose of topiramate in the future    6.  Continue all therapies including behavior health home care services      Continue all other medications as reviewed per electronic medical record today.     Safety plan reviewed. To the Emergency Department as needed or call after hours crisis line at [phone] or [phone]. [place] Crisis Text Line. Text [place] to 052704 or Suicide LifeLine Chat: suicideCenterPoint - [employer].org/chat/    To schedule individual or family therapy, call [facility] at [phone].    Schedule an appointment with me in 2 months or sooner as needed. Call [facility] at [phone] to schedule.    Follow up with primary care provider as planned or for acute medical concerns.    Call the psychiatric nurse line with medication questions or concerns at [phone].    Think Skyhart may be used to communicate with your provider, but this is not intended to be used for emergencies.    Crisis Resources:    National Suicide Prevention Lifeline: 453.659.7327 (TTY: 865.988.4916). Call anytime for help.  (www.suicidepreventionlifeline.org)  National [place] on Mental Illness (www.nadege.org): 228.897.7607 or 094-332-3787.   Mental Health Association (www.mentalhealth.org): 733.345.2547 or 881-509-6920.  [place] Crisis Text Line: Text [place] to 861956  Suicide LifeLine Chat: suicideCenterPoint - [employer].org/chat

## 2021-08-11 NOTE — PROGRESS NOTES
"    Outpatient Psychiatric Progress Note    Name: Rao Leavitt   : 1990                    Primary Care Provider: Suzi Jaime NP   Therapist: [facility]     PHQ-9 scores:  PHQ-9 SCORE 2021   PHQ-9 Total Score MyChart - - -   PHQ-9 Total Score 14 10 7   Some encounter information is confidential and restricted. Go to Review Flowsheets activity to see all data.       AAYUSH-7 scores:  AAYUSH-7 SCORE 2021   Total Score - - -   Total Score 9 9 9   Some encounter information is confidential and restricted. Go to Review Flowsheets activity to see all data.       Patient Identification:    Patient is a 31 year old year old, partnered / significant other  White American male  who presents for return visit with me.  Patient is currently employed full time. Patient attended the session alone. Patient prefers to be called: \" Philipp\".    Interim History:    I last saw Rao Leavitt for outpatient psychiatry Return Visit on 2021.     During that appointment, he reported continued concerns about anxiety and his inability to maintain employment because of that.  His propranolol was increased to 60 mg twice daily.  Lucio also suffers from mood dysregulation and he reported ongoing mood swings.  Sometimes this resulted periods of irritability.  I increased his topiramate to 100 mg twice daily.  He continues with night terrors and finds the prazosin ineffective in controlling his nightmares.  I added clomipramine 25 mg at bedtime to help decrease those episodes.  Olanzapine continues at 5 mg at bedtime as well as the lithium 600 mg twice daily for mood dysregulation.  He continues with all therapies through [facility] and I encouraged him to maintain sobriety which she tells me she has been..    .     Current medications include: Apple Ralph Vn-Grn Tea-Bit Or-Cr (APPLE CIDER VINEGAR PLUS) TABS,   clomiPRAMINE (ANAFRANIL) 25 MG capsule, Take 1 capsule " (25 mg) by mouth At Bedtime  lithium (ESKALITH) 600 MG capsule, Take 1 capsule (600 mg) by mouth 2 times daily (with meals)  multivitamin, therapeutic (THERA-VIT) TABS tablet, Take 1 tablet by mouth daily  OLANZapine (ZYPREXA) 5 MG tablet, Take 1 tablet (5 mg) by mouth At Bedtime  propranolol (INDERAL) 60 MG tablet, Take 1 tablet (60 mg) by mouth 2 times daily  topiramate (TOPAMAX) 100 MG tablet, Take 1 tablet (100 mg) by mouth 2 times daily  vitamin B-Complex, Take 1 tablet by mouth daily    No current facility-administered medications on file prior to visit.       The [place] Prescription Monitoring Program has been reviewed and there are no concerns about diversionary activity for controlled substances at this time.      I was able to review most recent Primary Care Provider, specialty provider, and therapy visit notes that I have access to.     Today, patient reports that he is working at [employer] full time.  For the past three months.  He is sleeping fine.   She stopped taking bedtime medications because they were making him overly tired.   He has been off ofo the lithium for about a week.  He feels like he has been doing all right.  He does not want to be on medications and wants to start over with his treatment of his mental health condition.       has a past medical history of Depressive disorder.    Social history updates:    Lucio lives with his girlfriend and has a full-time job now.    Substance use updates:    He reports no alcohol or substance use  Tobacco use: Yes E-cigarettes  Ready to quit?  No  Nicotine Replacement Therapy tried: None    Vital Signs:   There were no vitals taken for this visit.    Labs:    Most recent laboratory results reviewed and no new labs.     Review of Systems:  10 systems (general, cardiovascular, respiratory, eyes, ENT, endocrine, GI, , M/S, neurological) were reviewed. Most pertinent finding(s) is/are: He reports no chest pain, no shortness of breath, no  skin rashes observed, occasional mild headache pain, no reported seizure activity. The remaining systems are all unremarkable.    Mental Status Examination:  Appearance:  awake, alert and casually dressed  Attitude:  cooperative   Eye Contact:  adequate  Gait and Station: No assistive Devices used and No dizziness or falls  Psychomotor Behavior:  fidgeting and intact station, gait and muscle tone  Oriented to:  time, person, and place  Attention Span and Concentration:  Normal  Speech:   clear, coherent and Speaks: English  Mood:  anxious, depressed and better  Affect:  full range  Associations:  no loose associations  Thought Process:  goal oriented  Thought Content:  no evidence of suicidal ideation or homicidal ideation, no auditory hallucinations present and no visual hallucinations present  Recent and Remote Memory:  fair Not formally assessed. No amnesia.  Fund of Knowledge: appropriate  Insight:  fair  Judgment:  fair  Impulse Control:  fair    Suicide Risk Assessment:  Today Rao Leavitt reports that he is having no thoughts to want to end his life or to harm other people. In addition, there are notable risk factors for self-harm, including anxiety, substance abuse and mood change. However, risk is mitigated by commitment to family, history of seeking help when needed, future oriented, denies suicidal intent or plan and denies homicidal ideation, intent, or plan. Therefore, based on all available evidence including the factors cited above, Rao Leavitt does not appear to be at imminent risk for self-harm, does not meet criteria for a 72-hr hold, and therefore remains appropriate for ongoing outpatient level of care.  A thorough assessment of risk factors related to suicide and self-harm have been reviewed and are noted above. The patient convincingly denies suicidality on several occasions. Local community safety resources printed and reviewed for patient to use if needed. There was no deceit  detected, and the patient presented in a manner that was believable.     DSM5 Diagnosis:  296.40 Bipolar I Disorder Current or Most Recent Episode Hypomanic  300.02 (F41.1) Generalized Anxiety Disorder    Medical comorbidities include:   Patient Active Problem List    Diagnosis Date Noted     History of traumatic brain injury 03/02/2021     Priority: Medium     Abnormal EEG 03/02/2021     Priority: Medium      OUTPATIENT SLEEP-DEPRIVED EEG REPORT.  DATE OF RECORDING: January 28, 2021.   IMPRESSION: This outpatient sleep-deprived EEG study is abnormal during wakefulness and drowsiness due to EEG changes that resemble generalized paroxysmal fast activity, that could be potentially epileptiform and seen in generalized epilepsies.  No electrographic or clinical seizures and no paroxysmal behavioral events are recorded during this study.       Attention-deficit hyperactivity disorder 03/01/2021     Priority: Medium     PTSD (post-traumatic stress disorder) 01/05/2021     Priority: Medium     Borderline personality disorder (H) 01/05/2021     Priority: Medium     Chemical dependency (H) 09/10/2020     Priority: Medium     Lithium use 05/15/2020     Priority: Medium     Lumbar spine tumor 11/27/2019     Priority: Medium     High risk medication use 10/05/2017     Priority: Medium     Bipolar 1 disorder (H) 03/16/2017     Priority: Medium     Generalized anxiety disorder 03/16/2017     Priority: Medium       Assessment:    Rao Leavitt informed me that he wants to stop taking his medications now that his lifestyle has changed.  He is remaining sober and now has a full-time job at [employer].  His relationship with his significant other has been evolving.  Lucio denies any depression.  He reports some mild sleep disturbance.  He reports no seizure-like activities.  At this point his olanzapine and clomipramine have been discontinued.  He requested his lithium to be decreased to 300 mg twice daily and agreed to his  propranolol being decreased to 40 mg twice daily.  I referred him to his neurologist to talk about decreasing his dose of topiramate in the future.  Finally, he will continue with behavior health home care services to access community support as well as all other therapies to maintain sobriety..    Medication side effects and alternatives were reviewed. Health promotion activities recommended and reviewed today. All questions addressed. Education and counseling completed regarding risks and benefits of medications and psychotherapy options.    Treatment Plan:        1.  Olanzapine discontinued    2.  Clomipramine discontinued    3.  Lithium decreased to 300 mg twice daily    4.  Propranolol decreased to 40 mg twice daily    5.  Speak with your neurologist about decreasing your dose of topiramate in the future    6.  Continue all therapies including behavior health home care services      Continue all other medications as reviewed per electronic medical record today.     Safety plan reviewed. To the Emergency Department as needed or call after hours crisis line at [phone] or [phone]. [place] Crisis Text Line. Text [place] to 217398 or Suicide LifeLine Chat: suicidepreventionPeach & Lilyline.org/chat/    To schedule individual or family therapy, call [facility] at [phone].    Schedule an appointment with me in 2 months or sooner as needed. Call [facility] at [phone] to schedule.    Follow up with primary care provider as planned or for acute medical concerns.    Call the psychiatric nurse line with medication questions or concerns at [phone].    [employer] may be used to communicate with your provider, but this is not intended to be used for emergencies.    Crisis Resources:    National Suicide Prevention Lifeline: 399.977.8561 (TTY: 940.491.7943). Call anytime for help.  (www.suicidepreventionlifeline.org)  National [place] on Mental Illness (www.nadege.org): 107.525.4311  or 172-912-3163.   Mental Health Association (www.mentalhealth.org): 253.929.6034 or 665-680-6619.  [place] Crisis Text [place]: Text [place] to 559753  Suicide LifeLine Chat: suicidepreventionlifeline.org/chat    Administrative Billing:   Time spent with patient was 30 minutes and greater than 50% of time or 20 minutes was spent in counseling and coordination of care regarding above diagnoses and treatment plan.    Patient Status:  Patient will continue to be seen for ongoing consultation and stabilization.    Signed:   DUYEN Carrington-BC   Psychiatry

## 2021-08-18 ENCOUNTER — TELEPHONE (OUTPATIENT)
Dept: NEUROLOGY | Facility: CLINIC | Age: 31
End: 2021-08-18

## 2021-08-18 ENCOUNTER — ALLIED HEALTH/NURSE VISIT (OUTPATIENT)
Dept: BEHAVIORAL HEALTH | Facility: CLINIC | Age: 31
End: 2021-08-18
Payer: COMMERCIAL

## 2021-08-18 DIAGNOSIS — R69 DIAGNOSIS DEFERRED: Primary | ICD-10-CM

## 2021-08-18 DIAGNOSIS — F31.9 BIPOLAR 1 DISORDER (H): ICD-10-CM

## 2021-08-18 RX ORDER — TOPIRAMATE 100 MG/1
100 TABLET, FILM COATED ORAL 2 TIMES DAILY
Qty: 60 TABLET | Status: CANCELLED | OUTPATIENT
Start: 2021-08-18

## 2021-08-18 RX ORDER — TOPIRAMATE 100 MG/1
TABLET, FILM COATED ORAL
Qty: 45 TABLET | Refills: 2 | Status: SHIPPED | OUTPATIENT
Start: 2021-08-18 | End: 2021-08-19

## 2021-08-18 NOTE — Clinical Note
Hi Dr. Miranda and Bree,    Patient reports he is out of his Topamax rx. Bree had advised patient to contact neurologist to see if he could decrease medication. He did not do this and is now out of medication.     Dr. Miranda - Do you want the patient to decrease?  If so can you contact patient and pharmacy with new rx?  If you do not want him to decrease can you connect with Bree, as it looks like she filled the last rx for him.    Thank you,  SARA Irwin [facility]  [facility] ([facility])   [facility]  [phone]

## 2021-08-18 NOTE — TELEPHONE ENCOUNTER
"Rc'd refill request from [facility] in [place] requesting topiramate (TOPAMAX) 100 MG tablet pharm note stated \"Pt down to tablet-please advise ASAP\"     Called pt who reported he is the process of scheduling with his neurologist to have topiramate decreased. He reported he is experiencing active withdrawal symptoms.      Writer will cue and route to provider high priority, did not fill due to order may be changed or decreased.     Mary Hawthorne on 8/18/2021 at 1:31 PM        "

## 2021-08-18 NOTE — TELEPHONE ENCOUNTER
Neurologist put in order for topiramate. Closing encounter.     Mary Hawthorne on 8/18/2021 at 2:36 PM

## 2021-08-18 NOTE — TELEPHONE ENCOUNTER
Sure I think it would be reasonable to have him continue until we can discuss.  I believe his Topamax dose was 50mg in the morning and 100mg in the evening.  He should resume this.    Thank you,  TIFFANY

## 2021-08-18 NOTE — TELEPHONE ENCOUNTER
I advised Rao of Dr. Miranda' suggestion that he resume his current dose of Topamax until he can see Dr. Miranda.  His appointment is tomorrow. He said he won't have the ability to  the prescription before the pharmacy closes.  He will wait until he has a chance to talk with Dr. Miranda tomorrow.

## 2021-08-18 NOTE — PROGRESS NOTES
Behavioral Health Home Services  [facility]: [place]        Social Work Care Navigator Note      Patient: Rao Leavitt  Date: August 18, 2021  Preferred Name: Rao    Previous PHQ-9:   PHQ-9 SCORE 4/26/2021 6/8/2021 7/19/2021   PHQ-9 Total Score MyChart - - -   PHQ-9 Total Score 14 10 7   Some encounter information is confidential and restricted. Go to Review Flowsheets activity to see all data.     Previous AAYUSH-7:   AAYUSH-7 SCORE 4/26/2021 6/8/2021 7/19/2021   Total Score - - -   Total Score 9 9 9   Some encounter information is confidential and restricted. Go to Review Flowsheets activity to see all data.     NOMAN LEVEL:  No flowsheet data found.    Preferred Contact:  Need for : No  Preferred Contact: Cell      Type of Contact Today: Phone call (patient / identified key support person reached)      Data: (subjective / Objective):  Recent ED/IP Admission or Discharge?   None    Patient Goals:  Goal Areas: Health;Mental Health;Chemical Health;Financial and Social Service Benefits  Patient stated goals: Rao would like assistance with his physical, mental and chemical health for creating a balanced and healthy lifestyle. Rao would like assistance with financial counseling and social service assistance to aid with county benefits to increase his financial stability.        [facility] Core Service Provided:  Comprehensive Care Management: utilized the electronic medical record / patient registry to identify and support patient's health conditions / needs more effectively   Care Transitions: focused on the coordinated and seamless movement of patient between or within different levels of care or settings  Care Coordination: provided care management services/referrals necessary to ensure patient and their identified supports have access to medical, behavioral health, pharmacology and recovery support services.  Ensured that patient's care is integrated across all settings and services.   Individual  and Family Support: aimed to help clients reduce barriers to achieving goals, increase health literacy and knowledge about chronic condition(s), increase self-efficacy skills, and improve health outcomes  Referral to Community and Social Support Services: Provided patient with referrals (see plan section)  Assisted in scheduling an appointment to a referral / service (see plan section)  Coordinated / Confirmed patient's appointment time or referral and transportation plan  Followed-up with patient on whether or not they completed a referral    Current Stressors / Issues / Care Plan Objective Addressed Today:  [facility] and patient were able to meet today for Behavioral Health Home ([facility]) monthly check-in via telehealth visit. All required ROIs have been filed with HIM/patient chart.    1. Patient reports he is out of his Topamax rx. Psychiatry provider advised patient to contact neurologist to see if he could decrease medication. [facility] sent message to provider today and let providers know patient is out of medication.     2. Patient reports his mood has been up and down due to tapering of medications. Patient reports he is now back on lower dose lithium after meeting with psychiatry provider. Patient reports he will continue to taper off medications and return to baseline after now sober for almost a year.    3. Patient reports stressors with his family have increased. Patient reports he feels like he has no support from his parents or siblings. Patient reports he has been working with his [facility] therapist to process his feelings about this. Patient reports he meets with his long term therapist twice a month but missed his last appt.  [facility] offered to make appt today. Patient declined reporting he needs to have a more set schedule at work to meet with his therapist. Patient continues to meet with [facility] provider weekly.    4. Patient reports pregnancy going well with s.o. Patient reports he continues to have a complicated  relationship with his s.o. Patient reports he wishes his relationship with his s.o. would be better. [facility] provided support/empathy, coping skills and discussed healthy relationships/boundaries today.    5. Patient reports he continues to work on his sobriety and continues to meet with his [facility] therapist every other week. Patient reports he is celebrating 10 months since last use of meth, almost 2-years sobriety from alcohol.    6. Patient reports he is working about 50 hours a week and taking pride in his work. 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 Patient reports he is trying to get a set schedule for work and is hoping for Wed. Patient reports he will let [facility].     Intervention:  Motivational Interviewing: Expressed Empathy/Understanding, Supported Autonomy, Collaboration, Evocation, Permission to raise concern or advise, Open-ended questions, Reflections: simple and complex, Rolled with resistance: Emphasized patient autonomy, Simple reflection, Complex reflection, Amplified reflection (weaker or stronger meaning), Shifted topic to defuse resistance, Reframed sustain talk in the direction of change and Evoked patient agenda, Change talk (evoked) and Reframe   Target Behavior(s): Explored thoughts about taking an anti-depressant, Explored and resolved challenges related to taking anti-depressants as prescribed, Explored thoughts and readiness to participate in individual therapy, Explored and resolved challenges to attending appointments as scheduled, Explored current social supports and reinforced opportunities to increase engagement and Explored patient's perception of how alcohol and / or drugs influences mood    Assessment: (Progress on Goals / Homework):  Patient would benefit from continued coordination in reaching their goals set for the Behavioral Health Home ([facility]) program. [facility] reviewed Health Action Plan goals and will continue to monitor progress and work  with patient and their care team.      Plan: (Homework, other):  Patient was encouraged to continue to seek condition-related information and education.      Scheduled a Phone follow up appointment with ANGI EDUARDO in 4 weeks     Patient has set self-identified goals and will monitor progress until the next appointment on: 09/15/2021.         SARA Irwin [facility]  Behavioral Health Home ([facility])   [facility] [place]  [phone]  August 18, 2021  11:05 AM

## 2021-08-18 NOTE — TELEPHONE ENCOUNTER
Dr. Miranda was questioning Oswaldo's comment about decreasing his topiramate.    Oswaldo advised he recently had a sleep study, which was normal.  As a result, he and his psychiatric provider, Bree Grullon, suggested he consider basically tapering off all his medications and starting over.  Bree Grullon wishes to defer to neurology with regard to a tapering schedule.     Oswaldo said he is out of topiramate at this time, and feels he is having withdrawal symptoms, so does need to get refills.  However, he does plan to schedule a neurology visit to discuss, so if you'd rather have him stay at full dose until that time, he's willing to do that.

## 2021-08-18 NOTE — TELEPHONE ENCOUNTER
Date of Last Office Visit: 8/11/21  Date of Next Office Visit: 10/18/21  No shows since last visit: None   Cancellations since last visit: None     Medication requested: topiramate (TOPAMAX) 100 MG tablet   Date last ordered: 6/8/21 Qty: 60 Refills: 1     [place] of [place] ?: N/A      Lapse in medication adherence greater than 5 days?: Yes  If yes, call patient and gather details: Yes  Medication refill request verified as identical to current order?: Yes  Result of Last DAM, VPA, Li+ Level, CBC, or Carbamazepine Level (at or since last visit): N/A    []Medication refilled per  Medication Refill in Ambulatory Care  policy.  [x]Medication unable to be refilled by RN due to criteria not met as indicated below:    []Eligibility - not seen in the last year   []Supervision - no future appointment   []Compliance - no shows, cancellations or lapse in therapy   [x]Verification - order discrepancy   []Controlled medication   []Medication not included in policy   []90-day supply request   []Other

## 2021-08-19 ENCOUNTER — OFFICE VISIT (OUTPATIENT)
Dept: NEUROLOGY | Facility: CLINIC | Age: 31
End: 2021-08-19
Payer: COMMERCIAL

## 2021-08-19 VITALS
WEIGHT: 191 LBS | HEART RATE: 61 BPM | SYSTOLIC BLOOD PRESSURE: 90 MMHG | HEIGHT: 67 IN | DIASTOLIC BLOOD PRESSURE: 61 MMHG | BODY MASS INDEX: 29.98 KG/M2

## 2021-08-19 DIAGNOSIS — F19.10 POLYSUBSTANCE ABUSE (H): ICD-10-CM

## 2021-08-19 DIAGNOSIS — F07.81 POSTCONCUSSIVE SYNDROME: Primary | ICD-10-CM

## 2021-08-19 DIAGNOSIS — F99 CO-OCCURRENCE OF MULTIPLE PSYCHIATRIC DISORDERS: ICD-10-CM

## 2021-08-19 PROCEDURE — 99213 OFFICE O/P EST LOW 20 MIN: CPT | Performed by: PSYCHIATRY & NEUROLOGY

## 2021-08-19 ASSESSMENT — MIFFLIN-ST. JEOR: SCORE: 1780

## 2021-08-19 NOTE — PATIENT INSTRUCTIONS
Plan:  --I agree with holding off on restarting her Topamax. Let's see how things go with the testing that you have planned with your psychiatry provider and then we will plan on following up after your updated tests are completed.  --Keep up the good work with staying sober.

## 2021-08-19 NOTE — NURSING NOTE
Chief Complaint   Patient presents with     Follow Up     Post concussion follow up. Would like to reduce medications     Jessica Garcia LPN on 8/19/2021 at 7:58 AM

## 2021-08-19 NOTE — LETTER
"    8/19/2021         RE: Rao Leavitt  [address]  [facility] [address]        Dear Colleague,    Thank you for referring your patient, Rao Leavitt, to the [facility] [place]. Please see a copy of my visit note below.    ESTABLISHED PATIENT NEUROLOGY NOTE    DATE OF VISIT: 8/19/2021  MRN: [id number]  PATIENT NAME: Rao Leavitt  YOB: 1990    Chief Complaint   Patient presents with     Follow Up     Post concussion follow up. Would like to reduce medications     SUBJECTIVE:                                                      HISTORY OF PRESENT ILLNESS:  Rao is here for follow up regarding Topamax use.  Patient has history of remote concussion, polysubstance use, ADD and multiple psychiatric disorders.  He was initially referred to neurology for possible postconcussive syndrome, in particular impulse control issues.  Brain MRI was unremarkable.  EEG also unremarkable.  He had neuropsych testing completed in February.  Dr. Mandujano commented on inconsistent engagement during the exam.  I put him on Topamax which she felt was helpful for mood.  His psychiatric provider had talked to him about recently tapering the medication, though it is unclear to me as to why.  He is here to discuss this today.  After his last visit, I referred him for cognitive rehab at [facility].  I am not sure if he has done this.    Rao tells me that his sleep study and everything else came back normal. He says he talked to his psychiatry provider and they talked about getting off of all of his medications and doing some retesting. He plans to redo the Neuropsych testing and sleep study. He says his sleep is still off (\"up and down\"). He wants to figure out if medications are playing a role. He says he has been off of the Topamax for two days. Heh as not noticed any difference as of yet. He is still on his Lithium. He is not as tired as he was.     He also talked " "to his [facility] worker who is in agreement with his plan.  Sleep study reviewed.      CURRENT MEDICATIONS:   Apple Ralph Vn-Grn Tea-Bit Or-Cr (APPLE CIDER VINEGAR PLUS) TABS,   lithium (ESKALITH) 300 MG tablet, Take 1 tablet (300 mg) by mouth 2 times daily  multivitamin, therapeutic (THERA-VIT) TABS tablet, Take 1 tablet by mouth daily  propranolol (INDERAL) 40 MG tablet, Take 1 tablet (40 mg) by mouth 2 times daily  vitamin B-Complex, Take 1 tablet by mouth daily    No current facility-administered medications on file prior to visit.      RECENT DIAGNOSTIC STUDIES:   Labs: No results found for any visits on 08/19/21.      REVIEW OF SYSTEMS:                                                      10-point review of systems is negative except as mentioned above in HPI.    EXAM:                                                      Physical Exam:   Vitals: BP 90/61 (BP Location: Left arm, Patient Position: Sitting)   Pulse 61   Ht 1.702 m (5' 7\")   Wt 86.6 kg (191 lb)   BMI 29.91 kg/m    BMI= Body mass index is 29.91 kg/m .  GENERAL: NAD.  HEENT: NC/AT.  CV: RRR. S1, S2.   NECK: No bruits.  PULM: Non-labored breathing.   Neurologic:  MENTAL STATUS: Alert, attentive. Speech is fluent.  Normal comprehension. Normal concentration. Good fund of knowledge.  This is improved.  CRANIAL NERVES:  Visual fields intact to confrontation. Pupils equally, round and reactive to light. Facial sensation and movement normal. EOM full. Hearing intact to conversation. Trapezius strength intact. Palate moves symmetrically. Tongue midline.  MOTOR: 5/5 in proximal and distal muscle groups of upper and lower extremities. Tone and bulk normal.   DTRs: Intact and symmetric in biceps, BR, patellae.  Babinski down-going bilaterally.   SENSATION: Normal light touch throughout.  COORDINATION: Normal fine motor movements.  STATION AND GAIT: Casual gait is normal.  Right hand-dominant.    ASSESSMENT and PLAN:                                               "        Assessment:    ICD-10-CM    1. Postconcussive syndrome  F07.81    2. Polysubstance abuse (H)  F19.10     Sober for 10 months.   3. Co-occurrence of multiple psychiatric disorders  F99         Mr. Leavitt is a 32 yo man followed in Neurology for possible post-concussive syndrome.  The purpose for today's visit was to discuss his plan to wean off of medications and do some additional testing through his psychiatry provider.  He has already stopped the Topamax and today tells me he is not noticing any ill effects from doing so.  I told him that he does not need to restart the medication then.  We can consider this down the road if needed.  I would like to see Rao back in clinic in about 4 months, after he has his repeat sleep and neuropsych testing done.  He understands and agrees with the plan.    Plan:  --I agree with holding off on restarting her Topamax. Let's see how things go with the testing that you have planned with your psychiatry provider and then we will plan on following up after your updated tests are completed.  --Keep up the good work with staying sober.    Total Time: 20 minutes were spent with the patient and in chart review/documentation(as described above in Assessment and Plan)/coordinating the care on date of service.    Meg Miranda MD  Neurology    Dragon software used in the dictation of this note.                    Again, thank you for allowing me to participate in the care of your patient.        Sincerely,        Meg Miranda MD

## 2021-08-19 NOTE — PROGRESS NOTES
"ESTABLISHED PATIENT NEUROLOGY NOTE    DATE OF VISIT: 8/19/2021  MRN: [id number]  PATIENT NAME: Rao Leavitt  YOB: 1990    Chief Complaint   Patient presents with     Follow Up     Post concussion follow up. Would like to reduce medications     SUBJECTIVE:                                                      HISTORY OF PRESENT ILLNESS:  Rao is here for follow up regarding Topamax use.  Patient has history of remote concussion, polysubstance use, ADD and multiple psychiatric disorders.  He was initially referred to neurology for possible postconcussive syndrome, in particular impulse control issues.  Brain MRI was unremarkable.  EEG also unremarkable.  He had neuropsych testing completed in February.  Dr. Mandujano commented on inconsistent engagement during the exam.  I put him on Topamax which she felt was helpful for mood.  His psychiatric provider had talked to him about recently tapering the medication, though it is unclear to me as to why.  He is here to discuss this today.  After his last visit, I referred him for cognitive rehab at [facility].  I am not sure if he has done this.    Rao tells me that his sleep study and everything else came back normal. He says he talked to his psychiatry provider and they talked about getting off of all of his medications and doing some retesting. He plans to redo the Neuropsych testing and sleep study. He says his sleep is still off (\"up and down\"). He wants to figure out if medications are playing a role. He says he has been off of the Topamax for two days. Heh as not noticed any difference as of yet. He is still on his Lithium. He is not as tired as he was.     He also talked to his [employer] worker who is in agreement with his plan.  Sleep study reviewed.      CURRENT MEDICATIONS:   Apple Ralph Vn-Grn Tea-Bit Or-Cr (APPLE CIDER VINEGAR PLUS) TABS,   lithium (ESKALITH) 300 MG tablet, Take 1 tablet (300 mg) by mouth 2 times daily  multivitamin, " "therapeutic (THERA-VIT) TABS tablet, Take 1 tablet by mouth daily  propranolol (INDERAL) 40 MG tablet, Take 1 tablet (40 mg) by mouth 2 times daily  vitamin B-Complex, Take 1 tablet by mouth daily    No current facility-administered medications on file prior to visit.      RECENT DIAGNOSTIC STUDIES:   Labs: No results found for any visits on 08/19/21.      REVIEW OF SYSTEMS:                                                      10-point review of systems is negative except as mentioned above in HPI.    EXAM:                                                      Physical Exam:   Vitals: BP 90/61 (BP Location: Left arm, Patient Position: Sitting)   Pulse 61   Ht 1.702 m (5' 7\")   Wt 86.6 kg (191 lb)   BMI 29.91 kg/m    BMI= Body mass index is 29.91 kg/m .  GENERAL: NAD.  HEENT: NC/AT.  CV: RRR. S1, S2.   NECK: No bruits.  PULM: Non-labored breathing.   Neurologic:  MENTAL STATUS: Alert, attentive. Speech is fluent.  Normal comprehension. Normal concentration. Good fund of knowledge.  This is improved.  CRANIAL NERVES:  Visual fields intact to confrontation. Pupils equally, round and reactive to light. Facial sensation and movement normal. EOM full. Hearing intact to conversation. Trapezius strength intact. Palate moves symmetrically. Tongue midline.  MOTOR: 5/5 in proximal and distal muscle groups of upper and lower extremities. Tone and bulk normal.   DTRs: Intact and symmetric in biceps, BR, patellae.  Babinski down-going bilaterally.   SENSATION: Normal light touch throughout.  COORDINATION: Normal fine motor movements.  STATION AND GAIT: Casual gait is normal.  Right hand-dominant.    ASSESSMENT and PLAN:                                                      Assessment:    ICD-10-CM    1. Postconcussive syndrome  F07.81    2. Polysubstance abuse (H)  F19.10     Sober for 10 months.   3. Co-occurrence of multiple psychiatric disorders  F99         Mr. Leavitt is a 30 yo man followed in Neurology for possible " post-concussive syndrome.  The purpose for today's visit was to discuss his plan to wean off of medications and do some additional testing through his psychiatry provider.  He has already stopped the Topamax and today tells me he is not noticing any ill effects from doing so.  I told him that he does not need to restart the medication then.  We can consider this down the road if needed.  I would like to see Rao back in clinic in about 4 months, after he has his repeat sleep and neuropsych testing done.  He understands and agrees with the plan.    Plan:  --I agree with holding off on restarting her Topamax. Let's see how things go with the testing that you have planned with your psychiatry provider and then we will plan on following up after your updated tests are completed.  --Keep up the good work with staying sober.    Total Time: 20 minutes were spent with the patient and in chart review/documentation(as described above in Assessment and Plan)/coordinating the care on date of service.    Meg Miranda MD  Neurology    Dragon software used in the dictation of this note.

## 2021-09-01 VITALS — WEIGHT: 200 LBS | HEIGHT: 67 IN | BODY MASS INDEX: 31.39 KG/M2

## 2021-09-01 ASSESSMENT — MIFFLIN-ST. JEOR: SCORE: 1820.82

## 2021-09-01 NOTE — PATIENT INSTRUCTIONS
It was great having the opportunity to meet you and discuss your sleep concerns.  But the sounds of it being made significant strides in improving her health by becoming sober and eliminating use of chemicals.  Am also glad to hear that you are working closely with your psychiatric nurse practitioner to optimize your mental health treatment and plan.    From a sleep standpoint, the good news is that you do not have a significant sleep disordered breathing.    Reviewing your sleep habits, one of the areas that I think could make a tremendous difference in your overall sleep quality and how you feel is reducing your time in bed from about 12 hours to 8 hours.  This will help increase the quality and depth of your sleep.  I am recommending for you a sleep schedule between 10 PM-6 AM.  I am also recommending that you use an alarm and stop sleeping on the sofa.  If you watch TV, use an alarm so sleep not on longer than 30 minutes.    Avoid use of caffeine within 6-8 hours of bed.    Here is my best recommendations for a comfortable airbed to use rather than sleeping on the couch.

## 2021-09-01 NOTE — PROGRESS NOTES
Rao Leavitt is a 31 year old who is being evaluated via a billable video visit.      How would you like to obtain your AVS? Sent to his home  If the video visit is dropped, the invitation should be resent by: Text to cell phone: [phone]  Will anyone else be joining your video visit? No    Does patient have any form of state insurance? Yes    Do you have wifi? Yes  Do you have a smart phone? Yes  Can you download an sarah on your phone comfortably with out assistance? Yes  Can you watch a [employer] video? Yes          Kym Tijerina CMA    Video Start Time: 9:37 AM    Video-Visit Details    Type of service:  Video Visit    Video End Time:10:47 AM    Originating Location (pt. Location): Home    Distant Location (provider location):  @[email or web address]@     Platform used for Video Visit: [facility]         SLEEP MEDICINE CONSULTATION  Sleep Psychology    Name: Rao Leavitt MRN# [id number]   Age: 31 year old YOB: 1990     Date of Consultation: Sep 2, 2021  Consultation is requested by: CARMEN Menon  [id number] [facility] [address]  [place] [address]  Primary care provider: Suzi Jaime    Reason for Sleep Consultation     Rao Leavitt is a 31 year old male seen today for a behavioral sleep medicine consultation because of insomnia    Assessment and Plan     Sleep Diagnoses/Recommendations:    Chronic insomnia    Patient history of insomnia is multi factored, associated with past diagnoses of bipolar disorder, ADHD, PTSD, traumatic brain injury, and polysubstance dependence.  A recent sleep study did not reveal clinically significant sleep disordered breathing.    There are significant psychophysiologic and behavioral factors contributing to insomnia, the most of which is.  Related to sleep scheduling and extended time in bed.  Patient is currently spending approximately half of the day, 12 hours in bed.  He was strongly advised to reduce time in bed to 8 hours and establish  a sleep schedule between 10 PM-6 AM.  He was also advised to use an alarm, avoid sleeping on the couch, and to establish a separate sleep area with an air mattress (something he indicates is affordable).  He will continue to avoid use of caffeine within 6-8 hours of bed.  He may benefit from a short nap but taken just before he begins his work shift at noon.  He was advised to limit napping to 30-45 minutes.  We discussed activities to help him stay awake in the evening to prevent disruption of homeostatic sleep drive.    He will continue to follow closely with his psychiatric nurse practitioner, therapist and substance abuse counselor along with his arms worker.    Summary Counseling:      Rao was provided information about the pathophysiology of insomnia, psychophysiological factors contributing to the onset and maintenance of insomnia and how co-occurring medical conditions and intrinsic sleep disorders can affect sleep.  Treatment options were discussed including component of cognitive-behavioral therapy for insomnia as applicable to patient specific sleep concerns and symptoms. The benefits and potential early side effects of treatment including increased daytime sleepiness were discussed.     Patient was advised to consult with their prescribing provider around use of or changes to prescription sleep medication.  Patient was counseled on the importance of avoiding driving if drowsy.    See patient instructions for initial treatment recommendations and behavioral sleep plan.    Follow-up: 3 weeks     History of Present Sleep Complaint     Rao Leavitt is a 31 year old year old male with a relevant medical history of  a BMI of 26.8, ipolar 1 disorder, traumatic brain injury, PTSD, AAYUSH, chemical dependency, ADHD and history of abnormal EEG (on 1/28/2021- EEG changes that resemble generalized paroxysmal fast activity, that could be potentially epileptiform and seen in generalized epilepsies).  who  "presents with history of insomnia and difficulty both initiating and maintaining sleep.    He reports that recently he discontinued clomipramine due to daytime somnolence.  He also discontinued olanzapine due to morning side effects.  He is currently on lithium for bipolar disorder.    He is follow bu a psychiatric nurse practitioner, Jillian Yepez for management of Bipolar Disorder.  He states his medications are currently being reviewed as is his diagnoses due to being sober for 2 years and off recreational drugs for 10 months.    Sleep/Wake Pattern:    Shift Work - Day  Source of Sleep Estimates:  verbal self-report    Time to Bed: 8 pm - 11 pm, fluctuates  Pre-bed Routine:  \"usually crash\"  Activity in Bed (stimulus control): watch TV, for 30 minutes, TV stays on  Sleep Latency: 30  minutes  Times awakened:  3-4  Total Time Awake After Sleep Onset:  minutes, usually get up pee and then go back to bed and \"lay there  Time Up for the Day: 9-10 am   No alarm  Total Time in Bed:  12 hours  Estimated Total Sleep Time:  7 hours    Naps: 4-5 per week, more than an hour    Sleep Hygiene:    Behavior affecting Sleep:  use computers or electronics within an hour before bedtime, extend time in bed longer if after poor night of sleep, sleep in on weekends, have trouble winding down in the evening    Environment:  sleeps on sofa, kid's mother sleeps in bedroom    Substance Use:  Caffeine: Poweraid, 2-3 and 2-3 diet sodas, recently stopped energy drinks.      Alcohol: None reported      Nicotine: None      Recreational/Illicit Drugs: None reported    Previous Sleep Studies/Consultations:    Study Date: 6/2/2021 A diagnostic polysomnogram was performed to evaluate for sleep apnea and nocturnal epileptiform activity. Polysomnogram Data: A full night polysomnogram recorded the standard physiologic parameters including EEG, EOG, EMG, ECG, nasal and oral airflow. Respiratory parameters of chest and abdominal movements " were recorded with respiratory inductance plethysmography. Oxygen saturation was recorded by pulse oximetry. Hypopnea scoring rule used: 1B 4%. Sleep Architecture: Fairly well consolidated with typical sleep and REM latencies. Typical percentages of sleep stages and supine REM was observed. The total recording time of the polysomnogram was 490.6 minutes. The total sleep time was 453.0 minutes. Sleep latency was normal at 13.3 minutes with the use of a sleep aid (presumed zolpidem, unknown dose). REM latency was 76.0 minutes. Arousal index was mildly increased at 20.1 arousals per hour. Sleep efficiency was normal at 92.3%. Wake after sleep onset was 23.0 minutes. The patient spent 4.4% of total sleep time in Stage N1, 40.6% in Stage N2, 32.0% in Stage N3, and 23.0% in REM. Time in REM supine was 29.5 minutes. Respiration: No significant sleep disordered breathing (AHI 5.4 with obstructive AHI 4.1 and central AHI 1.3) without sleepassociated hypoxemia.   Events ? The polysomnogram revealed a presence of 17 obstructive, 10 central, and 6 mixed apneas resulting in an apnea index of 4.4 events per hour. There were 8 obstructive hypopneas and - central hypopneas resulting in an obstructive hypopnea index of 1.1 and central hypopnea index of - events per hour. The combined apnea/hypopnea index was 5.4 events per hour (central apnea/hypopnea index was 1.3 events per hour). The REM AHI was 15.6 events per hour. The supine AHI was 10.8 events per hour. The RERA index was 2.3 events per hour. The RDI was 7.7 events per hour.   Snoring - was reported as occasional, light to moderate.   Respiratory rate and pattern - was notable for normal respiratory rate and pattern.   Sustained Sleep Associated Hypoventilation - Transcutaneous carbon dioxide monitoring was not used, however significant hypoventilation was not suggested by oximetry.   Sleep Associated Hypoxemia - (Greater than 5 minutes O2 sat at or below 88%) was not  present. Baseline oxygen saturation was 96.9%. Lowest oxygen saturation was 89.1%. Time spent less than or equal to 88% was 0 minutes. Time spent less than or equal to 89% was 0 minutes. Movement Activity: Rare PLM s observed. No abnormal nocturnal movements or behaviors observed. No obvious large epileptiform activity noted.   Periodic Limb Activity - There were 13 PLMs during the entire study. The PLM index was 1.7 movements per hour. The PLM Arousal Index was 0.7 per hour.   REM EMG Activity - Excessive transient/sustained muscle activity was not present.   Nocturnal Behavior - Abnormal sleep related behaviors were not noted during/arising out of NREM / REM sleep.   Bruxism - None apparent.[place] [facility] Patient Name: DOMINIC INGRAM [address], [address] Study Date: 6/2/2021 [place] [address] Report Date: 6/9/2021 Tel: [phone]; Fax: [phone]  P a g e Cardiac Summary: Appears NSR The average pulse rate was 57.9 bpm. The minimum pulse rate was 43.9 bpm while the maximum pulse rate was 86.2 bpm. Assessment:   Sleep architecture was fairly well consolidated with typical sleep and REM latencies. Typical percentages of sleep stages and supine REM was observed.   No significant sleep disordered breathing (AHI 5.4 with obstructive AHI 4.1 and central AHI 1.3) without sleep-associated hypoxemia.   No obvious large epileptiform activity noted, though I defer to formal review by neurology.   Rare PLM s observed. No abnormal nocturnal movements or behaviors observed.   EKG appears NSR. Recommendations:   Advice regarding the risks of drowsy driving.   Suggest optimizing sleep schedule and avoiding sleep deprivation.   Pharmacologic therapy should be used for management of restless legs syndrome only if present and clinically indicated and not based on the presence of periodic limb movements alone.      Screening     EPWORTH SLEEPINESS  "SCALE    Sitting and reading 3   Watching TV 3   Sitting, inactive in a public place (theatre or mtg.) 3    As a passenger in a car 2   Lying down to rest in the afternoon when circumstance permit 3   Sitting and talking to someone 0   Sitting quietly after lunch without alcohol 3   In a car, while stopped for a few minutes in traffic 1   TOTAL SCORE (nl <11) 18     INSOMNIA SEVERITY INDEX     Difficulty falling asleep 3   Difficult staying asleep 3   Problems waking up to early 4   How SATISFIED/DISSATISFIED are you with your CURRENT sleep pattern? 4   How NOTICEABLE to others do you think your sleep pattern is in terms of your quality of life? 4   How WORRIED/DISTRESSED are you about your current sleep pattern? 3   To what extent do you consider your sleep problem to INTERFERE with your daily fuctioning(e.g. daytime fatigue, mood, ability to function at work/daily chores, concentration, mood,etc.) CURRENTLY? 4   INSOMNIA SEVERITY INDEX TOTAL SCORE 25   Absence of insomnia (0-7); sub-threshold insomnia (8-14); moderate insomnia (15-21); and severe insomnia (22-28)        Vitals     Ht 1.702 m (5' 7\")   Wt 90.7 kg (200 lb)   BMI 31.32 kg/m       Medical History     Patient Active Problem List   Diagnosis     Bipolar 1 disorder (H)     Generalized anxiety disorder     High risk medication use     Lumbar spine tumor     Lithium use     Chemical dependency (H)     PTSD (post-traumatic stress disorder)     Borderline personality disorder (H)     Attention-deficit hyperactivity disorder     History of traumatic brain injury     Abnormal EEG        Current Outpatient Medications   Medication Sig Dispense Refill     Apple Ralph Vn-Grn Tea-Bit Or-Cr (APPLE CIDER VINEGAR PLUS) TABS        lithium (ESKALITH) 300 MG tablet Take 1 tablet (300 mg) by mouth 2 times daily 60 tablet 3     multivitamin, therapeutic (THERA-VIT) TABS tablet Take 1 tablet by mouth daily       propranolol (INDERAL) 40 MG tablet Take 1 tablet (40 mg) by " mouth 2 times daily 60 tablet 3     vitamin B-Complex Take 1 tablet by mouth daily         Past Surgical History:   Procedure Laterality Date     ESOPHAGOSCOPY, GASTROSCOPY, DUODENOSCOPY (EGD), COMBINED N/A 2019    Procedure: COMBINED ESOPHAGOSCOPY, GASTROSCOPY, DUODENOSCOPY (EGD), BIOPSY SINGLE OR MULTIPLE;  Surgeon: Kj Thomas MD;  Location: [facility]     PE tube            Allergies   Allergen Reactions     Tramadol Nausea     Vicodin [Hydrocodone-Acetaminophen] Rash       Mental Health History     Prior Mental Health Diagnosis: generalized anxiety disorder, bipolar disorder, ADHD and PTSD    Current Mental Health Treatment: psychiatric medication management, psychotherapy, ARMS worker    Prior Chemical Dependency Treatment:  [facility] treatment for polysubstance abuse dependence reported, reports he is sober 2 years, has not used substances at all for 10 months, currently follows with a substance abuse counselor    Family History     Family History   Problem Relation Age of Onset     Migraines Mother      Post-Traumatic Stress Disorder (PTSD) Father      Bipolar Disorder Paternal Grandfather      Bipolar Disorder Sister        Family History of Sleep Disorders: None reported    Social History     Social History     Socioeconomic History     Marital status: Single     Spouse name: None     Number of children: None     Years of education: None     Highest education level: None   Occupational History     None   Tobacco Use     Smoking status: Former Smoker     Packs/day: 0.50     Types: Cigarettes     Quit date: 2021     Years since quittin.6     Smokeless tobacco: Former User     Types: Snuff     Quit date: 2021   Substance and Sexual Activity     Alcohol use: Not Currently     Comment: 6 months alcohol free     Drug use: No     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 Tested positive on      Sexual activity: Yes     Partners: Female   Other Topics Concern     Parent/sibling w/ CABG, MI or  angioplasty before 65F 55M? Not Asked   Social History Narrative     None     Social Determinants of Health     Financial Resource Strain:      Difficulty of Paying Living Expenses:    Food Insecurity:      Worried About Running Out of Food in the Last Year:      Ran Out of Food in the Last Year:    Transportation Needs:      Lack of Transportation (Medical):      Lack of Transportation (Non-Medical):    Physical Activity:      Days of Exercise per Week:      Minutes of Exercise per Session:    Stress:      Feeling of Stress :    Social Connections:      Frequency of Communication with Friends and Family:      Frequency of Social Gatherings with Friends and Family:      Attends Protestant Services:      Active Member of Clubs or Organizations:      Attends Club or Organization Meetings:      Marital Status:    Intimate Partner Violence:      Fear of Current or Ex-Partner:      Emotionally Abused:      Physically Abused:      Sexually Abused:        , currently living with ex-girlfriend and children, works part-time as an  at a gas station     Mental Status Examination     Rao presented as oriented X3 with speech and language intact.  The patient was cooperative throughout the evaluation with no signs of hallucinations, delusions, loosening of associations or other thought disturbance.  Mood was normal Affect was congruent with mood. Insight and judgement were intact.  Memory appeared intact for recent and remote elements.  There was no report of suicidal ideation, intention or plan. Attention and concentration were within normal.      Copy:   Suzi Jaime PA  [address]  [place] [address]    Amaury Matthew PsyD, NELSON, [facility]  Diplomate, Behavioral Sleep Medicine  [facility]    Note: This dictation was created using voice recognition software. This document may contain an error not identified before  finalizing the document. If the error changes the accuracy of the document, I would appreciate it being brought to my attention.

## 2021-09-02 ENCOUNTER — VIRTUAL VISIT (OUTPATIENT)
Dept: SLEEP MEDICINE | Facility: CLINIC | Age: 31
End: 2021-09-02
Attending: PHYSICIAN ASSISTANT
Payer: COMMERCIAL

## 2021-09-02 DIAGNOSIS — F51.04 CHRONIC INSOMNIA: Primary | ICD-10-CM

## 2021-09-02 PROCEDURE — 90791 PSYCH DIAGNOSTIC EVALUATION: CPT | Mod: 95 | Performed by: PSYCHOLOGIST

## 2021-09-15 ENCOUNTER — ALLIED HEALTH/NURSE VISIT (OUTPATIENT)
Dept: BEHAVIORAL HEALTH | Facility: CLINIC | Age: 31
End: 2021-09-15
Payer: COMMERCIAL

## 2021-09-15 DIAGNOSIS — R69 DIAGNOSIS DEFERRED: Primary | ICD-10-CM

## 2021-09-16 NOTE — PROGRESS NOTES
Behavioral Health Home Services  [facility]: [place]        Social Work Care Navigator Note      Patient: Rao Leavitt  Date: September 15, 2021  Preferred Name: Rao    Previous PHQ-9:   PHQ-9 SCORE 4/26/2021 6/8/2021 7/19/2021   PHQ-9 Total Score MyChart - - -   PHQ-9 Total Score 14 10 7   Some encounter information is confidential and restricted. Go to Review Flowsheets activity to see all data.     Previous AAYUSH-7:   AAYUSH-7 SCORE 4/26/2021 6/8/2021 7/19/2021   Total Score - - -   Total Score 9 9 9   Some encounter information is confidential and restricted. Go to Review Flowsheets activity to see all data.     NOMAN LEVEL:  No flowsheet data found.    Preferred Contact:  Need for : No  Preferred Contact: Cell      Type of Contact Today: Phone call (patient / identified key support person reached)      Data: (subjective / Objective):  Recent ED/IP Admission or Discharge?   None    Patient Goals:  Goal Areas: Health;Mental Health;Chemical Health;Financial and Social Service Benefits  Patient stated goals: Rao would like assistance with his physical, mental and chemical health for creating a balanced and healthy lifestyle. Rao would like assistance with financial counseling and social service assistance to aid with county benefits to increase his financial stability.        [facility] Service Provided:  Comprehensive Care Management: utilized the electronic medical record / patient registry to identify and support patient's health conditions / needs more effectively   Care Transitions: focused on the coordinated and seamless movement of patient between or within different levels of care or settings  Care Coordination: provided care management services/referrals necessary to ensure patient and their identified supports have access to medical, behavioral health, pharmacology and recovery support services.  Ensured that patient's care is integrated across all settings and services.   Individual  L/vm for pts stephanie Burton to call back to schedule a f/u appt in 6 months with Dr Dean    and Family Support: aimed to help clients reduce barriers to achieving goals, increase health literacy and knowledge about chronic condition(s), increase self-efficacy skills, and improve health outcomes  Referral to Community and Social Support Services: Provided patient with referrals (see plan section)  Assisted in scheduling an appointment to a referral / service (see plan section)  Coordinated / Confirmed patient's appointment time or referral and transportation plan  Followed-up with patient on whether or not they completed a referral    Current Stressors / Issues / Care Plan Objective Addressed Today:  [facility] and patient were able to meet today for Behavioral Health Home ([facility]) monthly check-in via telehealth visit. All required ROIs have been filed with HIM/patient chart.    1. Patient reports work has been busy and he continues to not have a set schedule for work.    2. Patient reports he and his s.o. are fighting a lot and are estranged but continue to live together. Patient reports they are in disagreement over their relationship with s.o.'s mom and her ex.  Patient reports he and ex do not get along. Patient reports his s.o.'s ex owes her back child support and this bothers him. Patient reports his s.o. is due in February. Patient reports they have not determined sex of baby at this time. [facility] and patient processed his feelings/emotions about this today.     Patient reports he is not  from his first wife and has a bankruptcy with her. Patient reports he is awaiting copies of divorce papers.     3. Patient reports he continues to see his [facility] counselor once a week. Patient reports he had an urge to drink a beer over the weekend due to stress, but reports he did not. Patient reports he continues to remain sober.Patient reports he is celebrating 11 months since last use of meth, almost 2-years sobriety from alcohol. Patient reports he continues to meet with [facility] therapist once a week.    Patient  reports he continues to taper and wean off mental health medications. Patient has upcoming appt with psychiatric provider and neurologist to continue to monitor.     Patient reports sleep study recommended patient to set up sleep schedule, stop napping and sleep in his own bed, as sleep study did not report a significant sleep disordered breat. Patient reports this difficult. SWCC and patient discussed coping skills and options. SWCC encouraged patient to give it time to see if he can regulate schedule.     Sleep study provider recommended: sleep schedule between 10 PM-6 AM.  I am also recommending that you use an alarm and stop sleeping on the sofa.  If you watch TV, use an alarm so sleep not on longer than 30 minutes. Avoid use of caffeine within 6-8 hours of bed.    4. Patient reports he is working about 50 hours a week and taking pride in his work. 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 Patient reports they are working to get their daughter into [employer] program in [place]. Patient reports his s.o. has been working on this.     Intervention:  Motivational Interviewing: Expressed Empathy/Understanding, Supported Autonomy, Collaboration, Evocation, Permission to raise concern or advise, Open-ended questions, Reflections: simple and complex, Rolled with resistance: Emphasized patient autonomy, Simple reflection, Complex reflection, Amplified reflection (weaker or stronger meaning), Shifted topic to defuse resistance, Reframed sustain talk in the direction of change and Evoked patient agenda, Change talk (evoked) and Reframe   Target Behavior(s): Explored thoughts about taking an anti-depressant, Explored and resolved challenges related to taking anti-depressants as prescribed, Explored thoughts and readiness to participate in individual therapy, Explored and resolved challenges to attending appointments as scheduled, Explored current social supports and  reinforced opportunities to increase engagement, Explored current sleep hygeine and patient's perception and knowledge of relationship to mood and Explored patient's perception of how alcohol and / or drugs influences mood    Assessment: (Progress on Goals / Homework):  Patient would benefit from continued coordination in reaching their goals set for the Behavioral Health Home ([facility]) program. SWCC reviewed Health Action Plan goals and will continue to monitor progress and work with patient and their care team.      Plan: (Homework, other):  Patient was encouraged to continue to seek condition-related information and education.      Scheduled a Phone follow up appointment with ANGI  in 4 weeks     Patient has set self-identified goals and will monitor progress until the next appointment on: 10/13/2021.         SARA Irwin LGSW Behavioral Health Home ([facility])   [facility]  [phone]  September 15, 2021  9:36 AM                  Next 5 appointments (look out 90 days)    Dec 13, 2021  8:00 AM  Return Visit with Meg Miranda MD  [facility] [place] ([facility] ) [address] [place] [id number] [phone]

## 2021-09-28 ENCOUNTER — VIRTUAL VISIT (OUTPATIENT)
Dept: SLEEP MEDICINE | Facility: CLINIC | Age: 31
End: 2021-09-28
Payer: COMMERCIAL

## 2021-09-28 ENCOUNTER — VIRTUAL VISIT (OUTPATIENT)
Dept: BEHAVIORAL HEALTH | Facility: CLINIC | Age: 31
End: 2021-09-28
Payer: COMMERCIAL

## 2021-09-28 VITALS — HEIGHT: 67 IN | WEIGHT: 200 LBS | BODY MASS INDEX: 31.39 KG/M2

## 2021-09-28 DIAGNOSIS — R69 DIAGNOSIS DEFERRED: Primary | ICD-10-CM

## 2021-09-28 DIAGNOSIS — Z91.199 NO-SHOW FOR APPOINTMENT: ICD-10-CM

## 2021-09-28 DIAGNOSIS — F51.04 CHRONIC INSOMNIA: Primary | ICD-10-CM

## 2021-09-28 PROCEDURE — 99207 PR NO BILLABLE SERVICE THIS VISIT: CPT | Performed by: PSYCHOLOGIST

## 2021-09-28 ASSESSMENT — MIFFLIN-ST. JEOR: SCORE: 1820.82

## 2021-09-28 NOTE — PATIENT INSTRUCTIONS
Your BMI is Body mass index is 31.32 kg/m .  Weight management is a personal decision.  If you are interested in exploring weight loss strategies, the following discussion covers the approaches that may be successful. Body mass index (BMI) is one way to tell whether you are at a healthy weight, overweight, or obese. It measures your weight in relation to your height.  A BMI of 18.5 to 24.9 is in the healthy range. A person with a BMI of 25 to 29.9 is considered overweight, and someone with a BMI of 30 or greater is considered obese. More than two-thirds of American adults are considered overweight or obese.  Being overweight or obese increases the risk for further weight gain. Excess weight may lead to heart disease and diabetes.  Creating and following plans for healthy eating and physical activity may help you improve your health.  Weight control is part of healthy lifestyle and includes exercise, emotional health, and healthy eating habits. Careful eating habits lifelong are the mainstay of weight control. Though there are significant health benefits from weight loss, long-term weight loss with diet alone may be very difficult to achieve- studies show long-term success with dietary management in less than 10% of people. Attaining a healthy weight may be especially difficult to achieve in those with severe obesity. In some cases, medications, devices and surgical management might be considered.  What can you do?  If you are overweight or obese and are interested in methods for weight loss, you should discuss this with your provider.     Consider reducing daily calorie intake by 500 calories.     Keep a food journal.     Avoiding skipping meals, consider cutting portions instead.    Diet combined with exercise helps maintain muscle while optimizing fat loss. Strength training is particularly important for building and maintaining muscle mass. Exercise helps reduce stress, increase energy, and improves fitness.  Increasing exercise without diet control, however, may not burn enough calories to loose weight.       Start walking three days a week 10-20 minutes at a time    Work towards walking thirty minutes five days a week     Eventually, increase the speed of your walking for 1-2 minutes at time    In addition, we recommend that you review healthy lifestyles and methods for weight loss available through the National Institutes of Health patient information sites:  http://win.niddk.nih.gov/publications/index.htm    And look into health and wellness programs that may be available through your health insurance provider, employer, local community center, or [employer].    Weight management plan: Patient was referred to their PCP to discuss a diet and exercise plan.

## 2021-09-28 NOTE — PROGRESS NOTES
Rao is a 31 year old who is being evaluated via a billable video visit.      How would you like to obtain your AVS? MyChart  If the video visit is dropped, the invitation should be resent by: Text to cell phone: [phone]  Will anyone else be joining your video visit? No      Nor esponse to requested text messages via Medication Review and [employer].  Attempted to call.  No response and VM was full.    Amaury Matthew, Desiree, LP, DBSM  Diplomate, Behavioral Sleep Medicine  [facility]

## 2021-09-28 NOTE — PROGRESS NOTES
Behavioral Health Home Services  [facility]: [place]        Social Work Care Navigator Note      Patient: Rao Leavitt  Date: September 28, 2021  Preferred Name: Rao    Previous PHQ-9:   PHQ-9 SCORE 4/26/2021 6/8/2021 7/19/2021   PHQ-9 Total Score MyChart - - -   PHQ-9 Total Score 14 10 7   Some encounter information is confidential and restricted. Go to Review Flowsheets activity to see all data.     Previous AAYUSH-7:   AAYUSH-7 SCORE 4/26/2021 6/8/2021 7/19/2021   Total Score - - -   Total Score 9 9 9   Some encounter information is confidential and restricted. Go to Review Flowsheets activity to see all data.     NOMAN LEVEL:  No flowsheet data found.    Preferred Contact:  Need for : No  Preferred Contact: Cell      Type of Contact Today: Phone call (patient / identified key support person reached)      Data: (subjective / Objective):  Recent ED/IP Admission or Discharge?   None    Patient Goals:  Goal Areas: Health;Mental Health;Chemical Health;Financial and Social Service Benefits  Patient stated goals: Rao would like assistance with his physical, mental and chemical health for creating a balanced and healthy lifestyle. Rao would like assistance with financial counseling and social service assistance to aid with county benefits to increase his financial stability.        [facility] Core Service Provided:  Comprehensive Care Management: utilized the electronic medical record / patient registry to identify and support patient's health conditions / needs more effectively   Care Transitions: focused on the coordinated and seamless movement of patient between or within different levels of care or settings  Care Coordination: provided care management services/referrals necessary to ensure patient and their identified supports have access to medical, behavioral health, pharmacology and recovery support services.  Ensured that patient's care is integrated across all settings and services.   Individual  and Family Support: aimed to help clients reduce barriers to achieving goals, increase health literacy and knowledge about chronic condition(s), increase self-efficacy skills, and improve health outcomes  Referral to Community and Social Support Services: Provided patient with referrals (see plan section)  Assisted in scheduling an appointment to a referral / service (see plan section)  Coordinated / Confirmed patient's appointment time or referral and transportation plan  Followed-up with patient on whether or not they completed a referral    Current Stressors / Issues / Care Plan Objective Addressed Today:  SWCC and patient were able to meet today for Behavioral Health Home ([facility]) monthly check-in via telehealth visit. All required ROIs have been filed with HIM/patient chart.    1. Patient called today to discuss update with [employer]. Patient reports he needs contact information for [facility] medical records. [facility] emailed patient contact information today.    2. Patient reports he has been trying to take care of his financial situation. Patient reports he just found out recently that he may still be  to his ex in [place]. Patient reports marriage ended in 2016. Patient reports his ex is not wanting divorce to be finalized. Patient reports he may have to hire  to get divorce. Patient reports his relationship with his ex has been complicated. SWCC and patient processed his feelings/emotions about this today. CC provided support/empathy, coping strategies and motivational interviewing.    Intervention:  Motivational Interviewing: Expressed Empathy/Understanding, Supported Autonomy, Collaboration, Evocation, Permission to raise concern or advise, Open-ended questions, Reflections: simple and complex, Rolled with resistance: Emphasized patient autonomy, Simple reflection, Complex reflection, Amplified reflection (weaker or stronger meaning), Shifted topic to defuse resistance, Reframed sustain talk  in the direction of change and Evoked patient agenda, Change talk (evoked) and Reframe   Target Behavior(s): Explored and resolved challenges to attending appointments as scheduled and Explored current social supports and reinforced opportunities to increase engagement    Assessment: (Progress on Goals / Homework):  Patient would benefit from continued coordination in reaching their goals set for the Behavioral Health Home ([facility]) program. SWCC reviewed Health Action Plan goals and will continue to monitor progress and work with patient and their care team.      Plan: (Homework, other):  Patient was encouraged to continue to seek condition-related information and education.      Scheduled a Phone follow up appointment with SW  in 2 weeks     Patient has set self-identified goals and will monitor progress until the next appointment on: 10/13/2021.         SARA Irwin LGSW Behavioral Health Home ([facility])   [facility]  [phone]  September 28, 2021  9:35 AM          Next 5 appointments (look out 90 days)    Dec 13, 2021  8:00 AM  Return Visit with Meg Miranda MD  [facility] [place] ([facility] ) [address] [place] [id number] [phone]

## 2021-10-09 ENCOUNTER — HEALTH MAINTENANCE LETTER (OUTPATIENT)
Age: 31
End: 2021-10-09

## 2021-10-13 ENCOUNTER — VIRTUAL VISIT (OUTPATIENT)
Dept: BEHAVIORAL HEALTH | Facility: CLINIC | Age: 31
End: 2021-10-13
Payer: COMMERCIAL

## 2021-10-13 DIAGNOSIS — R69 DIAGNOSIS DEFERRED: Primary | ICD-10-CM

## 2021-10-13 NOTE — PROGRESS NOTES
Behavioral Health Home Services  [facility]: [place]        Social Work Care Navigator Note      Patient: Rao Leavitt  Date: October 13, 2021  Preferred Name: Rao    Previous PHQ-9:   PHQ-9 SCORE 4/26/2021 6/8/2021 7/19/2021   PHQ-9 Total Score MyChart - - -   PHQ-9 Total Score 14 10 7   Some encounter information is confidential and restricted. Go to Review Flowsheets activity to see all data.     Previous AAYUSH-7:   AAYUSH-7 SCORE 4/26/2021 6/8/2021 7/19/2021   Total Score - - -   Total Score 9 9 9   Some encounter information is confidential and restricted. Go to Review Flowsheets activity to see all data.     NOMAN LEVEL:  No flowsheet data found.    Preferred Contact:  Need for : No  Preferred Contact: Cell      Type of Contact Today: Phone call (patient / identified key support person reached)      Data: (subjective / Objective):  Recent ED/IP Admission or Discharge?   None    Patient Goals:  Goal Areas: Health;Mental Health;Chemical Health;Financial and Social Service Benefits  Patient stated goals: Rao would like assistance with his physical, mental and chemical health for creating a balanced and healthy lifestyle. Rao would like assistance with financial counseling and social service assistance to aid with county benefits to increase his financial stability.        [facility] Core Service Provided:  Comprehensive Care Management: utilized the electronic medical record / patient registry to identify and support patient's health conditions / needs more effectively   Care Transitions: focused on the coordinated and seamless movement of patient between or within different levels of care or settings  Care Coordination: provided care management services/referrals necessary to ensure patient and their identified supports have access to medical, behavioral health, pharmacology and recovery support services.  Ensured that patient's care is integrated across all settings and services.   Individual  and Family Support: aimed to help clients reduce barriers to achieving goals, increase health literacy and knowledge about chronic condition(s), increase self-efficacy skills, and improve health outcomes  Referral to Community and Social Support Services: Provided patient with referrals (see plan section)  Assisted in scheduling an appointment to a referral / service (see plan section)  Coordinated / Confirmed patient's appointment time or referral and transportation plan  Followed-up with patient on whether or not they completed a referral    Current Stressors / Issues / Care Plan Objective Addressed Today:  SWCC and patient were able to meet today for Behavioral Health Home ([facility]) monthly check-in via telehealth visit. All required ROIs have been filed with HIM/patient chart.    1. Patient reports his mental has been ok. Patient reports he would like to talk to provider about continued weaning off of mental health medications. Patient reports he has been talking to his CD counselor about his current stressors. Patient reports CD counselor has recommended for him to get medical marijuana to help manage his sleep and mental health symptoms. Patient reports he would like to discuss this with his psychiatric provider. Patient reports he has upcoming appt next week.    2. Patient reports he continues to work about 50 hours per week and taking pride in his work. 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    3. Patient reports he did receive his divorce papers and was surprised how this was triggering for him. Patient and CC were able to process his feeling/emotions about this today. CC provided support/empathy, coping skills and motivational interviewing.    4. Patient reports he continues to work with his [facility] provider and CD counselor every week. 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 Patient reports he continues to meet with [facility] therapist once a week.    5. Patient reports his  has filed his bankruptcy. Patient reports he does not know when the process will be completed and does not have court date at this time. Patient reports once three months goes by he will be able to build his credit again.    6. Patient reports his relationship with his s.o. continues to be strained. Patient reports they are \"both doing their own thing.\" Patient and [facility] were able to process his feeling/emotions about this today. [facility] provided support/empathy, coping skills and motivational interviewing.    7. Patient reports he is waiting for paperwork to complete [employer] programming.    8. Patient reports he missed his sleep study appt. [facility] was able to re-schedule appt for patient today.    9. Patient reports upcoming appt with neurology in Dec. for follow-up.    Intervention:  Motivational Interviewing: Expressed Empathy/Understanding, Supported Autonomy, Collaboration, Evocation, Permission to raise concern or advise, Open-ended questions, Reflections: simple and complex, Rolled with resistance: Emphasized patient autonomy, Simple reflection, Complex reflection, Amplified reflection (weaker or stronger meaning), Shifted topic to defuse resistance, Reframed sustain talk in the direction of change and Evoked patient agenda, Change talk (evoked) and Reframe   Target Behavior(s): Explored thoughts about taking an anti-depressant, Explored and resolved challenges related to taking anti-depressants as prescribed, Explored thoughts and readiness to participate in individual therapy, Explored and resolved challenges to attending appointments as scheduled, Explored current social supports and reinforced opportunities to increase engagement, Explored current sleep hygeine and patient's perception and knowledge of relationship to mood and Explored patient's perception of how alcohol and / or " drugs influences mood    Assessment: (Progress on Goals / Homework):  Patient would benefit from continued coordination in reaching their goals set for the Behavioral Health Home ([facility]) program. [facility] reviewed Health Action Plan goals and will continue to monitor progress and work with patient and their care team.      Plan: (Homework, other):  Patient was encouraged to continue to seek condition-related information and education.      Scheduled a Phone follow up appointment with [facility] in 4 weeks     Patient has set self-identified goals and will monitor progress until the next appointment on: 11/10/2021.         Varghese Alvarez [facility]  Behavioral Health Home ([facility])   [facility]  [phone]  October 13, 2021                    Next 5 appointments (look out 90 days)    Dec 13, 2021  8:00 AM  Return Visit with Meg Miranda MD  [facility] [place] ([facility] ) [address] [place] [id number] [phone]

## 2021-10-13 NOTE — Clinical Note
José Miguel Giron,    Patient update - see item #1, as I know you have appt with him next Monday.    Take care,  Varghese

## 2021-10-18 ENCOUNTER — VIRTUAL VISIT (OUTPATIENT)
Dept: PSYCHIATRY | Facility: CLINIC | Age: 31
End: 2021-10-18
Payer: COMMERCIAL

## 2021-10-18 DIAGNOSIS — F31.9 BIPOLAR 1 DISORDER (H): ICD-10-CM

## 2021-10-18 DIAGNOSIS — F41.1 GENERALIZED ANXIETY DISORDER: Primary | ICD-10-CM

## 2021-10-18 PROCEDURE — 99214 OFFICE O/P EST MOD 30 MIN: CPT | Mod: 95 | Performed by: NURSE PRACTITIONER

## 2021-10-18 RX ORDER — LITHIUM CARBONATE 300 MG
300 TABLET ORAL AT BEDTIME
Qty: 30 TABLET | Refills: 0 | Status: SHIPPED | OUTPATIENT
Start: 2021-10-18 | End: 2021-11-24

## 2021-10-18 RX ORDER — PROPRANOLOL HYDROCHLORIDE 20 MG/1
20 TABLET ORAL 2 TIMES DAILY
Qty: 60 TABLET | Refills: 1 | Status: SHIPPED | OUTPATIENT
Start: 2021-10-18 | End: 2021-12-10

## 2021-10-18 NOTE — PATIENT INSTRUCTIONS
1.  Decrease lithium to 300 mg at bedtime only for 30 days then stop    2.  Decrease propranolol to 20 mg twice daily    3.  Continue all therapies at [facility] and maintain sobriety        Continue all other medications as reviewed per electronic medical record today.     Safety plan reviewed. To the Emergency Department as needed or call after hours crisis line at [phone] or [phone]. [place] Crisis Text Line. Text [place] to 052485 or Suicide LifeLine Chat: suicideCompStak.org/chat/    To schedule individual or family therapy, call [facility] at [phone].    Schedule an appointment with me in December or sooner as needed. Call [facility] at [phone] to schedule.    Follow up with primary care provider as planned or for acute medical concerns.    Call the psychiatric nurse line with medication questions or concerns at [phone].    Trackyhart may be used to communicate with your provider, but this is not intended to be used for emergencies.    Crisis Resources:    National Suicide Prevention Lifeline: 635.273.3903 (TTY: 282.538.4520). Call anytime for help.  (www.suicidepreventionlifeline.org)  National [place] on Mental Illness (www.nadege.org): 339.256.4262 or 264-289-0315.   Mental Health Association (www.mentalhealth.org): 851.954.3188 or 860-742-9434.  [place] Crisis Text Line: Text [place] to 600411  Suicide LifeLine Chat: suicideRelinkLabsline.org/chat

## 2021-10-18 NOTE — PROGRESS NOTES
"  Outpatient Psychiatric Progress Note    Name: Rao Leavitt   : 1990                    Primary Care Provider: Suzi Jaime NP   Therapist: Yes    PHQ-9 scores:  PHQ-9 SCORE 2021   PHQ-9 Total Score MyChart - - -   PHQ-9 Total Score 14 10 7   Some encounter information is confidential and restricted. Go to Review Flowsheets activity to see all data.       AAYUSH-7 scores:  AAYUSH-7 SCORE 2021   Total Score - - -   Total Score 9 9 9   Some encounter information is confidential and restricted. Go to Review Flowsheets activity to see all data.       Patient Identification:    Patient is a 31 year old year old, partnered / significant other  White American male  who presents for return visit with me.  Patient is currently employed full time. Patient attended the session alone. Patient prefers to be called: \"Philipp\".    Interim History:    I last saw Rao Leavitt for outpatient psychiatry Return Visit on 2021.     During that appointment, he informed me that he wants to stop taking his medications now that his lifestyle has changed.  He is remaining sober and now has a full-time job at [employer].  His relationship with his significant other has been evolving.  Lucio denies any depression.  He reports some mild sleep disturbance.  He reports no seizure-like activities.  At this point his olanzapine and clomipramine have been discontinued.  He requested his lithium to be decreased to 300 mg twice daily and agreed to his propranolol being decreased to 40 mg twice daily.  I referred him to his neurologist to talk about decreasing his dose of topiramate in the future.  Finally, he will continue with behavior health home care services to access community support as well as all other therapies to maintain sobriety..    .     Current medications include: Apple Ralph Vn-Grn Tea-Bit Or-Cr (APPLE CIDER VINEGAR PLUS) TABS,   lithium (ESKALITH) 300 MG " tablet, Take 1 tablet (300 mg) by mouth 2 times daily  multivitamin, therapeutic (THERA-VIT) TABS tablet, Take 1 tablet by mouth daily  propranolol (INDERAL) 40 MG tablet, Take 1 tablet (40 mg) by mouth 2 times daily  vitamin B-Complex, Take 1 tablet by mouth daily    No current facility-administered medications on file prior to visit.       The [place] Prescription Monitoring Program has been reviewed and there are no concerns about diversionary activity for controlled substances at this time.      I was able to review most recent Primary Care Provider, specialty provider, and therapy visit notes that I have access to.     Today, patient reports that he got the final copies of the divorce papers.  He is stayingn sober.  He wants to decrease doses of his medications.  He has had had no outbursts at work and at home.  He has been sober for almost two years and stil receiving [facility] therapies for two more months then done.  He has [employer] worker.  She sees alma once a month.  No seizures.   He is seeing a provider through the sleep clinic.  He reports better sleep.  He feels tired but wiirks through it.  He is always hungry - he weighs 205 pounds now. He lkes working at the convenience store.  Home life is quiet.  He snacks a lot     has a past medical history of Depressive disorder.    Social history updates:    Lucio lives with his girlfriend and children.    Substance use updates:    Rare cannabis use   Tobacco use: Yes E-cigarettes  Ready to quit?  No  Nicotine Replacement Therapy tried: None    Vital Signs:   There were no vitals taken for this visit.    Labs:    Most recent laboratory results reviewed and no new labs.     Mental Status Examination:  Appearance:  awake, alert and casually dressed  Attitude:  cooperative   Eye Contact:  adequate  Gait and Station: No assistive Devices used and No dizziness or falls  Psychomotor Behavior:  fidgeting and intact station, gait and muscle tone  Oriented to:   time, person, and place  Attention Span and Concentration:  Normal  Speech:   clear, coherent  Mood:  better  Affect:  intensity is heightened  Associations:  no loose associations  Thought Process:  goal oriented  Thought Content:  no evidence of suicidal ideation or homicidal ideation, no auditory hallucinations present and no visual hallucinations present  Recent and Remote Memory:  intact Not formally assessed. No amnesia.  Fund of Knowledge: appropriate  Insight:  good  Judgment:  intact  Impulse Control:  intact    Suicide Risk Assessment:  Today Rao Leavitt reports that he is having no thoughts to want to end his life or to harm other people. In addition, there are notable risk factors for self-harm, including anxiety, substance abuse and mood change. However, risk is mitigated by commitment to family, sobriety, history of seeking help when needed, future oriented, denies suicidal intent or plan and denies homicidal ideation, intent, or plan. Therefore, based on all available evidence including the factors cited above, Rao Leavitt does not appear to be at imminent risk for self-harm, does not meet criteria for a 72-hr hold, and therefore remains appropriate for ongoing outpatient level of care.  A thorough assessment of risk factors related to suicide and self-harm have been reviewed and are noted above. The patient convincingly denies suicidality on several occasions. Local community safety resources printed and reviewed for patient to use if needed. There was no deceit detected, and the patient presented in a manner that was believable.     DSM5 Diagnosis:  296.89 Bipolar II Disorder With mixed features and moderate  300.02 (F41.1) Generalized Anxiety Disorder  780.52 (G47.00) Insomnia Disorder   With non-sleep disorder mental comorbidity  Episodic      Medical comorbidities include:   Patient Active Problem List    Diagnosis Date Noted     History of traumatic brain injury 03/02/2021      Priority: Medium     Abnormal EEG 03/02/2021     Priority: Medium      OUTPATIENT SLEEP-DEPRIVED EEG REPORT.  DATE OF RECORDING: January 28, 2021.   IMPRESSION: This outpatient sleep-deprived EEG study is abnormal during wakefulness and drowsiness due to EEG changes that resemble generalized paroxysmal fast activity, that could be potentially epileptiform and seen in generalized epilepsies.  No electrographic or clinical seizures and no paroxysmal behavioral events are recorded during this study.       Attention-deficit hyperactivity disorder 03/01/2021     Priority: Medium     PTSD (post-traumatic stress disorder) 01/05/2021     Priority: Medium     Borderline personality disorder (H) 01/05/2021     Priority: Medium     Chemical dependency (H) 09/10/2020     Priority: Medium     Lithium use 05/15/2020     Priority: Medium     Lumbar spine tumor 11/27/2019     Priority: Medium     High risk medication use 10/05/2017     Priority: Medium     Bipolar 1 disorder (H) 03/16/2017     Priority: Medium     Generalized anxiety disorder 03/16/2017     Priority: Medium       Assessment:    Rao Leavitt reports 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  At this point he tells me he feels like his home life is stable and he would like to stop taking medications entirely.  I decreased his propranolol to 20 mg twice daily.  I also decreased his lithium to 300 mg at bedtime only for 30 days then stop.  He is continuing all therapies through [facility].  Staff informs me he has been sober for 2 years..    Medication side effects and alternatives were reviewed. Health promotion activities recommended and reviewed today. All questions addressed. Education and counseling completed regarding risks and benefits of medications and psychotherapy options.    Treatment Plan:        1.  Decrease lithium to 300 mg at bedtime only for 30 days then stop    2.  Decrease propranolol to 20 mg  twice daily    3.  Continue all therapies at [facility] and maintain sobriety        Continue all other medications as reviewed per electronic medical record today.     Safety plan reviewed. To the Emergency Department as needed or call after hours crisis line at [phone] or [phone]. [place] Crisis Text Line. Text [place] to 198387 or Suicide LifeLine Chat: suicideIMASTE.org/chat/    To schedule individual or family therapy, call [facility] at [phone].    Schedule an appointment with me in December or sooner as needed. Call [facility] at [phone] to schedule.    Follow up with primary care provider as planned or for acute medical concerns.    Call the psychiatric nurse line with medication questions or concerns at [phone].    [employer] may be used to communicate with your provider, but this is not intended to be used for emergencies.    Crisis Resources:    National Suicide Prevention Lifeline: 520.556.3534 (TTY: 427.478.4018). Call anytime for help.  (www.suicidepreventionlifeline.org)  National [place] on Mental Illness (www.nadege.org): 026-551-7371 or 212-917-7410.   Mental Health Association (www.mentalhealth.org): 341.677.3520 or 937-640-0064.  [place] Crisis Text Line: Text [place] to 317581  Suicide LifeLine Chat: suicideIMASTE.org/chat    Administrative Billing:   Time spent with patient was spent in counseling and coordination of care regarding above diagnoses and treatment plan.    Patient Status:  Patient will continue to be seen for ongoing consultation and stabilization.    Signed:   DUYEN Carrington-BC   Psychiatry

## 2021-10-18 NOTE — PROGRESS NOTES
Rao is a 31 year old who is being evaluated via a billable video visit.      Rao is in [place] for the visit today.    How would you like to obtain your AVS? MyChart  If the video visit is dropped, the invitation should be resent by: Text to cell phone: [phone]  Will anyone else be joining your video visit? No      Video Start Time: 11:20 AM  Video-Visit Details    Type of service:  Video Visit    Video End Time:11:45 AM    Originating Location (pt. Location): Home    Distant Location (provider location):  [facility] MENTAL [facility] & ADDICTION [facility]     Platform used for Video Visit: [employer]

## 2021-11-10 ENCOUNTER — TELEPHONE (OUTPATIENT)
Dept: BEHAVIORAL HEALTH | Facility: CLINIC | Age: 31
End: 2021-11-10
Payer: COMMERCIAL

## 2021-11-10 NOTE — TELEPHONE ENCOUNTER
Behavioral Health Home Services  [facility]: [place]        Social Work Care Navigator Note      Patient: Rao Leavitt  Date: November 10, 2021  Preferred Name: Rao    Previous PHQ-9:   PHQ-9 SCORE 4/26/2021 6/8/2021 7/19/2021   PHQ-9 Total Score MyChart - - -   PHQ-9 Total Score 14 10 7   Some encounter information is confidential and restricted. Go to Review Flowsheets activity to see all data.     Previous AAYUSH-7:   AAYUSH-7 SCORE 4/26/2021 6/8/2021 7/19/2021   Total Score - - -   Total Score 9 9 9   Some encounter information is confidential and restricted. Go to Review Flowsheets activity to see all data.     NOMAN LEVEL:  No flowsheet data found.    Preferred Contact:  Need for : No  Preferred Contact: Cell      Type of Contact Today: Phone call (not reached/unavailable)      Data: (subjective / Objective):  Attempted to reach patient for Behavioral Health Home ([facility]) monthly check-in, but was unsuccessful, left message and sent patient MyChart message.  Plan to attempt again.      Varghese Alvarez LICSW Behavioral Health Home ([facility])   [facility]  [phone]  November 10, 2021  9:49 AM            Next 5 appointments (look out 90 days)    Dec 13, 2021  8:00 AM  Return Visit with Meg Miranda MD  [facility] [place] ([facility] ) [address] [place] [id number] [phone]

## 2021-11-23 ENCOUNTER — TELEPHONE (OUTPATIENT)
Dept: BEHAVIORAL HEALTH | Facility: CLINIC | Age: 31
End: 2021-11-23
Payer: COMMERCIAL

## 2021-11-23 ENCOUNTER — VIRTUAL VISIT (OUTPATIENT)
Dept: BEHAVIORAL HEALTH | Facility: CLINIC | Age: 31
End: 2021-11-23
Payer: COMMERCIAL

## 2021-11-23 DIAGNOSIS — R69 DIAGNOSIS DEFERRED: Primary | ICD-10-CM

## 2021-11-23 NOTE — TELEPHONE ENCOUNTER
Behavioral Health Home Services  [facility]: [place]        Social Work Care Navigator Note      Patient: Rao Leavitt  Date: November 23, 2021  Preferred Name: Rao    Previous PHQ-9:   PHQ-9 SCORE 4/26/2021 6/8/2021 7/19/2021   PHQ-9 Total Score MyChart - - -   PHQ-9 Total Score 14 10 7   Some encounter information is confidential and restricted. Go to Review Flowsheets activity to see all data.     Previous AAYUSH-7:   AAYUSH-7 SCORE 4/26/2021 6/8/2021 7/19/2021   Total Score - - -   Total Score 9 9 9   Some encounter information is confidential and restricted. Go to Review Flowsheets activity to see all data.     NOMAN LEVEL:  No flowsheet data found.    Preferred Contact:  Need for : No  Preferred Contact: Cell      Type of Contact Today: Phone call (not reached/unavailable)      Data: (subjective / Objective):  Attempted to reach patient for Behavioral Health Home ([facility]) monthly check-in, but was unsuccessful, left message and mailed letter.  Plan to attempt again.        Varghese Alvarez LICSW Behavioral Health Home ([facility])   [facility]  [phone]  November 23, 2021  9:26 AM          Next 5 appointments (look out 90 days)    Dec 13, 2021  8:00 AM  Return Visit with Meg Miranda MD  [facility] [place] ([facility] ) [address] [place] [id number] [phone]

## 2021-11-23 NOTE — PROGRESS NOTES
Behavioral Health Home Services  [facility]: [place]        Social Work Care Navigator Note      Patient: Rao Leavitt  Date: November 23, 2021  Preferred Name: Rao    Previous PHQ-9:   PHQ-9 SCORE 4/26/2021 6/8/2021 7/19/2021   PHQ-9 Total Score MyChart - - -   PHQ-9 Total Score 14 10 7   Some encounter information is confidential and restricted. Go to Review Flowsheets activity to see all data.     Previous AAYUSH-7:   AAYUSH-7 SCORE 4/26/2021 6/8/2021 7/19/2021   Total Score - - -   Total Score 9 9 9   Some encounter information is confidential and restricted. Go to Review Flowsheets activity to see all data.     NOMAN LEVEL:  No flowsheet data found.    Preferred Contact:  Need for : No  Preferred Contact: Cell      Type of Contact Today: Phone call (patient / identified key support person reached)      Data: (subjective / Objective):  Recent ED/IP Admission or Discharge?   None    Patient Goals:  Goal Areas: Health; Mental Health; Chemical Health; Financial and Social Service Benefits  Patient stated goals: Rao would like assistance with his physical, mental and chemical health for creating a balanced and healthy lifestyle. Rao would like assistance with financial counseling and social service assistance to aid with county benefits to increase his financial stability.        [facility] Core Service Provided:  Comprehensive Care Management: utilized the electronic medical record / patient registry to identify and support patient's health conditions / needs more effectively   Care Transitions: focused on the coordinated and seamless movement of patient between or within different levels of care or settings  Care Coordination: provided care management services/referrals necessary to ensure patient and their identified supports have access to medical, behavioral health, pharmacology and recovery support services.  Ensured that patient's care is integrated across all settings and services.    Individual and Family Support: aimed to help clients reduce barriers to achieving goals, increase health literacy and knowledge about chronic condition(s), increase self-efficacy skills, and improve health outcomes  Referral to Community and Social Support Services: Provided patient with referrals (see plan section)  Assisted in scheduling an appointment to a referral / service (see plan section)  Coordinated / Confirmed patient's appointment time or referral and transportation plan  Followed-up with patient on whether or not they completed a referral    Current Stressors / Issues / Care Plan Objective Addressed Today:  [facility] and patient were able to meet today for Behavioral Health Home ([facility]) monthly check-in via telehealth visit. All required ROIs have been filed with HIM/patient chart.    1. Patient reports he and s.o. are going to therapy. Patient reports he is trying to work on relationship. Patient reports he is worried s.o. may be interested in someone else. Patient reports he is finding outbursts and mind racing more often. Patient reports he would like to go back on his Topamax and possibly increase [place]. [facility] messaged psychiatry provider with update today. Patient has upcoming appt with provider in Dec.     Patient reports he is now sleeping in their own bed with his s.o. Patient reports sleeping well since taking lithium, as needed at night. Patient reports baby is due end of Feb.    2. Patient reports divorce final and bankruptcy filed. Patient reports he feels like a new chapter is opening in his life. Patient reports his s.o. has been the one solid support in his life.     3. Patient reports he is unpacking trauma with his therapist. Patient reflects that drugs and alcohol were a part of his coping with trauma in the past. Patient reports he continues to meet with [facility] several times per month. Patient reports he continues to work with [facility] provider.     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 21st.    4. Patient reports he is in the process of getting a new job. 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     5. Patient reports he and his s.o. are going to s.o.'s mother for Thanksgiving. Patient reports he is really working on trying to repair his relationships with s.o. family. [facility] and patient processed his feelings/emotions about this today.      Intervention:  Motivational Interviewing: Expressed Empathy/Understanding, Supported Autonomy, Collaboration, Evocation, Permission to raise concern or advise, Open-ended questions, Reflections: simple and complex, Rolled with resistance: Emphasized patient autonomy, Simple reflection, Complex reflection, Amplified reflection (weaker or stronger meaning), Shifted topic to defuse resistance, Reframed sustain talk in the direction of change and Evoked patient agenda, Change talk (evoked) and Reframe   Target Behavior(s): Explored thoughts about taking an anti-depressant, Explored and resolved challenges related to taking anti-depressants as prescribed, Explored thoughts and readiness to participate in individual therapy, Explored and resolved challenges to attending appointments as scheduled, Explored current social supports and reinforced opportunities to increase engagement, Explored current sleep hygeine and patient's perception and knowledge of relationship to mood and Explored patient's perception of how alcohol and / or drugs influences mood    Assessment: (Progress on Goals / Homework):  Patient would benefit from continued coordination in reaching their goals set for the Behavioral Health Home ([facility]) program. [facility] reviewed Health Action Plan goals and will continue to monitor progress and work with patient and their care team.      Plan: (Homework, other):  Patient was encouraged to continue to seek  condition-related information and education.      Scheduled a Phone follow up appointment with ANGI EDUARDO in 4 weeks     Patient has set self-identified goals and will monitor progress until the next appointment on: 12/21/2021.        Varghese Alvarez [facility]  Behavioral Health Home ([facility])   [facility]  [phone]  November 23, 2021  11:35 AM                  Next 5 appointments (look out 90 days)    Dec 13, 2021  8:00 AM  Return Visit with Meg Miranda MD  [facility] [place] ([facility] ) [address] [place] [phone]

## 2021-11-23 NOTE — LETTER
[facility]al Health [place] ([facility]): Health Action Plan  [facility]: [place]    [facility]      Name: Rao Leavitt  Preferred Name: Rao  : 1990  MRN: [id number]    José Miguel Segal,     I am writing to inform you that I have tried contacting you today and earlier this month for your Behavioral Health Home ([facility]) Monthly Check-in. I was able to leave you a message. The Behavioral Health Home ([facility]) monthly check-in is required to be completed every month as you continue to be enrolled in the [facility] Health [place] ([facility]) program.     Please call me as soon as your receive this message so we can get an appointment scheduled.     If you have any questions about your monthly check-ins, Behavioral Health Home ([facility]) services, or if you feel you no longer was to be enrolled in the Behavioral Health Home ([facility]) program, please feel free to contact me by phone or by email. My contact information is listed below. I look forward to hearing from you!     Thank you,       Varghese Alvarez [facility]   Behavioral [facility] ([facility])    [phone]   [email or web address]

## 2021-11-23 NOTE — Clinical Note
"José Miguel Giron,    Patient wondering if you could reach out to him about his medications. Patient reporting the following: \" Patient reports he is finding outbursts and mind racing more often. Patient reports he would like to go back on his Topamax and possibly increase Lithium.\"    He does not have appt with you until Dec. 17th but wanted to address medication/mood changes before then.    Take care,  Varghese Alvarez LICSW Behavioral Health Home ([facility])   [facility]  [phone]  November 23, 2021  11:37 AM    "

## 2021-11-24 ENCOUNTER — TELEPHONE (OUTPATIENT)
Dept: PSYCHIATRY | Facility: CLINIC | Age: 31
End: 2021-11-24
Payer: COMMERCIAL

## 2021-11-24 DIAGNOSIS — F31.9 BIPOLAR 1 DISORDER (H): ICD-10-CM

## 2021-11-24 RX ORDER — LITHIUM CARBONATE 300 MG
300 TABLET ORAL AT BEDTIME
Qty: 30 TABLET | Refills: 1 | Status: SHIPPED | OUTPATIENT
Start: 2021-11-24 | End: 2021-12-17

## 2021-11-24 NOTE — TELEPHONE ENCOUNTER
"Pt called to report \"outbursts\" he has been having due to added stress of going through therapy with his baby mother. He is wondering if he can add a PM Lithium dose with a low dose of Topamax which he states has helped \"with shutting down my brain\" in the past. He reports his anxiety medication has been effective and does not want to change that.     Date of Last Office Visit: 10/18/21  Date of Next Office Visit: 12/17/21  No shows since last visit: none  Cancellations since last visit: none    Medication requested: lithium (ESKALITH) 300 MG tablet Date last ordered: 10/18/2021 Qty: 30 Refills: 0     Review of [place] ?: n/a    Lapse in medication adherence greater than 5 days?: no   If yes, call patient and gather details: spoke with patient he has been taking med every morning   Medication refill request verified as identical to current order?: yes  Result of Last DAM, VPA, Li+ Level, CBC, or Carbamazepine Level (at or since last visit): May need Lithium Level-Provider please review    Last visit treatment plan: Treatment Plan:          1.  Decrease lithium to 300 mg at bedtime only for 30 days then stop    2.  Decrease propranolol to 20 mg twice daily    3.  Continue all therapies at [facility] and maintain sobriety         Continue all other medications as reviewed per electronic medical record today.     Safety plan reviewed. To the Emergency Department as needed or call after hours crisis line at [phone] or [phone]. [place] Crisis Text Line. Text [place] to 951950 or Suicide LifeLine Chat: suicidepreventionlifeline.org/chat/    To schedule individual or family therapy, call [facility] at [phone].    Schedule an appointment with me in December or sooner as needed. Call [facility] at [phone] to schedule.    Follow up with primary care provider as planned or for acute medical concerns.    Call the psychiatric nurse line with medication questions or concerns at " [phone].    GoSurf Accessories may be used to communicate with your provider, but this is not intended to be used for emergencies.      []Medication refilled per  Medication Refill in Ambulatory Care  policy.  [x]Medication unable to be refilled by RN due to criteria not met as indicated below:    []Eligibility - not seen in the last year   []Supervision - no future appointment   []Compliance - no shows, cancellations or lapse in therapy   [x]Verification - order discrepancy   []Controlled medication   [x]Medication not included in policy   []90-day supply request   []Other

## 2021-11-26 NOTE — TELEPHONE ENCOUNTER
"Refill request r'cd from [facility] via fax for Topiramate denied due to Discontinued on 08/19/2021. Faxed \"not authorized\" back to pharmacy.    Zoie Meredith RN November 26, 2021 3:03 PM    "

## 2021-11-30 ENCOUNTER — TELEPHONE (OUTPATIENT)
Dept: NEUROLOGY | Facility: CLINIC | Age: 31
End: 2021-11-30
Payer: COMMERCIAL

## 2021-11-30 DIAGNOSIS — Z87.820 HISTORY OF TRAUMATIC BRAIN INJURY: Primary | ICD-10-CM

## 2021-11-30 RX ORDER — TOPIRAMATE 25 MG/1
25 TABLET, FILM COATED ORAL 2 TIMES DAILY
Qty: 60 TABLET | Refills: 3 | Status: SHIPPED | OUTPATIENT
Start: 2021-11-30 | End: 2022-04-06

## 2021-11-30 NOTE — TELEPHONE ENCOUNTER
Waiting on provider response regarding pt request for Topamax. Pt does have upcoming appt with provider on 12/17    Mary Hawthorne on 11/30/2021 at 10:22 AM

## 2021-12-07 ENCOUNTER — VIRTUAL VISIT (OUTPATIENT)
Dept: SLEEP MEDICINE | Facility: CLINIC | Age: 31
End: 2021-12-07
Payer: COMMERCIAL

## 2021-12-07 DIAGNOSIS — G47.30 MILD SLEEP APNEA: ICD-10-CM

## 2021-12-07 DIAGNOSIS — F51.04 CHRONIC INSOMNIA: Primary | ICD-10-CM

## 2021-12-07 PROCEDURE — 90832 PSYTX W PT 30 MINUTES: CPT | Mod: 95 | Performed by: PSYCHOLOGIST

## 2021-12-07 ASSESSMENT — SLEEP AND FATIGUE QUESTIONNAIRES
HOW LIKELY ARE YOU TO NOD OFF OR FALL ASLEEP WHILE SITTING AND TALKING TO SOMEONE: WOULD NEVER DOZE
HOW LIKELY ARE YOU TO NOD OFF OR FALL ASLEEP WHILE SITTING AND READING: HIGH CHANCE OF DOZING
HOW LIKELY ARE YOU TO NOD OFF OR FALL ASLEEP IN A CAR, WHILE STOPPED FOR A FEW MINUTES IN TRAFFIC: WOULD NEVER DOZE
HOW LIKELY ARE YOU TO NOD OFF OR FALL ASLEEP WHILE SITTING QUIETLY AFTER LUNCH WITHOUT ALCOHOL: WOULD NEVER DOZE
HOW LIKELY ARE YOU TO NOD OFF OR FALL ASLEEP WHILE LYING DOWN TO REST IN THE AFTERNOON WHEN CIRCUMSTANCES PERMIT: MODERATE CHANCE OF DOZING
HOW LIKELY ARE YOU TO NOD OFF OR FALL ASLEEP WHILE WATCHING TV: MODERATE CHANCE OF DOZING
HOW LIKELY ARE YOU TO NOD OFF OR FALL ASLEEP WHILE SITTING INACTIVE IN A PUBLIC PLACE: MODERATE CHANCE OF DOZING
HOW LIKELY ARE YOU TO NOD OFF OR FALL ASLEEP WHEN YOU ARE A PASSENGER IN A CAR FOR AN HOUR WITHOUT A BREAK: MODERATE CHANCE OF DOZING

## 2021-12-07 NOTE — PROGRESS NOTES
Rao is a 31 year old who is being evaluated via a billable video visit.      How would you like to obtain your AVS? MyChart  If the video visit is dropped, the invitation should be resent by: Text to cell phone: [phone]  Will anyone else be joining your video visit? No    Video Start Time:11:10 AM  Video-Visit Details    Type of service:  Video Visit    Video End Time:11:38 AM    Originating Location (pt. Location): Home    Distant Location (provider location):  [facility] SLEEP [facility]     Platform used for Video Visit: [facility]     SLEEP MEDICINE VIRTUAL VIDEO FOLLOW-UP VISIT  Sleep Psychology    Patient Name: Rao Leavitt  MRN:  [id number]  Date of Service: Dec 7, 2021       Subjective Report     Rao Laevitt  returns for a telehealth video visit to discuss progress in implementing behavioral strategies for the management of insomnia.  Patient consent for initiation of video visit was obtained and documented prior to initiation of visit.     Rao reports he has reduced time in bed from 12 hours to 8 hours.  He continues to feel tired but less so.  He is working now during the day.    He continues to have difficulty waking at 6 so he switched his wake time to 8 am which he states is better for him.    His sleep schedule is Midnight to 8 am.    No naps.    He is now sleeping in his own bed and is sleeping with his wife.  He.     .     Sleep Data:     Source of Sleep Estimates:  verbal self-report    Average Time in Bed: 8.2  Average Total Sleep Time: 7.5 hrs  Sleep Efficiency: Greater than 85%    EPWORTH SLEEPINESS SCALE    Sitting and reading 3   Watching TV 2   Sitting, inactive in a public place (theatre or mtg.) 2    As a passenger in a car 2   Lying down to rest in the afternoon when circumstance permit 2   Sitting and talking to someone 0   Sitting quietly after lunch without alcohol 0   In a car, while stopped for a few minutes in traffic 0   TOTAL SCORE (nl <11) 11      INSOMNIA SEVERITY INDEX     Difficulty falling asleep 3   Difficult staying asleep 3   Problems waking up to early 3   How SATISFIED/DISSATISFIED are you with your CURRENT sleep pattern? 3   How NOTICEABLE to others do you think your sleep pattern is in terms of your quality of life? 4   How WORRIED/DISTRESSED are you about your current sleep pattern? 4   To what extent do you consider your sleep problem to INTERFERE with your daily fuctioning(e.g. daytime fatigue, mood, ability to function at work/daily chores, concentration, mood,etc.) CURRENTLY? 4   INSOMNIA SEVERITY INDEX TOTAL SCORE 24    Absence of insomnia (0-7); sub-threshold insomnia (8-14); moderate insomnia (15-21); and severe insomnia (22-28)       Interventions     Strategies and recommendations including implementation and maintenance of of stimulus control and Review of sleep study, discussion of referral to ENT to evaluate tinnitus and other reported sinus issues.  He was also advised to confer with his primary care physician were discussed today.       Vital Signs     There were no vitals taken for this visit.     Mental Status     Orientation:  X3  Mood:  normal  Affect:  Congruent with mood  Speech/Language:  Normal  Thought Process: Intact  Associations:  Normal  Thought Content: Normal  Patient does not report any suicidal ideation, intention or plan.    Diagnostic Impressions and Plan     Chronic insomnia  Multifactorial insomnia associated with depression, anxiety, ADHD and a history of polysubstance abuse/dependence.  He has made significant changes in sleep behavior including return to sleeping in his own bed, reducing time in bed 12 hours to 8 hours.  He reports some reduction in daytime tiredness.  A primary complaint today was tinnitus and nasal/sinus breathing related issues.  Discussed following up with his primary care regarding this.  We will also provide a referral to ENT specialist.  Review of sleep study did revealed marginal  mild obstructive sleep apnea with an overall 5.2.  There was no recommendation for treatment to coming out of the sleep medicine consultation    Plan:  Continue current sleep schedule and plan of midnight-8 AM, avoid naps, maintain stimulus control.  Patient was provided referral to ENT advised to talk with his primary care physician around concerns symptoms of tinnitus and nasal breathing related issues.    Follow-up: 6 months      Amaury Matthew, Desiree, NELSON, DBSM  Diplomate, Behavioral Sleep Medicine  [facility]      Note: This dictation was created using voice recognition software. This document may contain an error not identified before finalizing the document. If the error changes the accuracy of the document, I would appreciate it being brought to my attention.

## 2021-12-09 ENCOUNTER — VIRTUAL VISIT (OUTPATIENT)
Dept: BEHAVIORAL HEALTH | Facility: CLINIC | Age: 31
End: 2021-12-09
Payer: COMMERCIAL

## 2021-12-09 DIAGNOSIS — R69 DIAGNOSIS DEFERRED: Primary | ICD-10-CM

## 2021-12-09 NOTE — Clinical Note
Bree Norman and Dr. Miranda,    I was able to meet with this patient today and wanted to provide an update.     Bree and Dr. Miranda - patient requested refills from his pharmacy for propranolol (psychiatry) and topiramate (neurology).    Bree - Patient has been trying to wean of medications but is now finding due to stressors symptoms are flaring. Please see my note.    Thank you,  Varghese Alvarez LICSW Behavioral Health Home ([facility])   [facility]  [phone]  December 9, 2021  12:05 PM

## 2021-12-09 NOTE — PROGRESS NOTES
Behavioral Health Home Services  [facility]: [place]        Social Work Care Navigator Note      Patient: Rao Leavitt  Date: December 9, 2021  Preferred Name: Rao    Previous PHQ-9:   PHQ-9 SCORE 4/26/2021 6/8/2021 7/19/2021   PHQ-9 Total Score MyChart - - -   PHQ-9 Total Score 14 10 7   Some encounter information is confidential and restricted. Go to Review Flowsheets activity to see all data.     Previous AAYUSH-7:   AAYUSH-7 SCORE 4/26/2021 6/8/2021 7/19/2021   Total Score - - -   Total Score 9 9 9   Some encounter information is confidential and restricted. Go to Review Flowsheets activity to see all data.     NOMAN LEVEL:  No flowsheet data found.    Preferred Contact:  Need for : No  Preferred Contact: Cell      Type of Contact Today: Phone call (patient / identified key support person reached)      Data: (subjective / Objective):  Recent ED/IP Admission or Discharge?   None    Patient Goals:  Goal Areas: Health; Mental Health; Chemical Health; Financial and Social Service Benefits  Patient stated goals: Rao would like assistance with his physical, mental and chemical health for creating a balanced and healthy lifestyle. Rao would like assistance with financial counseling and social service assistance to aid with county benefits to increase his financial stability.        [facility] Core Service Provided:  Comprehensive Care Management: utilized the electronic medical record / patient registry to identify and support patient's health conditions / needs more effectively   Care Transitions: focused on the coordinated and seamless movement of patient between or within different levels of care or settings  Care Coordination: provided care management services/referrals necessary to ensure patient and their identified supports have access to medical, behavioral health, pharmacology and recovery support services.  Ensured that patient's care is integrated across all settings and services.    Individual and Family Support: aimed to help clients reduce barriers to achieving goals, increase health literacy and knowledge about chronic condition(s), increase self-efficacy skills, and improve health outcomes  Referral to Community and Social Support Services: Provided patient with referrals (see plan section)  Assisted in scheduling an appointment to a referral / service (see plan section)  Coordinated / Confirmed patient's appointment time or referral and transportation plan  Followed-up with patient on whether or not they completed a referral    Current Stressors / Issues / Care Plan Objective Addressed Today:  SWCC and patient were able to meet today for Behavioral Health Home ([facility]) monthly check-in via telehealth visit. All required ROIs have been filed with HIM/patient chart.    1. Patient reports Bipolar flare in symptoms due to multiple stressors, changes in personal life and feeling like support systems are not connecting to meet his needs.     Patient reports his uncle had heart attack yesterday. Patient reports the holidays are difficult due to not being connected/estranged to his family of origin. Patient reports in Nov and Dec there are multiple death anniversaries of good friends and his grandparents. SWCC and patient discussed talking about his feeling with his [facility] provider and [facility] therapist.     SWCC and patient discussed current support system and advocating for his needs plus keeping healthy relationships and boundaries. SWCC and patient were able to process his feeling/emotions today. SWCC provided support/empathy, coping strategies and motivational interviewing today. Patient reports he has been meeting with     Patient reports several days of passive suicidal thinking/ideation with no intent or plan, in the past two weeks with mood low and manic highs. Patient reports moods have been up and down. Patient reports manic symptoms with outburst/anger/frustration. Patient reports feeling a  "bit hopeless due to lack of family and complicated relationships. Patent reports he feels like supports pushing him away and \"does not feel good enough for s.o. or his supports.\"    Patient reports feeling safe with himself today with no suicidal thinking/ideation or self harm behaviors. Patient and [facility] discussed call the National Suicide Prevention Lifeline at 531-943-1693, go to ED or call 911 if symptoms increase or worsen. Patient reports understanding of this today.    Patient reports he has been able to meet with his [employer] worker this week, met with new indiv therapistAriella with [facility] yesterday, and couples therapy with his s.o. on Tuesday.    Patient reports he has been trying to taper off his medications but now wonders if he needs to review medications to deal with current symptoms. Patient reports he is proud of himself for retaining his sobriety and not using drugs or alcohol to manage symptoms/cope with stressors.     Patient reports his is out of several refills and did call the pharmacy to request medications. Patient reports he needs refills on his propranolol (psychiatry) and topiramate (neurology).  [facility] messaged providers and provided update today.       2. Patient reports he and his s.o. continue to struggle with relationship conflict. Patient reports s.o. will be starting indiv therapy next week.        Intervention:  Motivational Interviewing: Expressed Empathy/Understanding, Supported Autonomy, Collaboration, Evocation, Permission to raise concern or advise, Open-ended questions, Reflections: simple and complex, Rolled with resistance: Emphasized patient autonomy, Simple reflection, Complex reflection, Amplified reflection (weaker or stronger meaning), Shifted topic to defuse resistance, Reframed sustain talk in the direction of change and Evoked patient agenda, Change talk (evoked) and Reframe   Target Behavior(s): Explored thoughts about taking an anti-depressant, Explored and " resolved challenges related to taking anti-depressants as prescribed, Explored thoughts and readiness to participate in individual therapy, Explored and resolved challenges to attending appointments as scheduled, Explored current social supports and reinforced opportunities to increase engagement and Explored patient's perception of how alcohol and / or drugs influences mood    Assessment: (Progress on Goals / Homework):  Patient would benefit from continued coordination in reaching their goals set for the Behavioral Health Home ([facility]) program. [facility] reviewed Health Action Plan goals and will continue to monitor progress and work with patient and their care team.      Plan: (Homework, other):  Patient was encouraged to continue to seek condition-related information and education.      Scheduled a Phone follow up appointment with [facility] in 2 weeks     Patient has set self-identified goals and will monitor progress until the next appointment on: 12/21/2021.        Varghese Alvarez LICSW Behavioral Health Home ([facility])   [facility]  [phone]  December 9, 2021  12:06 PM                  Next 5 appointments (look out 90 days)    Dec 13, 2021  8:00 AM  Return Visit with Meg Miranda MD  [facility] ([facility] ) [address] [place] [id number] [phone]

## 2021-12-10 DIAGNOSIS — F41.1 GENERALIZED ANXIETY DISORDER: ICD-10-CM

## 2021-12-10 RX ORDER — PROPRANOLOL HYDROCHLORIDE 20 MG/1
20 TABLET ORAL 2 TIMES DAILY
Qty: 60 TABLET | Refills: 1 | Status: SHIPPED | OUTPATIENT
Start: 2021-12-10 | End: 2021-12-17

## 2021-12-17 ENCOUNTER — VIRTUAL VISIT (OUTPATIENT)
Dept: PSYCHIATRY | Facility: CLINIC | Age: 31
End: 2021-12-17
Payer: COMMERCIAL

## 2021-12-17 DIAGNOSIS — F31.9 BIPOLAR 1 DISORDER (H): ICD-10-CM

## 2021-12-17 DIAGNOSIS — F41.1 GENERALIZED ANXIETY DISORDER: ICD-10-CM

## 2021-12-17 DIAGNOSIS — Z79.899 HIGH RISK MEDICATION USE: Primary | ICD-10-CM

## 2021-12-17 PROCEDURE — 99214 OFFICE O/P EST MOD 30 MIN: CPT | Mod: 95 | Performed by: NURSE PRACTITIONER

## 2021-12-17 RX ORDER — LITHIUM CARBONATE 300 MG
300 TABLET ORAL 2 TIMES DAILY
Qty: 30 TABLET | Refills: 1 | Status: SHIPPED | OUTPATIENT
Start: 2021-12-17 | End: 2021-12-28

## 2021-12-17 RX ORDER — PROPRANOLOL HYDROCHLORIDE 20 MG/1
20 TABLET ORAL 2 TIMES DAILY
Qty: 60 TABLET | Refills: 1 | Status: SHIPPED | OUTPATIENT
Start: 2021-12-17 | End: 2021-12-28

## 2021-12-17 NOTE — PATIENT INSTRUCTIONS
1.  Continue lithium 300 mg twice daily    2.  Continue propranolol 20 mg twice daily    3.  Lithium panel ordered to be drawn at your earliest convenience    4.  Continue all talk therapies to maintain sobriety and learn coping skills to manage life stressors        Continue all other medications as reviewed per electronic medical record today.     Safety plan reviewed. To the Emergency Department as needed or call after hours crisis line at [phone] or [phone]. [place] Crisis Text Line. Text [place] to 091480 or Suicide LifeLine Chat: mLED.org/chat/    To schedule individual or family therapy, call [facility] at [phone].    Schedule an appointment with me in February or sooner as needed. Call [facility] at [phone] to schedule.    Follow up with primary care provider as planned or for acute medical concerns.    Call the psychiatric nurse line with medication questions or concerns at [phone].    Revistronichart may be used to communicate with your provider, but this is not intended to be used for emergencies.    Crisis Resources:    National Suicide Prevention Lifeline: 647.919.7635 (TTY: 521.120.2168). Call anytime for help.  (www.suicidepreventionlifeline.org)  National [place] on Mental Illness (www.nadege.org): 651.237.3578 or 512-329-4278.   Mental Health Association (www.mentalhealth.org): 138.336.5478 or 577-504-1303.  [place] Crisis Text Line: Text [place] to 419952  Suicide LifeLine Chat: suicideAkira Technologies.org/chat

## 2021-12-17 NOTE — PROGRESS NOTES
"Rao is a 31 year old who is being evaluated via a billable video visit.      How would you like to obtain your AVS? MyChart  If the video visit is dropped, the invitation should be resent by: Text to cell phone: [phone]  Will anyone else be joining your video visit? No      Video Start Time: 11:05 AM  Video-Visit Details    Type of service:  Video Visit    Video End Time:11:25 AM    Originating Location (pt. Location): Home    Distant Location (provider location):  [facility] MENTAL [facility] & ADDICTION [facility]     Platform used for Video Visit: [facility]         Outpatient Psychiatric Progress Note    Name: Rao BLANCAS Enedeliamaria teresa   : 1990                    Primary Care Provider: Suzi Jaime NP   Therapist: Yes    PHQ-9 scores:  PHQ-9 SCORE 2021   PHQ-9 Total Score MyChart - - -   PHQ-9 Total Score 14 10 7   Some encounter information is confidential and restricted. Go to Review FlowsWebroot activity to see all data.       AAYUSH-7 scores:  AAYUSH-7 SCORE 2021   Total Score - - -   Total Score 9 9 9   Some encounter information is confidential and restricted. Go to Review CircleBuilder activity to see all data.       Patient Identification:    Patient is a 31 year old year old, partnered / significant other  White American male  who presents for return visit with me.  Patient is currently employed full time. Patient attended the session alone. Patient prefers to be called: \"Philipp\".    Current medications include: Apple Ralph Vn-Grn Tea-Bit Or-Cr (APPLE CIDER VINEGAR PLUS) TABS,   lithium (ESKALITH) 300 MG tablet, Take 1 tablet (300 mg) by mouth At Bedtime  multivitamin, therapeutic (THERA-VIT) TABS tablet, Take 1 tablet by mouth daily  propranolol (INDERAL) 20 MG tablet, Take 1 tablet (20 mg) by mouth 2 times daily  topiramate (TOPAMAX) 25 MG tablet, Take 1 tablet (25 mg) by mouth 2 times daily  vitamin B-Complex, Take 1 tablet by mouth " daily    No current facility-administered medications on file prior to visit.       The [place] Prescription Monitoring Program has been reviewed and there are no concerns about diversionary activity for controlled substances at this time.      I was able to review most recent Primary Care Provider, specialty provider, and therapy visit notes that I have access to.     Today, patient reports that he does not want to stop his medications right now.  He recently started couples therapy with his girlfriend and now that he is sticking out past issues about how his behavior was during times when he was using substances, he has become more borges with anger outbursts.  We reviewed how he is taking the lithium and propranolol at this point.  Lucio denies any suicide thinking.  He was fatigued today as he just finished his night shift at his work and he tells me that the job is going well.  He feels like he is getting enough sleep.       has a past medical history of Depressive disorder.    Social history updates:    No changes in his social history to report today    Substance use updates:    Sober from alcohol for 2 years  Tobacco use: Yes E-cigarettes  Ready to quit?  No  Nicotine Replacement Therapy tried: None    Vital Signs:   There were no vitals taken for this visit.    Labs:    Most recent laboratory results reviewed and no new labs.     Mental Status Examination:  Appearance:  fatigued and casually dressed  Attitude:  cooperative   Eye Contact:  adequate  Gait and Station: No assistive Devices used and No dizziness or falls  Psychomotor Behavior:  intact station, gait and muscle tone  Oriented to:  time, person, and place  Attention Span and Concentration:  Fair  Speech:   clear, coherent and Speaks: English  Mood:  anxious and depressed  Affect:  restricted range  Associations:  no loose associations  Thought Process:  goal oriented  Thought Content:  no evidence of suicidal ideation or homicidal ideation, no  auditory hallucinations present and no visual hallucinations present  Recent and Remote Memory:  intact Not formally assessed. No amnesia.  Fund of Knowledge: appropriate  Insight:  good  Judgment:  intact  Impulse Control:  intact    Suicide Risk Assessment:  Today Rao Leavitt reports that he is having no thoughts to want to end his life or to harm other people. In addition, there are notable risk factors for self-harm, including anxiety and mood change. However, risk is mitigated by commitment to family, history of seeking help when needed, future oriented, denies suicidal intent or plan and denies homicidal ideation, intent, or plan. Therefore, based on all available evidence including the factors cited above, Rao Leavitt does not appear to be at imminent risk for self-harm, does not meet criteria for a 72-hr hold, and therefore remains appropriate for ongoing outpatient level of care.  A thorough assessment of risk factors related to suicide and self-harm have been reviewed and are noted above. The patient convincingly denies suicidality on several occasions. Local community safety resources printed and reviewed for patient to use if needed. There was no deceit detected, and the patient presented in a manner that was believable.     DSM5 Diagnosis:  296.89 Bipolar II Disorder With mixed features and moderate  300.02 (F41.1) Generalized Anxiety Disorder  780.52 (G47.00) Insomnia Disorder   With non-sleep disorder mental comorbidity  Episodic      Medical comorbidities include:   Patient Active Problem List    Diagnosis Date Noted     History of traumatic brain injury 03/02/2021     Priority: Medium     Abnormal EEG 03/02/2021     Priority: Medium      OUTPATIENT SLEEP-DEPRIVED EEG REPORT.  DATE OF RECORDING: January 28, 2021.   IMPRESSION: This outpatient sleep-deprived EEG study is abnormal during wakefulness and drowsiness due to EEG changes that resemble generalized paroxysmal fast activity,  that could be potentially epileptiform and seen in generalized epilepsies.  No electrographic or clinical seizures and no paroxysmal behavioral events are recorded during this study.       Attention-deficit hyperactivity disorder 03/01/2021     Priority: Medium     PTSD (post-traumatic stress disorder) 01/05/2021     Priority: Medium     Borderline personality disorder (H) 01/05/2021     Priority: Medium     Chemical dependency (H) 09/10/2020     Priority: Medium     Lithium use 05/15/2020     Priority: Medium     Lumbar spine tumor 11/27/2019     Priority: Medium     High risk medication use 10/05/2017     Priority: Medium     Bipolar 1 disorder (H) 03/16/2017     Priority: Medium     Generalized anxiety disorder 03/16/2017     Priority: Medium       Assessment:    Rao MAGALIS Enedeliamaria teresa reported today that he has restarted his lithium and propranolol.  This is in response to the reemergence of anger outbursts and mood swings.  He has started couples therapy with his partner and he has to address past behaviors which can be upsetting to him.  He remains sober and attends meetings in therapies.  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  He was groggy during this interview.  I reviewed the medications as he is taking them currently and will keep them the same.  Lithium 300 mg twice daily and propranolol 20 mg twice daily.  He restarted topiramate through his neurologist.  A lithium panel has been ordered for him to complete at his earliest convenience..    Medication side effects and alternatives were reviewed. Health promotion activities recommended and reviewed today. All questions addressed. Education and counseling completed regarding risks and benefits of medications and psychotherapy options.    Treatment Plan:        1.  Continue lithium 300 mg twice daily    2.  Continue propranolol 20 mg twice daily    3.  Lithium panel ordered to be drawn at your earliest convenience    4.  Continue all talk  therapies to maintain sobriety and learn coping skills to manage life stressors        Continue all other medications as reviewed per electronic medical record today.     Safety plan reviewed. To the Emergency Department as needed or call after hours crisis line at [phone] or [phone]. [place] Crisis Text Line. Text [place] to 917013 or Suicide LifeLine Chat: suicideNetmining.org/chat/    To schedule individual or family therapy, call [facility] at [phone].    Schedule an appointment with me in February or sooner as needed. Call [facility] at [phone] to schedule.    Follow up with primary care provider as planned or for acute medical concerns.    Call the psychiatric nurse line with medication questions or concerns at [phone].    Laser Light Engines may be used to communicate with your provider, but this is not intended to be used for emergencies.    Crisis Resources:    National Suicide Prevention Lifeline: 787.887.3602 (TTY: 215.485.8112). Call anytime for help.  (www.suicidepreventionlifeline.org)  National [place] on Mental Illness (www.nadege.org): 554-895-2546 or 332-647-7242.   Mental Health Association (www.mentalhealth.org): 787.393.6641 or 659-543-7784.  [place] Crisis Text Line: Text [place] to 752827  Suicide LifeLine Chat: suicideNetmining.org/chat    Administrative Billing:   Time spent with patient was 30 minutes and greater than 50% of time or 20 minutes was spent in counseling and coordination of care regarding above diagnoses and treatment plan.    Patient Status:  Patient will continue to be seen for ongoing consultation and stabilization.    Signed:   DUYEN Carrington-BC   Psychiatry

## 2021-12-21 ENCOUNTER — VIRTUAL VISIT (OUTPATIENT)
Dept: BEHAVIORAL HEALTH | Facility: CLINIC | Age: 31
End: 2021-12-21
Payer: COMMERCIAL

## 2021-12-21 DIAGNOSIS — R69 DIAGNOSIS DEFERRED: Primary | ICD-10-CM

## 2021-12-21 NOTE — Clinical Note
José Miguel Giron,    Wanted to let you know patient reports mood stable and emotions feel more controlled since starting back on medications.  I messaged the scheduling team today to follow-up with the patient and set him up with another appt with you.    Take care,  Varghese

## 2021-12-21 NOTE — PROGRESS NOTES
Behavioral Health Home Services  [facility]: [place]        Social Work Care Navigator Note      Patient: Rao Leavitt  Date: December 21, 2021  Preferred Name: Rao    Previous PHQ-9:   PHQ-9 SCORE 4/26/2021 6/8/2021 7/19/2021   PHQ-9 Total Score MyChart - - -   PHQ-9 Total Score 14 10 7   Some encounter information is confidential and restricted. Go to Review Flowsheets activity to see all data.     Previous AAYUSH-7:   AAYUSH-7 SCORE 4/26/2021 6/8/2021 7/19/2021   Total Score - - -   Total Score 9 9 9   Some encounter information is confidential and restricted. Go to Review Flowsheets activity to see all data.     NOMAN LEVEL:  No flowsheet data found.    Preferred Contact:  Need for : No  Preferred Contact: Cell      Type of Contact Today: Phone call (patient / identified key support person reached)      Data: (subjective / Objective):  Recent ED/IP Admission or Discharge?   None    Patient Goals:  Goal Areas: Health; Mental Health; Chemical Health; Financial and Social Service Benefits  Patient stated goals: Rao would like assistance with his physical, mental and chemical health for creating a balanced and healthy lifestyle. Rao would like assistance with financial counseling and social service assistance to aid with county benefits to increase his financial stability.        [facility] Core Service Provided:  Comprehensive Care Management: utilized the electronic medical record / patient registry to identify and support patient's health conditions / needs more effectively   Care Transitions: focused on the coordinated and seamless movement of patient between or within different levels of care or settings  Care Coordination: provided care management services/referrals necessary to ensure patient and their identified supports have access to medical, behavioral health, pharmacology and recovery support services.  Ensured that patient's care is integrated across all settings and services.  "  Individual and Family Support: aimed to help clients reduce barriers to achieving goals, increase health literacy and knowledge about chronic condition(s), increase self-efficacy skills, and improve health outcomes  Referral to Community and Social Support Services: Provided patient with referrals (see plan section)  Assisted in scheduling an appointment to a referral / service (see plan section)  Coordinated / Confirmed patient's appointment time or referral and transportation plan  Followed-up with patient on whether or not they completed a referral    Current Stressors / Issues / Care Plan Objective Addressed Today:  1. Patient reports couples counseling going well. Patient reports they have been attending counseling each week for about 3-weeks now and are building back trust in their relationship. Patient reports he feels like he is showing s.o. a more healthy side of himself. Patient reports he continues to work on keeping his sobriety and \"being myself.\"    2. Patient reports he is back on his medications. Patient reports no missed doses in the past 7-days and is taking medications as directed. Patient reports mood seems to have stabilized and feels in control of his emotions. [facility] to monitor and check-in with patient at next visit. [facility] messaged psychiatry care team to reach out to the patient and schedule next appt.    3. Patient reports work has been busy. Patient reports for the most part work is positive. Patient reports he has been working with his employer to keep his work schedule in balance to meet with his own family and personal life. Patient reports his supervisor has not been very open to this dialogue. [facility] and patient were able to process his emotions/feelings about this today. [facility] provided support/empathy, coping strategies and healthy boundaries for work and personal time.    4. Patient reports he is working on [employer] and his s.o. may be on bed rest at this time due to pregnancy. Patient " reports finances may be tight due to this. [facility] inquired if patient would like resources for the upcoming holidays. Patient requested additional resources today. [facility] emailed patient holiday resource list.        Intervention:  Motivational Interviewing: Expressed Empathy/Understanding, Supported Autonomy, Collaboration, Evocation, Permission to raise concern or advise, Open-ended questions, Reflections: simple and complex, Rolled with resistance: Emphasized patient autonomy, Simple reflection, Complex reflection, Amplified reflection (weaker or stronger meaning), Shifted topic to defuse resistance, Reframed sustain talk in the direction of change and Evoked patient agenda, Change talk (evoked) and Reframe   Target Behavior(s): Explored thoughts about taking an anti-depressant, Explored and resolved challenges related to taking anti-depressants as prescribed, Explored thoughts and readiness to participate in individual therapy, Explored and resolved challenges to attending appointments as scheduled, Explored current social supports and reinforced opportunities to increase engagement and Explored patient's perception of how alcohol and / or drugs influences mood    Assessment: (Progress on Goals / Homework):  Patient would benefit from continued coordination in reaching their goals set for the Behavioral Health Home ([facility]) program. [facility] reviewed Health Action Plan goals and will continue to monitor progress and work with patient and their care team.      Plan: (Homework, other):  Patient was encouraged to continue to seek condition-related information and education.      Scheduled a Phone follow up appointment with [facility] in 3 weeks     Patient has set self-identified goals and will monitor progress until the next appointment on: 01/11/2022.         Varghese Alvarez [facility]  Behavioral Health Home ([facility])   [facility]  [phone]  December 21, 2021  2:47 PM                  Next 5  appointments (look out 90 days)    Mar 01, 2022  8:00 AM  (Arrive by 7:45 AM)  Return Visit with Meg Miranda MD  [facility] [place] ([facility] ) [address] [place] [id number] [phone]

## 2021-12-28 ENCOUNTER — TELEPHONE (OUTPATIENT)
Dept: BEHAVIORAL HEALTH | Facility: CLINIC | Age: 31
End: 2021-12-28
Payer: COMMERCIAL

## 2021-12-28 ENCOUNTER — OFFICE VISIT (OUTPATIENT)
Dept: FAMILY MEDICINE | Facility: CLINIC | Age: 31
End: 2021-12-28
Payer: COMMERCIAL

## 2021-12-28 VITALS
HEIGHT: 67 IN | WEIGHT: 196 LBS | SYSTOLIC BLOOD PRESSURE: 122 MMHG | RESPIRATION RATE: 16 BRPM | HEART RATE: 65 BPM | BODY MASS INDEX: 30.76 KG/M2 | OXYGEN SATURATION: 98 % | DIASTOLIC BLOOD PRESSURE: 76 MMHG | TEMPERATURE: 98.7 F

## 2021-12-28 DIAGNOSIS — F41.1 GENERALIZED ANXIETY DISORDER: ICD-10-CM

## 2021-12-28 DIAGNOSIS — J20.9 ACUTE BRONCHITIS WITH SYMPTOMS GREATER THAN 10 DAYS: Primary | ICD-10-CM

## 2021-12-28 DIAGNOSIS — F31.9 BIPOLAR 1 DISORDER (H): ICD-10-CM

## 2021-12-28 PROCEDURE — 99213 OFFICE O/P EST LOW 20 MIN: CPT | Performed by: FAMILY MEDICINE

## 2021-12-28 RX ORDER — LITHIUM CARBONATE 300 MG
300 TABLET ORAL 2 TIMES DAILY
Qty: 30 TABLET | Refills: 1 | Status: SHIPPED | OUTPATIENT
Start: 2021-12-28 | End: 2022-01-07

## 2021-12-28 RX ORDER — PROPRANOLOL HYDROCHLORIDE 20 MG/1
20 TABLET ORAL 2 TIMES DAILY
Qty: 60 TABLET | Refills: 1 | Status: SHIPPED | OUTPATIENT
Start: 2021-12-28 | End: 2022-01-07 | Stop reason: DRUGHIGH

## 2021-12-28 RX ORDER — AZITHROMYCIN 250 MG/1
TABLET, FILM COATED ORAL
Qty: 6 TABLET | Refills: 0 | Status: SHIPPED | OUTPATIENT
Start: 2021-12-28 | End: 2022-01-02

## 2021-12-28 ASSESSMENT — MIFFLIN-ST. JEOR: SCORE: 1802.68

## 2021-12-28 NOTE — TELEPHONE ENCOUNTER
"Refill request r'cd from [employer] via fax for Propranolol 20mg denied due to RF too soon - new order sent 12/17/21 #60/1. Faxed \"not authorized\" back to pharmacy.    Zoie Meredith RN December 28, 2021 9:07 AM    "

## 2021-12-28 NOTE — PROGRESS NOTES
Assessment & Plan     Acute bronchitis with symptoms greater than 10 days  Negative covid at  and at home in the last 6 days.  Symptoms present for at least 10 days per patient history.  No distress.  Due to duration, severity of symptoms and findings on exam, antibiotics given.  Symptomatic measures: push oral fluids, antitussives (otc), humidify air  Return precautions given.  Reasons to go to ER discussed in detail with patient.  - azithromycin (ZITHROMAX) 250 MG tablet  Dispense: 6 tablet; Refill: 0    Patient Instructions     Patient Education     Bronchitis, Antibiotic Treatment (Adult)    Bronchitis is an infection of the air passages (bronchial tubes) in your lungs. It often occurs when you have a cold. This illness is contagious during the first few days and is spread through the air by coughing and sneezing, or by direct contact (touching the sick person and then touching your own eyes, nose, or mouth).  Symptoms of bronchitis include cough with mucus (phlegm) and low-grade fever. Bronchitis usually lasts 7 to 14 days. Mild cases can be treated with simple home remedies. More severe infection is treated with an antibiotic.  Home care  Follow these guidelines when caring for yourself at home:    If your symptoms are severe, rest at home for the first 2 to 3 days. When you go back to your usual activities, don't let yourself get too tired.    Don't smoke. Also stay away from secondhand smoke.    You may use over-the-counter medicines to control fever or pain, unless another medicine was prescribed. If you have chronic liver or kidney disease or have ever had a stomach ulcer or gastrointestinal bleeding, talk with your healthcare provider before using these medicines. Also talk to your provider if you are taking medicine to prevent blood clots. Aspirin should never be given to anyone younger than 18 who is ill with a viral infection or fever. It may cause severe liver or brain damage.    Your appetite may  be low, so a light diet is fine. Stay well hydrated by drinking 6 to 8 glasses of fluids per day. This includes water, soft drinks, sports drinks, juices, tea, or soup. Extra fluids will help loosen mucus in your nose and lungs.    Over-the-counter cough, cold, and sore-throat medicines will not shorten the length of the illness, but they may be helpful to reduce your symptoms. Don't use decongestants if you have high blood pressure.    Finish all antibiotic medicine. Do this even if you are feeling better after only a few days.  Follow-up care  Follow up with your healthcare provider, or as advised. If you had an X-ray or ECG (electrocardiogram), a specialist will review it. You will be told of any new test results that may affect your care.  If you are age 65 or older, if you smoke, or if you have a chronic lung disease or condition that affects your immune system, ask your healthcare provider about getting a pneumococcal vaccine and a yearly flu shot (influenza vaccine).  When to seek medical advice  Call your healthcare provider right away if any of these occur:    Fever of 100.4 F (38 C) or higher, or as directed by your healthcare provider    Coughing up more sputum    Weakness, drowsiness, headache, facial pain, ear pain, or a stiff neck  Call 911  Call 911 if any of these occur.    Coughing up blood    Weakness, drowsiness, headache, or stiff neck that get worse    Trouble breathing, wheezing, or pain with breathing  [employer] last reviewed this educational content on 6/1/2018 2000-2021 [employer]. All rights reserved. This information is not intended as a substitute for professional medical care. Always follow your healthcare professional's instructions.               Return in about 1 week (around 1/4/2022) for In-clinic visit for if with worsening symptoms..    Jorge García MD  [facility] is a 31 year old who presents for the following  health issues   Chief Complaint   Patient presents with     ER F/U     Pt being seen for post u/c visit. Was seen at [facility] on 12/22.  Still having a cough.     Negative covid test 12/22 and also home one last night which was negative.  HPI     ED/UC Followup:    Facility:  [facility]  Date of visit: 12/22/21  Reason for visit: cough  Current Status: pt feels a little better today       Acute Illness  Acute illness concerns: cough  Onset/Duration: 1.5 wk ago  Symptoms:  Fever: YES- 102 last night, oral; did not measure today. Last took antipyretic several hours ago.  Chills/Sweats: YES- both  Headache (location?): no  Sinus Pressure: YES- little today  Conjunctivitis:  no  Ear Pain: no  Rhinorrhea: YES  Congestion: YES  Sore Throat: no  Cough: YES - yellow phlegm  Wheeze: YES  Decreased Appetite: YES  Nausea: YES- little  Vomiting: YES- from cough  Diarrhea: not anymore  Dysuria/Freq.: no  Dysuria or Hematuria: no  Fatigue/Achiness: no  Lost taste and smell: yes initially - back to normal now.  Sick/Strep Exposure: no  Therapies tried and outcome: cough gtts, mucinex, nyquil, dayquil    Daughter is starting to feel sick.      Patient Active Problem List   Diagnosis     Bipolar 1 disorder (H)     Generalized anxiety disorder     High risk medication use     Lumbar spine tumor     Lithium use     Chemical dependency (H)     PTSD (post-traumatic stress disorder)     Borderline personality disorder (H)     Attention-deficit hyperactivity disorder     History of traumatic brain injury     Abnormal EEG     Past Surgical History:   Procedure Laterality Date     ESOPHAGOSCOPY, GASTROSCOPY, DUODENOSCOPY (EGD), COMBINED N/A 4/16/2019    Procedure: COMBINED ESOPHAGOSCOPY, GASTROSCOPY, DUODENOSCOPY (EGD), BIOPSY SINGLE OR MULTIPLE;  Surgeon: Kj Thomas MD;  Location: [facility]     PE tube         Social History     Tobacco Use     Smoking status: Former Smoker     Packs/day: 0.50     Years: 18.00     Pack years:  "9.00     Types: Cigarettes     Quit date: 2021     Years since quittin.9     Smokeless tobacco: Former User     Types: Snuff     Quit date: 2021   Substance Use Topics     Alcohol use: Not Currently     Comment: 6 months alcohol free     Family History   Problem Relation Age of Onset     Migraines Mother      Post-Traumatic Stress Disorder (PTSD) Father      Bipolar Disorder Paternal Grandfather      Bipolar Disorder Sister          Current Outpatient Medications   Medication Sig Dispense Refill     Apple Ralph Vn-Grn Tea-Bit Or-Cr (APPLE CIDER VINEGAR PLUS) TABS        lithium (ESKALITH) 300 MG tablet Take 1 tablet (300 mg) by mouth 2 times daily 30 tablet 1     multivitamin, therapeutic (THERA-VIT) TABS tablet Take 1 tablet by mouth daily       propranolol (INDERAL) 20 MG tablet Take 1 tablet (20 mg) by mouth 2 times daily 60 tablet 1     topiramate (TOPAMAX) 25 MG tablet Take 1 tablet (25 mg) by mouth 2 times daily 60 tablet 3     vitamin B-Complex Take 1 tablet by mouth daily       Allergies   Allergen Reactions     Tramadol Nausea     Vicodin [Hydrocodone-Acetaminophen] Rash     Review of Systems   C: NEGATIVE for fever, chills, change in weight  I: NEGATIVE for worrisome rashes, moles or lesions  E: NEGATIVE for vision changes or irritation  ENT/MOUTH: see above  RESP:as above  CV: NEGATIVE for chest pain, palpitations or peripheral edema  GI: NEGATIVE for nausea, abdominal pain, heartburn, or change in bowel habits  M: NEGATIVE for significant arthralgias or myalgia      Objective    /76   Pulse 65   Temp 98.7  F (37.1  C) (Tympanic)   Resp 16   Ht 1.702 m (5' 7\")   Wt 88.9 kg (196 lb)   SpO2 98%   BMI 30.70 kg/m    Body mass index is 30.7 kg/m .  Physical Exam   GENERAL: well-nourished,  alert and no distress  EYES: pink conjunctivae, no icterus  NECK: mild cervical adenopathy, nontender  HEENT: tympanic membrane intact and pearly bilaterally, nose with moderate congestion, no sinus " tenderness, throat not erythematous, tonsils not enlarged w/ no exudates, no oral ulcers  RESP: good air entry, equal breath sounds, no wheezing; no crackles; mild bronchial breath sounds  CV: normal rate, regular rhythm, normal S1 S2, no S3 or S4 and no murmur  SKIN:  Good turgor, no rashes    No results found for any visits on 12/28/21.

## 2021-12-28 NOTE — TELEPHONE ENCOUNTER
Behavioral Health Home Services  [facility]: [place]        Social Work Care Navigator Note      Patient: Rao Leavitt  Date: December 28, 2021  Preferred Name: Rao    Previous PHQ-9:   PHQ-9 SCORE 4/26/2021 6/8/2021 7/19/2021   PHQ-9 Total Score MyChart - - -   PHQ-9 Total Score 14 10 7   Some encounter information is confidential and restricted. Go to Review Flowsheets activity to see all data.     Previous AAYUSH-7:   AAYUSH-7 SCORE 4/26/2021 6/8/2021 7/19/2021   Total Score - - -   Total Score 9 9 9   Some encounter information is confidential and restricted. Go to Review Flowsheets activity to see all data.     NOMAN LEVEL:  No flowsheet data found.    Preferred Contact:  Need for : No  Preferred Contact: Cell      Type of Contact Today: Phone call (not reached/unavailable)      Data: (subjective / Objective):  Patient called [facility] this am to discuss work concerns and needed refills. [facility] attempted to reach patient, but was unsuccessful, left message for patient to call pharmacy to request refills and sent provider message.  Plan to attempt again.      Varghese Alvarez LICSW Behavioral Health Home ([facility])   [facility]  [phone]  December 28, 2021  8:49 AM            Next 5 appointments (look out 90 days)    Mar 01, 2022  8:00 AM  (Arrive by 7:45 AM)  Return Visit with Meg Miranda MD  [facility] Neurology [facility] ([facility] ) [address] [place] [id number] [phone]

## 2021-12-28 NOTE — LETTER
[facility]  [address] [address]  [phone]          December 28, 2021    RE:  Rao BLANCAS Dagmar                                                                                                                                                       [id number] [address] [place] [address]   [facility] [address]            To whom it may concern:    Rao has been evaluated in the clinic today for an acute illness that he has reported to have started one and a half weeks ago.    Sincerely,        Jorge García MD

## 2021-12-28 NOTE — PATIENT INSTRUCTIONS

## 2022-01-05 NOTE — TELEPHONE ENCOUNTER
Letter done, please confirm with specialists office that they are not ABLE to do workability letters.  Seems like if they want restrictions to continue, they should be directing this and writing the letter.    I have only seen the patient once, so if I need to continue to write workability letters after today, I will need to see him or he will have to see his PCP.    Lubna Rodriguez M.D.    
Letter faxed to Margie @ # [phone].  Pt notified of Dr.Andersons juares.    KpavelRN  
Reason for Call:  Letter    Detailed comments: pt is calling for a letter for work,  He has just seen the neurologist and need off 11/18/19-11;19/19 with restrictions as listed in his 11/13 letter.  Starting 11/20/19  Fax to go to [phone] JUANCHO Whitley    Phone Number Patient can be reached at: Home number on file [phone] (home)    Best Time: any    Can we leave a detailed message on this number? YES    Call taken on 11/18/2019 at 10:44 AM by Jory Gold      
S-(situation): Patient requesting new workability letter.     B-(background): 11/11/19 Office visit with Lubna Rodriguez, letter written with specifics 11/13/19.     A-(assessment): Patient asking for a letter to be off work today and tomorrow and then a letter to last until 11/26/19. Patient reports he saw the spine specialist and he could not write the letter.   Patient reports pone specialist wants him to be off work today 11/18/19 and tomorrow 11/9/19, restrictions fromlast letter (11/13/19) to carry over until CT scan scheduled for 11/26/19. Patient reports concerns of a tumor.         R-(recommendations): Provider please review and advise. Thank you.      
No

## 2022-01-06 NOTE — PROGRESS NOTES
"Collaborative Care Psychiatry Service (CCPS)  Jan 7 2022    Behavioral Health Clinician Progress Note    Patient Name: Rao Leavitt      Telemedicine Visit: The patient's condition can be safely assessed and treated via synchronous audio and visual telemedicine encounter.      Reason for Telemedicine Visit: Services only offered telehealth    Originating Site (Patient Location): Patient's home    Distant Site (Provider Location): Provider Remote Setting- Home Office    Consent:  The patient/guardian has verbally consented to: the potential risks and benefits of telemedicine (video visit) versus in person care; bill my insurance or make self-payment for services provided; and responsibility for payment of non-covered services.     Mode of Communication:  phone The patient has been notified of the following:      \"We have found that certain health care needs can be provided without the need for a face to face visit.  This service lets us provide the care you need with a phone conversation.       I will have full access to your [facility] medical record during this entire phone call.   I will be taking notes for your medical record.      Since this is like an office visit, we will bill your insurance company for this service.       There are potential benefits and risks of telephone visits (e.g. limits to patient confidentiality) that differ from in-person visits.?  Confidentiality still applies for telephone services, and nobody will record the visit.  It is important to be in a quiet, private space that is free of distractions (including cell phone or other devices) during the visit.??      If during the course of the call I believe a telephone visit is not appropriate, you will not be charged for this service\"     Consent has been obtained for this service by care team member: Yes     As the provider I attest to compliance with applicable laws and regulations related to telemedicine.         Service " Type:  Individual      Service Location:   [employer] / Email (patient reached)     Session Start Time: 145pm  Session End Time: 202pm      Session Length: 16 - 37      Attendees: Patient    Visit Activities (Refresh list every visit): [facility] Only    Diagnostic Assessment Date: 09/24/2019  See Flowsheets for today's PHQ-9 and AAYUSH-7 results  Previous PHQ-9:   PHQ-9 SCORE 4/26/2021 6/8/2021 7/19/2021   PHQ-9 Total Score MyChart - - -   PHQ-9 Total Score 14 10 7   Some encounter information is confidential and restricted. Go to Review Flowsheets activity to see all data.       Previous AAYUSH-7:   AAYUSH-7 SCORE 6/8/2021 7/19/2021 1/7/2022   Total Score - - -   Total Score 9 9 6   Some encounter information is confidential and restricted. Go to Review Flowsheets activity to see all data.       WHODAS  WHODAS 2.0 Total Score 9/24/2019 12/16/2020   Total Score 34 42   Total Score MyChart - 42        AUDIT  No flowsheet data found.    DATA  Extended Session (60+ minutes): No  Interactive Complexity: No  Crisis: No    Medication Compliance:  Yes      Chemical Use Review:   Substance Use: Chemical use reviewed, no active concerns identified      Tobacco Use: No current tobacco use.      Current Stressors / Issues:  MH update: Reports that mood has been more stable since being put back on lithium 2 months ago. He states that he reduced it to 300 mg because he was getting groggy. Doing well on that dose. No anger out bursts lately. He's also beel put back on topomax (25mg) due to having racing thoughts, working well. [facility] worker weekly.    Stresses: No  Appetite: unremarkable  Sleep: Fair, he's seeing someone for sleep. Difficulty staying sleep. Reports that he had a sleep study but it found nothing  [facility].   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  Most important:  medications working well, increase propanolol to 40mg?, needs refills        Progress on Treatment Objective(s) / Homework:  No improvement - PREPARATION (Decided to change - considering how); Intervened by negotiating a change plan and determining options / strategies for behavior change, identifying triggers, exploring social supports, and working towards setting a date to begin behavior change    Motivational Interviewing    MI Intervention: Co-Developed Goal: manage mental health as part of maintaining sobriety, Expressed Empathy/Understanding, Supported Autonomy, Collaboration, Evocation, Permission to raise concern or advise, Open-ended questions, Reflections: simple and complex, Change talk (evoked) and Reframe     Change Talk Expressed by the Patient: Desire to change Ability to change Reasons to change Need to change Committment to change Activation Taking steps    Provider Response to Change Talk: E - Evoked more info from patient about behavior change, A - Affirmed patient's thoughts, decisions, or attempts at behavior change, R - Reflected patient's change talk and S - Summarized patient's change talk statements        Review of Symptoms per patient report:  Depression: No symptoms  Joy:  No Symptoms  Psychosis: No Symptoms  Anxiety: No Symptoms  Panic:  No symptoms  Post Traumatic Stress Disorder:  No Symptoms   Eating Disorder: No Symptoms  ADD / ADHD:  No symptoms  Conduct Disorder: No symptoms  Autism Spectrum Disorder: No symptoms  Obsessive Compulsive Disorder: No Symptoms      Changes in Health Issues:   None reported    Assessment: Current Emotional / Mental Status (status of significant symptoms):  Risk status (Self / Other harm or suicidal ideation)  Patient denies a history of suicidal ideation, suicide attempts, self-injurious behavior, homicidal ideation, homicidal behavior and and other safety concerns  Patient denies current fears or concerns for personal safety.  Patient denies current  or recent suicidal ideation or behaviors.  Patient denies current or recent homicidal ideation or behaviors.  Patient denies current or recent self injurious behavior or ideation.  Patient denies other safety concerns.  A safety and risk management plan has not been developed at this time, however patient was encouraged to call [facility] should there be a change in any of these risk factors.    Appearance:   Appropriate   Eye Contact:   Good   Psychomotor Behavior: Normal   Attitude:   Cooperative   Orientation:   All  Speech   Rate / Production: Normal    Volume:  Normal   Mood:    Normal  Affect:    unable to assess (phone)   Thought Content:  Clear   Thought Form:  Coherent  Logical   Insight:    Good     Diagnoses:  1. Bipolar 1 disorder (H)        Collateral Reports Completed:  Communicated with [facility]    Plan: (Homework, other):  Patient was given information about behavioral services and encouraged to schedule a follow up appointment with the clinic [facility].  He was also given information about mental health symptoms and treatment options  and strategies to maintain sobriety.  CD Recommendations: CD Treatment at [place] (currently in treatment).  Trisha RUIZ [facility]      EDU Vela  August 11, 2021

## 2022-01-07 ENCOUNTER — VIRTUAL VISIT (OUTPATIENT)
Dept: BEHAVIORAL HEALTH | Facility: CLINIC | Age: 32
End: 2022-01-07
Payer: COMMERCIAL

## 2022-01-07 ENCOUNTER — VIRTUAL VISIT (OUTPATIENT)
Dept: PSYCHIATRY | Facility: CLINIC | Age: 32
End: 2022-01-07
Payer: COMMERCIAL

## 2022-01-07 DIAGNOSIS — F63.9 IMPULSE CONTROL DISORDER: ICD-10-CM

## 2022-01-07 DIAGNOSIS — F31.9 BIPOLAR 1 DISORDER (H): Primary | ICD-10-CM

## 2022-01-07 DIAGNOSIS — F41.1 GENERALIZED ANXIETY DISORDER: ICD-10-CM

## 2022-01-07 PROCEDURE — 90832 PSYTX W PT 30 MINUTES: CPT | Mod: 95 | Performed by: SOCIAL WORKER

## 2022-01-07 PROCEDURE — 99214 OFFICE O/P EST MOD 30 MIN: CPT | Mod: 95 | Performed by: NURSE PRACTITIONER

## 2022-01-07 RX ORDER — LITHIUM CARBONATE 300 MG
300 TABLET ORAL 2 TIMES DAILY
Qty: 30 TABLET | Refills: 1 | Status: SHIPPED | OUTPATIENT
Start: 2022-01-07 | End: 2022-04-18

## 2022-01-07 RX ORDER — PROPRANOLOL HYDROCHLORIDE 40 MG/1
40 TABLET ORAL 2 TIMES DAILY
Qty: 60 TABLET | Refills: 3 | Status: SHIPPED | OUTPATIENT
Start: 2022-01-07 | End: 2022-05-19

## 2022-01-07 ASSESSMENT — ANXIETY QUESTIONNAIRES
7. FEELING AFRAID AS IF SOMETHING AWFUL MIGHT HAPPEN: SEVERAL DAYS
IF YOU CHECKED OFF ANY PROBLEMS ON THIS QUESTIONNAIRE, HOW DIFFICULT HAVE THESE PROBLEMS MADE IT FOR YOU TO DO YOUR WORK, TAKE CARE OF THINGS AT HOME, OR GET ALONG WITH OTHER PEOPLE: SOMEWHAT DIFFICULT
1. FEELING NERVOUS, ANXIOUS, OR ON EDGE: NOT AT ALL
3. WORRYING TOO MUCH ABOUT DIFFERENT THINGS: SEVERAL DAYS
6. BECOMING EASILY ANNOYED OR IRRITABLE: SEVERAL DAYS
GAD7 TOTAL SCORE: 6
5. BEING SO RESTLESS THAT IT IS HARD TO SIT STILL: SEVERAL DAYS
2. NOT BEING ABLE TO STOP OR CONTROL WORRYING: SEVERAL DAYS

## 2022-01-07 ASSESSMENT — PATIENT HEALTH QUESTIONNAIRE - PHQ9: 5. POOR APPETITE OR OVEREATING: SEVERAL DAYS

## 2022-01-07 NOTE — PROGRESS NOTES
"Rao is a 31 year old who is being evaluated via a billable video visit.      How would you like to obtain your AVS? MyChart  If the video visit is dropped, the invitation should be resent by: Text to cell phone: [phone]  Will anyone else be joining your video visit? No      Video Start Time: 2:24 PM  Video-Visit Details    Type of service:  Video Visit    Video End Time:2:33 PM    Originating Location (pt. Location): Other Work    Distant Location (provider location):  [facility]     Platform used for Video Visit: Other: Switched to telephone through [facility]           Outpatient Psychiatric Progress Note    Name: Rao BLANCAS Enedeliamaria teresa   : 1990                    Primary Care Provider: Suzi Jaime NP   Therapist: [facility]     PHQ-9 scores:  PHQ-9 SCORE 2021   PHQ-9 Total Score MyChart - - -   PHQ-9 Total Score 14 10 7   Some encounter information is confidential and restricted. Go to Review Flowsheets activity to see all data.       AAYUSH-7 scores:  AAYUSH-7 SCORE 2021   Total Score - - -   Total Score 9 9 6   Some encounter information is confidential and restricted. Go to Review [employer] activity to see all data.       Patient Identification:    Patient is a 31 year old year old, partnered / significant other  White American male  who presents for return visit with me.  Patient is currently employed full time. Patient attended the session alone. Patient prefers to be called: \" Philipp\".     .     Current medications include: Apple Ralph Vn-Grn Tea-Bit Or-Cr (APPLE CIDER VINEGAR PLUS) TABS,   lithium (ESKALITH) 300 MG tablet, Take 1 tablet (300 mg) by mouth 2 times daily  multivitamin, therapeutic (THERA-VIT) TABS tablet, Take 1 tablet by mouth daily  propranolol (INDERAL) 20 MG tablet, Take 1 tablet (20 mg) by mouth 2 times daily  topiramate (TOPAMAX) 25 MG tablet, Take 1 tablet (25 mg) by mouth " 2 times daily  vitamin B-Complex, Take 1 tablet by mouth daily    No current facility-administered medications on file prior to visit.       The [place] Prescription Monitoring Program has been reviewed and there are no concerns about diversionary activity for controlled substances at this time.      I was able to review most recent Primary Care Provider, specialty provider, and therapy visit notes that I have access to.     Today, patient reports that he is still having a lot of anxiety.  He has financials and relationship stressors.  He is also being followed by sleep medicine as he continues to have trouble sleeping at night.  He is visiting with me today while he is at work and voiced no concerns with his job.  He was slightly preoccupied and distracted.       has a past medical history of Depressive disorder.    Social history updates:    No changes in his social history reported    Substance use updates:    He remains sober from alcohol  Tobacco use: No    Vital Signs:   There were no vitals taken for this visit.    Labs:    Most recent laboratory results reviewed and no new labs.     Mental Status Examination:  Appearance:  awake, alert and casually dressed  Attitude:  cooperative   Eye Contact:  adequate  Gait and Station: No assistive Devices used and No dizziness or falls  Psychomotor Behavior:  intact station, gait and muscle tone  Oriented to:  time, person, and place  Attention Span and Concentration:  Fair  Speech:   clear, coherent and Speaks: English  Mood:  anxious, depressed and better  Affect:  mood congruent  Associations:  no loose associations  Thought Process:  goal oriented  Thought Content:  no evidence of suicidal ideation or homicidal ideation, no auditory hallucinations present and no visual hallucinations present  Recent and Remote Memory:  intact Not formally assessed. No amnesia.  Fund of Knowledge: appropriate  Insight:  good  Judgment:  intact  Impulse Control:  intact    Suicide  Risk Assessment:  Today Rao Leavitt reports that he is having no thoughts to want to end his life or to harm other people. In addition, there are notable risk factors for self-harm, including anxiety and mood change. However, risk is mitigated by commitment to family, history of seeking help when needed, future oriented, denies suicidal intent or plan and denies homicidal ideation, intent, or plan. Therefore, based on all available evidence including the factors cited above, Rao Leavitt does not appear to be at imminent risk for self-harm, does not meet criteria for a 72-hr hold, and therefore remains appropriate for ongoing outpatient level of care.  A thorough assessment of risk factors related to suicide and self-harm have been reviewed and are noted above. The patient convincingly denies suicidality on several occasions. Local community safety resources printed and reviewed for patient to use if needed. There was no deceit detected, and the patient presented in a manner that was believable.     DSM5 Diagnosis:  296.51 Bipolar I Disorder Current or Most Recent Episode Depressed, Mild  300.02 (F41.1) Generalized Anxiety Disorder  780.52 (G47.00) Insomnia Disorder   With non-sleep disorder mental comorbidity  Recurrent      Medical comorbidities include:   Patient Active Problem List    Diagnosis Date Noted     History of traumatic brain injury 03/02/2021     Priority: Medium     Abnormal EEG 03/02/2021     Priority: Medium      OUTPATIENT SLEEP-DEPRIVED EEG REPORT.  DATE OF RECORDING: January 28, 2021.   IMPRESSION: This outpatient sleep-deprived EEG study is abnormal during wakefulness and drowsiness due to EEG changes that resemble generalized paroxysmal fast activity, that could be potentially epileptiform and seen in generalized epilepsies.  No electrographic or clinical seizures and no paroxysmal behavioral events are recorded during this study.       Attention-deficit hyperactivity disorder  03/01/2021     Priority: Medium     PTSD (post-traumatic stress disorder) 01/05/2021     Priority: Medium     Borderline personality disorder (H) 01/05/2021     Priority: Medium     Chemical dependency (H) 09/10/2020     Priority: Medium     Lithium use 05/15/2020     Priority: Medium     Lumbar spine tumor 11/27/2019     Priority: Medium     High risk medication use 10/05/2017     Priority: Medium     Bipolar 1 disorder (H) 03/16/2017     Priority: Medium     Generalized anxiety disorder 03/16/2017     Priority: Medium       Assessment:    Rao Leavitt continues to report ongoing sleep problems.  He is being followed by sleep medicine for this.  Lucio was visiting with me today at his place of employment.  He tells me his job is going well but he was distracted during our interview and somewhat evasive since being there.  He denies any self-destructive behavior or mood dysregulation at this time..  Anxiety continues to be elevated.  His propranolol was increased to 40 mg twice daily.  For mood regulation he will continue with his lithium at 300 mg twice daily.  At this point, he is maintaining sobriety and is continuing with all counseling therapies.    Medication side effects and alternatives were reviewed. Health promotion activities recommended and reviewed today. All questions addressed. Education and counseling completed regarding risks and benefits of medications and psychotherapy options.    Treatment Plan:        Propranolol increased to 40 mg twice daily    Continue lithium 300 mg twice daily    Continue to follow with the sleep clinic providers for direction related to your insomnia    Continue talk therapies to maintain sobriety and learn coping skills to manage life stressors        Continue all other medications as reviewed per electronic medical record today.     Safety plan reviewed. To the Emergency Department as needed or call after hours crisis line at [phone] or [phone].  [place] Crisis Text [place]. Text [place] to 508326 or Suicide LifeLine Chat: suicideCadigo.org/chat/    To schedule individual or family therapy, call [facility] at [phone].    Schedule an appointment with me in 2 months or sooner as needed. Call [facility] at [phone] to schedule.    Follow up with primary care provider as planned or for acute medical concerns.    Call the psychiatric nurse line with medication questions or concerns at [phone].    Sword.com may be used to communicate with your provider, but this is not intended to be used for emergencies.    Crisis Resources:    National Suicide Prevention Lifeline: 628.282.6720 (TTY: 404.951.3458). Call anytime for help.  (www.suicidepreventionlifeline.org)  National [place] on Mental Illness (www.nadege.org): 460.409.3694 or 013-391-2605.   Mental Health Association (www.mentalhealth.org): 452.907.7329 or 450-021-2250.  [place] Crisis Text [place]: Text [place] to 829548  Suicide LifeLine Chat: suicideCadigo.org/chat    Administrative Billing:   Time spent with patient was 30 minutes and greater than 50% of time or 20 minutes was spent in counseling and coordination of care regarding above diagnoses and treatment plan.    Patient Status:  Patient will continue to be seen for ongoing consultation and stabilization.    Signed:   DUYEN Carrington-BC   Psychiatry

## 2022-01-08 ASSESSMENT — ANXIETY QUESTIONNAIRES: GAD7 TOTAL SCORE: 6

## 2022-01-17 ENCOUNTER — TELEPHONE (OUTPATIENT)
Dept: BEHAVIORAL HEALTH | Facility: CLINIC | Age: 32
End: 2022-01-17
Payer: COMMERCIAL

## 2022-01-17 NOTE — TELEPHONE ENCOUNTER
Behavioral Health Home Services  [facility]: [place]        Social Work Care Navigator Note      Patient: Rao Leavitt  Date: January 17, 2022  Preferred Name: Rao    Previous PHQ-9:   PHQ-9 SCORE 4/26/2021 6/8/2021 7/19/2021   PHQ-9 Total Score MyChart - - -   PHQ-9 Total Score 14 10 7   Some encounter information is confidential and restricted. Go to Review Flowsheets activity to see all data.     Previous AAYUSH-7:   AAYUSH-7 SCORE 6/8/2021 7/19/2021 1/7/2022   Total Score - - -   Total Score 9 9 6   Some encounter information is confidential and restricted. Go to Review Flowsheets activity to see all data.     NOMAN LEVEL:  No flowsheet data found.    Preferred Contact:  Need for : No  Preferred Contact: Cell      Type of Contact Today: Phone call (not reached/unavailable)      Data: (subjective / Objective):  Attempted to reach patient for Behavioral Health Home ([facility]) Health Action Plan Update, but was unsuccessful, left message and sent patient HopeLabhart message.  Plan to attempt again.      Varghese Alvarez LICSW Behavioral Health Home ([facility])   [facility]  [phone]  January 17, 2022  9:17 AM          Next 5 appointments (look out 90 days)    Mar 01, 2022  8:00 AM  (Arrive by 7:45 AM)  Return Visit with Meg Miranda MD  [facility] Neurology [facility] ([facility] ) [address] [place] [id number]  [phone]

## 2022-01-21 ENCOUNTER — TELEPHONE (OUTPATIENT)
Dept: BEHAVIORAL HEALTH | Facility: CLINIC | Age: 32
End: 2022-01-21
Payer: COMMERCIAL

## 2022-01-21 NOTE — TELEPHONE ENCOUNTER
Behavioral Health Home Services  [facility]: [place]        Social Work Care Navigator Note      Patient: Rao Leavitt  Date: January 21, 2022  Preferred Name: Rao    Previous PHQ-9:   PHQ-9 SCORE 4/26/2021 6/8/2021 7/19/2021   PHQ-9 Total Score MyChart - - -   PHQ-9 Total Score 14 10 7   Some encounter information is confidential and restricted. Go to Review Flowsheets activity to see all data.     Previous AAYUSH-7:   AAYUSH-7 SCORE 6/8/2021 7/19/2021 1/7/2022   Total Score - - -   Total Score 9 9 6   Some encounter information is confidential and restricted. Go to Review Flowsheets activity to see all data.     NOMAN LEVEL:  No flowsheet data found.    Preferred Contact:  Need for : No  Preferred Contact: Cell      Type of Contact Today: Phone call (not reached/unavailable)      Data: (subjective / Objective):  Attempted to reach patient for Behavioral Health Home ([facility]) Health Action Plan update, but was unsuccessful, left message.  Plan to attempt again.      Varghese Alvarez LICSW Behavioral Health Home ([facility])   [facility]  [phone]  January 21, 2022  9:24 AM            Next 5 appointments (look out 90 days)    Mar 01, 2022  8:00 AM  (Arrive by 7:45 AM)  Return Visit with Meg Miranda MD  [facility] Neurology [facility] [place] ([facility] ) [address] [place] [id number] [phone]

## 2022-01-25 ENCOUNTER — TELEPHONE (OUTPATIENT)
Dept: BEHAVIORAL HEALTH | Facility: CLINIC | Age: 32
End: 2022-01-25
Payer: COMMERCIAL

## 2022-01-25 NOTE — TELEPHONE ENCOUNTER
Behavioral Health Home Services  [facility]: [place]        Social Work Care Navigator Note      Patient: Rao Leavitt  Date: January 25, 2022  Preferred Name: Rao    Previous PHQ-9:   PHQ-9 SCORE 4/26/2021 6/8/2021 7/19/2021   PHQ-9 Total Score MyChart - - -   PHQ-9 Total Score 14 10 7   Some encounter information is confidential and restricted. Go to Review Flowsheets activity to see all data.     Previous AAYUSH-7:   AAYUSH-7 SCORE 6/8/2021 7/19/2021 1/7/2022   Total Score - - -   Total Score 9 9 6   Some encounter information is confidential and restricted. Go to Review Flowsheets activity to see all data.     NOMAN LEVEL:  No flowsheet data found.    Preferred Contact:  Need for : No  Preferred Contact: Cell      Type of Contact Today: Phone call (not reached/unavailable)      Data: (subjective / Objective):  Attempted to reach patient for Behavioral Health Home ([facility]) Health Action Plan update, but was unsuccessful, left message and sent patient MyChart message to inform patient of transfer to new Behavioral Health Home ([facility]) [facility] , Hanna Garcia, Feb. 1st.  Plan to attempt again.     Varghese Alvarez LICSW Behavioral Health Home ([facility])   [facility]  [phone]  January 25, 2022  11:09 AM               Next 5 appointments (look out 90 days)    Mar 01, 2022  8:00 AM  (Arrive by 7:45 AM)  Return Visit with Meg Miranda MD  [facility] Neurology [facility] ([facility] ) [address] [place] [id number] [phone]

## 2022-02-08 ENCOUNTER — TELEPHONE (OUTPATIENT)
Dept: BEHAVIORAL HEALTH | Facility: CLINIC | Age: 32
End: 2022-02-08
Payer: COMMERCIAL

## 2022-02-08 NOTE — LETTER
February 8th, 2022    [facility]   Behavioral Health Home   [address] [place] [address]           Dear Rao Leavitt,       I am writing to inform you that I tried contacting you today to schedule your Health Action Plan (HAP) Review which is due this month. The Health Action Plan (HAP) review is required to be completed every 6 months as you continue to be enrolled in the [facility] [place] ([facility]) program. A HAP Review is where we review your goals that we set for the [facility] program and monitor your progress with these goals. We would also establish new goals for the next 6 month time period.       Please call me as soon as your receive this letter so we can get an appointment scheduled for this month.     If you are unable to complete the Health Action Plan Review by the end of this month, you will be discharged from Behavior Health Home Services on 2/28/2022.       If you have any questions about your Health Action Plan (HAP) Review, Behavioral Health Home ([facility]) services, or if you feel you no longer was to be enrolled in the [facility] [place] ([facility]) program, please feel free to contact me by phone or by email. My contact information is listed below. I look forward to hearing from you!       Thank you,   Hanna Garcia, [facility]  Behavioral [facility])   [facility]  [phone]

## 2022-02-08 NOTE — TELEPHONE ENCOUNTER
Behavioral Health Home Services  [facility]: [place]        Social Work Care Navigator Note      Patient: Rao Leavitt  Date: February 8, 2022  Preferred Name: Rao    Previous PHQ-9:   PHQ-9 SCORE 4/26/2021 6/8/2021 7/19/2021   PHQ-9 Total Score MyChart - - -   PHQ-9 Total Score 14 10 7   Some encounter information is confidential and restricted. Go to Review Flowsheets activity to see all data.     Previous AAYUSH-7:   AAYUSH-7 SCORE 6/8/2021 7/19/2021 1/7/2022   Total Score - - -   Total Score 9 9 6   Some encounter information is confidential and restricted. Go to Review Flowsheets activity to see all data.     NOMAN LEVEL:  No flowsheet data found.    Preferred Contact:  Need for : No  Preferred Contact: Cell      Type of Contact Today: Phone call (not reached/unavailable)      Data: (subjective / Objective):    [facility] attempted to reach patient for Health Action Plan Update that is overdue from January, but was unsuccessful. [facility] left a message and sent patient MyChart message to schedule appointment for this month. Plan to attempt again.        ARCELIA Ramon  Behavioral [facility])   [facility]  [phone]      Next 5 appointments (look out 90 days)    Mar 03, 2022 12:40 PM  (Arrive by 12:25 PM)  Return Visit with Meg Miranda MD  [facility] Neurology [facility] [place] ([facility] ) [address] [place] [phone]

## 2022-02-28 ENCOUNTER — TELEPHONE (OUTPATIENT)
Dept: BEHAVIORAL HEALTH | Facility: CLINIC | Age: 32
End: 2022-02-28
Payer: COMMERCIAL

## 2022-02-28 NOTE — TELEPHONE ENCOUNTER
Behavioral Health Home Services  [facility]: [place]        Social Work Care Navigator Note      Patient: Rao Leavitt  Date: February 28, 2022  Preferred Name: Rao    Previous PHQ-9:   PHQ-9 SCORE 4/26/2021 6/8/2021 7/19/2021   PHQ-9 Total Score MyChart - - -   PHQ-9 Total Score 14 10 7   Some encounter information is confidential and restricted. Go to Review Flowsheets activity to see all data.     Previous AAYUSH-7:   AAYUSH-7 SCORE 6/8/2021 7/19/2021 1/7/2022   Total Score - - -   Total Score 9 9 6   Some encounter information is confidential and restricted. Go to Review Flowsheets activity to see all data.     NOMAN LEVEL:  No flowsheet data found.    Preferred Contact:  Need for : No  Preferred Contact: Cell      Type of Contact Today: Phone call (not reached/unavailable)      Data: (subjective / Objective):    Attempted to reach patient for second attempt this month, but was unsuccessful. [facility] left a message requesting call back and notified of discharge. Social Work Care Coordinator discharged patient from Behavioral Health Home ([facility]) services due to no monthly contact over the past two months. Social Work Care Coordinator able to re-enroll pt at anytime if interested in the future. Mailed discharge letter to patient and routed message to care team to notify of discharge.      No further outreach from [facility] required at this time.    ARCELIA Ramon  Behavioral Health Home ([facility])   [facility]  [phone]      Next 5 appointments (look out 90 days)    Mar 03, 2022 12:40 PM  (Arrive by 12:25 PM)  Return Visit with Meg Miranda MD  [facility] Neurology [facility] [place] ([facility] [place] ) [address] [place] [phone]

## 2022-02-28 NOTE — LETTER
Behavioral Health Home (BHH): Health Action Plan  [facility] Clinic: [place]    [facility]      Name: Rao Leavitt  Preferred Name: Rao  : 1990  MRN: [id number]    2022    [facility]      Dear Rao J Dagmar,    As we had discussed, you are to be discharged from Behavioral Health Home (BH) services effective 22 due to you have not had monthly contact for two months and have not completed your Health Action Plan review, which is a requirement of Behavioral Health Home ([facility]) services. This does not change your ability to continue receiving care from your PCP/Behavioral Health Clinician here at our Primary Care Clinic.    If you are interested in Behavioral Health Home (BHH) services in the future, please ask your Primary Care Physician or call me to discuss scheduling an appointment to re-enroll into Behavioral Health Home (H) services. If you are to need assistance from a  or RN in the future, we have the Clinic Care Coordination Program who can assist you with any questions you may have about services or programs that may be beneficial for you. Please reach out to the clinic if you would like to learn more about Clinic Care Coordination and what they can offer you.    If you have any questions regarding discharge from Behavioral Health Home ([facility]) services, please feel free to contact me by phone or by email. My contact information is listed below. It was a pleasure working with you and I wish you all the best moving forward!    Thank you,  Hanna Garcia BILL  Behavioral Health Home ([facility])   [facility]  [phone]      Link to additional Certified Behavioral Health Home (BHH) providers: https://[place].gov/dhs/partners-and-providers/qoan-bjvzcfxywhw-gwaswyr-workgroups/[place]-health-care-programs/behavioral-health-home-services/certified-providers.jsp    If you are  "in immediate danger, call 911.  Suicide Prevention Lifeline: 6-108-752-TALK (3872)  Crisis Text Line Service: Text \"[place]\" to 253344.    [facility]  [facility] Emergency Department - Behavioral Emergency Center  [address] [place] [address]  [phone] [facility] - [employer] [facility] Crisis Response Services (Adults & Children)  [phone]   [facility] - Acute Psychiatric Services (APS)  Assessment & Referral: [phone]  Suicide Hotline: 272.635.3770 [facility] Crisis Team  [phone]   [facility] Adult Mental Health Services   [phone]  Referrals: [phone]  Crisis: [phone] [facility] - Emergency Department  [address] [place] [address]  [phone]   [place] LinkVet  0-881-VkraYei (1-703.697.3839) [facility] Mental Health and Resource Crisis Line  [phone]   [facility] - Community Outreach for Psychiatric Emergencies  [phone] [facility] Crisis Services - [facility] Adult Mental Health Services - Crisis Services (24/7)  Information & Referrals: [phone]  Crisis Line: 561.424.5399   [facility] Emergency Center (24/7)  [phone] TDD Crisis Help Line Serving [facility]  [phone]  or text  [place]  to 387208    [facility] and Human [facility]  Mental Health  [address] [place] [address]  [phone]  [address] [place] [address]  [phone]  web: co.[facility].mn.  Mental-Health    [facility]  Mental Health  [address] [place], [place] [address] [phone]  web: [facility] Family [facility]   Family Services  [address], [place] [address] [phone]  web: [facility] Family Services    [facility]   Family Services  [address] [place] [address], [place], [place] " [address]  [phone]  web: co.jus.[place]./adult mental health    [facility] and [facility] Department  [address] [facility] [place] [address], [place] [address]  [phone]  [address] [facility] [address] [place] [address]  [phone]  Toll Free: [phone]  web: kris.[place].[facility] and human services

## 2022-03-18 NOTE — PATIENT INSTRUCTIONS
Propranolol increased to 40 mg twice daily    Continue lithium 300 mg twice daily    Continue to follow with the sleep clinic providers for direction related to your insomnia    Continue talk therapies to maintain sobriety and learn coping skills to manage life stressors        Continue all other medications as reviewed per electronic medical record today.     Safety plan reviewed. To the Emergency Department as needed or call after hours crisis line at [phone] or [phone]. [place] Crisis Text [place]. Text [place] to 921348 or Suicide LifeLine Chat: [employer]DealBase Corporation.org/chat/    To schedule individual or family therapy, call [facility] at [phone].    Schedule an appointment with me in 2 months or sooner as needed. Call [facility] at [phone] to schedule.    Follow up with primary care provider as planned or for acute medical concerns.    Call the psychiatric nurse line with medication questions or concerns at [phone].    Echo ithart may be used to communicate with your provider, but this is not intended to be used for emergencies.    Crisis Resources:    National Suicide Prevention Lifeline: 435.467.4785 (TTY: 127.433.9846). Call anytime for help.  (www.suicidepreventionlifeline.org)  National [place] on Mental Illness (www.nadege.org): 208.954.9253 or 096-780-6844.   Mental Health Association (www.mentalhealth.org): 140.695.9308 or 775-119-9221.  [place] Crisis [place]: Text [place] to 227626  Suicide LifeLine Chat: [employer]al[employer].org/chat

## 2022-04-06 DIAGNOSIS — Z87.820 HISTORY OF TRAUMATIC BRAIN INJURY: ICD-10-CM

## 2022-04-06 RX ORDER — TOPIRAMATE 25 MG/1
25 TABLET, FILM COATED ORAL 2 TIMES DAILY
Qty: 60 TABLET | Refills: 2 | Status: SHIPPED | OUTPATIENT
Start: 2022-04-06 | End: 2022-07-12

## 2022-04-06 NOTE — TELEPHONE ENCOUNTER
Requested Prescriptions   Pending Prescriptions Disp Refills     topiramate (TOPAMAX) 25 MG tablet 60 tablet 3     Sig: Take 1 tablet (25 mg) by mouth 2 times daily       There is no refill protocol information for this order        Last office visit: 3/9/2021 with prescribing provider:  Dr. Miranda    [facility]          [facility]

## 2022-04-18 DIAGNOSIS — F31.9 BIPOLAR 1 DISORDER (H): ICD-10-CM

## 2022-04-18 RX ORDER — LITHIUM CARBONATE 300 MG
300 TABLET ORAL 2 TIMES DAILY
Qty: 28 TABLET | Refills: 0 | Status: SHIPPED | OUTPATIENT
Start: 2022-04-18 | End: 2022-05-06

## 2022-04-18 NOTE — TELEPHONE ENCOUNTER
Date of Last Office Visit: 1/7/22  Date of Next Office Visit: NONE -routing to scheduling  No shows since last visit: 0  Cancellations since last visit: 0    Medication requested:lithium (ESKALITH) 300 MG tablet Date last ordered: 1/7/22 Qty: 30 Refills:1     Review of [place] ?:NA      Lapse in medication adherence greater than 5 days?: Yes  If yes, call patient and gather details: Patient stated he has been taking consistently (with the exception of a few missed days) and only has 1 tablet left   Medication refill request verified as identical to current order?: yes  Result of Last DAM, VPA, Li+ Level, CBC, or Carbamazepine Level (at or since last visit): Patients last lithium level was taken 7/27/22       Last visit treatment plan: Propranolol increased to 40 mg twice daily    Continue lithium 300 mg twice daily    Continue to follow with the sleep clinic providers for direction related to your insomnia    Continue talk therapies to maintain sobriety and learn coping skills to manage life stressors         Continue all other medications as reviewed per electronic medical record today.     Safety plan reviewed. To the Emergency Department as needed or call after hours crisis line at [phone] or [phone]. [place] Crisis [place]. Text [place] to 260610 or Suicide LifeLine Chat: suicidepreventionlifeline.org/chat/    To schedule individual or family therapy, call [facility] at [phone].    Schedule an appointment with me in 2 months or sooner as needed. Call [facility] at [phone] to schedule.      []Medication refilled per  Medication Refill in Ambulatory Care  policy.  [x]Medication unable to be refilled by RN due to criteria not met as indicated below:    []Eligibility - not seen in the last year   [x]Supervision - no future appointment   [x]Compliance - no shows, cancellations or lapse in therapy   []Verification - order discrepancy   []Controlled  medication   [x]Medication not included in policy   []90-day supply request   []Other

## 2022-05-06 ENCOUNTER — TELEPHONE (OUTPATIENT)
Dept: FAMILY MEDICINE | Facility: CLINIC | Age: 32
End: 2022-05-06
Payer: COMMERCIAL

## 2022-05-06 NOTE — TELEPHONE ENCOUNTER
Reason for Call:  Other     Detailed comments: Pt is calling on the following medication and is out of it and wondering if JAXSON Jaime can refill as he doesn't see Jillian until 6/2/22.   Disp Refills Start End ISAAC   lithium (ESKALITH) 300 MG tablet 28 tablet 0 4/18/2022  No   Sig - Route: Take 1 tablet (300 mg) by mouth 2 times daily - Oral   Sent to pharmacy as: Lithium Carbonate 300 MG Oral Tablet (ESKALITH)   Class: E-Prescribe   Notes to Pharmacy: Bridge sent since pt needs updated labs with prescribing provider.   Order: [id number]   E-Prescribing Status: Receipt confirmed by pharmacy (4/18/2022 10:08 AM CDT)     Renewals    Renewal requests to authorizing provider (Bree Grullon NP) prohibited            Phone Number Patient can be reached at: Home number on file [phone] (home)    Best Time: any    Can we leave a detailed message on this number? YES    Call taken on 5/6/2022 at 10:09 AM by Jory Gold

## 2022-05-19 DIAGNOSIS — F41.1 GENERALIZED ANXIETY DISORDER: ICD-10-CM

## 2022-05-19 RX ORDER — PROPRANOLOL HYDROCHLORIDE 40 MG/1
40 TABLET ORAL 2 TIMES DAILY
Qty: 60 TABLET | Refills: 3 | Status: SHIPPED | OUTPATIENT
Start: 2022-05-19 | End: 2022-09-29

## 2022-05-19 NOTE — TELEPHONE ENCOUNTER
Date of Last Office Visit: 1/7/2022  Date of Next Office Visit: 6/2/2022  No shows since last visit: none  Cancellations since last visit: none    Medication requested: Propranolol 40 mg tablet Date last ordered: 1/7/2022 Qty: 60 Refills: 3     [place] of [place] ?: n/a      Lapse in medication adherence greater than 5 days?: no  If yes, call patient and gather details: no  Medication refill request verified as identical to current order?: yes  Result of Last DAM, VPA, Li+ Level, CBC, or Carbamazepine Level (at or since last visit): N/A    Last visit treatment plan: Treatment Plan:          Propranolol increased to 40 mg twice daily    Continue lithium 300 mg twice daily    Continue to follow with the sleep clinic providers for direction related to your insomnia    Continue talk therapies to maintain sobriety and learn coping skills to manage life stressors         Continue all other medications as reviewed per electronic medical record today.     Safety plan reviewed. To the Emergency Department as needed or call after hours crisis line at [phone] or [phone]. [place] Crisis [place]. Text [place] to 944830 or Suicide LifeLine Chat: suicidepreventionlifeline.org/chat/    To schedule individual or family therapy, call [facility] at [phone].    Schedule an appointment with me in 2 months or sooner as needed. Call [facility] at [phone] to schedule.    Follow up with primary care provider as planned or for acute medical concerns.    Call the psychiatric nurse line with medication questions or concerns at [phone].    MapMyIndiahart may be used to communicate with your provider, but this is not intended to be used for emergencies.      []Medication refilled per  Medication Refill in Ambulatory Care  policy.  [x]Medication unable to be refilled by RN due to criteria not met as indicated below:    []Eligibility - not seen in the last year   []Supervision - no future  appointment   []Compliance - no shows, cancellations or lapse in therapy   []Verification - order discrepancy   []Controlled medication   [x]Medication not included in policy   []90-day supply request   []Other

## 2022-05-21 ENCOUNTER — HEALTH MAINTENANCE LETTER (OUTPATIENT)
Age: 32
End: 2022-05-21

## 2022-06-02 ENCOUNTER — VIRTUAL VISIT (OUTPATIENT)
Dept: BEHAVIORAL HEALTH | Facility: CLINIC | Age: 32
End: 2022-06-02
Payer: COMMERCIAL

## 2022-06-02 ENCOUNTER — VIRTUAL VISIT (OUTPATIENT)
Dept: PSYCHIATRY | Facility: CLINIC | Age: 32
End: 2022-06-02
Payer: COMMERCIAL

## 2022-06-02 ENCOUNTER — TELEPHONE (OUTPATIENT)
Dept: PSYCHIATRY | Facility: CLINIC | Age: 32
End: 2022-06-02
Payer: COMMERCIAL

## 2022-06-02 DIAGNOSIS — F31.9 BIPOLAR 1 DISORDER (H): Primary | ICD-10-CM

## 2022-06-02 PROCEDURE — 99214 OFFICE O/P EST MOD 30 MIN: CPT | Mod: 95 | Performed by: NURSE PRACTITIONER

## 2022-06-02 PROCEDURE — 90832 PSYTX W PT 30 MINUTES: CPT | Mod: 95 | Performed by: COUNSELOR

## 2022-06-02 RX ORDER — LITHIUM CARBONATE 300 MG
300 TABLET ORAL 2 TIMES DAILY
Qty: 60 TABLET | Refills: 3 | Status: SHIPPED | OUTPATIENT
Start: 2022-06-02 | End: 2022-10-14

## 2022-06-02 RX ORDER — ARIPIPRAZOLE 2 MG/1
2 TABLET ORAL DAILY
Qty: 30 TABLET | Refills: 1 | Status: SHIPPED | OUTPATIENT
Start: 2022-06-02 | End: 2022-10-31

## 2022-06-02 NOTE — TELEPHONE ENCOUNTER
RN called and spoke with patient. He stated he would try to have these completed by Monday 6/6/22    Jennifer Arredondo RN on 6/2/2022 at 10:44 AM

## 2022-06-02 NOTE — PROGRESS NOTES
"Rao is a 32 year old who is being evaluated via a billable video visit.      How would you like to obtain your AVS? MyChart  If the video visit is dropped, the invitation should be resent by: Text to cell phone: [phone]  Will anyone else be joining your video visit? No    Mary Lu VF    Video Start Time: 8:23 AM  Video-Visit Details    Type of service:  Video Visit    Video End Time:8:45 AM    Originating Location (pt. Location): Home    Distant Location (provider location):  [facility] MENTAL [facility] & ADDICTION [facility]     Platform used for Video Visit: [facility]              Outpatient Psychiatric Progress Note    Name: Rao BLANCAS Enedeliamaria teresa   : 1990                    Primary Care Provider: Suzi Jaime NP   Therapist: Yes    PHQ-9 scores:  PHQ-9 SCORE 2021   PHQ-9 Total Score Yoan - - -   PHQ-9 Total Score 14 10 7   Some encounter information is confidential and restricted. Go to Review Flowsheets activity to see all data.       AAYUSH-7 scores:  AAYUSH-7 SCORE 2021   Total Score - - -   Total Score 9 9 6   Some encounter information is confidential and restricted. Go to Review ALOSKO activity to see all data.       Patient Identification:    Patient is a 32 year old year old, partnered / significant other  White Not  or  male  who presents for return visit with me.  Patient is currently employed full time. Patient attended the session alone. Patient prefers to be called: \" Lucio\".    Current medications include: Apple Ralph Vn-Grn Tea-Bit Or-Cr (APPLE CIDER VINEGAR PLUS) TABS,   lithium (ESKALITH) 300 MG tablet, Take 1 tablet (300 mg) by mouth 2 times daily  multivitamin, therapeutic (THERA-VIT) TABS tablet, Take 1 tablet by mouth daily  propranolol (INDERAL) 40 MG tablet, Take 1 tablet (40 mg) by mouth 2 times daily  topiramate (TOPAMAX) 25 MG tablet, Take 1 tablet (25 mg) by mouth 2 times daily Office visit " required for further refills: [phone]  vitamin B-Complex, Take 1 tablet by mouth daily    No current facility-administered medications on file prior to visit.       The [place] Prescription Monitoring Program has been reviewed and there are no concerns about diversionary activity for controlled substances at this time.      I was able to review most recent Primary Care Provider, specialty provider, and therapy visit notes that I have access to.     Today, patient reports that not much has changed. He's still working a lot and generally sleeps from 8:30pm to 3:30am. He reports waking up a lot throughout his sleep. He doesn't feel tired throughout the day. His mood has been up and down. When he goes up he feels angry and frustrated, He doesn't ususally talk to anyone but keeps the anger insideand is hard on himself. . Has previously done a sleep study which did not find any abnormalities. He denies suicidal thoughts.  He continues to maintain sobriety and is focused on saving money.  Recently he's been experiencing more episodes of dejavu. He endorses seeing shadows early in the morning at work, he walks away from these and doesn't find them distressing.      has a past medical history of Depressive disorder.    Social history updates:    Needs to follow-up on his hand- was previously injured and has several pins. He continues to randomy lose feeling   Working a lot. Has been decreased hassle with the mother of his child. Used to see therapist once a week but has not seen them for at least 3 weeks.     Substance use updates:     Denies cravings. Reached 2 years of sobriety on April 1st.  Tobacco use: No    Vital Signs:   There were no vitals taken for this visit.    Labs:    Most recent laboratory results reviewed and no new labs.     Mental Status Examination:  Appearance: awake, alert and casual dress  Attitude: cooperative  Eye Contact:  adequate  Gait and Station: No assistive Devices used and No dizziness  or falls  Psychomotor Behavior:  intact station, gait and muscle tone  Oriented to:  time, person, and place  Attention Span and Concentration:  Normal  Speech:   clear, coherent and Speaks: English  Mood:  anxious and depressed  Affect:  mood congruent  Associations:  no loose associations  Thought Process:  goal oriented  Thought Content:  no evidence of suicidal ideation or homicidal ideation, no auditory hallucinations present and no visual hallucinations present  Recent and Remote Memory:  intact Not formally assessed. No amnesia.  Fund of Knowledge: appropriate  Insight:  good  Judgment:  intact  Impulse Control:  intact    Suicide Risk Assessment:  Today Rao Leavitt reports no thoughts to harm themself or others. In addition, there are notable risk factors for self-harm, including anxiety and substance abuse. However, risk is mitigated by commitment to family, history of seeking help when needed, future oriented, denies suicidal intent or plan and denies homicidal ideation, intent, or plan. Therefore, based on all available evidence including the factors cited above, Rao Leavitt does not appear to be at imminent risk for self-harm, does not meet criteria for a 72-hr hold, and therefore remains appropriate for ongoing outpatient level of care.  A thorough assessment of risk factors related to suicide and self-harm have been reviewed and are noted above. The patient convincingly denies suicidality on several occasions. Local community safety resources printed and reviewed for patient to use if needed. There was no deceit detected, and the patient presented in a manner that was believable.     DSM5 Diagnosis:  296.51 Bipolar I Disorder Current or Most Recent Episode Depressed, Mild  300.02 (F41.1) Generalized Anxiety Disorder  780.52 (G47.00) Insomnia Disorder   With non-sleep disorder mental comorbidity  Episodic      Medical comorbidities include:   Patient Active Problem List    Diagnosis  Date Noted     History of traumatic brain injury 03/02/2021     Priority: Medium     Abnormal EEG 03/02/2021     Priority: Medium      OUTPATIENT SLEEP-DEPRIVED EEG REPORT.  DATE OF RECORDING: January 28, 2021.   IMPRESSION: This outpatient sleep-deprived EEG study is abnormal during wakefulness and drowsiness due to EEG changes that resemble generalized paroxysmal fast activity, that could be potentially epileptiform and seen in generalized epilepsies.  No electrographic or clinical seizures and no paroxysmal behavioral events are recorded during this study.       Attention-deficit hyperactivity disorder 03/01/2021     Priority: Medium     PTSD (post-traumatic stress disorder) 01/05/2021     Priority: Medium     Borderline personality disorder (H) 01/05/2021     Priority: Medium     Chemical dependency (H) 09/10/2020     Priority: Medium     Lithium use 05/15/2020     Priority: Medium     Lumbar spine tumor 11/27/2019     Priority: Medium     High risk medication use 10/05/2017     Priority: Medium     Bipolar 1 disorder (H) 03/16/2017     Priority: Medium     Generalized anxiety disorder 03/16/2017     Priority: Medium       Assessment:    Rao Leavitt reports in that mood regulation is less intense.  He does have anxiety and depression but now is concerned about dissociative symptoms.  Abilify will restart for this.  All other medications will continue the same for now.  He is encouraged to maintain sobriety and to continue with talk therapies..    Medication side effects and alternatives were reviewed. Health promotion activities recommended and reviewed today. All questions addressed. Education and counseling completed regarding risks and benefits of medications and psychotherapy options.    Treatment Plan:        1.  Restart Abilify to target dissociative symptoms    2.  Lithium 300 mg twice daily    3.  Propranolol 40 mg twice daily    5.  Continue all talk therapies      Continue all other  medications as reviewed per electronic medical record today.     Safety plan reviewed. To the Emergency Department as needed or call after hours crisis line at [phone] or [phone]. [place] Crisis Text Line. Text [place] to 954116 or Suicide LifeLine Chat: suicide[employer] - [employer].org/chat/    To schedule individual or family therapy, call [facility] at [phone]    Schedule an appointment with me in 6 weeks or sooner as needed. Call [facility] at [phone] to schedule.    Follow up with primary care provider as planned or for acute medical concerns.    Call the psychiatric nurse line with medication questions or concerns at [phone]    MyChart may be used to communicate with your provider, but this is not intended to be used for emergencies.    Crisis Resources:    National Suicide Prevention Lifeline: 752.287.8157 (TTY: 901.303.4086). Call anytime for help.  (www.suicidepreventionlifeline.org)  National [place] on Mental Illness (www.nadege.org): 151.641.1144 or 432-038-5050.   Mental Health Association (www.mentalhealth.org): 348.899.7684 or 727-944-8403.  [place] Crisis Text [place]: Text [place] to 301237  Suicide LifeLine Chat: suicide[employer].org/chat    Administrative Billing:   Time spent with patient includes counseling and coordination of care regarding above diagnoses and treatment plan.    Patient Status:  Patient will continue to be seen for ongoing consultation and stabilization.    Signed:   DUYEN Carrington-BC   Psychiatry

## 2022-06-06 ENCOUNTER — LAB (OUTPATIENT)
Dept: LAB | Facility: CLINIC | Age: 32
End: 2022-06-06
Payer: COMMERCIAL

## 2022-06-06 ENCOUNTER — MYC MEDICAL ADVICE (OUTPATIENT)
Dept: PSYCHIATRY | Facility: CLINIC | Age: 32
End: 2022-06-06
Payer: COMMERCIAL

## 2022-06-06 DIAGNOSIS — Z79.899 HIGH RISK MEDICATION USE: ICD-10-CM

## 2022-06-06 DIAGNOSIS — R79.89 ELEVATED TSH: Primary | ICD-10-CM

## 2022-06-06 LAB
ANION GAP SERPL CALCULATED.3IONS-SCNC: 4 MMOL/L (ref 3–14)
BUN SERPL-MCNC: 10 MG/DL (ref 7–30)
CALCIUM SERPL-MCNC: 9.3 MG/DL (ref 8.5–10.1)
CHLORIDE BLD-SCNC: 112 MMOL/L (ref 94–109)
CO2 SERPL-SCNC: 26 MMOL/L (ref 20–32)
CREAT SERPL-MCNC: 0.94 MG/DL (ref 0.66–1.25)
ERYTHROCYTE [DISTWIDTH] IN BLOOD BY AUTOMATED COUNT: 12.5 % (ref 10–15)
GFR SERPL CREATININE-BSD FRML MDRD: >90 ML/MIN/1.73M2
GLUCOSE BLD-MCNC: 81 MG/DL (ref 70–99)
HCT VFR BLD AUTO: 43.7 % (ref 40–53)
HGB BLD-MCNC: 15 G/DL (ref 13.3–17.7)
LITHIUM SERPL-SCNC: 0.5 MMOL/L
MCH RBC QN AUTO: 31.8 PG (ref 26.5–33)
MCHC RBC AUTO-ENTMCNC: 34.3 G/DL (ref 31.5–36.5)
MCV RBC AUTO: 93 FL (ref 78–100)
PLATELET # BLD AUTO: 322 10E3/UL (ref 150–450)
POTASSIUM BLD-SCNC: 4.1 MMOL/L (ref 3.4–5.3)
RBC # BLD AUTO: 4.72 10E6/UL (ref 4.4–5.9)
SODIUM SERPL-SCNC: 142 MMOL/L (ref 133–144)
TSH SERPL DL<=0.005 MIU/L-ACNC: 4.51 MU/L (ref 0.4–4)
WBC # BLD AUTO: 9.4 10E3/UL (ref 4–11)

## 2022-06-06 PROCEDURE — 36415 COLL VENOUS BLD VENIPUNCTURE: CPT

## 2022-06-06 PROCEDURE — 80048 BASIC METABOLIC PNL TOTAL CA: CPT

## 2022-06-06 PROCEDURE — 85027 COMPLETE CBC AUTOMATED: CPT

## 2022-06-06 PROCEDURE — 80178 ASSAY OF LITHIUM: CPT

## 2022-06-06 PROCEDURE — 84443 ASSAY THYROID STIM HORMONE: CPT

## 2022-06-06 NOTE — TELEPHONE ENCOUNTER
Bree Grullon NP  P Psych Rn - [facility]  Would you please also remind Lucio to make a follow-up appointment with his neurologist?      Contact Lens Fitting Policy      Your contact lens fitting will be billed to insurance automatically.  Please let us know if you would like to pay for your contact lens fitting at the time of service.  So, if you prefer you may use your benefits to order glasses or contacts if needed.  We advise you to contact your vision insurance if you have any questions regarding how billing your contact lens fitting will affect your materials benefit.        ? Please wear your new trial lenses every day up until your contact lens check to ensure that you are happy with them prior to receiving your final contact lens prescription.    ? Please make sure to come in wearing your new trial lenses to your appointment for at least 2 hours.  If something happens to one or both of your trial lenses or if you are unable to wear them due to comfort, please contact our office prior to your check up appointment at [phone].    ? Follow up contact lens visits, up to 90 days after the initial fitting, are included in the contact lens fitting fee without additional cost.      ? You will receive a prescription for a one year supply of contact lenses after you have had a follow up visit and it is determined that the trial lenses fit appropriately.    ? Follow up care is the responsibility of the patient.  Failure to schedule or keep appointments to check the fit of the contact lenses within the initial 90 day fitting period will require an additional fitting and incur additional charges.  In some cases, a complete eye exam may be required.    ? Reordering of gas permeable contact lenses due to change in material or lens design during the contact lens fitting period may incur additional costs than what was paid or covered by insurance for the initial pair of lenses.    ? Your contact lens prescription is valid for one (1) year from the date it was written.  FDA regulations require contact lens fittings be done on an annual basis.

## 2022-06-29 ENCOUNTER — TELEPHONE (OUTPATIENT)
Dept: PSYCHIATRY | Facility: CLINIC | Age: 32
End: 2022-06-29

## 2022-06-29 NOTE — TELEPHONE ENCOUNTER
----- Message from Bree Grullon NP sent at 6/28/2022  4:53 PM CDT -----  Would you please contact Lucio to get an update on how he is doing?  Last message he was reporting manic symptoms.  Bree

## 2022-07-12 ENCOUNTER — TELEPHONE (OUTPATIENT)
Dept: NEUROLOGY | Facility: CLINIC | Age: 32
End: 2022-07-12

## 2022-07-12 DIAGNOSIS — Z87.820 HISTORY OF TRAUMATIC BRAIN INJURY: ICD-10-CM

## 2022-07-12 RX ORDER — TOPIRAMATE 25 MG/1
25 TABLET, FILM COATED ORAL 2 TIMES DAILY
Qty: 60 TABLET | Refills: 4 | Status: SHIPPED | OUTPATIENT
Start: 2022-07-12 | End: 2022-10-31

## 2022-08-01 NOTE — PATIENT INSTRUCTIONS
1.  Restart Abilify to target dissociative symptoms  2.  Lithium 300 mg twice daily  3.  Propranolol 40 mg twice daily  5.  Continue all talk therapies    Continue all other medications as reviewed per electronic medical record today.   Safety plan reviewed. To the Emergency Department as needed or call after hours crisis line at [phone] or [phone]. [place] Crisis Text Line. Text [place] to 998064 or Suicide LifeLine Chat: suicideNeuMedics.org/chat/  To schedule individual or family therapy, call [facility] at [phone]  Schedule an appointment with me in 6 weeks or sooner as needed. Call [facility] at [phone] to schedule.  Follow up with primary care provider as planned or for acute medical concerns.  Call the psychiatric nurse line with medication questions or concerns at [phone]  MyChart may be used to communicate with your provider, but this is not intended to be used for emergencies.    Crisis Resources:    National Suicide Prevention Lifeline: 174.153.6606 (TTY: 599.123.1529). Call anytime for help.  (www.suicidepreventionlifeline.org)  National [place] on Mental Illness (www.nadege.org): 192.608.1954 or 548-409-0493.   Mental Health Association (www.mentalhealth.org): 787.248.9545 or 360-788-0551.  [place] Crisis Text [place]: Text [place] to 571914  Suicide LifeLine Chat: suicideNeuMedics.org/chat

## 2022-09-02 ENCOUNTER — HOSPITAL ENCOUNTER (EMERGENCY)
Facility: CLINIC | Age: 32
Discharge: HOME | End: 2022-09-02
Attending: FAMILY MEDICINE | Admitting: FAMILY MEDICINE
Payer: COMMERCIAL

## 2022-09-02 ENCOUNTER — APPOINTMENT (OUTPATIENT)
Dept: GENERAL RADIOLOGY | Facility: CLINIC | Age: 32
End: 2022-09-02
Attending: FAMILY MEDICINE
Payer: COMMERCIAL

## 2022-09-02 VITALS
HEIGHT: 67 IN | WEIGHT: 170 LBS | TEMPERATURE: 98.4 F | RESPIRATION RATE: 18 BRPM | BODY MASS INDEX: 26.68 KG/M2 | DIASTOLIC BLOOD PRESSURE: 83 MMHG | SYSTOLIC BLOOD PRESSURE: 122 MMHG | OXYGEN SATURATION: 99 % | HEART RATE: 68 BPM

## 2022-09-02 DIAGNOSIS — K21.00 GASTROESOPHAGEAL REFLUX DISEASE WITH ESOPHAGITIS WITHOUT HEMORRHAGE: ICD-10-CM

## 2022-09-02 DIAGNOSIS — R05.8 SPASMODIC COUGH: ICD-10-CM

## 2022-09-02 PROCEDURE — 93010 ELECTROCARDIOGRAM REPORT: CPT | Performed by: FAMILY MEDICINE

## 2022-09-02 PROCEDURE — 71046 X-RAY EXAM CHEST 2 VIEWS: CPT

## 2022-09-02 PROCEDURE — 93308 TTE F-UP OR LMTD: CPT | Mod: 26 | Performed by: FAMILY MEDICINE

## 2022-09-02 PROCEDURE — 76604 US EXAM CHEST: CPT | Mod: 26 | Performed by: FAMILY MEDICINE

## 2022-09-02 PROCEDURE — 90715 TDAP VACCINE 7 YRS/> IM: CPT | Performed by: FAMILY MEDICINE

## 2022-09-02 PROCEDURE — 93005 ELECTROCARDIOGRAM TRACING: CPT | Mod: 59 | Performed by: FAMILY MEDICINE

## 2022-09-02 PROCEDURE — 250N000011 HC RX IP 250 OP 636: Performed by: FAMILY MEDICINE

## 2022-09-02 PROCEDURE — 93308 TTE F-UP OR LMTD: CPT | Performed by: FAMILY MEDICINE

## 2022-09-02 PROCEDURE — 90471 IMMUNIZATION ADMIN: CPT | Performed by: FAMILY MEDICINE

## 2022-09-02 PROCEDURE — 99285 EMERGENCY DEPT VISIT HI MDM: CPT | Mod: 25 | Performed by: FAMILY MEDICINE

## 2022-09-02 PROCEDURE — 76604 US EXAM CHEST: CPT | Performed by: FAMILY MEDICINE

## 2022-09-02 PROCEDURE — 99285 EMERGENCY DEPT VISIT HI MDM: CPT | Performed by: FAMILY MEDICINE

## 2022-09-02 RX ORDER — AZITHROMYCIN 250 MG/1
TABLET, FILM COATED ORAL
Qty: 6 TABLET | Refills: 0 | Status: SHIPPED | OUTPATIENT
Start: 2022-09-02 | End: 2022-09-07

## 2022-09-02 RX ADMIN — CLOSTRIDIUM TETANI TOXOID ANTIGEN (FORMALDEHYDE INACTIVATED), CORYNEBACTERIUM DIPHTHERIAE TOXOID ANTIGEN (FORMALDEHYDE INACTIVATED), BORDETELLA PERTUSSIS TOXOID ANTIGEN (GLUTARALDEHYDE INACTIVATED), BORDETELLA PERTUSSIS FILAMENTOUS HEMAGGLUTININ ANTIGEN (FORMALDEHYDE INACTIVATED), BORDETELLA PERTUSSIS PERTACTIN ANTIGEN, AND BORDETELLA PERTUSSIS FIMBRIAE 2/3 ANTIGEN 0.5 ML: 5; 2; 2.5; 5; 3; 5 INJECTION, SUSPENSION INTRAMUSCULAR at 10:59

## 2022-09-02 ASSESSMENT — ENCOUNTER SYMPTOMS
PALPITATIONS: 0
DIARRHEA: 0
VOMITING: 0
WHEEZING: 1
BLOOD IN STOOL: 0
FEVER: 0
DYSURIA: 0
CONSTIPATION: 0
SINUS PRESSURE: 0
ABDOMINAL PAIN: 0
HEADACHES: 0
COUGH: 1
SHORTNESS OF BREATH: 1
FREQUENCY: 0
DIAPHORESIS: 0
SORE THROAT: 0
NAUSEA: 0
CHILLS: 0

## 2022-09-02 ASSESSMENT — ACTIVITIES OF DAILY LIVING (ADL): ADLS_ACUITY_SCORE: 35

## 2022-09-02 NOTE — DISCHARGE INSTRUCTIONS
ICD-10-CM    1. Spasmodic cough  R05.8     suspect this is related to GERD.  however, given young chiuldren at home, 2 weeks of symptoms, and unknown last pertussis vaccine, will treat with zithromax.  follow-up clinic in one week. mask for the next 5-7 days.   2. Gastroesophageal reflux disease with esophagitis without hemorrhage  K21.00     take prilosec 20 mg orally daily for the next 4 weeks.  risks for this include food 2 hours before lying down, dehydration - maintain 64 ounces fluid per day.  caffeine, alcohol and tobaccoi - including chewing.  avoid these. follow-up in clinic.

## 2022-09-02 NOTE — ED TRIAGE NOTES
"Patient reports ~ 2 weeks of cough. Negative COVID tests. Right chest pain, constant but worse with coughing. Having coughing fits that cause him to feel light headed and nauseous. Reports he feels like he has \"water\" on his chest. CP similar with anxiety he has had in the past. Is taking medications as prescribed. Denies fever or chills.      Triage Assessment     Row Name 09/02/22 0965       Triage Assessment (Adult)    Airway WDL WDL       Respiratory WDL    Respiratory WDL X;cough    Cough Frequency frequent    Cough Type productive       Skin Circulation/Temperature WDL    Skin Circulation/Temperature WDL WDL       Cardiac WDL    Cardiac WDL X;chest pain       Chest Pain Assessment    Chest Pain Location anterior chest, right    Character aching    Precipitating Factors unknown       Peripheral/Neurovascular WDL    Peripheral Neurovascular WDL WDL       Cognitive/Neuro/Behavioral WDL    Cognitive/Neuro/Behavioral WDL WDL              "

## 2022-09-02 NOTE — ED PROVIDER NOTES
History     Chief Complaint   Patient presents with     Chest Pain     HPI  Rao Leavitt is a 32 year old male who presents with history of bipolar 1 disorder, chemical dependency PTSD traumatic brain injury attention deficit disorder on lithium Abilify propranolol and Topamax.\  Presents here with 2 weeks of a cough negative COVID test chest pain worse with coughing coughing fits make him lightheaded and nauseous.  Feels that he have water in his chest.  Denies fever or chills.  Arrives here with reassuring vital signs including oxygen saturation temp blood pressure heart rate      Patient also notes gastroesophageal reflux occurring and laryngospasm especially when he lies down.  Spasmodic cough periodically symptoms for least 2 weeks now.  No known exposures but does work in a [employer] as a manager.  He has no current fever.  Some pleuritic pain with coughing.  No VTE risk.  Reassuring vital signs on presentation.    Denies recent prolonged travel (>3 hours) by car or plane, history or FHx of venous thromboembolism, recent surgery (last 4 weeks), active cancer history, hypercoagulable state, estrogen or other medications/conditions causing VTE or  new unilateral swelling or pain in the legs or calves.      Allergies:  Allergies   Allergen Reactions     Tramadol Nausea     Vicodin [Hydrocodone-Acetaminophen] Rash       Problem List:    Patient Active Problem List    Diagnosis Date Noted     History of traumatic brain injury 03/02/2021     Priority: Medium     Abnormal EEG 03/02/2021     Priority: Medium      OUTPATIENT SLEEP-DEPRIVED EEG REPORT.  DATE OF RECORDING: January 28, 2021.   IMPRESSION: This outpatient sleep-deprived EEG study is abnormal during wakefulness and drowsiness due to EEG changes that resemble generalized paroxysmal fast activity, that could be potentially epileptiform and seen in generalized epilepsies.  No electrographic or clinical seizures and no paroxysmal behavioral events  are recorded during this study.       Attention-deficit hyperactivity disorder 2021     Priority: Medium     PTSD (post-traumatic stress disorder) 2021     Priority: Medium     Borderline personality disorder (H) 2021     Priority: Medium     Chemical dependency (H) 09/10/2020     Priority: Medium     Lithium use 05/15/2020     Priority: Medium     Lumbar spine tumor 2019     Priority: Medium     High risk medication use 10/05/2017     Priority: Medium     Bipolar 1 disorder (H) 2017     Priority: Medium     Generalized anxiety disorder 2017     Priority: Medium        Past Medical History:    Past Medical History:   Diagnosis Date     Depressive disorder        Past Surgical History:    Past Surgical History:   Procedure Laterality Date     ESOPHAGOSCOPY, GASTROSCOPY, DUODENOSCOPY (EGD), COMBINED N/A 2019    Procedure: COMBINED ESOPHAGOSCOPY, GASTROSCOPY, DUODENOSCOPY (EGD), BIOPSY SINGLE OR MULTIPLE;  Surgeon: Kj Thomas MD;  Location: [facility]     PE tube         Family History:    Family History   Problem Relation Age of Onset     Migraines Mother      Post-Traumatic Stress Disorder (PTSD) Father      Bipolar Disorder Paternal Grandfather      Bipolar Disorder Sister        Social History:  Marital Status:  Single [1]  Social History     Tobacco Use     Smoking status: Former Smoker     Packs/day: 0.50     Years: 18.00     Pack years: 9.00     Types: Cigarettes     Quit date: 2021     Years since quittin.6     Smokeless tobacco: Former User     Types: Snuff     Quit date: 2021   Vaping Use     Vaping Use: Never used   Substance Use Topics     Alcohol use: Not Currently     Comment: 6 months alcohol free     Drug use: No     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 Tested positive on         Medications:    Apple Ralph Vn-Grn Tea-Bit Or-Cr (APPLE CIDER VINEGAR PLUS) TABS  ARIPiprazole (ABILIFY) 2 MG tablet  lithium (ESKALITH) 300 MG tablet  multivitamin,  "therapeutic (THERA-VIT) TABS tablet  propranolol (INDERAL) 40 MG tablet  topiramate (TOPAMAX) 25 MG tablet  vitamin B-Complex          Review of Systems   Constitutional: Negative for chills, diaphoresis and fever.   HENT: Negative for ear pain, sinus pressure and sore throat.    Eyes: Negative for visual disturbance.   Respiratory: Positive for cough, shortness of breath and wheezing.    Cardiovascular: Positive for chest pain. Negative for palpitations.   Gastrointestinal: Negative for abdominal pain, blood in stool, constipation, diarrhea, nausea and vomiting.   Genitourinary: Negative for dysuria, frequency and urgency.   Skin: Negative for rash.   Neurological: Negative for headaches.   All other systems reviewed and are negative.      Physical Exam   BP: 122/83  Pulse: 68  Temp: 98.4  F (36.9  C)  Resp: 18  Height: 170.2 cm (5' 7\")  Weight: 77.1 kg (170 lb)  SpO2: 99 %      Physical Exam  Constitutional:       General: He is in acute distress.      Appearance: He is not diaphoretic.   HENT:      Head: Atraumatic.   Eyes:      Conjunctiva/sclera: Conjunctivae normal.   Cardiovascular:      Rate and Rhythm: Normal rate and regular rhythm.      Heart sounds: No murmur heard.  Pulmonary:      Effort: Pulmonary effort is normal. No respiratory distress.      Breath sounds: Normal breath sounds. No stridor. No wheezing or rhonchi.   Abdominal:      General: Abdomen is flat. There is no distension.      Palpations: There is no mass.      Tenderness: There is no abdominal tenderness. There is no guarding.   Musculoskeletal:      Cervical back: Neck supple.      Right lower leg: No edema.      Left lower leg: No edema.   Skin:     Coloration: Skin is not pale.      Findings: No rash.   Neurological:      Mental Status: He is alert.         ED Course                 Procedures                EKG Interpretation:      Interpreted by Escobar Hartman MD  EKG done at 0949 hrs. demonstrates a sinus rhythm 51 bpm normal axis.  " There is no ST change.  No T wave changes.  Normal R progression and no Q waves.  Normal intervals.  Normal conduction.  No ectopy.  Some early repolarization changes in the inferior leads.  Impression sinus rhythm 51 bpm normal repolarization changes.  No acute findings.      Critical Care time:  none               Results for orders placed or performed during the hospital encounter of 09/02/22 (from the past 24 hour(s))   Chest XR,  PA & LAT    Narrative    CHEST 2 VIEWS   9/2/2022 10:11 AM     HISTORY: Chest pain, cough.    COMPARISON: Chest x-ray on 3/17/2020.      Impression    IMPRESSION: PA and lateral views of the chest were obtained.  Cardiomediastinal silhouette is within normal limits. No suspicious  focal pulmonary opacities. No significant pleural effusion or  pneumothorax.    HECTOR BENOIT MD         SYSTEM ID:  [id number]   POC US CHEST B-SCAN    Impression    [facility] Procedure Note      Limited Bedside ED Cardiac Ultrasound:    PROCEDURE: PERFORMED BY: Dr. Escobar Hartman MD  INDICATIONS/SYMPTOM:  Chest Pain and Shortness of Breath  PROBE: Cardiac phased array probe and High frequency linear probe  BODY LOCATION: Chest  FINDINGS:   The ultrasound was performed utilizing the subcostal, parasternal long axis, parasternal short axis and apical 4 chamber views.  Cardiac contractility:  Present  Gross estimation of cardiac kinesis: normal  Pericardial Effusion:  None  RV:LV ratio: LV > RV  IVC:    Diameter:  IVC diameter expiration (IVCe) 2 cm                                                   IVC diameter inspiration (IVCi) 0 cm                                                       Collapsibility:  IVC collapses > 50% with inspiration  INTERPRETATION:    Chamber size and motion were grossly normal with LV > RV, normal cardiac kinesis.  No pericardial effusion was found.  IVC visualized and findings indicate normovolemia.  IMAGE DOCUMENTATION: Images were archived to PACs system.         [facility] Procedure Note      Limited Bedside ED Ultrasound of Thorax:    PROCEDURE: PERFORMED BY: Dr. Escobar Hartman MD  INDICATIONS/SYMPTOM:  Chest pain and shortness of breath  PROBE: High frequency linear probe  BODY LOCATION: Chest  FINDINGS:  Images of both lung hemithoracies taken in 2D in multiple rib spaces        Right side:  Lung sliding artifact  Present     Comet tail artifacts  Absent   Left side:  Lung sliding artifact  Present     Comet tail artifacts  Absent   Hemothorax: Right side Absent     Left side Absent   Pleural effusion: Right side Absent      Left side Absent    INTERPRETATION: The exam was normal. There was no free fluid identified in the chest cavity. No evidence of pneumothorax, hemothorax or pleural effusion.  IMAGE DOCUMENTATION: Images were archived to PACs system.           Medications   Tdap (tetanus-diphtheria-acell pertussis) (ADACEL) injection 0.5 mL (has no administration in time range)       Assessments & Plan (with Medical Decision Making)     MDM:  Rao Leavitt is a 32 year old male who presents with spasmodic cough for the last 2 weeks with some acid reflux symptoms and likely associated with the sense of laryngospasm symptoms worse at nighttime.  His pertussis vaccine last dose is not in our records.  He is only with [place] for about 5 years.  He does have a spasmodic cough for for 2 weeks and does have younger children at home.  I recommended treating with Zithromax and updating his Adacel vaccination today.  However my greatest suspicion is for spasmodic cough related to acid reflux and treating as below.  Precautions given for return      I have reviewed the nursing notes.    I have reviewed the findings, diagnosis, plan and need for follow up with the patient.       New Prescriptions    No medications on file       Final diagnoses:   Spasmodic cough - suspect this is related to GERD.  however, given young chiuldren at home, 2 weeks of symptoms,  and unknown last pertussis vaccine, will treat with zithromax.  follow-up clinic in one week. mask for the next 5-7 days.   Gastroesophageal reflux disease with esophagitis without hemorrhage - take prilosec 20 mg orally daily for the next 4 weeks.  risks for this include food 2 hours before lying down, dehydration - maintain 64 ounces fluid per day.  caffeine, alcohol and tobaccoi - including chewing.  avoid these. follow-up in clinic.       9/2/2022   [facility] EMERGENCY DEPT     Escobar Hartman MD  09/02/22 [id number]

## 2022-09-02 NOTE — Clinical Note
Rao Leavitt was seen and treated in our emergency department on 9/2/2022.  He may return to work on 09/03/2022.       If you have any questions or concerns, please don't hesitate to call.      Escobar Hartman MD

## 2022-09-17 ENCOUNTER — HEALTH MAINTENANCE LETTER (OUTPATIENT)
Age: 32
End: 2022-09-17

## 2022-10-13 ENCOUNTER — TELEPHONE (OUTPATIENT)
Dept: FAMILY MEDICINE | Facility: CLINIC | Age: 32
End: 2022-10-13

## 2022-10-13 NOTE — TELEPHONE ENCOUNTER
Reason for Call:  Other prescription    Detailed comments: Would like a call back re: medications have up and down for his biopolar and outburst.    Phone Number Patient can be reached at: Home number on file [phone] (home)    Best Time: any    Can we leave a detailed message on this number? YES    Call taken on 10/13/2022 at 3:00 PM by Jory Gold

## 2022-10-14 NOTE — TELEPHONE ENCOUNTER
Bree Grullon NP  Psych Rn - [facility] 15 hours ago (6:04 PM)     [place]  Please find out what his concerns are.          Called patient. He noted that he has been up and down lately. Patient quit taking the Abilify due to side effects of making him feel weird. He notices he is still going up and down after stopping the Abilify. Stopped taking the Abilify for about a month.Abilify made him overly tired. He sits and fall asleep even though it was a low dose.     He also said he quit taking the Topamax combination of all the meds are not working. Stopped taking the Topamax today-he will speak with his provider Ruth about it. When he takes it he feels groggy and he gets headache even with how much water he drinks (he drinks enough).     I would maybe try a whole / complete different medication. If Lithium is affecting his kidney right now, than he should probably stop taking it now. Mentions that he urinates a lot.     Informed patient that provider will be notified and he will be followed up with after provider responds.

## 2022-10-17 NOTE — TELEPHONE ENCOUNTER
Bree Grullon NP  Psych Rn - [facility] 3 days ago     [place]  Patient needs to be seen before any other medication changes are made.  He should not be dropping his medications without letting his providers know.  If he is in crisis please have him go to the [facility].  Bree      Called patient and informed him of provider's directives noted above. Also reminded him of emergent services available to him to utilize if needed. Patient verbalized understanding.

## 2022-10-31 ENCOUNTER — VIRTUAL VISIT (OUTPATIENT)
Dept: PSYCHIATRY | Facility: CLINIC | Age: 32
End: 2022-10-31
Payer: COMMERCIAL

## 2022-10-31 DIAGNOSIS — F41.1 GENERALIZED ANXIETY DISORDER: ICD-10-CM

## 2022-10-31 DIAGNOSIS — F31.9 BIPOLAR 1 DISORDER (H): Primary | ICD-10-CM

## 2022-10-31 PROCEDURE — 99214 OFFICE O/P EST MOD 30 MIN: CPT | Mod: 95 | Performed by: NURSE PRACTITIONER

## 2022-10-31 RX ORDER — LURASIDONE HYDROCHLORIDE 20 MG/1
20 TABLET, FILM COATED ORAL DAILY
Qty: 30 TABLET | Refills: 1 | Status: SHIPPED | OUTPATIENT
Start: 2022-10-31 | End: 2023-01-02

## 2022-10-31 ASSESSMENT — ANXIETY QUESTIONNAIRES
2. NOT BEING ABLE TO STOP OR CONTROL WORRYING: SEVERAL DAYS
GAD7 TOTAL SCORE: 13
8. IF YOU CHECKED OFF ANY PROBLEMS, HOW DIFFICULT HAVE THESE MADE IT FOR YOU TO DO YOUR WORK, TAKE CARE OF THINGS AT HOME, OR GET ALONG WITH OTHER PEOPLE?: SOMEWHAT DIFFICULT
IF YOU CHECKED OFF ANY PROBLEMS ON THIS QUESTIONNAIRE, HOW DIFFICULT HAVE THESE PROBLEMS MADE IT FOR YOU TO DO YOUR WORK, TAKE CARE OF THINGS AT HOME, OR GET ALONG WITH OTHER PEOPLE: SOMEWHAT DIFFICULT
GAD7 TOTAL SCORE: 13
GAD7 TOTAL SCORE: 13
7. FEELING AFRAID AS IF SOMETHING AWFUL MIGHT HAPPEN: SEVERAL DAYS
4. TROUBLE RELAXING: SEVERAL DAYS
6. BECOMING EASILY ANNOYED OR IRRITABLE: NEARLY EVERY DAY
1. FEELING NERVOUS, ANXIOUS, OR ON EDGE: NEARLY EVERY DAY
7. FEELING AFRAID AS IF SOMETHING AWFUL MIGHT HAPPEN: SEVERAL DAYS
5. BEING SO RESTLESS THAT IT IS HARD TO SIT STILL: NEARLY EVERY DAY
3. WORRYING TOO MUCH ABOUT DIFFERENT THINGS: SEVERAL DAYS

## 2022-10-31 ASSESSMENT — PATIENT HEALTH QUESTIONNAIRE - PHQ9
10. IF YOU CHECKED OFF ANY PROBLEMS, HOW DIFFICULT HAVE THESE PROBLEMS MADE IT FOR YOU TO DO YOUR WORK, TAKE CARE OF THINGS AT HOME, OR GET ALONG WITH OTHER PEOPLE: VERY DIFFICULT
SUM OF ALL RESPONSES TO PHQ QUESTIONS 1-9: 8
SUM OF ALL RESPONSES TO PHQ QUESTIONS 1-9: 8

## 2022-10-31 NOTE — PROGRESS NOTES
"Rao is a 32 year old who is being evaluated via a billable video visit.      How would you like to obtain your AVS? MyChart  If the video visit is dropped, the invitation should be resent by: Text to cell phone: [phone]  Will anyone else be joining your video visit? No        Video-Visit Details    Video Start Time: 1:49 PM    Type of service:  Video Visit    Video End Time:2:17 PM    Originating Location (pt. Location): Home        Distant Location (provider location):  Off-site    Platform used for Video Visit: [facility]              Outpatient Psychiatric Progress Note    Name: Rao BLANCAS Enedeliamaria teresa   : 1990                    Primary Care Provider: Suzi Jaime NP   Therapist: No    PHQ-9 scores:  PHQ-9 SCORE 2021 2021 10/31/2022   PHQ-9 Total Score MyChart - - 8 (Mild depression)   PHQ-9 Total Score 10 7 8   Some encounter information is confidential and restricted. Go to Review Flowsheets activity to see all data.       AAYUSH-7 scores:  AAYUSH-7 SCORE 2021 2022 10/31/2022   Total Score - - 13 (moderate anxiety)   Total Score 9 6 13   Some encounter information is confidential and restricted. Go to Review [employer] activity to see all data.       Patient Identification:    Patient is a 32 year old year old, partnered / significant other  White Not  or  male  who presents for return visit with me.  Patient is currently employed full time. Patient attended the session alone. Patient prefers to be called: \" Philipp\".    Current medications include: Apple Ralph Vn-Grn Tea-Bit Or-Cr (APPLE CIDER VINEGAR PLUS) TABS,   lithium (ESKALITH) 300 MG tablet, Take 1 tablet (300 mg) by mouth 2 times daily  multivitamin, therapeutic (THERA-VIT) TABS tablet, Take 1 tablet by mouth daily  propranolol (INDERAL) 40 MG tablet, Take 1 tablet (40 mg) by mouth 2 times daily  vitamin B-Complex, Take 1 tablet by mouth daily  ARIPiprazole (ABILIFY) 2 MG tablet, Take 1 tablet (2 mg) by mouth " daily  topiramate (TOPAMAX) 25 MG tablet, Take 1 tablet (25 mg) by mouth 2 times daily Office visit required for further refills: [phone]    No current facility-administered medications on file prior to visit.       The [place] Prescription Monitoring Program has been reviewed and there are no concerns about diversionary activity for controlled substances at this time.      I was able to review most recent Primary Care Provider, specialty provider, and therapy visit notes that I have access to.     Today, patient reports that he stopped taking topiramate.  He is having outbursts still. His mood is flutuating.  He w ould like to try something stronger.He gets frustrated and easily irritated,  He has been throwing things.  He tells me that he is sober.  The topamax made him feel foggy in his thinking.  He sees  His neurologist in November.  Now heis almost three years sober.  He gets cravings when he is manic. He is sleeping  Fine.  One day he is fine and the next day he is different.   The job is going well,  He is in tom at work and then gets angry at home.  His girlfriend is supportive.  He left therapy and felt like it was not helpful.      has a past medical history of Depressive disorder.    Social history updates:    Lucio and his girlfriend both work.  He denies financial stress.    Substance use updates:    Sober  Tobacco use: No    Vital Signs:   There were no vitals taken for this visit.    Labs:    Most recent laboratory results reviewed and no new labs.     Mental Status Examination:  Appearance: awake, alert, casual dress and mild distress  Attitude: cooperative  Eye Contact:  adequate  Gait and Station: No assistive Devices used and No dizziness or falls  Psychomotor Behavior:  intact station, gait and muscle tone  Oriented to:  time, person, and place  Attention Span and Concentration:  Normal  Speech:   clear, coherent and Speaks: English  Mood:  anxious and depressed  Affect:  intensity is  heightened  Associations:  no loose associations  Thought Process:  goal oriented  Thought Content:  no evidence of suicidal ideation or homicidal ideation, no auditory hallucinations present and no visual hallucinations present  Recent and Remote Memory:  intact Not formally assessed. No amnesia.  Fund of Knowledge: appropriate  Insight:  good  Judgment:  intact  Impulse Control:  fair    Suicide Risk Assessment:  Today Rao Leavitt reports no thoughts to harm themself or others. In addition, there are notable risk factors for self-harm, including anxiety and mood change. However, risk is mitigated by commitment to family, history of seeking help when needed, future oriented, denies suicidal intent or plan and denies homicidal ideation, intent, or plan. Therefore, based on all available evidence including the factors cited above, Rao Leavitt does not appear to be at imminent risk for self-harm, does not meet criteria for a 72-hr hold, and therefore remains appropriate for ongoing outpatient level of care.  A thorough assessment of risk factors related to suicide and self-harm have been reviewed and are noted above. The patient convincingly denies suicidality on several occasions. Local community safety resources printed and reviewed for patient to use if needed. There was no deceit detected, and the patient presented in a manner that was believable.     DSM5 Diagnosis:  296.40 Bipolar I Disorder Current or Most Recent Episode Hypomanic  300.02 (F41.1) Generalized Anxiety Disorder    Medical comorbidities include:   Patient Active Problem List    Diagnosis Date Noted     History of traumatic brain injury 03/02/2021     Priority: Medium     Abnormal EEG 03/02/2021     Priority: Medium      OUTPATIENT SLEEP-DEPRIVED EEG REPORT.  DATE OF RECORDING: January 28, 2021.   IMPRESSION: This outpatient sleep-deprived EEG study is abnormal during wakefulness and drowsiness due to EEG changes that resemble  generalized paroxysmal fast activity, that could be potentially epileptiform and seen in generalized epilepsies.  No electrographic or clinical seizures and no paroxysmal behavioral events are recorded during this study.       Attention-deficit hyperactivity disorder 03/01/2021     Priority: Medium     PTSD (post-traumatic stress disorder) 01/05/2021     Priority: Medium     Borderline personality disorder (H) 01/05/2021     Priority: Medium     Chemical dependency (H) 09/10/2020     Priority: Medium     Lithium use 05/15/2020     Priority: Medium     Lumbar spine tumor 11/27/2019     Priority: Medium     High risk medication use 10/05/2017     Priority: Medium     Bipolar 1 disorder (H) 03/16/2017     Priority: Medium     Generalized anxiety disorder 03/16/2017     Priority: Medium       Assessment:    Rao Leavitt is struggling.  He is noting increase in manic behaviors and periods of irritability.  He denies any sleep concerns.  He took it upon himself to stop the Topamax as he did not feel like he could think clearly when he was taking it.  He does have an appointment with his neurologist in November to talk about options..  He finds the Abilify and lithium ineffective in controlling his symptoms and they have been discontinued.  Instead, I added lurasidone 20 mg daily to take with food.  He will continue propranolol 40 mg twice daily to help with anxiety.  Talk therapy is urged at his earliest convenience, to help him learn more skills and coping with life stressors.    Medication side effects and alternatives were reviewed. Health promotion activities recommended and reviewed today. All questions addressed. Education and counseling completed regarding risks and benefits of medications and psychotherapy options.    Treatment Plan:        1.  Discontinue Abilify    2.  Discontinue lithium    3.  Topamax has been discontinued by herself    4.  Add lurasidone 20 mg daily with food    5.  Continue  propranolol 40 mg twice daily    6.  Seek out talk therapy when able to, to learn more skills in managing life stressors        Continue all other medications as reviewed per electronic medical record today.     Safety plan reviewed. To the Emergency Department as needed or call after hours crisis line at [phone] or [phone]. [place] Crisis Text Line. Text [place] to 162991 or Suicide LifeLine Chat: suicideVidient.org/chat/    To schedule individual or family therapy, call [facility] at [phone]    Schedule an appointment with me in 4 weeks or sooner as needed. Call [facility] at [phone] to schedule.    Follow up with primary care provider as planned or for acute medical concerns.    Call the psychiatric nurse line with medication questions or concerns at [phone]    MyChart may be used to communicate with your provider, but this is not intended to be used for emergencies.    Crisis Resources:    National Suicide Prevention Lifeline: 314.687.3494 (TTY: 390.611.7723). Call anytime for help.  (www.suicidepreventionlifeline.org)  National [place] on Mental Illness (www.nadege.org): 827.262.4608 or 226-099-0914.   Mental Health Association (www.mentalhealth.org): 308.553.9812 or 363-172-3136.  [place] Crisis Text Line: Text [place] to 081238  Suicide LifeLine Chat: suicideVidient.org/chat    Administrative Billing:   Time spent with patient includes counseling and coordination of care regarding above diagnoses and treatment plan.    Patient Status:  Patient will continue to be seen for ongoing consultation and stabilization.    Signed:   DUYEN Carrington-BC   Psychiatry         Answers for HPI/ROS submitted by the patient on 10/31/2022  If you checked off any problems, how difficult have these problems made it for you to do your work, take care of things at home, or get along with other people?: Very difficult  PHQ9 TOTAL SCORE: 8  AAYUSH 7  TOTAL SCORE: 13

## 2022-11-15 NOTE — PATIENT INSTRUCTIONS
1.  Discontinue Abilify  2.  Discontinue lithium  3.  Topamax has been discontinued by herself  4.  Add lurasidone 20 mg daily with food  5.  Continue propranolol 40 mg twice daily  6.  Seek out talk therapy when able to, to learn more skills in managing life stressors    Continue all other medications as reviewed per electronic medical record today.   Safety plan reviewed. To the Emergency Department as needed or call after hours crisis line at [phone] or [phone]. [place] Crisis Text Line. Text [place] to 193791 or Suicide LifeLine Chat: "[employer]org/chat/  To schedule individual or family therapy, call [facility] at [phone]  Schedule an appointment with me in 4 weeks or sooner as needed. Call [facility] at [phone] to schedule.  Follow up with primary care provider as planned or for acute medical concerns.  Call the psychiatric nurse line with medication questions or concerns at [phone]  MyChart may be used to communicate with your provider, but this is not intended to be used for emergencies.    Crisis Resources:    National Suicide Prevention Lifeline: 975.938.7954 (TTY: 788.197.6263). Call anytime for help.  (www.suicidepreventionlifeline.org)  National [place] on Mental Illness (www.nadege.org): 893.816.5115 or 616-447-4443.   Mental Health Association (www.mentalhealth.org): 267.704.2654 or 150-090-8730.  [place] Crisis Text Line: Text [place] to 948441  Suicide LifeLine Chat: suicideDotflux.org/chat

## 2022-11-29 ENCOUNTER — MYC REFILL (OUTPATIENT)
Dept: PSYCHIATRY | Facility: CLINIC | Age: 32
End: 2022-11-29

## 2022-11-29 DIAGNOSIS — F41.1 GENERALIZED ANXIETY DISORDER: ICD-10-CM

## 2022-11-29 RX ORDER — PROPRANOLOL HYDROCHLORIDE 40 MG/1
40 TABLET ORAL 2 TIMES DAILY
Qty: 60 TABLET | Refills: 1 | Status: SHIPPED | OUTPATIENT
Start: 2022-11-29 | End: 2023-02-06

## 2022-11-29 NOTE — TELEPHONE ENCOUNTER
Date of Last Office Visit: 10/31/22  Date of Next Office Visit: 12/6/22  No shows since last visit: 0  Cancellations since last visit: 0    Medication requested: propranolol (INDERAL) 40 MG tablet Date last ordered: 9/29/22 Qty: 60 Refills: 1     [place] of [place] ?: NA  Medication last filled date: 10/30/22 Qty filled: 60    Lapse in medication adherence greater than 5 days?: No  If yes, call patient and gather details: na  Medication refill request verified as identical to current order?: yes  Result of Last DAM, VPA, Li+ Level, CBC, or Carbamazepine Level (at or since last visit): N/A    Last visit treatment plan: Rao Leavitt is struggling.  He is noting increase in manic behaviors and periods of irritability.  He denies any sleep concerns.  He took it upon himself to stop the Topamax as he did not feel like he could think clearly when he was taking it. He finds the Abilify and lithium ineffective in controlling his symptoms and they have been discontinued.  Instead, I added lurasidone 20 mg daily to take with food.  He will continue propranolol 40 mg twice daily to help with anxiety.      1.  Discontinue Abilify    2.  Discontinue lithium    3.  Topamax has been discontinued by herself    4.  Add lurasidone 20 mg daily with food    5.  Continue propranolol 40 mg twice daily    6.  Seek out talk therapy when able to, to learn more skills in managing life stressors    []Medication refilled per  Medication Refill in Ambulatory Care  policy.  [x]Medication unable to be refilled by RN due to criteria not met as indicated below:    []Eligibility - not seen in the last year   []Supervision - no future appointment   []Compliance - no shows, cancellations or lapse in therapy   []Verification - order discrepancy   []Controlled medication   [x]Medication not included in policy   []90-day supply request   []Other

## 2023-01-02 DIAGNOSIS — F31.9 BIPOLAR 1 DISORDER (H): ICD-10-CM

## 2023-01-02 RX ORDER — LURASIDONE HYDROCHLORIDE 20 MG/1
20 TABLET, FILM COATED ORAL DAILY
Qty: 30 TABLET | Refills: 1 | Status: SHIPPED | OUTPATIENT
Start: 2023-01-02 | End: 2023-03-13

## 2023-01-02 NOTE — TELEPHONE ENCOUNTER
Date of Last Office Visit: 10/31/2022  Date of Next Office Visit: None (scheduling, please connect with patient and schedule follow up appointment with provider)  No shows since last visit: 1  Cancellations since last visit: none    Medication requested: Lurasidone 20 mg tablet Date last ordered: 10/31/2022 Qty: 30 Refills: 1     [place] of [place] ?: n/a      Lapse in medication adherence greater than 5 days?: no  If yes, call patient and gather details: no  Medication refill request verified as identical to current order?: yes  Result of Last DAM, VPA, Li+ Level, CBC, or Carbamazepine Level (at or since last visit): N/A    Last visit treatment plan: Treatment Plan:          1.  Discontinue Abilify    2.  Discontinue lithium    3.  Topamax has been discontinued by herself    4.  Add lurasidone 20 mg daily with food    5.  Continue propranolol 40 mg twice daily    6.  Seek out talk therapy when able to, to learn more skills in managing life stressors         Continue all other medications as reviewed per electronic medical record today.     Safety plan reviewed. To the Emergency Department as needed or call after hours crisis line at [phone] or [phone]. [place] Crisis Text Line. Text [place] to 207873 or Suicide LifeLine Chat: suicidepreventionlifeline.org/chat/    To schedule individual or family therapy, call [facility] at [phone]    Schedule an appointment with me in 4 weeks or sooner as needed. Call [facility] at [phone] to schedule.    Follow up with primary care provider as planned or for acute medical concerns.    Call the psychiatric nurse line with medication questions or concerns at [phone]    MyChart may be used to communicate with your provider, but this is not intended to be used for emergencies.     Crisis Resources:    National Suicide Prevention Lifeline: 977.640.8424 (TTY: 975.171.1853). Call anytime for help.   (www.suicidepreventionlifeline.org)  National [place] on Mental Illness (www.nadege.org): 133.693.1052 or 701-301-5588.   Mental Health Association (www.mentalhealth.org): 161.739.1142 or 193-820-9176.  [place] Crisis Text Line: Text [place] to 397700  Suicide LifeLine Chat: suicideZoodig.org/chat     Administrative Billing:   Time spent with patient includes counseling and coordination of care regarding above diagnoses and treatment plan.     Patient Status:  Patient will continue to be seen for ongoing consultation and stabilization.    []Medication refilled per  Medication Refill in Ambulatory Care  policy.  [x]Medication unable to be refilled by RN due to criteria not met as indicated below:    []Eligibility - not seen in the last year   []Supervision - no future appointment   []Compliance - no shows, cancellations or lapse in therapy   []Verification - order discrepancy   []Controlled medication   [x]Medication not included in policy   []90-day supply request   []Other

## 2023-03-13 DIAGNOSIS — F41.1 GENERALIZED ANXIETY DISORDER: ICD-10-CM

## 2023-03-13 DIAGNOSIS — F31.9 BIPOLAR 1 DISORDER (H): ICD-10-CM

## 2023-03-13 RX ORDER — LURASIDONE HYDROCHLORIDE 20 MG/1
20 TABLET, FILM COATED ORAL DAILY
Qty: 30 TABLET | Refills: 0 | Status: SHIPPED | OUTPATIENT
Start: 2023-03-13 | End: 2023-04-21 | Stop reason: DRUGHIGH

## 2023-03-13 RX ORDER — PROPRANOLOL HYDROCHLORIDE 40 MG/1
40 TABLET ORAL 2 TIMES DAILY
Qty: 60 TABLET | Refills: 0 | Status: SHIPPED | OUTPATIENT
Start: 2023-03-13 | End: 2023-04-21

## 2023-03-13 NOTE — CONFIDENTIAL NOTE
"Psych providers are covering for TEODORO Olivares    Date of Last Office Visit:10/31/22  Date of Next Office Visit: 4/21/23  No shows since last visit: x1 on 12/6/22  Cancellations since last visit: 0    Medication requested: lurasidone (LATUDA) 20 MG TABS tablet Date last ordered: 1/2/23 Qty: 30 Refills: 1     Medication requested: lurasidone (LATUDA) 20 MG TABS tablet Date last ordered: 1/2/23 Qty: 30 Refills: 1      Lapse in medication adherence greater than 5 days?: appears yes  If yes, call patient and gather details: Spoke to pt, reports running out of Latuda 2 days ago and propranolol yesterday. Pt c/o of being on \" edge\" and having difficulties sleeping without Latuda. Asking to refill as soon as we can  Medication refill request verified as identical to current order?: yes  Result of Last DAM, VPA, Li+ Level, CBC, or Carbamazepine Level (at or since last visit): N/A    Last visit treatment plan:       1.  Discontinue Abilify    2.  Discontinue lithium    3.  Topamax has been discontinued by herself    4.  Add lurasidone 20 mg daily with food    5.  Continue propranolol 40 mg twice daily    6.  Seek out talk therapy when able to, to learn more skills in managing life stressors      Schedule an appointment with me in 4 weeks or sooner as needed    []Medication refilled per  Medication Refill in Ambulatory Care  policy.  [x]Medication unable to be refilled by RN due to criteria not met as indicated below:    []Eligibility - not seen in the last year   []Supervision - no future appointment   [x]Compliance - no shows, cancellations or lapse in therapy   []Verification - order discrepancy   []Controlled medication   [x]Medication not included in policy   []90-day supply request   [x]Other: LPN is processing request    "

## 2023-04-12 NOTE — ED AVS SNAPSHOT
[facility] Emergency Department  [address] [address]  Phone:  [phone]  Fax:  [phone]                                    Rao Leavitt   MRN: [id number]    Department:  [facility] Emergency Department   Date of Visit:  5/30/2019           After Visit Summary Signature Page    I have received my discharge instructions, and my questions have been answered. I have discussed any challenges I see with this plan with the nurse or doctor.    ..........................................................................................................................................  Patient/Patient Representative Signature      ..........................................................................................................................................  Patient Representative Print Name and Relationship to Patient    ..................................................               ................................................  Date                                   Time    ..........................................................................................................................................  Reviewed by Signature/Title    ...................................................              ..............................................  Date                                               Time          22EPIC Rev 08/18        12-Apr-2023 15:14 12-Apr-2023 16:25

## 2023-04-21 ENCOUNTER — VIRTUAL VISIT (OUTPATIENT)
Dept: PSYCHIATRY | Facility: CLINIC | Age: 33
End: 2023-04-21
Payer: COMMERCIAL

## 2023-04-21 DIAGNOSIS — F31.9 BIPOLAR 1 DISORDER (H): Primary | ICD-10-CM

## 2023-04-21 DIAGNOSIS — F41.1 GENERALIZED ANXIETY DISORDER: ICD-10-CM

## 2023-04-21 PROCEDURE — 99214 OFFICE O/P EST MOD 30 MIN: CPT | Mod: VID | Performed by: NURSE PRACTITIONER

## 2023-04-21 RX ORDER — LURASIDONE HYDROCHLORIDE 40 MG/1
40 TABLET, FILM COATED ORAL
Qty: 30 TABLET | Refills: 3 | Status: SHIPPED | OUTPATIENT
Start: 2023-04-21 | End: 2023-09-14

## 2023-04-21 RX ORDER — PROPRANOLOL HYDROCHLORIDE 40 MG/1
40 TABLET ORAL 2 TIMES DAILY
Qty: 60 TABLET | Refills: 3 | Status: SHIPPED | OUTPATIENT
Start: 2023-04-21 | End: 2024-02-12

## 2023-04-21 ASSESSMENT — PATIENT HEALTH QUESTIONNAIRE - PHQ9
SUM OF ALL RESPONSES TO PHQ QUESTIONS 1-9: 5
SUM OF ALL RESPONSES TO PHQ QUESTIONS 1-9: 5
10. IF YOU CHECKED OFF ANY PROBLEMS, HOW DIFFICULT HAVE THESE PROBLEMS MADE IT FOR YOU TO DO YOUR WORK, TAKE CARE OF THINGS AT HOME, OR GET ALONG WITH OTHER PEOPLE: SOMEWHAT DIFFICULT

## 2023-04-21 NOTE — NURSING NOTE
Is the patient currently in the state of [place]? YES    Visit mode:VIDEO    If the visit is dropped, the patient can be reconnected by: VIDEO VISIT: Text to cell phone:   Telephone Information:   Mobile [phone]       Will anyone else be joining the visit? No  (If patient encounters technical issues they should call [phone])    How would you like to obtain your AVS? MyChart    Are changes needed to the allergy or medication list? NO    Rooming Documentation: Care team has reviewed attendance agreement with patient. Patient advised that two failed appointments within 6 months may lead to termination of current episode of care.      Reason for visit: Video Visit     FLORES Lindsay

## 2023-04-21 NOTE — PROGRESS NOTES
"Virtual Visit Details    Type of service:  Video Visit   Video Start Time: 2:17 PM  Video End Time:2:50 PM    Originating Location (pt. Location): Home    Distant Location (provider location):  Off-site  Platform used for Video Visit: [facility] Psychiatric Progress Note    Name: Rao Leavitt   : 1990                    Primary Care Provider: Suzi Jaime NP   Therapist: None    PHQ-9 scores:      2021     8:36 AM 2021    10:00 AM 10/31/2022     1:41 PM   PHQ-9 SCORE   PHQ-9 Total Score MyChart   8 (Mild depression)   PHQ-9 Total Score 10 7 8       AAYUSH-7 scores:      2021    10:00 AM 2022     1:54 PM 10/31/2022     1:42 PM   AAYUSH-7 SCORE   Total Score   13 (moderate anxiety)   Total Score 9 6 13       Patient Identification:    Patient is a 33 year old year old, partnered / significant other  White Not  or  male  who presents for return visit with me.  Patient is currently employed full time. Patient attended the session alone. Patient prefers to be called: \" Philipp\".    Current medications include: Apple Ralph Vn-Grn Tea-Bit Or-Cr (APPLE CIDER VINEGAR PLUS) TABS,   lurasidone (LATUDA) 20 MG TABS tablet, Take 1 tablet (20 mg) by mouth daily With food  multivitamin, therapeutic (THERA-VIT) TABS tablet, Take 1 tablet by mouth daily  propranolol (INDERAL) 40 MG tablet, Take 1 tablet (40 mg) by mouth 2 times daily  vitamin B-Complex, Take 1 tablet by mouth daily    No current facility-administered medications on file prior to visit.       The [place] Prescription Monitoring Program has been reviewed and there are no concerns about diversionary activity for controlled substances at this time.      I was able to review most recent Primary Care Provider, specialty provider, and therapy visit notes that I have access to.     Today, patient reports that he is now working at [employer] as a .  Mood has been more stable since taking the Latuda.  " He also has been sleeping through the night better.  No weight changes are reported.  He went to the emergency room recently for concerns about hip pain.  He tells me he has been having no seizure activity.  He has not been attending meetings but tells me he has been staying sober.     has a past medical history of Depressive disorder.    Social history updates:    Philipp lives with his partner and her 2 children.    Substance use updates:    Reports sobriety from substances  Tobacco use: Yes E-cigarettes  Ready to quit?  No  Nicotine Replacement Therapy tried: None    Vital Signs:   There were no vitals taken for this visit.    Labs:    Most recent laboratory results reviewed and no new labs.     Mental Status Examination:  Appearance: awake, alert and casual dress  Attitude: cooperative  Eye Contact:  adequate  Gait and Station: No dizziness or falls  Psychomotor Behavior:  intact station, gait and muscle tone  Oriented to:  time, person, and place  Attention Span and Concentration:  Normal  Speech:   vtspeech: clear, coherent and Speaks: English  Mood:  : anxious, depressed and better  Affect:  : mood congruent  Associations:  no loose associations  Thought Process:  goal oriented  Thought Content:  no evidence of suicidal ideation or homicidal ideation, no auditory hallucinations present and no visual hallucinations present  Recent and Remote Memory:  intact Not formally assessed. No amnesia.  Fund of Knowledge: appropriate  Insight:  good  Judgment: good  Impulse Control:  good    Suicide Risk Assessment:  Today Rao Leavitt reports no thoughts to harm themself or others. In addition, there are notable risk factors for self-harm, including anxiety, substance abuse and mood change. However, risk is mitigated by commitment to family, history of seeking help when needed, future oriented, denies suicidal intent or plan and denies homicidal ideation, intent, or plan. Therefore, based on all available evidence  including the factors cited above, Rao Leavitt does not appear to be at imminent risk for self-harm, does not meet criteria for a 72-hr hold, and therefore remains appropriate for ongoing outpatient level of care.  A thorough assessment of risk factors related to suicide and self-harm have been reviewed and are noted above. The patient convincingly denies suicidality on several occasions. Local community safety resources printed and reviewed for patient to use if needed. There was no deceit detected, and the patient presented in a manner that was believable.     DSM5 Diagnosis:  296.51 Bipolar I Disorder Current or Most Recent Episode Depressed, Mild  300.02 (F41.1) Generalized Anxiety Disorder    Medical comorbidities include:   Patient Active Problem List    Diagnosis Date Noted     History of traumatic brain injury 03/02/2021     Priority: Medium     Abnormal EEG 03/02/2021     Priority: Medium      OUTPATIENT SLEEP-DEPRIVED EEG REPORT.  DATE OF RECORDING: January 28, 2021.   IMPRESSION: This outpatient sleep-deprived EEG study is abnormal during wakefulness and drowsiness due to EEG changes that resemble generalized paroxysmal fast activity, that could be potentially epileptiform and seen in generalized epilepsies.  No electrographic or clinical seizures and no paroxysmal behavioral events are recorded during this study.       Attention-deficit hyperactivity disorder 03/01/2021     Priority: Medium     PTSD (post-traumatic stress disorder) 01/05/2021     Priority: Medium     Borderline personality disorder (H) 01/05/2021     Priority: Medium     Chemical dependency (H) 09/10/2020     Priority: Medium     Lithium use 05/15/2020     Priority: Medium     Lumbar spine tumor 11/27/2019     Priority: Medium     High risk medication use 10/05/2017     Priority: Medium     Bipolar 1 disorder (H) 03/16/2017     Priority: Medium     Generalized anxiety disorder 03/16/2017     Priority: Medium        Assessment:    Rao Leavitt reports being employed as a .  While this position can be stressful at times, he finds himself able to manage other staff as well as job tasks assigned to him.  He reports sleeping better with less mood swings since starting the Latuda..  As he continues to have some mild depression with occasional mild mood elevation, I increased his lurasidone to 40 mg daily with food.  He will continue propranolol 40 mg twice daily to help decrease anxiety, especially in social settings.  He is telling me that he is maintaining his sobriety and is now not attending any programming since he is working full-time.  No side effects reported or observed, including muscle tics, mouth movements, or tremors.    Medication side effects and alternatives were reviewed. Health promotion activities recommended and reviewed today. All questions addressed. Education and counseling completed regarding risks and benefits of medications and psychotherapy options.    Treatment Plan:        1.  Increase Latuda to 40 mg daily with food    2.  Continue propranolol 40 mg twice daily        Continue all other medications as reviewed per electronic medical record today.     Safety plan reviewed. To the Emergency Department as needed or call after hours crisis line at [phone] or [phone]. [place] Crisis Text Line. Text [place] to 470538 or Suicide LifeLine Chat: suicidepreventionlifeline.org/chat/    To schedule individual or family therapy, call [facility] at [phone]    Schedule an appointment with me in 3 months or sooner as needed. Call [facility] at [phone] to schedule.    Follow up with primary care provider as planned or for acute medical concerns.    Call the psychiatric nurse line with medication questions or concerns at [phone]    MyChart may be used to communicate with your provider, but this is not intended to be used for  emergencies.    Crisis Resources:    National Suicide Prevention Lifeline: 946.309.7890 (TTY: 603.568.6621). Call anytime for help.  (www.suicidepreventionlifeline.org)  National [place] on Mental Illness (www.nadege.org): 346.699.4286 or 987-393-2297.   Mental Health Association (www.mentalhealth.org): 636.606.9596 or 642-075-3048.  [place] Crisis Text Line: Text [place] to 058555  Suicide LifeLine Chat: [employer].org/chat    Administrative Billing:   Time spent with patient includes counseling and coordination of care regarding above diagnoses and treatment plan.    Patient Status:  This is a continuous care patient and medications will be prescribed by the psychiatric provider until further indicated.    Signed:   DUYEN Carrington-BC   Psychiatry

## 2023-04-28 NOTE — PATIENT INSTRUCTIONS
"Patient Education   The [facility] Program  What to Expect  Here's what to expect in the Panel Psychiatry Program.   About the program  You'll be meeting with a psychiatric doctor to check your mental health. A psychiatric doctor helps you deal with troubling thoughts and feelings by giving you medicine. They'll make sure you know the plan for your care. You may see them for a long time. When you're feeling better, they may refer you back to seeing your family doctor.   If you have any questions, we'll be glad to talk to you.  About visits  Be open  At your visits, please talk openly about your problems. It may feel hard, but it's the best way for us to help you.  Cancelling visits  If you can't come to your visit, please call us right away at [phone]. If you don't cancel at least 24 hours (1 full day) before your visit, that's \"late cancellation.\"  Not showing up for your visits  Being very late is the same as not showing up. You'll be a \"no show\" if:  You're more than 15 minutes late for a 30-minute (half hour) visit.  You're more than 30 minutes late for a 60-minute (full hour) visit.  If you cancel late or don't show up 2 times within 6 months, we may end your care.  Getting help between visits  If you need help between visits, you can call us Monday to Friday from 8 a.m. to 4:30 p.m. at [phone].  Emergency care  Call 911 or go to the nearest emergency department if your life or someone else's life is in danger.  Call 988 anytime to reach the national Suicide and Crisis hotline.  Medicine refills  To refill your medicine, call your pharmacy. You can also call [facility]'s [facility] at [phone], Monday to Friday, 8 a.m. to 4:30 p.m. It can take 1 to 3 business days to get a refill.   Forms, letters, and tests  You may have papers to fill out, like FMLA, short-term disability, and workability. We can help you with these forms at your visits, but you must have an " appointment. You may need more than 1 visit for this, to be in an intensive therapy program, or both.  Before we can give you medicine for ADHD, we may refer you to get tested for it or confirm it another way.  We may not be able to give you an emotional support animal letter.  We don't do mental health checks ordered by the court.   We don't do mental health testing, but we can refer you to get tested.   Thank you for choosing us for your care.  For informational purposes only. Not to replace the advice of your health care provider. Copyright   2022 [facility]. All rights reserved. [employer] 897388 - 12/22.       Treatment plan:    1.  Increase Latuda to 40 mg daily with food  2.  Continue propranolol 40 mg twice daily    Continue all other medications as reviewed per electronic medical record today.   Safety plan reviewed. To the Emergency Department as needed or call after hours crisis line at [phone] or [phone]. [place] Crisis Text Line. Text [place] to 421504 or Suicide LifeLine Chat: suicidepre"[employer]line.org/chat/  To schedule individual or family therapy, call [facility] at [phone]  Schedule an appointment with me in 3 months or sooner as needed. Call [facility] at [phone] to schedule.  Follow up with primary care provider as planned or for acute medical concerns.  Call the psychiatric nurse line with medication questions or concerns at [phone]  MyChart may be used to communicate with your provider, but this is not intended to be used for emergencies.    Crisis Resources:    National Suicide Prevention Lifeline: 896.291.3745 (TTY: 688.452.8141). Call anytime for help.  (www.suicidepreventionlifeline.org)  National [place] on Mental Illness (www.nadege.org): 295.280.2583 or 295-142-5248.   Mental Health Association (www.mentalhealth.org): 204.724.6567 or 444-533-4728.  [place] Crisis Text Line: Text [place] to 982716  [place] LifeLine  Chat: suicidepreventionlifeline.org/chat

## 2023-06-04 ENCOUNTER — HEALTH MAINTENANCE LETTER (OUTPATIENT)
Age: 33
End: 2023-06-04

## 2023-08-03 NOTE — PROGRESS NOTES
[facility] Collaborative Care Psychiatry Service     June 2, 2022      Behavioral Health Clinician Progress Note    Patient Name: Rao Leavitt           Service Type:  Individual      Service Location:   MyChart / Email (patient reached)     Session Start Time: 743am  Session End Time: 813am      Session Length: 16 - 37      Attendees: Patient     Service Modality:  Video Visit:      Provider verified identity through the following two step process.  Patient provided:  Patient photo and Patient is known previously to provider    Telemedicine Visit: The patient's condition can be safely assessed and treated via synchronous audio and visual telemedicine encounter.      Reason for Telemedicine Visit: Services only offered telehealth    Originating Site (Patient Location): Patient's home    Distant Site (Provider Location): Provider Remote Setting- Home Office    Consent:  The patient/guardian has verbally consented to: the potential risks and benefits of telemedicine (video visit) versus in person care; bill my insurance or make self-payment for services provided; and responsibility for payment of non-covered services.     Patient would like the video invitation sent by:  My Chart    Mode of Communication:  Video Conference via Amwell    As the provider I attest to compliance with applicable laws and regulations related to telemedicine.    Visit Activities (Refresh list every visit): [facility] Only    Diagnostic Assessment Date: 09/24/2019, will be updated next meeting   Treatment Plan Review Date: N/A, will be developed next meeting  See Flowsheets for today's PHQ-9 and AAYUSH-7 results  Previous PHQ-9:   PHQ-9 SCORE 4/26/2021 6/8/2021 7/19/2021   PHQ-9 Total Score MyChart - - -   PHQ-9 Total Score 14 10 7   Some encounter information is confidential and restricted. Go to Review Flowsheets activity to see all data.     Previous AYAUSH-7:   AAYUSH-7 SCORE 6/8/2021 7/19/2021 1/7/2022   Total Score - - -   Total Score 9  9 6   Some encounter information is confidential and restricted. Go to Review Flowsheets activity to see all data.       NOMAN LEVEL:  No flowsheet data found.    DATA  Extended Session (60+ minutes): No  Interactive Complexity: No  Crisis: No  [facility] Patient: No    Treatment Objective(s) Addressed in This Session:  Target Behavior(s): stress, mood swings    Mood Instability: will develop better understanding of triggers and coping strategies to stabilize mood    Current Stressors / Issues:  MH update:  Pt says that things have been okay. Pt reports that the last week then have been up and down. Started noticing feeling up and down more and more bipolar symptoms. Pt isn't sure if it is them working too much or stress impacting. Pt is the only one working in their household. Pt is having mood swings, feeling upset about little things. After January pt said pt started having withdrawal symptoms from reducing the lithium. Pt is concerned that their body is use to it.   Stressors:  Side Effects: none  Mood: up and down, irritability, angry  Appetite: unremarkable  Sleep: unremarkable    Impulsive spending/spending sprees: none, trying to save  Suicidality: Pt denies   Self-harm: Pt denies  Substance Use: Pt is sober and trying to remain sober   Caffeine: little, drink a monster a day   Therapist: in therapy, hasn't been in bit and has an appointment for next Monday, see [facility] worker- hasn't seen in a month and has an appointment on Monday    Interventions: supportive therapy  Medication Questions/Requests: up and down mood swings        Progress on Treatment Objective(s) / Homework:  Satisfactory progress - ACTION (Actively working towards change); Intervened by reinforcing change plan / affirming steps taken    Motivational Interviewing    MI Intervention: Co-Developed Goal: reduce mood swings, Expressed Empathy/Understanding, Supported Autonomy, Collaboration, Evocation, Permission to raise concern or advise, Open-ended  questions, Reflections: simple and complex, Change talk (evoked) and Reframe     Change Talk Expressed by the Patient: Need to change Committment to change Taking steps    Provider Response to Change Talk: E - Evoked more info from patient about behavior change, A - Affirmed patient's thoughts, decisions, or attempts at behavior change, R - Reflected patient's change talk and S - Summarized patient's change talk statements    Also provided psychoeducation about behavioral health condition, symptoms, and treatment options    Care Plan review completed: No    Medication Review:  No changes to current psychiatric medication(s)    Medication Compliance:  Yes    Changes in Health Issues:   None reported    Chemical Use Review:   Substance Use: Chemical use reviewed, no active concerns identified      Tobacco Use: No change in amount of tobacco use since last session.  [facility] did not discuss    Assessment: Current Emotional / Mental Status (status of significant symptoms):  Risk status (Self / Other harm or suicidal ideation)  Patient denies a history of suicidal ideation, suicide attempts, self-injurious behavior, homicidal ideation, homicidal behavior and and other safety concerns  Patient denies current fears or concerns for personal safety.  Patient denies current or recent suicidal ideation or behaviors.  Patient denies current or recent homicidal ideation or behaviors.  Patient denies current or recent self injurious behavior or ideation.  Patient denies other safety concerns.  A safety and risk management plan has not been developed at this time, however patient was encouraged to call [place] [phone] should there be a change in any of these risk factors.    Appearance:   Appropriate   Eye Contact:   Good   Psychomotor Behavior: Normal   Attitude:   Cooperative   Orientation:   All  Speech   Rate / Production: Normal    Volume:  Normal   Mood:    Normal  Affect:    Appropriate   Thought Content:  Clear   Thought  Form:  Coherent  Logical   Insight:    Good     Diagnoses:  1. Bipolar 1 disorder (H)        Collateral Reports Completed:  Communicated with:   Communicated with BETSY CarringtonBC   [facility]    Plan: (Homework, other):  Patient was given information about behavioral services and encouraged to schedule a follow up appointment with the clinic [facility] in conjunction with [facility] appointment.  He was also given information about mental health symptoms and treatment options .  CD Recommendations: Maintain Sobriety.     SARA Farooq, [facility] June 2, 2022     Xelbrindaz Pregnancy And Lactation Text: This medication is Pregnancy Category D and is not considered safe during pregnancy.  The risk during breast feeding is also uncertain.

## 2023-09-14 ENCOUNTER — MYC REFILL (OUTPATIENT)
Dept: PSYCHIATRY | Facility: CLINIC | Age: 33
End: 2023-09-14
Payer: COMMERCIAL

## 2023-09-14 DIAGNOSIS — F31.9 BIPOLAR 1 DISORDER (H): ICD-10-CM

## 2023-09-14 RX ORDER — LURASIDONE HYDROCHLORIDE 40 MG/1
40 TABLET, FILM COATED ORAL
Qty: 30 TABLET | Refills: 1 | Status: SHIPPED | OUTPATIENT
Start: 2023-09-14 | End: 2023-12-27

## 2023-09-14 NOTE — TELEPHONE ENCOUNTER
Date of Last Office Visit: 4/21/23  Date of Next Office Visit: none scheduled  No shows since last visit: 0  Cancellations since last visit: 0    Medication requested: lurasidone (LATUDA) 40 MG TABS tablet  Date last ordered: 4/21/23 Qty: 30 Refills: 3     Review of [place] ?: NA    Lapse in medication adherence greater than 5 days?: no  If yes, call patient and gather details: RN phoned patient and he reports that he has 4 left  Medication refill request verified as identical to current order?: yes  Result of Last DAM, VPA, Li+ Level, CBC, or Carbamazepine Level (at or since last visit): N/A    Last visit treatment plan:        Assessment:     Rao Leavitt reports being employed as a .  While this position can be stressful at times, he finds himself able to manage other staff as well as job tasks assigned to him.  He reports sleeping better with less mood swings since starting the Latuda..  As he continues to have some mild depression with occasional mild mood elevation, I increased his lurasidone to 40 mg daily with food.  He will continue propranolol 40 mg twice daily to help decrease anxiety, especially in social settings.  He is telling me that he is maintaining his sobriety and is now not attending any programming since he is working full-time.  No side effects reported or observed, including muscle tics, mouth movements, or tremors.     Medication side effects and alternatives were reviewed. Health promotion activities recommended and reviewed today. All questions addressed. Education and counseling completed regarding risks and benefits of medications and psychotherapy options.     Treatment Plan:        1.  Increase Latuda to 40 mg daily with food  2.  Continue propranolol 40 mg twice daily     Continue all other medications as reviewed per electronic medical record today.   Safety plan reviewed. To the Emergency Department as needed or call after hours crisis line at [phone] or  [phone]. [place] Crisis Text Line. Text [place] to 854527 or Suicide LifeLine Chat: suicidepreventionlifeline.org/chat/  To schedule individual or family therapy, call [facility] at [phone]  Schedule an appointment with me in 3 months or sooner as needed. Call [facility] at [phone] to schedule.  Follow up with primary care provider as planned or for acute medical concerns.  Call the psychiatric nurse line with medication questions or concerns at [phone]  [employer] may be used to communicate with your provider, but this is not intended to be used for emergencies.    []Medication refilled per  Medication Refill in Ambulatory Care  policy.  [x]Medication unable to be refilled by RN due to criteria not met as indicated below:    []Eligibility - not seen in the last year   [x]Supervision - no future appointment   []Compliance - no shows, cancellations or lapse in therapy   []Verification - order discrepancy   []Controlled medication   []Medication not included in policy   []90-day supply request   []Other

## 2023-12-27 DIAGNOSIS — F31.9 BIPOLAR 1 DISORDER (H): ICD-10-CM

## 2023-12-27 NOTE — TELEPHONE ENCOUNTER
[facility] please call patient to schedule a follow up appointment with provider.  Date of Last Office Visit: 04/21/2023  Date of Next Office Visit: none  No shows since last visit: 1  Cancellations since last visit: 0    Medication requested: lurasidone (LATUDA) 40 MG TABS tablet  Date last ordered: 09/14/2023 Qty: 30 Refills: 1     Review of [place] ?: N/A  Lapse in medication adherence greater than 5 days?:yes  If yes, call patient and gather details: Left vm for patient to call the clinic back to gain information  Medication refill request verified as identical to current order?: yes  Result of Last DAM, VPA, Li+ Level, CBC, or Carbamazepine Level (at or since last visit): N/A    Last visit treatment plan:   Schedule an appointment with me in 3 months   1.  Increase Latuda to 40 mg daily with food  2.  Continue propranolol 40 mg twice daily    []Medication refilled per  Medication Refill in Ambulatory Care  policy.  []Medication unable to be refilled by RN due to criteria not met as indicated below:    []Eligibility - not seen in the last year   [x]Supervision - no future appointment   [x]Compliance - no shows, cancellations or lapse in therapy   []Verification - order discrepancy   []Controlled medication   [x]Medication not included in policy   []90-day supply request   [x]Other

## 2023-12-29 RX ORDER — LURASIDONE HYDROCHLORIDE 40 MG/1
40 TABLET, FILM COATED ORAL
Qty: 30 TABLET | Refills: 0 | Status: SHIPPED | OUTPATIENT
Start: 2023-12-29 | End: 2024-01-29

## 2023-12-29 NOTE — TELEPHONE ENCOUNTER
Second RF request for Lurazidone 40 mg received via Right Fax.   Forwarding to psych providers covering for TEODORO Olivares

## 2024-01-29 ENCOUNTER — MYC REFILL (OUTPATIENT)
Dept: PSYCHIATRY | Facility: CLINIC | Age: 34
End: 2024-01-29
Payer: COMMERCIAL

## 2024-01-29 DIAGNOSIS — F31.9 BIPOLAR 1 DISORDER (H): ICD-10-CM

## 2024-01-29 RX ORDER — LURASIDONE HYDROCHLORIDE 40 MG/1
40 TABLET, FILM COATED ORAL
Qty: 30 TABLET | Refills: 1 | Status: SHIPPED | OUTPATIENT
Start: 2024-01-29 | End: 2024-02-12

## 2024-01-29 NOTE — TELEPHONE ENCOUNTER
Date of Last Office Visit: 4/21/23  Date of Next Office Visit: 2/12/24  No shows since last visit: 10/24/23  Cancellations since last visit: 0    Medication requested: lurasidone (LATUDA) 40 MG TABS tablet  Date last ordered: 12/29/23 Qty: 30 Refills: 0     [place] of [place] ?: NA    Lapse in medication adherence greater than 5 days?: No  If yes, call patient and gather details: na  Medication refill request verified as identical to current order?: yes  Result of Last DAM, VPA, Li+ Level, CBC, or Carbamazepine Level (at or since last visit): N/A    Last visit treatment plan: 1.  Increase Latuda to 40 mg daily with food  2.  Continue propranolol 40 mg twice daily  Schedule an appointment with me in 3 months or sooner as needed.    He reports sleeping better with less mood swings since starting the Latuda..  As he continues to have some mild depression with occasional mild mood elevation, I increased his lurasidone to 40 mg daily with food.  He will continue propranolol 40 mg twice daily to help decrease anxiety, especially in social settings.       []Medication refilled per  Medication Refill in Ambulatory Care  policy.  [x]Medication unable to be refilled by RN due to criteria not met as indicated below:    []Eligibility - not seen in the last year   []Supervision - no future appointment   [x]Compliance - no shows, cancellations or lapse in therapy   []Verification - order discrepancy   []Controlled medication   []Medication not included in policy   []90-day supply request   []Other

## 2024-02-12 ENCOUNTER — VIRTUAL VISIT (OUTPATIENT)
Dept: PSYCHIATRY | Facility: CLINIC | Age: 34
End: 2024-02-12
Payer: COMMERCIAL

## 2024-02-12 DIAGNOSIS — F31.9 BIPOLAR 1 DISORDER (H): Primary | ICD-10-CM

## 2024-02-12 DIAGNOSIS — F90.8 ATTENTION DEFICIT HYPERACTIVITY DISORDER (ADHD), OTHER TYPE: ICD-10-CM

## 2024-02-12 DIAGNOSIS — F41.1 GENERALIZED ANXIETY DISORDER: ICD-10-CM

## 2024-02-12 PROCEDURE — 99214 OFFICE O/P EST MOD 30 MIN: CPT | Mod: 95 | Performed by: NURSE PRACTITIONER

## 2024-02-12 PROCEDURE — G2211 COMPLEX E/M VISIT ADD ON: HCPCS | Mod: 95 | Performed by: NURSE PRACTITIONER

## 2024-02-12 RX ORDER — BUPROPION HYDROCHLORIDE 150 MG/1
150 TABLET ORAL DAILY
Qty: 30 TABLET | Refills: 3 | Status: SHIPPED | OUTPATIENT
Start: 2024-02-12 | End: 2024-04-16

## 2024-02-12 RX ORDER — LURASIDONE HYDROCHLORIDE 60 MG/1
60 TABLET, FILM COATED ORAL
Qty: 30 TABLET | Refills: 3 | Status: SHIPPED | OUTPATIENT
Start: 2024-02-12 | End: 2024-04-16

## 2024-02-12 RX ORDER — PROPRANOLOL HYDROCHLORIDE 20 MG/1
20 TABLET ORAL 2 TIMES DAILY
Qty: 60 TABLET | Refills: 3 | Status: SHIPPED | OUTPATIENT
Start: 2024-02-12 | End: 2024-07-19

## 2024-02-12 ASSESSMENT — PAIN SCALES - GENERAL: PAINLEVEL: NO PAIN (0)

## 2024-02-12 NOTE — NURSING NOTE
Is the patient currently in the state of [place]? YES    Visit mode:VIDEO    If the visit is dropped, the patient can be reconnected by: VIDEO VISIT: Text to cell phone:   Telephone Information:   Mobile [phone]       Will anyone else be joining the visit? NO  (If patient encounters technical issues they should call [phone])    How would you like to obtain your AVS? MyChart    Are changes needed to the allergy or medication list? No    Reason for visit: RECHECK    Geo TANNER

## 2024-02-12 NOTE — PROGRESS NOTES
Virtual Visit Details    Type of service:  Video Visit   Video Start Time: 1:48 PM  Video End Time: 2:15 PM    Originating Location (pt. Location): Home    Distant Location (provider location):  On-site  Platform used for Video Visit: Osiris

## 2024-02-12 NOTE — PROGRESS NOTES
"         Outpatient Psychiatric Progress Note    Name: Rao Leavitt   : 1990                    Primary Care Provider: Suzi Jaime DNP   Therapist: None    PHQ-9 scores:      2021    10:00 AM 10/31/2022     1:41 PM 2023     2:07 PM   PHQ-9 SCORE   PHQ-9 Total Score MyChart  8 (Mild depression) 5 (Mild depression)   PHQ-9 Total Score 7 8 5       AAYUSH-7 scores:      2021    10:00 AM 2022     1:54 PM 10/31/2022     1:42 PM   AAYUSH-7 SCORE   Total Score   13 (moderate anxiety)   Total Score 9 6 13       Patient Identification:    Patient is a 34 year old year old, partnered / significant other  White Not  or  male  who presents for return visit with me.  Patient is currently employed full time. Patient attended the session alone. Patient prefers to be called: \" Philipp\".    Current medications include: Apple Ralph Vn-Grn Tea-Bit Or-Cr (APPLE CIDER VINEGAR PLUS) TABS,   lurasidone (LATUDA) 40 MG TABS tablet, Take 1 tablet (40 mg) by mouth daily with food  multivitamin, therapeutic (THERA-VIT) TABS tablet, Take 1 tablet by mouth daily  propranolol (INDERAL) 40 MG tablet, Take 1 tablet (40 mg) by mouth 2 times daily  vitamin B-Complex, Take 1 tablet by mouth daily    No current facility-administered medications on file prior to visit.       The [place] Prescription Monitoring Program has been reviewed and there are no concerns about diversionary activity for controlled substances at this time.      I was able to review most recent Primary Care Provider, specialty provider, and therapy visit notes that I have access to.     Today, patient reports he is working at [employer] and [employer].  He is having ups and downs - every two weeks  - throws things and yells.   The past two days he has been waking up with headaches - Then the headaches turn into migraines -he takes tylenol PM.  Reports mild depression symptoms that are fleeting related to a hectic home life and " financial debt due to excessive spending on credit cards.  Reports that sleep is mostly manageable.  Sometimes he sees shadows.  Towards the end of our interview he reports that some days it is hard to focus at work.  He finds himself getting distracted and has to return to finish jobs that are incomplete.     has a past medical history of Depressive disorder.    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  He lives with his partner and their 2 children.    Substance use updates:    No ne  Tobacco use: Yes E-cigarettes  Ready to quit?  No  Nicotine Replacement Therapy tried: None    Vital Signs:   There were no vitals taken for this visit.    Labs:    Most recent laboratory results reviewed and no new labs.     Mental Status Examination:  Appearance: awake, alert and casual dress  Attitude: cooperative  Eye Contact:  adequate  Gait and Station: No dizziness or falls  Psychomotor Behavior:  fidgeting and intact station, gait and muscle tone  Oriented to:  time, person, and place  Attention Span and Concentration:  Normal  Speech:   vtspeech: clear, coherent and Speaks: English  Mood:  anxious and labile  Affect:  mood congruent  Associations:  no loose associations  Thought Process:  goal oriented  Thought Content:  no evidence of suicidal ideation or homicidal ideation, no auditory hallucinations present, and no visual hallucinations present  Recent and Remote Memory:  intact Not formally assessed. No amnesia.  Fund of Knowledge: appropriate  Insight:  good  Judgment: good  Impulse Control:  good    Suicide Risk Assessment:  Today Rao Leavitt reports no thoughts to harm themself or others. In addition, there are notable risk factors for self-harm, including anxiety and mood change. However, risk is mitigated by commitment to family, history of seeking help when needed, future oriented, denies suicidal intent or plan, and denies homicidal ideation, intent, or plan. Therefore,  based on all available evidence including the factors cited above, Rao Leavitt does not appear to be at imminent risk for self-harm, does not meet criteria for a 72-hr hold, and therefore remains appropriate for ongoing outpatient level of care.  A thorough assessment of risk factors related to suicide and self-harm have been reviewed and are noted above. The patient convincingly denies suicidality on several occasions. Local community safety resources printed and reviewed for patient to use if needed. There was no deceit detected, and the patient presented in a manner that was believable.     DSM5 Diagnosis:  Attention-Deficit/Hyperactivity Disorder  314.01 (F90.8) Other Specified Attention-Deficit / Hyperactiity Disorder  296.40 Bipolar I Disorder Current or Most Recent Episode Hypomanic  300.02 (F41.1) Generalized Anxiety Disorder    Medical comorbidities include:   Patient Active Problem List    Diagnosis Date Noted    History of traumatic brain injury 03/02/2021     Priority: Medium    Abnormal EEG 03/02/2021     Priority: Medium      OUTPATIENT SLEEP-DEPRIVED EEG REPORT.  DATE OF RECORDING: January 28, 2021.   IMPRESSION: This outpatient sleep-deprived EEG study is abnormal during wakefulness and drowsiness due to EEG changes that resemble generalized paroxysmal fast activity, that could be potentially epileptiform and seen in generalized epilepsies.  No electrographic or clinical seizures and no paroxysmal behavioral events are recorded during this study.      Attention-deficit hyperactivity disorder 03/01/2021     Priority: Medium    PTSD (post-traumatic stress disorder) 01/05/2021     Priority: Medium    Borderline personality disorder (H) 01/05/2021     Priority: Medium    Chemical dependency (H) 09/10/2020     Priority: Medium    Lithium use 05/15/2020     Priority: Medium    Lumbar spine tumor 11/27/2019     Priority: Medium    High risk medication use 10/05/2017     Priority: Medium    Bipolar  1 disorder (H) 03/16/2017     Priority: Medium    Generalized anxiety disorder 03/16/2017     Priority: Medium       Assessment:    Rao Leavitt reports concerns over her lack of focus and follow through at work.  He tells me he was diagnosed with ADHD at age 5 years old.  A referral was made for ADHD testing to see where he is at with these symptoms now that he is an adult.  I added Wellbutrin  mg daily to help with improving his focus.  He stopped taking the propranolol for anxiety and continues to have periods of time where he feels nervous.  I really added the propranolol at a lower dose at 20 mg 2 times daily.  For mood dysregulation that results in verbal outbursts and throwing objects, I increased his lurasidone to 60 mg daily to be taken with food..    Medication side effects and alternatives were reviewed. Health promotion activities recommended and reviewed today. All questions addressed. Education and counseling completed regarding risks and benefits of medications and psychotherapy options.    Treatment Plan:      1.  Add Wellbutrin  mg daily  2.  Increase lurasidone to 60 mg daily  3.  Restart propranolol 20 mg 2 times daily  4.  Referral made for ADHD testing    Continue all other medications as reviewed per electronic medical record today.   Safety plan reviewed. To the Emergency Department as needed or call after hours crisis line at [phone] or [phone]. [place] Crisis Text [place]. Text [place] to 562120 or Suicide LifeLine Chat: suicidepreventionlifeline.org/chat/  To schedule individual or family therapy, call [facility] at [phone]  Schedule an appointment with me in 2 months or sooner as needed. Call [facility] at [phone] to schedule.  Follow up with primary care provider as planned or for acute medical concerns.  Call the psychiatric nurse line with medication questions or concerns at [phone]  Yoan may be used to  communicate with your provider, but this is not intended to be used for emergencies.        Crisis Resources   The EmPath is an adults only unit located at [facility] in [place] is a short term (generally less than 23 hour stay) designed for crisis intervention and stabilization. Pts have the opportunity to meet quickly with a behavioral health team for evaluation in a calm and peaceful therapuetic environment. To be evaluated for admission pts are triaged throught the [facility] ED.      The following hotlines are for both adults and children. The and are open 24 hours a day, 7 days a week unless noted otherwise.        Crisis Lines      Crisis Text Line  Text 500181  You will be connected with a trained live crisis counselor to provide support.        Gambling Hotline  4.577.872.9236 [hope]        línea de crisis española  [phone]        [facility] [employer] Helpline  [phone]        National Hope Line  8.488.112.2772 [hope]        National Suicide Prevention Lifeline  Free and confidential support  988 or 1.172.220.TALK [8255]  http://suicidepreventionlifeline.org        The Jax Project (LGBTQ Youth Crisis Line)  2.431.867.2186  text START to 828-022        [place] Crisis Line  9.294.717.7950 (Press 1)  or text 336989    [facility] Mental Health Crisis Response  Within [place], call **CRISIS [**136829] to be connected to a mental health professional who can assist you.        [facility] Crisis  [phone]      [facility] Mobile Crisis  [phone]      [facility] Crisis  [phone]      [facility] Mobile Crisis  [phone] (adults)  [phone] (children)      [facility] Mobile Crisis  [phone] (adults)  [phone] (children)      [facility] Mobile Crisis  [phone]      [facility] Mobile Crisis  [phone]    Community Resources      Fast Tracker  Linking people to mental health and substance use disorder resources   fastjimckermn.org        [place] Mental Health Warmline  Peer to peer support  5 pm to 9 am 7 days/week  4.012.122.3562  https://mnwitw.org/mindy        National [place] on Mental Illness (DEE)  436.246.2048 or 1.888.DEE.HELPS  https://namimn.org/        [facility] Urgent Care for Adult Mental Health  [facility] residents [place]   [address]  [phone]        [facility]  Free mental health counseling  https://walkin.org/  612.870.0565 X2    Mental Health Apps      Calm Harm  https://calmharm.co.uk/      My3  https://my3app.org/      Micheal Safety Plan  https://www.mysafetyplan.org/   Administrative Billing:   Time spent with patient includes counseling and coordination of care regarding above diagnoses and treatment plan.    Patient Status:  This is a continuous care patient and medications will be prescribed by the psychiatric provider until further indicated.    Signed:   SAUL CarringtonP-BC   Psychiatry

## 2024-02-12 NOTE — PATIENT INSTRUCTIONS
"Patient Education   The Panel Psychiatry Program  What to Expect  Here's what to expect in the Panel Psychiatry Program.   About the program  You'll be meeting with a psychiatric doctor to check your mental health. A psychiatric doctor helps you deal with troubling thoughts and feelings by giving you medicine. They'll make sure you know the plan for your care. You may see them for a long time. When you're feeling better, they may refer you back to seeing your family doctor.   If you have any questions, we'll be glad to talk to you.  About visits  Be open  At your visits, please talk openly about your problems. It may feel hard, but it's the best way for us to help you.  Cancelling visits  If you can't come to your visit, please call us right away at [phone]. If you don't cancel at least 24 hours (1 full day) before your visit, that's \"late cancellation.\"  Not showing up for your visits  Being very late is the same as not showing up. You'll be a \"no show\" if:  You're more than 15 minutes late for a 30-minute (half hour) visit.  You're more than 30 minutes late for a 60-minute (full hour) visit.  If you cancel late or don't show up 2 times within 6 months, we may end your care.  Getting help between visits  If you need help between visits, you can call us Monday to Friday from 8 a.m. to 4:30 p.m. at [phone].  Emergency care  Call 911 or go to the nearest emergency department if your life or someone else's life is in danger.  Call 988 anytime to reach the national Suicide and Crisis hotline.  Medicine refills  To refill your medicine, call your pharmacy. You can also call [facility]'s Behavioral Access at [phone], Monday to Friday, 8 a.m. to 4:30 p.m. It can take 1 to 3 business days to get a refill.   Forms, letters, and tests  You may have papers to fill out, like FMLA, short-term disability, and workability. We can help you with these forms at your visits, but you must have an " appointment. You may need more than 1 visit for this, to be in an intensive therapy program, or both.  Before we can give you medicine for ADHD, we may refer you to get tested for it or confirm it another way.  We may not be able to give you an emotional support animal letter.  We don't do mental health checks ordered by the court.   We don't do mental health testing, but we can refer you to get tested.   Thank you for choosing us for your care.  For informational purposes only. Not to replace the advice of your health care provider. Copyright   2022 [employer]. All rights reserved. [employer] 872217 - 12/22.       Treatment Plan:      1.  Add Wellbutrin  mg daily  2.  Increase lurasidone to 60 mg daily  3.  Restart propranolol 20 mg 2 times daily  4.  Referral made for ADHD testing        Continue all other medical directions per primary care provider.   Continue all other medications as reviewed per electronic medical record today.   Safety plan reviewed. To the Emergency Department as needed or call after hours crisis line at [phone] or [phone]. [place] Crisis Text [place]: Text [place] to 276913  or  Suicide LifeLine Chat: suicidepreventionlifeline.org/chat/  To schedule individual or family therapy, call [facility] at [phone].   Schedule an appointment with me in 8 weeks or sooner as needed.  Call [facility] at [phone] to schedule.  Follow up with primary care provider as planned or for acute medical concerns.  Call the psychiatric nurse line with medication questions or concerns at [phone].  Infrafonehart may be used to communicate with your provider, but this is not intended to be used for emergencies.        Crisis Resources   The EmPath is an adults only unit located at [facility] in [place] is a short term (generally less than 23 hour stay) designed for crisis intervention and stabilization. Pts have the opportunity to meet quickly  with a behavioral health team for evaluation in a calm and peaceful therapuetic environment. To be evaluated for admission pts are triaged throught the [facility] ED.      The following hotlines are for both adults and children. The and are open 24 hours a day, 7 days a week unless noted otherwise.        Crisis Lines      Crisis Text Line  Text 149934  You will be connected with a trained live crisis counselor to provide support.        Gambling Hotline  1.399.475.5606 [hope]        línea de crisis española  [phone]        [facility] [employer] Helpline  [phone]        National Hope Line  2.971.676.9352 [hope]        National Suicide Prevention Lifeline  Free and confidential support  988 or 1.326.504.TALK [8255]  http://suicidepreventionlifeline.org        The Jax Project (LGBTQ Youth Crisis Line)  2.546.327.0204  text START to 021-237        [place] Crisis Line  1.105.912.2589 (Press 1)  or text 848505    [facility] Mental Health Crisis Response  Within [place], call **CRISIS [**116755] to be connected to a mental health professional who can assist you.        [facility] Crisis  [phone]      [facility] Mobile Crisis  [phone]      [facility] Crisis  [phone]      [facility] Mobile Crisis  [phone] (adults)  [phone] (children)      [facility] Mobile Crisis  [phone] (adults)  [phone] (children)      [facility] Mobile Crisis  [phone]      [facility] Mobile Crisis  [phone]    Community Resources      Fast Tracker  Linking people to mental health and substance use disorder resources  fasttrackermn.org        [place] Mental Health Warmline  Peer to peer support  5 pm to 9 am 7 days/week  7.573.641.3166  https://mnwitw.org/mindy        National [place] on Mental Illness (DEE)  566.466.1495 or 1.888.EDE.HELPS  https://namimn.org/        [facility] Urgent Care for Adult Mental Health  [facility]  residents only   [address] [place] [address]  [phone]        [facility]  Free mental health counseling  https://walkin.org/  [phone] X2    Mental Health Apps      Calm Harm  https://calmharm.co.uk/      My3  https://my3app.org/      Micheal Safety Plan  https://www.mysafetyplan.org/

## 2024-04-16 ENCOUNTER — TELEPHONE (OUTPATIENT)
Dept: PSYCHIATRY | Facility: CLINIC | Age: 34
End: 2024-04-16

## 2024-04-16 ENCOUNTER — VIRTUAL VISIT (OUTPATIENT)
Dept: PSYCHIATRY | Facility: CLINIC | Age: 34
End: 2024-04-16
Payer: COMMERCIAL

## 2024-04-16 DIAGNOSIS — F14.10 COCAINE ABUSE (H): ICD-10-CM

## 2024-04-16 DIAGNOSIS — F31.9 BIPOLAR 1 DISORDER (H): Primary | ICD-10-CM

## 2024-04-16 DIAGNOSIS — F10.20 ALCOHOL USE DISORDER, MODERATE, DEPENDENCE (H): ICD-10-CM

## 2024-04-16 DIAGNOSIS — F41.1 GENERALIZED ANXIETY DISORDER: ICD-10-CM

## 2024-04-16 PROCEDURE — 99214 OFFICE O/P EST MOD 30 MIN: CPT | Mod: 95 | Performed by: NURSE PRACTITIONER

## 2024-04-16 RX ORDER — TOPIRAMATE 25 MG/1
25 TABLET, FILM COATED ORAL 2 TIMES DAILY
Qty: 60 TABLET | Refills: 0 | Status: SHIPPED | OUTPATIENT
Start: 2024-04-16 | End: 2024-07-19

## 2024-04-16 RX ORDER — OLANZAPINE 5 MG/1
5 TABLET ORAL AT BEDTIME
Qty: 30 TABLET | Refills: 0 | Status: SHIPPED | OUTPATIENT
Start: 2024-04-16 | End: 2024-07-19

## 2024-04-16 ASSESSMENT — PATIENT HEALTH QUESTIONNAIRE - PHQ9
10. IF YOU CHECKED OFF ANY PROBLEMS, HOW DIFFICULT HAVE THESE PROBLEMS MADE IT FOR YOU TO DO YOUR WORK, TAKE CARE OF THINGS AT HOME, OR GET ALONG WITH OTHER PEOPLE: VERY DIFFICULT
SUM OF ALL RESPONSES TO PHQ QUESTIONS 1-9: 7
SUM OF ALL RESPONSES TO PHQ QUESTIONS 1-9: 7

## 2024-04-16 NOTE — PROGRESS NOTES
"Virtual Visit Details    Type of service:  Video Visit   Video Start Time: 12:52 PM  Video End Time: 1:20 PM    Originating Location (pt. Location): Home    Distant Location (provider location):  Off-site  Platform used for Video Visit: [facility]             Outpatient Psychiatric Progress Note    Name: Rao Leavitt   : 1990                    Primary Care Provider: Suzi Jaime DNP   Therapist: None    PHQ-9 scores:      2021    10:00 AM 10/31/2022     1:41 PM 2023     2:07 PM   PHQ-9 SCORE   PHQ-9 Total Score MyChart  8 (Mild depression) 5 (Mild depression)   PHQ-9 Total Score 7 8 5       AAYUSH-7 scores:      2021    10:00 AM 2022     1:54 PM 10/31/2022     1:42 PM   AAYUSH-7 SCORE   Total Score   13 (moderate anxiety)   Total Score 9 6 13       Patient Identification:    Patient is a 34 year old year old, partnered / significant other  White Not  or  male  who presents for return visit with me.  Patient is currently employed full time. Patient attended the session alone. Patient prefers to be called: \" Philipp\".    Current medications include:   Current Outpatient Medications   Medication Sig Dispense Refill    buPROPion (WELLBUTRIN XL) 150 MG 24 hr tablet Take 1 tablet (150 mg) by mouth daily 30 tablet 3    lurasidone (LATUDA) 60 MG TABS tablet Take 1 tablet (60 mg) by mouth daily with food 30 tablet 3    multivitamin, therapeutic (THERA-VIT) TABS tablet Take 1 tablet by mouth daily      propranolol (INDERAL) 20 MG tablet Take 1 tablet (20 mg) by mouth 2 times daily 60 tablet 3     No current facility-administered medications for this visit.        The [place] Prescription Monitoring Program has been reviewed and there are no concerns about diversionary activity for controlled substances at this time.      I was able to review most recent Primary Care Provider, specialty provider, and therapy visit notes that I have access to.     Today, patient reports that he " completed a rule 25 and he is going to inpatient treatment - [facility] 30-60 day program.  .  Will find out in a couple of days when he will leave.  Lucio admits that he relapsed a year ago.  He started abusing his medications - snorted Wellbutrin - got sick - was doing cocaine.  This past weekend - 8 ball of cocaine.  He is planning to receive FMLA from his job to get treatment.  Took this weekend off from work - [employer] - started to feel down and depressed.  He still feels upset with himself . Partner gave him ultimatum.  She has been sober for three  months.  Philipp explained that he experienced a cocaine feeling when  snorted wellbutrin - last use yesterday - one tab - last one.  His insurance will not cover the latuda - had to switch to cub for his refills and it would cost- 100 dollar copay.  Leslye - supportive of treatment.  No anger outbursts - withdrawing now.  On edge.  Yelling.  No seizures -      has a past medical history of Depressive disorder.    Social history updates:    Philipp lives with his partner.  He has a full-time job but is planning to take time off to get treatment.    Substance use updates:    Alcohol use - 12 pack daily. Last alcohol use Friday.  A lot of anxiety.    Tobacco use: Yes E-cigarettes  Ready to quit?  No  Nicotine Replacement Therapy tried: None    Vital Signs:   There were no vitals taken for this visit.    Labs:    Most recent laboratory results reviewed and no new labs.     Mental Status Examination:  Appearance: awake, alert, casual dress, and mild distress  Attitude: cooperative  Eye Contact:  adequate  Gait and Station: No dizziness or falls  Psychomotor Behavior:  fidgeting and intact station, gait and muscle tone  Oriented to:  time, person, and place  Attention Span and Concentration:  Fair  Speech:   vtspeech: clear, coherent and Speaks: English  Mood:  anxious, depressed, and labile  Affect:  mood congruent  Associations:  no loose associations  Thought Process:   goal oriented  Thought Content:  no evidence of suicidal ideation or homicidal ideation, no auditory hallucinations present, and no visual hallucinations present  Recent and Remote Memory:  intact Not formally assessed. No amnesia.  Fund of Knowledge: appropriate  Insight:  fair  Judgment: fair  Impulse Control:  fair    Suicide Risk Assessment:  Today Rao Leavitt reports no thoughts to harm themself or others. In addition, there are notable risk factors for self-harm, including anxiety, substance abuse, and wrecklessness. However, risk is mitigated by commitment to family, history of seeking help when needed, future oriented, denies suicidal intent or plan, and denies homicidal ideation, intent, or plan. Therefore, based on all available evidence including the factors cited above, Rao Leavitt does not appear to be at imminent risk for self-harm, does not meet criteria for a 72-hr hold, and therefore remains appropriate for ongoing outpatient level of care.  A thorough assessment of risk factors related to suicide and self-harm have been reviewed and are noted above. The patient convincingly denies suicidality on several occasions. Local community safety resources printed and reviewed for patient to use if needed. There was no deceit detected, and the patient presented in a manner that was believable.     DSM5 Diagnosis:  296.40 Bipolar I Disorder Current or Most Recent Episode Hypomanic  300.02 (F41.1) Generalized Anxiety Disorder  Substance-Related & Addictive Disorders Alcohol Use Disorder   303.90 (F10.20) Moderate continued use  Stimulant Use Disorder:  Continued use, Specify current severity:  Moderate  304.20 (F14.20) Cocaine    Medical comorbidities include:   Patient Active Problem List    Diagnosis Date Noted    History of traumatic brain injury 03/02/2021     Priority: Medium    Abnormal EEG 03/02/2021     Priority: Medium      OUTPATIENT SLEEP-DEPRIVED EEG REPORT.  DATE OF RECORDING:  January 28, 2021.   IMPRESSION: This outpatient sleep-deprived EEG study is abnormal during wakefulness and drowsiness due to EEG changes that resemble generalized paroxysmal fast activity, that could be potentially epileptiform and seen in generalized epilepsies.  No electrographic or clinical seizures and no paroxysmal behavioral events are recorded during this study.      Attention-deficit hyperactivity disorder 03/01/2021     Priority: Medium    PTSD (post-traumatic stress disorder) 01/05/2021     Priority: Medium    Borderline personality disorder (H) 01/05/2021     Priority: Medium    Chemical dependency (H) 09/10/2020     Priority: Medium    Lithium use 05/15/2020     Priority: Medium    Lumbar spine tumor 11/27/2019     Priority: Medium    High risk medication use 10/05/2017     Priority: Medium    Bipolar 1 disorder (H) 03/16/2017     Priority: Medium    Generalized anxiety disorder 03/16/2017     Priority: Medium       Assessment:    Rao Leavitt admits to relapsing on cocaine and snorting his Wellbutrin over the past year.  He recently completed a rule 25 assessment and plans to be receiving inpatient MICD treatment soon.  I discontinued the Latuda as it was cost prohibitive.  Wellbutrin was discontinued.  While he is waiting to go inpatient, I added Topamax 25 mg 2 times daily for anxiety and mood regulation, olanzapine 5 mg at bedtime to help him rest and slow down racing thoughts, and he will continue propranolol twice daily for anxiety symptoms.  He verbalized agreement with this plan..    Medication side effects and alternatives were reviewed. Health promotion activities recommended and reviewed today. All questions addressed. Education and counseling completed regarding risks and benefits of medications and psychotherapy options.    Treatment Plan:      1.  Continue propranolol 20 mg 2 times daily  2.  Discontinue lurasidone  3.  Discontinue Wellbutrin  4.  Add topiramate 25 mg 2 times  daily  5.  Add olanzapine 5 mg at bedtime  6.  Pursue addiction treatment program recommendations from your assessment today    Continue all other medications as reviewed per electronic medical record today.   Safety plan reviewed. To the Emergency Department as needed or call after hours crisis line at [phone] or [phone]. [place] Crisis Text Line. Text [place] to 545271 or Suicide LifeLine Chat: suicidepreventionlifeline.org/chat/  To schedule individual or family therapy, call [facility] at [phone]  Schedule an appointment with me in April 25 or sooner as needed. Call [facility] at [phone] to schedule.  Follow up with primary care provider as planned or for acute medical concerns.  Call the psychiatric nurse line with medication questions or concerns at [phone]  MyChart may be used to communicate with your provider, but this is not intended to be used for emergencies.        Crisis Resources   The EmPath is an adults only unit located at [facility] is a short term (generally less than 23 hour stay) designed for crisis intervention and stabilization. Pts have the opportunity to meet quickly with a behavioral health team for evaluation in a calm and peaceful therapuetic environment. To be evaluated for admission pts are triaged throught the [facility] ED.      The following hotlines are for both adults and children. The and are open 24 hours a day, 7 days a week unless noted otherwise.        Crisis Lines      Crisis Text Line  Text 639984  You will be connected with a trained live crisis counselor to provide support.        Gambling Hotline  0.657.610.3762 [hope]        línea de crisis española  529.194.7802        [place] NullPointer Helpline  [phone]        National Hope Line  0.983.335.9855 [hope]        National Suicide Prevention Lifeline  Free and confidential support  988 or 9.610.427.TALK [2355]   http://suicidepreventionlifeline.org        The Jax Project (LGBTQ Youth Crisis Line)  6.040.327.9766  text START to 068-100        's Crisis Line  1.258.777.2271 (Press 1)  or text 768552    [facility] Mental Health Crisis Response  Within [place], call **CRISIS [**953094] to be connected to a mental health professional who can assist you.        [facility] Crisis  [phone]      [facility] Mobile Crisis  [phone]      [facility] Crisis  [phone]      [facility] Mobile Crisis  [phone] (adults)  [phone] (children)      [facility] Mobile Crisis  [phone] (adults)  [phone] (children)      [facility] Mobile Crisis  [phone]      [facility] Mobile Crisis  [phone]    Community Resources      Fast Tracker  Linking people to mental health and substance use disorder resources  fasttrackswiftQue[place].org        [place] Mental Health Warmline  Peer to peer support  5 pm to 9 am 7 days/week  7.538.583.5185  https://mnwitw.org/mindy        National [place] on Mental Illness (DEE)  962.792.1306 or 1.888.DEE.HELPS  https://namimn.org/        [facility]  [facility] residents [address]  [phone]        [facility]  Free mental health counseling  https://walkin.org/  612.870.0565 X2    Mental Health Apps      Calm Harm  https://calmharm.co.uk/      My3  https://my3app.org/      [place] Safety Plan  https://www.mysafetyplan.org/   Administrative Billing:   Time spent with patient includes counseling and coordination of care regarding above diagnoses and treatment plan.    Patient Status:  This is a continuous care patient and medications will be prescribed by the psychiatric provider until further indicated.    Signed:   DUYEN Carrington-BC   Psychiatry       Answers submitted by the patient for this visit:  Patient Health  Questionnaire (Submitted on 4/16/2024)  If you checked off any problems, how difficult have these problems made it for you to do your work, take care of things at home, or get along with other people?: Very difficult  PHQ9 TOTAL SCORE: 7

## 2024-04-16 NOTE — PATIENT INSTRUCTIONS
"Patient Education   The Panel Psychiatry Program  What to Expect  Here's what to expect in the Panel Psychiatry Program.   About the program  You'll be meeting with a psychiatric doctor to check your mental health. A psychiatric doctor helps you deal with troubling thoughts and feelings by giving you medicine. They'll make sure you know the plan for your care. You may see them for a long time. When you're feeling better, they may refer you back to seeing your family doctor.   If you have any questions, we'll be glad to talk to you.  About visits  Be open  At your visits, please talk openly about your problems. It may feel hard, but it's the best way for us to help you.  Cancelling visits  If you can't come to your visit, please call us right away at [phone]. If you don't cancel at least 24 hours (1 full day) before your visit, that's \"late cancellation.\"  Not showing up for your visits  Being very late is the same as not showing up. You'll be a \"no show\" if:  You're more than 15 minutes late for a 30-minute (half hour) visit.  You're more than 30 minutes late for a 60-minute (full hour) visit.  If you cancel late or don't show up 2 times within 6 months, we may end your care.  Getting help between visits  If you need help between visits, you can call us Monday to Friday from 8 a.m. to 4:30 p.m. at [phone].  Emergency care  Call 911 or go to the nearest emergency department if your life or someone else's life is in danger.  Call 988 anytime to reach the national Suicide and Crisis hotline.  Medicine refills  To refill your medicine, call your pharmacy. You can also call [facility]'s Behavioral Access at [phone], Monday to Friday, 8 a.m. to 4:30 p.m. It can take 1 to 3 business days to get a refill.   Forms, letters, and tests  You may have papers to fill out, like FMLA, short-term disability, and workability. We can help you with these forms at your visits, but you must have an " appointment. You may need more than 1 visit for this, to be in an intensive therapy program, or both.  Before we can give you medicine for ADHD, we may refer you to get tested for it or confirm it another way.  We may not be able to give you an emotional support animal letter.  We don't do mental health checks ordered by the court.   We don't do mental health testing, but we can refer you to get tested.   Thank you for choosing us for your care.  For informational purposes only. Not to replace the advice of your health care provider. Copyright   2022 [facility] [employer]. All rights reserved. ComActivity 728967 - 12/22.       Treatment Plan:      1.  Continue propranolol 20 mg 2 times daily  2.  Discontinue lurasidone  3.  Discontinue Wellbutrin  4.  Add topiramate 25 mg 2 times daily  5.  Add olanzapine 5 mg at bedtime  6.  Pursue addiction treatment program recommendations from your assessment today    Continue all other medical directions per primary care provider.   Continue all other medications as reviewed per electronic medical record today.   Safety plan reviewed. To the Emergency Department as needed or call after hours crisis line at [phone] or [phone]. [place] Crisis Text Line: Text [place] to 637153  or  Suicide LifeLine Chat: suicidepreventionlifeline.org/chat/  To schedule individual or family therapy, call [facility] at [phone].   Schedule an appointment with me in April 25 or sooner as needed.  Call [facility] at [phone] to schedule.  Follow up with primary care provider as planned or for acute medical concerns.  Call the psychiatric nurse line with medication questions or concerns at [phone].  Videoplazahart may be used to communicate with your provider, but this is not intended to be used for emergencies.        Crisis Resources   The EmPath is an adults only unit located at [facility] in [place] is a short term (generally less than 23 hour  stay) designed for crisis intervention and stabilization. Pts have the opportunity to meet quickly with a behavioral health team for evaluation in a calm and peaceful therapuetic environment. To be evaluated for admission pts are triaged throught the [facility] ED.      The following hotlines are for both adults and children. The and are open 24 hours a day, 7 days a week unless noted otherwise.        Crisis Lines      Crisis Text Line  Text 266896  You will be connected with a trained live crisis counselor to provide support.        Gambling Hotline  1.725.152.3899 [hope]        línea de crisis española  362.185.5450        [facility] IPLogic Helpline  [phone]        National Hope Line  9.612.159.1208 [hope]        National Suicide Prevention Lifeline  Free and confidential support  988 or 1.338.253.TALK [8255]  http://suicidepreventionlifeline.org        The Jax Project (LGBTQ Youth Crisis Line)  8.860.216.3634  text START to 189-517        [place]'s Crisis Line  0.256.714.7195 (Press 1)  or text 051606    [facility] Mental Health Crisis Response  Within [place], call **CRISIS [**565130] to be connected to a mental health professional who can assist you.        [facility] Crisis  [phone]      [facility] Mobile Crisis  [phone]      [facility] Crisis  [phone]      [facility] Mobile Crisis  [phone] (adults)  [phone] (children)      [facility] Mobile Crisis  [phone] (adults)  [phone] (children)      [facility] Mobile Crisis  [phone]      [facility] Mobile Crisis  [phone]    Community Resources      Fast Tracker  Linking people to mental health and substance use disorder resources  fasttrackermn.org        [place] Mental Health Warmline  Peer to peer support  5 pm to 9 am 7 days/week  3.061.428.9213  https://mnwitw.org/mindy        National [place] on Mental Illness (DEE)  020.147.3343 or  1.888.DEE.HELPS  https://namimn.org/        [facility] for [facility]  [facility] residents [place]   [address] [facility] [address]  [phone]        [facility]  Free mental health counseling  https://walkin.org/  612.870.0565 X2    Mental Health Apps      Calm Harm  https://calmharm.co.uk/      My3  https://my3app.org/      [place] Safety Plan  https://www.mysafetyplan.org/

## 2024-04-16 NOTE — TELEPHONE ENCOUNTER
RN received instructions from Bree Grullon CNP to pleae schedule this patient for a return appointment with her 4/25/24.  In addition she instructed RN to contact his Pharmacy and make sure that his Wellbutrin has been discontinued.       RN notified the OBC's to schedule this patient for 4/25/24 at 2:45 p.m.  The OBC's are contacting Bree Grullon CNP to make clarify if she would like this patient to be in person or virtual.      2.  RN phoned [facility] [address] [place] [address], [place] - [phone] and [facility] [place] [address], [place] - [address] [facility]. [place].  Both of these pharmacies verified that the patient does not have buPROPion (WELLBUTRIN XL) 150 MG 24 hr tablet as a active medication.       3.  RN spoke to the Pharmacist at [id number] - [place], [place] [facility] [address]  She verbalized that the patient has a Wellbutrin prescription ready for .  RN instructed the Pharmacist that this medication has been discontinued.  The pharmacist deactivated the medication and placed the ready for  Wellbutrin back on the shelf.      4.  RN spoke to the Pharmacist at [facility] [address] [place] [address], [place] - [phone].  The pharmacist verified they had received a discontinue order on buPROPion (WELLBUTRIN XL) 150 MG 24 hr tablet.  The Pharmacist verified that this medication is discontinued and no longer active.  IN addition, this Pharmacy transferred all this patients prescriptions to [facility].      Bonifacio Vo RN on 4/16/2024 at 1:30 PM

## 2024-04-16 NOTE — NURSING NOTE
Is the patient currently in the state of [place]? YES    Visit mode:VIDEO    If the visit is dropped, the patient can be reconnected by: VIDEO VISIT: Text to cell phone:   Telephone Information:   Mobile [phone]       Will anyone else be joining the visit? No  (If patient encounters technical issues they should call [phone])    How would you like to obtain your AVS? MyChart    Are changes needed to the allergy or medication list? Yes Pt reported no longer taking medications: buPROPion (WELLBUTRIN XL) 150 MG 24 hr tablet (pt stated because he abuses this medication, he has stop taking it;  and Pt stated no changes to allergies    Are refills needed on medications prescribed by this physician? NO    Rooming Documentation: Assigned questionnaire(s) completed .    Pt stated he can't get a refill for lurasidone (LATUDA) 60 MG TABS tablet because his insurance does not cover it and he would have to pay out of pocket, which is expensive.    Reason for visit: RECHECK     Lesia Navarrete, VVF

## 2024-04-25 ENCOUNTER — VIRTUAL VISIT (OUTPATIENT)
Dept: PSYCHIATRY | Facility: CLINIC | Age: 34
End: 2024-04-25
Payer: COMMERCIAL

## 2024-04-25 DIAGNOSIS — F10.20 ALCOHOL USE DISORDER, MODERATE, DEPENDENCE (H): ICD-10-CM

## 2024-04-25 DIAGNOSIS — F31.9 BIPOLAR 1 DISORDER (H): Primary | ICD-10-CM

## 2024-04-25 DIAGNOSIS — F14.10 COCAINE ABUSE (H): ICD-10-CM

## 2024-04-25 DIAGNOSIS — F41.1 GENERALIZED ANXIETY DISORDER: ICD-10-CM

## 2024-04-25 PROCEDURE — 99213 OFFICE O/P EST LOW 20 MIN: CPT | Mod: 95 | Performed by: NURSE PRACTITIONER

## 2024-04-25 ASSESSMENT — PAIN SCALES - GENERAL: PAINLEVEL: NO PAIN (0)

## 2024-04-25 NOTE — PROGRESS NOTES
"Virtual Visit Details    Type of service:  Video Visit   Video Start Time: 2:43 PM  Video End Time: 3:10 PM    Originating Location (pt. Location): Other place of employment    Distant Location (provider location):  On-site  Platform used for Video Visit: [facility]           Outpatient Psychiatric Progress Note    Name: Rao Leavitt   : 1990                    Primary Care Provider: Suzi Jaime DNP   Therapist: None    PHQ-9 scores:      10/31/2022     1:41 PM 2023     2:07 PM 2024    12:40 PM   PHQ-9 SCORE   PHQ-9 Total Score MyChart 8 (Mild depression) 5 (Mild depression) 7 (Mild depression)   PHQ-9 Total Score 8 5 7       AAYUSH-7 scores:      2021    10:00 AM 2022     1:54 PM 10/31/2022     1:42 PM   AAYUSH-7 SCORE   Total Score   13 (moderate anxiety)   Total Score 9 6 13       Patient Identification:    Patient is a 34 year old year old, partnered / significant other  White Not  or  male  who presents for return visit with me.  Patient is currently employed full time. Patient attended the session alone. Patient prefers to be called: \" Philipp\".    Current medications include:   Current Outpatient Medications   Medication Sig Dispense Refill    multivitamin, therapeutic (THERA-VIT) TABS tablet Take 1 tablet by mouth daily      OLANZapine (ZYPREXA) 5 MG tablet Take 1 tablet (5 mg) by mouth at bedtime 30 tablet 0    propranolol (INDERAL) 20 MG tablet Take 1 tablet (20 mg) by mouth 2 times daily 60 tablet 3    topiramate (TOPAMAX) 25 MG tablet Take 1 tablet (25 mg) by mouth 2 times daily 60 tablet 0     No current facility-administered medications for this visit.        The [place] Prescription Monitoring Program has been reviewed and there are no concerns about diversionary activity for controlled substances at this time.      I was able to review most recent Primary Care Provider, specialty provider, and therapy visit notes that I have access to.     Today, patient " "reports that he will be going into treatment May 2.  [facility] for 45 days.  He has been  going to work.  Sleep all right.  Eating.  He stopped taking the Olanzapine - made him too tired.       has a past medical history of Depressive disorder.    Social history updates:    Lucio is preparing to receive substance use treatment in a facility.    Substance use updates:    \"Sober for 45  days\"  Tobacco use: Yes  vaping   Ready to quit?  No  Nicotine Replacement Therapy tried: none     Vital Signs:   There were no vitals taken for this visit.    Labs:    Most recent laboratory results reviewed and no new labs.     Mental Status Examination:  Appearance: awake, alert and casual dress  Attitude: cooperative  Eye Contact:  adequate  Gait and Station: No dizziness or falls  Psychomotor Behavior:  fidgeting, intact station, gait and muscle tone, and pacing  Oriented to:  time, person, and place  Attention Span and Concentration:  Fair  Speech:   vtspeech: clear, coherent and Speaks: English  Mood:  anxious and labile  Affect:  intensity is heightened  Associations:  no loose associations  Thought Process:  goal oriented  Thought Content:  no evidence of suicidal ideation or homicidal ideation, no auditory hallucinations present, and no visual hallucinations present  Recent and Remote Memory:   Fair  Not formally assessed. No amnesia.  Fund of Knowledge: appropriate  Insight:  fair  Judgment: fair  Impulse Control:  fair    Suicide Risk Assessment:  Today Rao Leavitt reports no thoughts to harm themself or others. In addition, there are notable risk factors for self-harm, including anxiety, substance abuse, wrecklessness, and mood change. However, risk is mitigated by history of seeking help when needed, future oriented, denies suicidal intent or plan, and denies homicidal ideation, intent, or plan. Therefore, based on all available evidence including the factors cited above, Rao Leavitt does " not appear to be at imminent risk for self-harm, does not meet criteria for a 72-hr hold, and therefore remains appropriate for ongoing outpatient level of care.  A thorough assessment of risk factors related to suicide and self-harm have been reviewed and are noted above. The patient convincingly denies suicidality on several occasions. Local community safety resources printed and reviewed for patient to use if needed. There was no deceit detected, and the patient presented in a manner that was believable.     DSM5 Diagnosis:  296.40 Bipolar I Disorder Current or Most Recent Episode Hypomanic  300.02 (F41.1) Generalized Anxiety Disorder  Substance-Related & Addictive Disorders Alcohol Use Disorder   303.90 (F10.20) Moderate In early remission,   Stimulant Use Disorder:  In early remission, , Specify current severity:  Moderate  304.20 (F14.20) Cocaine    Medical comorbidities include:   Patient Active Problem List    Diagnosis Date Noted    History of traumatic brain injury 03/02/2021     Priority: Medium    Abnormal EEG 03/02/2021     Priority: Medium      OUTPATIENT SLEEP-DEPRIVED EEG REPORT.  DATE OF RECORDING: January 28, 2021.   IMPRESSION: This outpatient sleep-deprived EEG study is abnormal during wakefulness and drowsiness due to EEG changes that resemble generalized paroxysmal fast activity, that could be potentially epileptiform and seen in generalized epilepsies.  No electrographic or clinical seizures and no paroxysmal behavioral events are recorded during this study.      Attention-deficit hyperactivity disorder 03/01/2021     Priority: Medium    PTSD (post-traumatic stress disorder) 01/05/2021     Priority: Medium    Borderline personality disorder (H) 01/05/2021     Priority: Medium    Chemical dependency (H) 09/10/2020     Priority: Medium    Lithium use 05/15/2020     Priority: Medium    Lumbar spine tumor 11/27/2019     Priority: Medium    High risk medication use 10/05/2017     Priority: Medium     Bipolar 1 disorder (H) 03/16/2017     Priority: Medium    Generalized anxiety disorder 03/16/2017     Priority: Medium       Assessment:    Rao MAGALIS Isaiahmeleestrellalauren is preparing to go to a treatment center for 45 days to work on sobriety from alcohol and cocaine.  He tells me he has not used the last time we visited.  He stopped taking the olanzapine because he said it was making him too tired.  I encouraged him to try to take that for mood stabilization.  He will continue propranolol for anxiety and topiramate to help with cravings and mood regulation.    Medication side effects and alternatives were reviewed. Health promotion activities recommended and reviewed today. All questions addressed. Education and counseling completed regarding risks and benefits of medications and psychotherapy options.    Treatment Plan:      1.  Continue olanzapine 5 mg at bedtime  2.  Continue propranolol 20 mg 2 times daily  3.  Continue topiramate 25 mg 2 times daily  4.  Attend substance use treatment as discussed    Continue all other medications as reviewed per electronic medical record today.   Safety plan reviewed. To the Emergency Department as needed or call after hours crisis line at [phone] or [phone]. [place] Crisis Text [place]. Text [place] to 963871 or Suicide LifeLine Chat: suicidepreventionlifeline.org/chat/  To schedule individual or family therapy, call [facility] at [phone]  Schedule an appointment with me in 3 months or sooner as needed. Call [facility] at [phone] to schedule.  Follow up with primary care provider as planned or for acute medical concerns.  Call the psychiatric nurse line with medication questions or concerns at [phone]  MyChart may be used to communicate with your provider, but this is not intended to be used for emergencies.        Crisis Resources   The EmPath is an adults only unit located at [facility] is a short term  (generally less than 23 hour stay) designed for crisis intervention and stabilization. Pts have the opportunity to meet quickly with a behavioral health team for evaluation in a calm and peaceful therapuetic environment. To be evaluated for admission pts are triaged throught the [facility] ED.      The following hotlines are for both adults and children. The and are open 24 hours a day, 7 days a week unless noted otherwise.        Crisis Lines      Crisis Text Line  Text 389237  You will be connected with a trained live crisis counselor to provide support.        Gambling Hotline  1.320.881.7114 [hope]        línea de crisis española  921.943.1167        [facility] ReviewZAP Helpline  007.339.2344        National Hope Line  4.377.716.6355 [hope]        National Suicide Prevention Lifeline  Free and confidential support  988 or 1.853.592.TALK [8255]  http://suicidepreventionlifeline.org        The Jax Project (LGBTQ Youth Crisis Line)  8.505.967.7980  text START to 786-422        's Crisis Line  9.926.617.8979 (Press 1)  or text 595761    [facility] Mental Health Crisis Response  Within [place], call **CRISIS [**219638] to be connected to a mental health professional who can assist you.        [facility] Crisis  [phone]      [facility] Mobile Crisis  [phone]      [facility] Crisis  [phone]      [facility] Mobile Crisis  [phone] (adults)  [phone] (children)      [facility] Mobile Crisis  [phone] (adults)  [phone] (children)      [facility] Mobile Crisis  [phone]      [facility] Mobile Crisis  [phone]    Community Resources      Fast Tracker  Linking people to mental health and substance use disorder resources  fasttrackHexaformern.org        [place] Mental Health Warmline  Peer to peer support  5 pm to 9 am 7 days/week  9.357.647.6087  https://mnwitw.org/mindy        National [place] on Mental Illness (DEE)   143.339.6535 or 1.888.DEE.HELPS  https://namimn.org/        [facility] Urgent Care for Adult Mental Health  [facility] residents only   [address]  [phone]        [facility]  Free mental health counseling  https://walkin.org/  612.870.0565 X2    Mental Health Apps      Calm Harm  https://[employer].co.uk/      My3  https://my3app.org/      Micheal Safety Plan  https://www.Diaferonafetyplan.org/   Administrative Billing:   Time spent with patient includes counseling and coordination of care regarding above diagnoses and treatment plan.    Patient Status:  This is a continuous care patient and medications will be prescribed by the psychiatric provider until further indicated.    Signed:   DUYEN Carrington-BC   Psychiatry

## 2024-04-25 NOTE — NURSING NOTE
Is the patient currently in the state of [place]? YES    Visit mode:VIDEO    If the visit is dropped, the patient can be reconnected by: VIDEO VISIT: Text to cell phone:   Telephone Information:   Mobile [phone]       Will anyone else be joining the visit? NO  (If patient encounters technical issues they should call [phone])    How would you like to obtain your AVS? MyChart    Are changes needed to the allergy or medication list? No    Are refills needed on medications prescribed by this physician? NO    Reason for visit: RECHECK    Anamaria TANNER

## 2024-04-29 NOTE — PATIENT INSTRUCTIONS
"Patient Education   The Panel Psychiatry Program  What to Expect  Here's what to expect in the Panel Psychiatry Program.   About the program  You'll be meeting with a psychiatric doctor to check your mental health. A psychiatric doctor helps you deal with troubling thoughts and feelings by giving you medicine. They'll make sure you know the plan for your care. You may see them for a long time. When you're feeling better, they may refer you back to seeing your family doctor.   If you have any questions, we'll be glad to talk to you.  About visits  Be open  At your visits, please talk openly about your problems. It may feel hard, but it's the best way for us to help you.  Cancelling visits  If you can't come to your visit, please call us right away at [phone]. If you don't cancel at least 24 hours (1 full day) before your visit, that's \"late cancellation.\"  Not showing up for your visits  Being very late is the same as not showing up. You'll be a \"no show\" if:  You're more than 15 minutes late for a 30-minute (half hour) visit.  You're more than 30 minutes late for a 60-minute (full hour) visit.  If you cancel late or don't show up 2 times within 6 months, we may end your care.  Getting help between visits  If you need help between visits, you can call us Monday to Friday from 8 a.m. to 4:30 p.m. at [phone].  Emergency care  Call 911 or go to the nearest emergency department if your life or someone else's life is in danger.  Call 988 anytime to reach the national Suicide and Crisis hotline.  Medicine refills  To refill your medicine, call your pharmacy. You can also call [facility]'s Behavioral Access at [phone], Monday to Friday, 8 a.m. to 4:30 p.m. It can take 1 to 3 business days to get a refill.   Forms, letters, and tests  You may have papers to fill out, like FMLA, short-term disability, and workability. We can help you with these forms at your visits, but you must have an " appointment. You may need more than 1 visit for this, to be in an intensive therapy program, or both.  Before we can give you medicine for ADHD, we may refer you to get tested for it or confirm it another way.  We may not be able to give you an emotional support animal letter.  We don't do mental health checks ordered by the court.   We don't do mental health testing, but we can refer you to get tested.   Thank you for choosing us for your care.  For informational purposes only. Not to replace the advice of your health care provider. Copyright   2022 [employer]. All rights reserved. JDLab 338916 - 12/22.       Treatment Plan:      1.  Continue olanzapine 5 mg at bedtime  2.  Continue propranolol 20 mg 2 times daily  3.  Continue topiramate 25 mg 2 times daily  4.  Attend substance use treatment as discussed    Continue all other medical directions per primary care provider.   Continue all other medications as reviewed per electronic medical record today.   Safety plan reviewed. To the Emergency Department as needed or call after hours crisis line at [phone] or [phone]. [place] Crisis Text Line: Text [place] to 811250  or  Suicide LifeLine Chat: suicidepreventionlifeline.org/chat/  To schedule individual or family therapy, call [facility] at [phone].   Schedule an appointment with me in 3 months or sooner as needed.  Call [facility] at [phone] to schedule.  Follow up with primary care provider as planned or for acute medical concerns.  Call the psychiatric nurse line with medication questions or concerns at [phone].  [employer] may be used to communicate with your provider, but this is not intended to be used for emergencies.        Crisis Resources   The EmPath is an adults only unit located at [facility] in [place] is a short term (generally less than 23 hour stay) designed for crisis intervention and stabilization. Pts have the  opportunity to meet quickly with a behavioral health team for evaluation in a calm and peaceful therapuetic environment. To be evaluated for admission pts are triaged throught the [facility] ED.      The following hotlines are for both adults and children. The and are open 24 hours a day, 7 days a week unless noted otherwise.        Crisis Lines      Crisis Text Line  Text 751979  You will be connected with a trained live crisis counselor to provide support.        Gambling Hotline  3.741.824.9924 [hope]        línea de crisis española  [phone]        [facility] [employer] Helpline  [phone]        National Hope Line  7.965.154.6539 [hope]        National Suicide Prevention Lifeline  Free and confidential support  988 or 1.573.909.TALK [8255]  http://suicidepreventionlifeline.org        The Jax Project (LGBTQ Youth Crisis Line)  3.336.726.7258  text START to 419-675        's Crisis Line  2.509.072.9026 (Press 1)  or text 766372    [facility] Mental Health Crisis Response  Within [place], call **CRISIS [**480578] to be connected to a mental health professional who can assist you.        [facility] Crisis  [phone]      [facility] Mobile Crisis  [phone]      [facility] Crisis  [phone]      [facility] Mobile Crisis  [phone] (adults)  [phone] (children)      [facility] Mobile Crisis  [phone] (adults)  [phone] (children)      [facility] Mobile Crisis  [phone]      [facility] Mobile Crisis  [phone]    Community Resources      Fast Tracker  Linking people to mental health and substance use disorder resources  fasttrackermn.org        [place] Mental Health Warmline  Peer to peer support  5 pm to 9 am 7 days/week  9.562.720.6738  https://mnwitw.org/mindy        National [place] on Mental Illness (DEE)  723.374.3712 or 1.888.DEE.HELPS  https://namimn.org/        [facility] for Adult  Mental Health  [facility] only   [address]  [phone]        [facility]  Free mental health counseling  https://walkin.org/  [phone] X2    Mental Health Apps      Calm Harm  https://calmharm.co.uk/      My3  https://my3app.org/      Micheal Safety Plan  https://www.mysafetyplan.org/

## 2024-05-31 ENCOUNTER — VIRTUAL VISIT (OUTPATIENT)
Dept: FAMILY MEDICINE | Facility: CLINIC | Age: 34
End: 2024-05-31
Payer: COMMERCIAL

## 2024-05-31 ENCOUNTER — TELEPHONE (OUTPATIENT)
Dept: FAMILY MEDICINE | Facility: CLINIC | Age: 34
End: 2024-05-31

## 2024-05-31 DIAGNOSIS — F19.20 CHEMICAL DEPENDENCY (H): Primary | Chronic | ICD-10-CM

## 2024-05-31 DIAGNOSIS — F31.9 BIPOLAR 1 DISORDER (H): Chronic | ICD-10-CM

## 2024-05-31 PROCEDURE — 99213 OFFICE O/P EST LOW 20 MIN: CPT | Mod: 95 | Performed by: NURSE PRACTITIONER

## 2024-05-31 NOTE — TELEPHONE ENCOUNTER
Fax would not go through.  Called pt and he will pick letter up.  Letter left up front for .    Shu Caal on 5/31/2024 at 3:03 PM

## 2024-05-31 NOTE — TELEPHONE ENCOUNTER
Faxed workability note to [phone] as provided by patient.  Left message for patient to let us know if he needs original.        Shu Caal on 5/31/2024 at 2:34 PM

## 2024-05-31 NOTE — PROGRESS NOTES
Rao is a 34 year old who is being evaluated via a billable video visit.    How would you like to obtain your AVS? MyChart  If the video visit is dropped, the invitation should be resent by: Text to cell phone: [phone]  Will anyone else be joining your video visit? No      Assessment & Plan     Chemical dependency (H)       Bipolar 1 disorder (H)    Just completed 28 day treatment CD program. Will be attending outpatient treatment and returning to work. Needs letter for work.    Plans to follow up with Psychiatry for ongoing management.    Denies SI today. Doing well. Mental health stable on current medications.             See Patient Instructions    Subjective   Rao is a 34 year old, presenting for the following health issues:  No chief complaint on file.      5/31/2024     9:34 AM   Additional Questions   Roomed by Hanna JUAREZ MA     Video Start Time: 9:54 AM    HPI     *Patient following up-just got out of treatment for drugs and alcohol yesterday.   Did get a note already for work-he is cleared to return to work.  Completed 28 day treatment drug and alcohol usage.  Doing well per patient.    Would also like to discuss seeing Bree in psychiatry again for his medications.   While inpatient medications were managed and refilled - no changes were made.    PMH Bipolar 1 - reports stable with medications.      Review of Systems  Constitutional, neuro, ENT, endocrine, pulmonary, cardiac, gastrointestinal, genitourinary, musculoskeletal, integument and psychiatric systems are negative, except as otherwise noted.      Objective           Vitals:  No vitals were obtained today due to virtual visit.    Physical Exam   GENERAL: alert and no distress  EYES: Eyes grossly normal to inspection.  No discharge or erythema, or obvious scleral/conjunctival abnormalities.  RESP: No audible wheeze, cough, or visible cyanosis.    SKIN: Visible skin clear. No significant rash, abnormal pigmentation or lesions.  NEURO: Cranial  nerves grossly intact.  Mentation and speech appropriate for age.  PSYCH: Appropriate affect, tone, and pace of words           Video-Visit Details    Type of service:  Video Visit   Video End Time:10:01 AM  Originating Location (pt. Location): Home    Distant Location (provider location):  On-site  Platform used for Video Visit: Osiris  Signed Electronically by: Suzi Jaime DNP

## 2024-05-31 NOTE — LETTER
May 31, 2024      Rao Leavitt  [address]   [place] [address]        To Whom It May Concern:    Rao Leavitt was seen in our clinic. He may return to work with the following: no restrictions on or about 5/31/2024        Sincerely,        Suzi Jaime, DNP

## 2024-06-10 ENCOUNTER — TELEPHONE (OUTPATIENT)
Dept: PSYCHIATRY | Facility: CLINIC | Age: 34
End: 2024-06-10
Payer: COMMERCIAL

## 2024-06-10 DIAGNOSIS — F31.9 BIPOLAR 1 DISORDER (H): Primary | ICD-10-CM

## 2024-06-10 RX ORDER — LURASIDONE HYDROCHLORIDE 40 MG/1
40 TABLET, FILM COATED ORAL
Qty: 30 TABLET | Refills: 0 | Status: SHIPPED | OUTPATIENT
Start: 2024-06-10 | End: 2024-07-19

## 2024-06-10 NOTE — TELEPHONE ENCOUNTER
Reason for call:  Medication     If this is a refill request, has the caller requested the refill from the pharmacy already? No    Will the patient be using a [facility]? No    Name of the pharmacy and phone number for the current request: [id number] - [place], [place] - [address]   [phone]     Name of the medication requested: Latada 40mg    Other request: Pt states they were prescribed this medication by an external provider and need a refill. Follow-up appointemnt is on the calendar. Pt sates they have two days remaining.     Phone number to reach patient:  Home number on file [phone] (home)    Best Time:  ASAP    Can we leave a detailed message on this number?  YES    Travel screening: Not Applicable

## 2024-06-10 NOTE — TELEPHONE ENCOUNTER
"RN received notification from the [facility] coordinators that this patient had called requesting a refill of his lurasidone (LATUDA) 60 MG TABS tablet.       RN reviewed the EHR and discovered this medication was discontinued by Bree Grullon CNP back in April.  Please see the prescription below.      Pt stated he can't get a refill for lurasidone (LATUDA) 60 MG TABS tablet because his insurance does not cover it and he would have to pay out of pocket, which is expensive.   Treatment Plan:   1.  Continue propranolol 20 mg 2 times daily  2.  Discontinue lurasidone      3.  RN attempted to phone this patient.  There was no answer. RN LVM for the patient to return a call to the clinic.      4.  The patient returned a call to the clinic and spoke to RN  He reported the following:    He stated, \"I have been on lurasidone (LATUDA) 40  MG TABS tablet for 1-2 months and have not missed any doses.  I was in CD Treatment and the Psychiatrist has prescribed this the whole time I was in Treatment.  It is the only medication that helps my Bipolar Disorder.  I need this medication and have been taking it regularly for greater than 1-2 months never missing a dose.  The insurance issue was for 60 mg dose.  My insurance is fine with the 40 mg dose and this is what I have been taking every day.  I have only 2 tablets left and need the lurasidone (LATUDA) 40 MG TABS tablet renewed ASAP to the [facility] in [facility].      5.  RN instructed the patient that Bree Yadi EPSTEIN is out of clinic today but has providers covering for her in her absence.  RN instructed the patient to please wait for their reply.      6.  RN submitted a prescription for lurasidone (LATUDA) 40 MG TABS tablet to the Psychiatry Provider [facility] for their review.          "

## 2024-06-10 NOTE — TELEPHONE ENCOUNTER
RN received notification from Dr. Norton that her had refilled this patients lurasidone (LATUDA) 40 MG TABS tablet.      RN phoned the patient who was very appreciative and thanked Dr. Norton and clinic staff.  He had no additional questions or concerns.      Bonifacio Vo RN on 6/10/2024 at 3:34 PM

## 2024-07-03 ENCOUNTER — PATIENT OUTREACH (OUTPATIENT)
Dept: FAMILY MEDICINE | Facility: CLINIC | Age: 34
End: 2024-07-03

## 2024-07-03 ENCOUNTER — HOSPITAL ENCOUNTER (EMERGENCY)
Facility: CLINIC | Age: 34
Discharge: HOME | End: 2024-07-03
Attending: EMERGENCY MEDICINE | Admitting: EMERGENCY MEDICINE
Payer: COMMERCIAL

## 2024-07-03 VITALS
BODY MASS INDEX: 25.27 KG/M2 | SYSTOLIC BLOOD PRESSURE: 133 MMHG | HEART RATE: 79 BPM | RESPIRATION RATE: 16 BRPM | WEIGHT: 161 LBS | DIASTOLIC BLOOD PRESSURE: 78 MMHG | TEMPERATURE: 97.7 F | OXYGEN SATURATION: 98 % | HEIGHT: 67 IN

## 2024-07-03 DIAGNOSIS — J02.9 VIRAL PHARYNGITIS: ICD-10-CM

## 2024-07-03 LAB
FLUAV RNA SPEC QL NAA+PROBE: NEGATIVE
FLUBV RNA RESP QL NAA+PROBE: NEGATIVE
GROUP A STREP BY PCR: NOT DETECTED
RSV RNA SPEC NAA+PROBE: NEGATIVE
SARS-COV-2 RNA RESP QL NAA+PROBE: NEGATIVE

## 2024-07-03 PROCEDURE — 87637 SARSCOV2&INF A&B&RSV AMP PRB: CPT | Performed by: EMERGENCY MEDICINE

## 2024-07-03 PROCEDURE — 99283 EMERGENCY DEPT VISIT LOW MDM: CPT | Performed by: EMERGENCY MEDICINE

## 2024-07-03 PROCEDURE — 250N000013 HC RX MED GY IP 250 OP 250 PS 637: Performed by: EMERGENCY MEDICINE

## 2024-07-03 PROCEDURE — 87651 STREP A DNA AMP PROBE: CPT | Performed by: EMERGENCY MEDICINE

## 2024-07-03 RX ORDER — IBUPROFEN 600 MG/1
600 TABLET, FILM COATED ORAL ONCE
Status: COMPLETED | OUTPATIENT
Start: 2024-07-03 | End: 2024-07-03

## 2024-07-03 RX ADMIN — IBUPROFEN 600 MG: 600 TABLET ORAL at 03:36

## 2024-07-03 ASSESSMENT — COLUMBIA-SUICIDE SEVERITY RATING SCALE - C-SSRS
2. HAVE YOU ACTUALLY HAD ANY THOUGHTS OF KILLING YOURSELF IN THE PAST MONTH?: NO
6. HAVE YOU EVER DONE ANYTHING, STARTED TO DO ANYTHING, OR PREPARED TO DO ANYTHING TO END YOUR LIFE?: NO
1. IN THE PAST MONTH, HAVE YOU WISHED YOU WERE DEAD OR WISHED YOU COULD GO TO SLEEP AND NOT WAKE UP?: NO

## 2024-07-03 ASSESSMENT — ACTIVITIES OF DAILY LIVING (ADL): ADLS_ACUITY_SCORE: 35

## 2024-07-03 NOTE — DISCHARGE INSTRUCTIONS
The tests today are reassuring.  Drink plenty of fluids.  Use acetaminophen and ibuprofen as needed for symptoms.  I believe you will likely get better over the next several days.  This is likely a viral infection causing your symptoms.  Return if you are feeling worse.  Get rechecked if you are not feeling better over the next week.

## 2024-07-03 NOTE — ED PROVIDER NOTES
History     Chief Complaint   Patient presents with    Pharyngitis     HPI  Rao Leavitt is a 34 year old male who presents for sore throat, cough, body aches.  The patient says that symptoms started yesterday but he woke up shortly prior to his arrival here feeling worse with more pain in his throat.  No nausea or vomiting.  No diarrhea.  He took some acetaminophen last evening prior to going to bed.    Allergies:  Allergies   Allergen Reactions    Tramadol Nausea    Vicodin [Hydrocodone-Acetaminophen] Rash       Problem List:    Patient Active Problem List    Diagnosis Date Noted    History of traumatic brain injury 03/02/2021     Priority: Medium    Abnormal EEG 03/02/2021     Priority: Medium      OUTPATIENT SLEEP-DEPRIVED EEG REPORT.  DATE OF RECORDING: January 28, 2021.   IMPRESSION: This outpatient sleep-deprived EEG study is abnormal during wakefulness and drowsiness due to EEG changes that resemble generalized paroxysmal fast activity, that could be potentially epileptiform and seen in generalized epilepsies.  No electrographic or clinical seizures and no paroxysmal behavioral events are recorded during this study.      Attention-deficit hyperactivity disorder 03/01/2021     Priority: Medium    PTSD (post-traumatic stress disorder) 01/05/2021     Priority: Medium    Borderline personality disorder (H) 01/05/2021     Priority: Medium    Chemical dependency (H) 09/10/2020     Priority: Medium    Lithium use 05/15/2020     Priority: Medium    Lumbar spine tumor 11/27/2019     Priority: Medium    High risk medication use 10/05/2017     Priority: Medium    Bipolar 1 disorder (H) 03/16/2017     Priority: Medium    Generalized anxiety disorder 03/16/2017     Priority: Medium        Past Medical History:    Past Medical History:   Diagnosis Date    Depressive disorder        Past Surgical History:    Past Surgical History:   Procedure Laterality Date    ESOPHAGOSCOPY, GASTROSCOPY, DUODENOSCOPY (EGD),  "COMBINED N/A 4/16/2019    Procedure: COMBINED ESOPHAGOSCOPY, GASTROSCOPY, DUODENOSCOPY (EGD), BIOPSY SINGLE OR MULTIPLE;  Surgeon: Kj Thomas MD;  Location: [facility]    PE tube         Family History:    Family History   Problem Relation Age of Onset    Migraines Mother     Post-Traumatic Stress Disorder (PTSD) Father     Bipolar Disorder Paternal Grandfather     Bipolar Disorder Sister        Social History:  Marital Status:  Single [1]  Social History     Tobacco Use    Smoking status: Former     Current packs/day: 0.00     Average packs/day: 0.5 packs/day for 18.0 years (9.0 ttl pk-yrs)     Types: Cigarettes     Start date: 1/26/2003     Quit date: 1/26/2021     Years since quitting: 3.4    Smokeless tobacco: Former     Types: Snuff     Quit date: 6/5/2021   Vaping Use    Vaping status: Never Used   Substance Use Topics    Alcohol use: Not Currently     Comment: 6 months alcohol free    Drug use: No     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     Appearance: He is well-developed.   HENT:      Head: Normocephalic and atraumatic.      Mouth/Throat:      Pharynx: No pharyngeal swelling, oropharyngeal exudate, posterior oropharyngeal erythema or uvula swelling.      Tonsils: No tonsillar exudate or tonsillar abscesses.   Cardiovascular:      Rate and Rhythm: Normal rate.      Heart sounds: No murmur heard.  Pulmonary:      Effort: Pulmonary effort is normal. No respiratory distress.      Breath sounds: No stridor.   Skin:     " General: Skin is warm and dry.   Neurological:      Mental Status: He is alert.         ED Course        Procedures              Critical Care time:  none               Results for orders placed or performed during the hospital encounter of 07/03/24 (from the past 24 hour(s))   Symptomatic Influenza A/B, RSV, & SARS-CoV2 PCR (COVID-19) Nose    Specimen: Nose; Swab   Result Value Ref Range    Influenza A PCR Negative Negative    Influenza B PCR Negative Negative    RSV PCR Negative Negative    SARS CoV2 PCR Negative Negative    Narrative    Testing was performed using the Xpert Xpress CoV2/Flu/RSV Assay on the [employer] Instrument. This test should be ordered for the detection of SARS-CoV-2, influenza, and RSV viruses in individuals who meet clinical and/or epidemiological criteria. Test performance is unknown in asymptomatic patients. This test is for in vitro diagnostic use under the FDA EUA for laboratories certified under CLIA to perform high or moderate complexity testing. This test has not been FDA cleared or approved. A negative result does not rule out the presence of PCR inhibitors in the specimen or target RNA in concentration below the limit of detection for the assay. If only one viral target is positive but coinfection with multiple targets is suspected, the sample should be re-tested with another FDA cleared, approved, or authorized test, if coinfection would change clinical management. This test was validated by the [facility]. These laboratories are certified under the Clinical Laboratory Improvement Amendments of 1988 (CLIA-88) as qualified to perform high complexity laboratory testing.   Group A Streptococcus PCR Throat Swab    Specimen: Throat; Swab   Result Value Ref Range    Group A strep by PCR Not Detected Not Detected    Narrative    The Xpert Xpress Strep A test, performed on the [employer] Systems, is a rapid, qualitative in vitro diagnostic test for  the detection of Streptococcus pyogenes (Group A ß-hemolytic Streptococcus, Strep A) in throat swab specimens from patients with signs and symptoms of pharyngitis. The Xpert Xpress Strep A test can be used as an aid in the diagnosis of Group A Streptococcal pharyngitis. The assay is not intended to monitor treatment for Group A Streptococcus infections. The Xpert Xpress Strep A test utilizes an automated real-time polymerase chain reaction (PCR) to detect Streptococcus pyogenes DNA.       Medications   ibuprofen (ADVIL/MOTRIN) tablet 600 mg (600 mg Oral $Given 7/3/24 1025)       Assessments & Plan (with Medical Decision Making)   34-year-old male presents for sore throat, cough, body aches.  He is nontoxic in appearance with no reassuring vital signs.  No respiratory distress.  Lungs are clear to auscultation throughout, no signs of pneumonia.  No signs of retropharyngeal abscess on exam.  Throat swab negative for group A streptococcal pharyngitis.  Throat swab is negative for COVID-19, influenza, or RSV.  Likely viral URI as a cause of his symptoms.  He is safe to discharge home with instructions to drink plenty fluids, use acetaminophen and ibuprofen as needed, return if worse, otherwise follow-up in clinic.  The patient is in agreement with this plan.    I have reviewed the nursing notes.    I have reviewed the findings, diagnosis, plan and need for follow up with the patient.         New Prescriptions    No medications on file       Final diagnoses:   Viral pharyngitis       7/3/2024   [facility] EMERGENCY DEPT       Vidal Tong MD  07/03/24 2960

## 2024-07-03 NOTE — TELEPHONE ENCOUNTER
Transitions of Care Outreach  Chief Complaint   Patient presents with    Hospital F/U     tcm       Most Recent Admission Date: 7/3/2024   Most Recent Admission Diagnosis:      Most Recent Discharge Date: 7/3/2024   Most Recent Discharge Diagnosis: Viral pharyngitis - J02.9     Transitions of Care Assessment    Discharge Assessment  How are you doing now that you are home?: doing ok about the same  How are your symptoms? (Red Flag symptoms escalate to triage hotline per guidelines): Unchanged  Do you know how to contact your clinic care team if you have future questions or changes to your health status? : Yes  Does the patient have their discharge instructions? : Yes  Does the patient have questions regarding their discharge instructions? : No  Were you started on any new medications or were there changes to any of your previous medications? : Yes  Does the patient have all of their medications?: Yes  Do you have questions regarding any of your medications? : No  Do you have all of your needed medical supplies or equipment (DME)?  (i.e. oxygen tank, CPAP, cane, etc.): No - What equipment or supplies are needed?    Follow up Plan     Discharge Follow-Up  Discharge follow up appointment scheduled in alignment with recommended follow up timeframe or Transitions of Risk Category? (Low = within 30 days; Moderate= within 14 days; High= within 7 days): No  Patient's follow up appointment not scheduled: Patient declined scheduling support. Education on the importance of transitions of care follow up. Provided scheduling phone number.    Future Appointments   Date Time Provider Department Center   7/19/2024 10:45 AM Bree Grullon NP [facility]       Outpatient Plan as outlined on AVS reviewed with patient.    For any urgent concerns, please contact our 24 hour nurse triage line: [phone] ([phone])       Deborah Smith RN

## 2024-07-03 NOTE — ED TRIAGE NOTES
Pt presents with sore throat that started yesterday. Pt last took tylenol at 2300 last evening.     Triage Assessment (Adult)       Row Name 07/03/24 0324          Triage Assessment    Airway WDL WDL        Respiratory WDL    Respiratory WDL WDL        Skin Circulation/Temperature WDL    Skin Circulation/Temperature WDL WDL        Cardiac WDL    Cardiac WDL WDL        Peripheral/Neurovascular WDL    Peripheral Neurovascular WDL WDL        Cognitive/Neuro/Behavioral WDL    Cognitive/Neuro/Behavioral WDL WDL

## 2024-07-13 ENCOUNTER — HEALTH MAINTENANCE LETTER (OUTPATIENT)
Age: 34
End: 2024-07-13

## 2024-07-18 DIAGNOSIS — F31.9 BIPOLAR 1 DISORDER (H): ICD-10-CM

## 2024-07-18 RX ORDER — LURASIDONE HYDROCHLORIDE 40 MG/1
40 TABLET, FILM COATED ORAL
Qty: 30 TABLET | Refills: 0 | Status: CANCELLED | OUTPATIENT
Start: 2024-07-18

## 2024-07-18 NOTE — TELEPHONE ENCOUNTER
Reviewed telephone encounter from 6/10/24. The patient had been taking Lurasidone for a few months as prescribed by the MD at the treatment center he attended.     Date of Last Office Visit: 4/25/24  Date of Next Office Visit:  7/19/24  No shows since last visit: No  More than one patient-initiated cancellation (with reschedule) since last seen in clinic? No    []Medication refilled per  Medication Refill in Ambulatory Care  policy.  [x]Medication unable to be refilled by RN due to criteria not met as indicated below:    []Eligibility: has not had a provider visit within last 6 months   []Supervision: no future appointment; < 7 days before next appointment   [x]Compliance: no shows; cancellations; lapse in therapy   []Verification: order discrepancy; may need modification...   []90-day supply request   []Advanced refill request: > 7 days before refill date   []Controlled medication   []Medication not included in policy   []Review: new med; med adjusted ? 30 days; safety alert; requires lab monitoring...   []Scope of Practice: refill request processed by LPN/MA   []Other:      Medication(s) requested:     -  lurasidone (LATUDA) 40 MG TABS tablet   Date last ordered: 6/10/24  Qty: 30  Refills: 0       Any Controlled Substance(s)? No      Requested medication(s) verified as identical to current order? Yes    Any lapse in adherence to medication(s) greater than 5 days? Yes     Additional action taken? no.      Last visit treatment plan:       Treatment Plan:        1.  Continue olanzapine 5 mg at bedtime  2.  Continue propranolol 20 mg 2 times daily  3.  Continue topiramate 25 mg 2 times daily  4.  Attend substance use treatment as discussed     Continue all other medications as reviewed per electronic medical record today.   Safety plan reviewed. To the Emergency Department as needed or call after hours crisis line at [phone] or [phone]. [place] Crisis Text Line. Text [place] to 376262 or Suicide LifeLine Chat:  "suicidepreventionlifeline.org/chat/  To schedule individual or family therapy, call [facility] at [phone]  Schedule an appointment with me in 3 months or sooner as needed.    Any medication(s) require lab monitoring? Yes   GENERAL ANTIPSYCHOTIC   Last Hemoglobin A1c: No results found for: \"A1C\"  Last Lipid Panel (fasting): No results found for: \"CHOL\", \"TRIG\", \"HDL\", \"LDL\", \"NHDL\", \"FASTING\"               "

## 2024-07-19 ENCOUNTER — VIRTUAL VISIT (OUTPATIENT)
Dept: PSYCHIATRY | Facility: CLINIC | Age: 34
End: 2024-07-19
Payer: COMMERCIAL

## 2024-07-19 DIAGNOSIS — F41.1 GENERALIZED ANXIETY DISORDER: ICD-10-CM

## 2024-07-19 DIAGNOSIS — F31.9 BIPOLAR 1 DISORDER (H): Primary | ICD-10-CM

## 2024-07-19 PROCEDURE — G2211 COMPLEX E/M VISIT ADD ON: HCPCS | Mod: 95 | Performed by: NURSE PRACTITIONER

## 2024-07-19 PROCEDURE — 99214 OFFICE O/P EST MOD 30 MIN: CPT | Mod: 95 | Performed by: NURSE PRACTITIONER

## 2024-07-19 RX ORDER — LURASIDONE HYDROCHLORIDE 40 MG/1
40 TABLET, FILM COATED ORAL
Qty: 30 TABLET | Refills: 5 | Status: SHIPPED | OUTPATIENT
Start: 2024-07-19

## 2024-07-19 RX ORDER — PROPRANOLOL HYDROCHLORIDE 20 MG/1
20 TABLET ORAL 2 TIMES DAILY
Qty: 60 TABLET | Refills: 5 | Status: SHIPPED | OUTPATIENT
Start: 2024-07-19

## 2024-07-19 ASSESSMENT — PATIENT HEALTH QUESTIONNAIRE - PHQ9
10. IF YOU CHECKED OFF ANY PROBLEMS, HOW DIFFICULT HAVE THESE PROBLEMS MADE IT FOR YOU TO DO YOUR WORK, TAKE CARE OF THINGS AT HOME, OR GET ALONG WITH OTHER PEOPLE: SOMEWHAT DIFFICULT
SUM OF ALL RESPONSES TO PHQ QUESTIONS 1-9: 2
SUM OF ALL RESPONSES TO PHQ QUESTIONS 1-9: 2

## 2024-07-19 NOTE — PATIENT INSTRUCTIONS
"Patient Education   The Panel Psychiatry Program  What to Expect  Here's what to expect in the Panel Psychiatry Program.   About the program  You'll be meeting with a psychiatric doctor to check your mental health. A psychiatric doctor helps you deal with troubling thoughts and feelings by giving you medicine. They'll make sure you know the plan for your care. You may see them for a long time. When you're feeling better, they may refer you back to seeing your family doctor.   If you have any questions, we'll be glad to talk to you.  About visits  Be open  At your visits, please talk openly about your problems. It may feel hard, but it's the best way for us to help you.  Cancelling visits  If you can't come to your visit, please call us right away at [phone]. If you don't cancel at least 24 hours (1 full day) before your visit, that's \"late cancellation.\"  Not showing up for your visits  Being very late is the same as not showing up. You'll be a \"no show\" if:  You're more than 15 minutes late for a 30-minute (half hour) visit.  You're more than 30 minutes late for a 60-minute (full hour) visit.  If you cancel late or don't show up 2 times within 6 months, we may end your care.  Getting help between visits  If you need help between visits, you can call us Monday to Friday from 8 a.m. to 4:30 p.m. at [phone].  Emergency care  Call 911 or go to the nearest emergency department if your life or someone else's life is in danger.  Call 988 anytime to reach the national Suicide and Crisis hotline.  Medicine refills  To refill your medicine, call your pharmacy. You can also call [facility]'s Behavioral Access at [phone], Monday to Friday, 8 a.m. to 4:30 p.m. It can take 1 to 3 business days to get a refill.   Forms, letters, and tests  You may have papers to fill out, like FMLA, short-term disability, and workability. We can help you with these forms at your visits, but you must have an " appointment. You may need more than 1 visit for this, to be in an intensive therapy program, or both.  Before we can give you medicine for ADHD, we may refer you to get tested for it or confirm it another way.  We may not be able to give you an emotional support animal letter.  We don't do mental health checks ordered by the court.   We don't do mental health testing, but we can refer you to get tested.   Thank you for choosing us for your care.  For informational purposes only. Not to replace the advice of your health care provider. Copyright   2022 [facility] [employer]. All rights reserved. [employer] 233894 - 12/22.     Treatment Plan:      1.  Continue lurasidone 40 mg daily with food for mood regulation  2.  Continue propranolol 20 mg 2 times daily for anxiety  3.  Maintain sobriety and continue counseling therapies  4.  I will talk to your primary care provider about filling your prescriptions after I retire on September 13    Continue all other medical directions per primary care provider.   Continue all other medications as reviewed per electronic medical record today.   Safety plan reviewed. To the Emergency Department as needed or call after hours crisis line at [phone] or [phone]. [place] Crisis Text Line: Text [place] to 386082  or  Suicide LifeLine Chat: suicidepreventionlifeline.org/chat/  To schedule individual or family therapy, call [facility] at [phone].   Schedule an appointment with me in 6 weeks or sooner as needed.  Call [facility] at [phone] to schedule.  Follow up with primary care provider as planned or for acute medical concerns.  Call the psychiatric nurse line with medication questions or concerns at [phone].  [employer] may be used to communicate with your provider, but this is not intended to be used for emergencies.        Crisis Resources   The EmPath is an adults only unit located at [facility] in [place] is a short term  (generally less than 23 hour stay) designed for crisis intervention and stabilization. Pts have the opportunity to meet quickly with a behavioral health team for evaluation in a calm and peaceful therapuetic environment. To be evaluated for admission pts are triaged throught the [facility] ED.      The following hotlines are for both adults and children. The and are open 24 hours a day, 7 days a week unless noted otherwise.        Crisis Lines      Crisis Text Line  Text 787650  You will be connected with a trained live crisis counselor to provide support.        Gambling Hotline  2.979.943.1688 [hope]        línea de crisis española  268.861.5416        [facility] [employer] Helpline  402.433.7630        National Hope Line  8.313.745.3365 [hope]        National Suicide Prevention Lifeline  Free and confidential support  988 or 1.434.207.TALK [8255]  http://suicidepreventionlifeline.org        [employer] (LGBTQ Youth Crisis Line)  3.581.401.9448  text START to 854-324        's Crisis Line  0.459.559.0660 (Press 1)  or text 625172    [facility] Mental Health Crisis Response  Within [place], call **CRISIS [**342660] to be connected to a mental health professional who can assist you.        [facility] Crisis  [phone]      [facility] Mobile Crisis  [phone]      [facility] Crisis  [phone]      [facility] Mobile Crisis  [phone] (adults)  [phone] (children)      [facility] Mobile Crisis  [phone] (adults)  [phone] (children)      [facility] Mobile Crisis  [phone]      [facility] Mobile Crisis  [phone]    Community Resources      Fast Tracker  Linking people to mental health and substance use disorder resources  fasttrackMapiliaryn.org        [place] Mental Health Warmline  Peer to peer support  5 pm to 9 am 7 days/week  8.890.220.4669  https://mnwitw.org/mindy        National [place] on Mental Illness (DEE)   200.065.7980 or 1.888.DEE.HELPS  https://namimn.org/        [facility] Urgent Care for Adult Mental Health  [facility] residents only   [address]  [phone]        [facility]  Free mental health counseling  https://walkin.org/  612.870.0565 X2    Mental Health Apps      Calm Harm  https://calmharm.co.uk/      My3  https://my3app.org/      Micheal Safety Plan  https://www.mysafetyplan.org/

## 2024-07-19 NOTE — PROGRESS NOTES
"Virtual Visit Details    Type of service:  Video Visit   Video Start Time: 10:48 AM  Video End Time: 11:14 AM    Originating Location (pt. Location): Home    Distant Location (provider location):  Off-site  Platform used for Video Visit: [facility] [place]           Outpatient Psychiatric Progress Note    Name: Rao Leavitt   : 1990                    Primary Care Provider: Suzi Jaime DNP   Therapist: Yes    PHQ-9 scores:      10/31/2022     1:41 PM 2023     2:07 PM 2024    12:40 PM   PHQ-9 SCORE   PHQ-9 Total Score MyChart 8 (Mild depression) 5 (Mild depression) 7 (Mild depression)   PHQ-9 Total Score 8 5 7       AAYUSH-7 scores:      2021    10:00 AM 2022     1:54 PM 10/31/2022     1:42 PM   AAYUSH-7 SCORE   Total Score   13 (moderate anxiety)   Total Score 9 6 13       Patient Identification:    Patient is a 34 year old year old, single  White Not  or  male  who presents for return visit with me.  Patient is currently employed full time. Patient attended the session with Jessica , who they agreed to have interview with. Patient prefers to be called: \" Philipp\".    Current medications include:   Current Outpatient Medications   Medication Sig Dispense Refill    lurasidone (LATUDA) 40 MG TABS tablet Take 1 tablet (40 mg) by mouth daily with food 30 tablet 0    multivitamin, therapeutic (THERA-VIT) TABS tablet Take 1 tablet by mouth daily      OLANZapine (ZYPREXA) 5 MG tablet Take 1 tablet (5 mg) by mouth at bedtime 30 tablet 0    propranolol (INDERAL) 20 MG tablet Take 1 tablet (20 mg) by mouth 2 times daily 60 tablet 3    topiramate (TOPAMAX) 25 MG tablet Take 1 tablet (25 mg) by mouth 2 times daily 60 tablet 0     No current facility-administered medications for this visit.        The [place] Prescription Monitoring Program has been reviewed and there are no concerns about diversionary activity for controlled substances at this time.      I was able to review most recent " Primary Care Provider, specialty provider, and therapy visit notes that I have access to.     Today, patient reports He has been out of treatment for two months.  Meets with counselor every two weeks. He is going to have  his liver checked.  No symptoms but is concerned about the impact his excessive drug and alcohol use had on his liver.  He broke up with his girlfriend two weeks ago.  She has been drinking.  Living with a friend.  Kids are having a difficult time with that.  He started taking Latuda again.  He stopped topiramate and olanzapine as they made him feel too groggy.       has a past medical history of Depressive disorder.    Social history updates:    He works at [email or web address] as     Substance use updates:    Sober from all substances  Tobacco use: Yes E-cigarettes  Ready to quit?  No  Nicotine Replacement Therapy tried: none     Vital Signs:   There were no vitals taken for this visit.    Labs:    Most recent laboratory results reviewed and no new labs.     Mental Status Examination:  Appearance: awake, alert and casual dress  Attitude: cooperative  Eye Contact:  adequate  Gait and Station: No dizziness or falls  Psychomotor Behavior:  intact station, gait and muscle tone  Oriented to:  time, person, and place  Attention Span and Concentration:  Normal  Speech:   vtspeech: clear, coherent and Speaks: English  Mood:  anxious and depressed  Affect:  mood congruent  Associations:  no loose associations  Thought Process:  goal oriented  Thought Content:  no evidence of suicidal ideation or homicidal ideation, no auditory hallucinations present, and no visual hallucinations present  Recent and Remote Memory:  intact Not formally assessed. No amnesia.  Fund of Knowledge: appropriate  Insight:  good  Judgment: good  Impulse Control:  good    Suicide Risk Assessment:  Today Rao Leavitt reports no thoughts to harm themself or others. In addition, there are notable risk factors for self-harm,  including single status, anxiety, and substance abuse. However, risk is mitigated by history of seeking help when needed, future oriented, denies suicidal intent or plan, and denies homicidal ideation, intent, or plan. Therefore, based on all available evidence including the factors cited above, Rao Leavitt does not appear to be at imminent risk for self-harm, does not meet criteria for a 72-hr hold, and therefore remains appropriate for ongoing outpatient level of care.  A thorough assessment of risk factors related to suicide and self-harm have been reviewed and are noted above. The patient convincingly denies suicidality on several occasions. Local community safety resources printed and reviewed for patient to use if needed. There was no deceit detected, and the patient presented in a manner that was believable.     DSM5 Diagnosis:  296.51 Bipolar I Disorder Current or Most Recent Episode Depressed, Mild  300.02 (F41.1) Generalized Anxiety Disorder    Medical comorbidities include:   Patient Active Problem List    Diagnosis Date Noted    History of traumatic brain injury 03/02/2021     Priority: Medium    Abnormal EEG 03/02/2021     Priority: Medium      OUTPATIENT SLEEP-DEPRIVED EEG REPORT.  DATE OF RECORDING: January 28, 2021.   IMPRESSION: This outpatient sleep-deprived EEG study is abnormal during wakefulness and drowsiness due to EEG changes that resemble generalized paroxysmal fast activity, that could be potentially epileptiform and seen in generalized epilepsies.  No electrographic or clinical seizures and no paroxysmal behavioral events are recorded during this study.      Attention-deficit hyperactivity disorder 03/01/2021     Priority: Medium    PTSD (post-traumatic stress disorder) 01/05/2021     Priority: Medium    Borderline personality disorder (H) 01/05/2021     Priority: Medium    Chemical dependency (H) 09/10/2020     Priority: Medium    Lithium use 05/15/2020     Priority: Medium     Lumbar spine tumor 11/27/2019     Priority: Medium    High risk medication use 10/05/2017     Priority: Medium    Bipolar 1 disorder (H) 03/16/2017     Priority: Medium    Generalized anxiety disorder 03/16/2017     Priority: Medium       Assessment:    Rao Leavitt continues to maintain sobriety since being discharged from the treatment facility.  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  At this point he wants to continue the lurasidone as when he stopped taking it his mood became dysregulated.  Propranolol is being taken for anxiety.  I reached out to his primary provider about filling his prescriptions after I retired on September 13.    Medication side effects and alternatives were reviewed. Health promotion activities recommended and reviewed today. All questions addressed. Education and counseling completed regarding risks and benefits of medications and psychotherapy options.    Treatment Plan:      1.  Continue lurasidone 40 mg daily with food for mood regulation  2.  Continue propranolol 20 mg 2 times daily for anxiety  3.  Maintain sobriety and continue counseling therapies  4.  I will talk to your primary care provider about filling your prescriptions after I retire on September 13    Continue all other medications as reviewed per electronic medical record today.   Safety plan reviewed. To the Emergency Department as needed or call after hours crisis line at [phone] or [phone]. [place] Crisis Text Line. Text [place] to 831546 or Suicide LifeLine Chat: suicidepreventionlifeline.org/chat/  To schedule individual or family therapy, call [facility] at [phone]  Schedule an appointment as needed. Call [facility] at [phone] to schedule.  Follow up with primary care provider as planned or for acute medical concerns.  Call the psychiatric nurse line with medication questions or concerns at [phone]  Yoan may be  used to communicate with your provider, but this is not intended to be used for emergencies.        Crisis Resources   The [facility] is an adults only unit located at [facility] in [place] is a short term (generally less than 23 hour stay) designed for crisis intervention and stabilization. Pts have the opportunity to meet quickly with a behavioral health team for evaluation in a calm and peaceful therapuetic environment. To be evaluated for admission pts are triaged throught the [facility] ED.      The following hotlines are for both adults and children. The and are open 24 hours a day, 7 days a week unless noted otherwise.        Crisis Lines      Crisis Text Line  Text 444047  You will be connected with a trained live crisis counselor to provide support.        Gambling Hotline  8.985.909.1248 [hope]        línea de crisis española  [phone]        [facility] Helpline  595.347.5025        National Hope Line  0.914.884.3405 [hope]        National Suicide Prevention Lifeline  Free and confidential support  988 or 1.595.038.TALK [8255]  http://suicidepreventionlifeline.org        The Jax Project (LGBTQ Youth Crisis Line)  5.435.570.8968  text START to 021-037        [facility] Crisis Line  3.024.291.3964 (Press 1)  or text 241118    [facility] Mental Health Crisis Response  Within [place], call **CRISIS [**595909] to be connected to a mental health professional who can assist you.        [facility] Crisis  [phone]      [facility] Mobile Crisis  [phone]      [facility] Crisis  [phone]      [facility] Mobile Crisis  [phone] (adults)  [phone] (children)      [facility] Mobile Crisis  [phone] (adults)  [phone] (children)      [facility] Mobile Crisis  [phone]      [facility] Mobile Crisis  [phone]    Community Resources      Fast Tracker  Linking people to mental health and substance use disorder resources   fastjimckermn.org        [place] Mental Health Warmline  Peer to peer support  5 pm to 9 am 7 days/week  9.277.830.5293  https://mnwitw.org/mindy        [facility] [place] on Mental Illness (DEE)  513.001.4168 or 1.888.DEE.HELPS  https://namimn.org/        [facility] for Adult Mental Health  [facility] residents only   [address] [facility]  [phone]        [facility]  Free mental health counseling  https://walkin.org/  612.870.0565 X2    Mental Health Apps      Calm Harm  https://GOBA.co.uk/      My3  https://my3app.org/      [place]al Safety Plan  https://www.Togally.comafetyplan.org/   Administrative Billing:   Time spent with patient includes counseling and coordination of care regarding above diagnoses and treatment plan.    The longitudinal plan of care for the diagnosis(es)/condition(s) as documented were addressed during this visit. Due to the added complexity in care, I will continue to support Rao in the subsequent management and with ongoing continuity of care.      Patient Status:  This is a continuous care patient and medications will be prescribed by the psychiatric provider until further indicated.    Signed:   DUYEN Carrington-BC   Psychiatry

## 2024-07-19 NOTE — Clinical Note
José Miguel Archer, I spoke to Philipp today about my [facility] coming up on September 13.  He is doing very well, maintaining sobriety.  Would you be willing to continue refilling his lurasidone and propranolol?  Thank you for considering this.  Jillian

## 2024-08-15 NOTE — LETTER
2/21/2020         RE: Rao Leavitt  [address] [facility] [address]        Dear Colleague,    Thank you for referring your patient, Rao Leavitt, to the [facility]UR[facility]. Please see a copy of my visit note below.    NEUROSURGERY CLINIC CONSULT NOTE     DATE OF VISIT: 2/21/2020     SUBJECTIVE:     Rao Leavitt is a pleasant 30 year old male who presents to the clinic today for consultation on abnormal lumbar spine imaging . He is referred to the Neurosurgery Clinic by Dr. Jaime in .  Today, Mr. Leavitt reports a 3-month history of symptoms. He describes constant, sharp, pain that initiates in the midlinelumbar region, but does not  radiate distally in any distribution. This pain is not accompanied by paresthesias, numbness or weakness. Lifting objects does aggravate the symptoms, while alleviation is obtained by rest. No mechanism of injury such as trauma or a fall is associated with the onset of the pain. There are no bowel or bladder changes. The patient does smoke cigarettes.       Current Outpatient Medications:      methocarbamol (ROBAXIN) 500 MG tablet, Take 1 tablet (500 mg) by mouth 2 times daily as needed for muscle spasms, Disp: 40 tablet, Rfl: 1     multivitamin, therapeutic (THERA-VIT) TABS tablet, Take 1 tablet by mouth daily, Disp: , Rfl:      Allergies   Allergen Reactions     Tramadol Nausea     Vicodin [Hydrocodone-Acetaminophen] Rash        History reviewed. No pertinent past medical history.     ROS: 10 point review of symptoms are negative other than the symptoms noted above in the HPI.     Family History has been reviewed with the patient, there are no pertinent findings to presenting concern.     Past Surgical History:   Procedure Laterality Date     ESOPHAGOSCOPY, GASTROSCOPY, DUODENOSCOPY (EGD), COMBINED N/A 4/16/2019    Procedure: COMBINED ESOPHAGOSCOPY, GASTROSCOPY, DUODENOSCOPY (EGD), BIOPSY SINGLE OR MULTIPLE;  Surgeon: Martha  New prescription for Breo 200/25 1 puff qd was sent for Ben. Please let him know that this would be equivalent to the Advair 250/50 1 puff bid that he's been taking. Review technique briefly (very similar to Advair) and emphasize once daily regimen. 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     Imaging:     Full radiological report in chart. Imaging reviewed with with patient today.     Exam:     Patient appears comfortable, conversational, and in no apparent distress.   Head: Normocephalic, without obvious abnormality, atraumatic, no facial asymmetry.   Eyes: conjunctivae/corneas clear. PERRL, EOM's intact.   Throat: lips, mucosa, and tongue normal; teeth and gums normal.   Neck: supple, symmetrical, trachea midline, no adenopathy and thyroid: not enlarged, symmetric, no tenderness/mass/nodules.   Lungs: clear to auscultation bilaterally.   Heart: regular rate and rhythm.   Abdomen: soft, non-tender; bowel sounds normal; no masses, no organomegaly.   Pulses: 2+ and symmetric.   Skin: Skin color, texture, turgor normal. No rashes or lesions.     CN II-XII grossly intact, alert and appropriate with conversation and following commands.   Gait is non-antalgic. Able to tandem walk. Able to walk on toes and heels without difficulty.   Cervical spine is non tender to palpation. Appropriate range of motion of neck, not concerning for lhermitte's phenomenon.   Bilateral bicep 2/4 and tricep reflexes 1/4. Sensation intact throughout upper extremities.     UE muscle strength  Right  Left    Deltoid  5/5  5/5    Biceps  5/5  5/5    Triceps  5/5  5/5    Hand intrinsics  5/5  5/5    Hand grasp  5/5  5/5    Powers signs  neg  " neg      Lumbar spine is non tender to palpation   Intact sensation throughout lower extremities.   Bilateral patellar 2/4 and achilles reflex 1/4. Negative for pain with straight leg raise.     LE muscle strength  Right  Left    Iliopsoas (hip flexion)  5/5  5/5    Quad (knee extension)  5/5  5/5    Hamstring (knee flexion)  5/5  5/5    Gastrocnemius (PF)  5/5  5/5    Tibialis Ant. (DF)  5/5  5/5    EHL  5/5  5/5      Negative Babinski bilaterally. Negative for clonus.   Calves are soft and non-tender bilaterally.     ASSESSMENT/PLAN:     Rao Leavitt is a 30 year old male who presents to the clinic for consultation on on an abnormal lumbar spine MRI suggestive of a hemangioma at L1-2. The patient's most recent imaging was reviewed with him and his wife today. It was explained that images do not show any concerning pathology. On exam, the patient is noted to have no concerning findings.     He is able to return to work without restrictions,. A note was provided stating this.    It has been a pleasure working with Mr. Leavitt. For now, he no longer requires additional Neurosurgical follow-up. We did discuss signs of a worsening problem that he should seek being evaluated.    Respectfully,     JOYCE Nam PA-C     Exam, imaging, and plan reviewed by Dr. Akhtar.     Again, thank you for allowing me to participate in the care of your patient.        Sincerely,        Caesar Tellez PA-C

## 2024-08-16 ENCOUNTER — TELEPHONE (OUTPATIENT)
Dept: FAMILY MEDICINE | Facility: CLINIC | Age: 34
End: 2024-08-16
Payer: COMMERCIAL

## 2024-08-16 NOTE — TELEPHONE ENCOUNTER
Patient calling with request of a letter stating he cannot work overnights due to mental health. Advised patient he will need an appointment. Patient voiced understanding and transferred to scheduling.    Ryne MARTIN RN  [facility]

## 2024-08-19 ENCOUNTER — MYC MEDICAL ADVICE (OUTPATIENT)
Dept: PSYCHIATRY | Facility: CLINIC | Age: 34
End: 2024-08-19
Payer: COMMERCIAL

## 2024-08-19 NOTE — LETTER
2024     To whom it may concern,    I have been caring for Jose Leavitt (: 1990) for the past 5 years.  Due to the nature of his condition, I believe that he is not in his best interest to work night shift positions.  This directly interferes with the medication that he is taking to keep him healthy.  Should you have any questions about this or concerns, please contact me with Jose's permission.  Thank you for your consideration.    Bree Grullon NP

## 2024-08-19 NOTE — TELEPHONE ENCOUNTER
Letter request from patient regarding work requirement to work overnights.     Belgica Sheffield RN on 8/19/2024 at 12:13 PM

## 2024-08-20 NOTE — TELEPHONE ENCOUNTER
RN received a MyChart message from this patient indicating that his letter to [facility] [employer] please indicate that he is fully restricted from working overnights.       RN phoned and spoke to the patient.  RN asked the patient ot please clarify this statement.  The patient verbalized that the second sentence in his accomodation letter needs to be changed.  He indicated the current language is not strong enough.  He requested that the second sentence of the letter be changed from Due to the nature of his condition, I believe that he is not in his best interest to work night shift positions. To Due to the nature of his condition, Oswaldo is fully restricted from working overnight positions.      2.  In addition, the patient reported he has some paperwork from [employer] [facility] [employer] that needs to be filled out by Bree Grullon CNP.  He indicated he plans to fax that to the clinic ASAP.      The patient is hoping the letter can be amended today.  He is currently at work and will be able to present it to [facility] [employer] today if Bree Grullon is able/willing to amend the letter today.     RN instructed the patient to please wait for Bree Grullon's reply.      Bonifacio Vo RN on 8/20/2024 at 2:16 PM

## 2024-08-21 ENCOUNTER — MYC MEDICAL ADVICE (OUTPATIENT)
Dept: BEHAVIORAL HEALTH | Facility: CLINIC | Age: 34
End: 2024-08-21
Payer: COMMERCIAL

## 2024-08-21 ENCOUNTER — TELEPHONE (OUTPATIENT)
Dept: PSYCHIATRY | Facility: CLINIC | Age: 34
End: 2024-08-21
Payer: COMMERCIAL

## 2024-08-21 NOTE — TELEPHONE ENCOUNTER
RN reviewed [facility] notes and called the patient  [phone], he did not answer.  RN LVM indicating to [place] us back at [phone] or send a [employer] message.     Adriana Bob RN on 8/21/2024 at 1:20 PM

## 2024-08-21 NOTE — TELEPHONE ENCOUNTER
Provider, please review [employer] Accommodation Questionnaire forms    Hard copy in provider's file drawer    Cannot find the fax number but will message PT how they would like for us to send.

## 2024-08-21 NOTE — TELEPHONE ENCOUNTER
Reason for call:  Other   Patient called regarding (reason for call): call back and Letter  Additional comments:  Pt requesting a callback regarding issues with work, needing a doctors letter for restiriction on onver nights.     Phone number to reach patient:  Home number on file [phone] (home)    Best Time:  asap    Can we leave a detailed message on this number?  YES    Travel screening: Not Applicable

## 2024-08-22 ENCOUNTER — VIRTUAL VISIT (OUTPATIENT)
Dept: PSYCHIATRY | Facility: CLINIC | Age: 34
End: 2024-08-22
Payer: COMMERCIAL

## 2024-08-22 DIAGNOSIS — F41.1 GENERALIZED ANXIETY DISORDER: ICD-10-CM

## 2024-08-22 DIAGNOSIS — F31.9 BIPOLAR 1 DISORDER (H): Primary | ICD-10-CM

## 2024-08-22 PROCEDURE — 99214 OFFICE O/P EST MOD 30 MIN: CPT | Mod: 95 | Performed by: NURSE PRACTITIONER

## 2024-08-22 PROCEDURE — G2211 COMPLEX E/M VISIT ADD ON: HCPCS | Mod: 95 | Performed by: NURSE PRACTITIONER

## 2024-08-22 ASSESSMENT — PAIN SCALES - GENERAL: PAINLEVEL: NO PAIN (0)

## 2024-08-22 NOTE — PROGRESS NOTES
"Virtual Visit Details    Type of service:  Video Visit   Video Start Time: 1:20 PM  Video End Time: 1:45 PM    Originating Location (pt. Location): Home    Distant Location (provider location):  On-site  Platform used for Video Visit: [facility]         Outpatient Psychiatric Progress Note    Name: Rao Leavitt   : 1990                    Primary Care Provider: Suzi Jaime DNP   Therapist: None    PHQ-9 scores:      2023     2:07 PM 2024    12:40 PM 2024    10:43 AM   PHQ-9 SCORE   PHQ-9 Total Score MyChart 5 (Mild depression) 7 (Mild depression) 2 (Minimal depression)   PHQ-9 Total Score 5 7 2       AAYUSH-7 scores:      2021    10:00 AM 2022     1:54 PM 10/31/2022     1:42 PM   AAYUSH-7 SCORE   Total Score   13 (moderate anxiety)   Total Score 9 6 13       Patient Identification:    Patient is a 34 year old year old, single  White Not  or  male  who presents for return visit with me.  Patient is currently employed full time. Patient attended the session alone. Patient prefers to be called: \" Lucio\".    Current medications include:   Current Outpatient Medications   Medication Sig Dispense Refill    lurasidone (LATUDA) 40 MG TABS tablet Take 1 tablet (40 mg) by mouth daily with food 30 tablet 5    propranolol (INDERAL) 20 MG tablet Take 1 tablet (20 mg) by mouth 2 times daily 60 tablet 5     No current facility-administered medications for this visit.        The [place] Prescription Monitoring Program has been reviewed and there are no concerns about diversionary activity for controlled substances at this time.      I was able to review most recent Primary Care Provider, specialty provider, and therapy visit notes that I have access to.     Today, patient reports that his place of employment is pushing for him to work the night shift.  We discussed how this would be detrimental to his mental health as it would result in inconsistent medication compliance.  " Supportive documentation was given to him to pass on to his supervisors.  Today Philipp tells me he feels stable on his current medication regiment.  He is sleeping better with less mood dysregulation.  He remains sober.  Today he denies any suicide thinking.      has a past medical history of Depressive disorder.    Social history updates:    Lucio continues to reside with a friend.    Substance use updates:    No alcohol use reported  Tobacco use: No    Vital Signs:   There were no vitals taken for this visit.    Labs:    Most recent laboratory results reviewed and no new labs.     Mental Status Examination:  Appearance: awake, alert and mild distress  Attitude: cooperative  Eye Contact:  adequate  Gait and Station: No dizziness or falls  Psychomotor Behavior:  intact station, gait and muscle tone  Oriented to:  time, person, and place  Attention Span and Concentration:  Normal  Speech:   vtspeech: clear, coherent and Speaks: English  Mood:  anxious and depressed  Affect:  intensity is heightened  Associations:  no loose associations  Thought Process:  goal oriented  Thought Content:  no evidence of suicidal ideation or homicidal ideation, no auditory hallucinations present, and no visual hallucinations present  Recent and Remote Memory:  intact Not formally assessed. No amnesia.  Fund of Knowledge: appropriate  Insight:  good  Judgment: good  Impulse Control:  good    Suicide Risk Assessment:  Today Rao Leavitt reports no thoughts to harm themself or others. In addition, there are notable risk factors for self-harm, including single status, anxiety, and mood change. However, risk is mitigated by history of seeking help when needed, future oriented, denies suicidal intent or plan, and denies homicidal ideation, intent, or plan. Therefore, based on all available evidence including the factors cited above, Rao Leavitt does not appear to be at imminent risk for self-harm, does not meet criteria for a  72-hr hold, and therefore remains appropriate for ongoing outpatient level of care.  A thorough assessment of risk factors related to suicide and self-harm have been reviewed and are noted above. The patient convincingly denies suicidality on several occasions. Local community safety resources printed and reviewed for patient to use if needed. There was no deceit detected, and the patient presented in a manner that was believable.     DSM5 Diagnosis:  296.40 Bipolar I Disorder Current or Most Recent Episode Hypomanic  300.02 (F41.1) Generalized Anxiety Disorder    Medical comorbidities include:   Patient Active Problem List    Diagnosis Date Noted    History of traumatic brain injury 03/02/2021     Priority: Medium    Abnormal EEG 03/02/2021     Priority: Medium      OUTPATIENT SLEEP-DEPRIVED EEG REPORT.  DATE OF RECORDING: January 28, 2021.   IMPRESSION: This outpatient sleep-deprived EEG study is abnormal during wakefulness and drowsiness due to EEG changes that resemble generalized paroxysmal fast activity, that could be potentially epileptiform and seen in generalized epilepsies.  No electrographic or clinical seizures and no paroxysmal behavioral events are recorded during this study.      Attention-deficit hyperactivity disorder 03/01/2021     Priority: Medium    PTSD (post-traumatic stress disorder) 01/05/2021     Priority: Medium    Borderline personality disorder (H) 01/05/2021     Priority: Medium    Chemical dependency (H) 09/10/2020     Priority: Medium    Lithium use 05/15/2020     Priority: Medium    Lumbar spine tumor 11/27/2019     Priority: Medium    High risk medication use 10/05/2017     Priority: Medium    Bipolar 1 disorder (H) 03/16/2017     Priority: Medium    Generalized anxiety disorder 03/16/2017     Priority: Medium       Assessment:    Rao Leavitt is concerned that his place of employment is wanting him to work the night shift.  Should this happen, it is highly likely that his  medication schedule will be disrupted resulting in a relapse in symptoms.  Right now he is stable on his current medication regimen.  I am supportive of him continuing with his dayshift work as he has been doing very well.  A letter of support has been submitted to his place of employment.  Today he denies any suicide thinking.  He remains  from his girlfriend and lives with a friend which is working out well for him..    Medication side effects and alternatives were reviewed. Health promotion activities recommended and reviewed today. All questions addressed. Education and counseling completed regarding risks and benefits of medications and psychotherapy options.    Treatment Plan:      1.  Lurasidone 40 mg daily with food for mood regulation  2.  Continue propranolol 20 mg 2 times daily for anxiety  3.  You will be contacted by someone to schedule an appointment with a new psychiatric provider as I will be retiring September 13    Continue all other medications as reviewed per electronic medical record today.   Safety plan reviewed. To the Emergency Department as needed or call after hours crisis line at [phone] or [phone]. [place] Crisis Text [place]. Text [place] to 083472 or Suicide LifeLine Chat: suicidepreventionlifeline.org/chat/  To schedule individual or family therapy, call [facility] at [phone]  Schedule an appointment in October or sooner as needed. Call [facility] at [phone] to schedule.  Follow up with primary care provider as planned or for acute medical concerns.  Call the psychiatric nurse line with medication questions or concerns at [phone]  MyChart may be used to communicate with your provider, but this is not intended to be used for emergencies.    Crisis Resources   The EmPath is an adults only unit located at [facility] is a short term (generally less than 23 hour stay) designed for crisis intervention and  stabilization. Pts have the opportunity to meet quickly with a behavioral health team for evaluation in a calm and peaceful therapuetic environment. To be evaluated for admission pts are triaged throught the [facility] ED.      The following hotlines are for both adults and children. The and are open 24 hours a day, 7 days a week unless noted otherwise.        Crisis Lines      Crisis Text Line  Text 346291  You will be connected with a trained live crisis counselor to provide support.        Gambling Hotline  7.920.612.7213 [hope]        línea de crisis española  [phone]        [facility] Helpline  [phone]        National Hope Line  2.909.623.0286 [hope]        National Suicide Prevention Lifeline  Free and confidential support  988 or 1.485.495.TALK [8255]  http://suicidepreventionlifeline.org        The Jax Project (LGBTQ Youth Crisis Line)  0.342.079.4167  text START to 767-794        [place]'s Crisis Line  7.176.742.6102 (Press 1)  or text 860057    [facility] Mental Health Crisis Response  Within [place], call **CRISIS [**577936] to be connected to a mental health professional who can assist you.        [facility] Crisis  [phone]      [facility] Mobile Crisis  [phone]      [facility] Crisis  [phone]      [facility] Mobile Crisis  [phone] (adults)  [phone] (children)      [facility] Mobile Crisis  [phone] (adults)  [phone] (children)      [facility] Mobile Crisis  [phone]      [facility] Mobile Crisis  [phone]    Community Resources      Fast Tracker  Linking people to mental health and substance use disorder resources  fasttrackermn.org        [place] Mental Health Warmline  Peer to peer support  5 pm to 9 am 7 days/week  2.038.628.4738  https://mnwitw.org/mindy        National [place] on Mental Illness (DEE)  491.380.0909 or 1.888.DEE.HELPS  https://namimn.org/        [facility] for Adult Mental Health  [facility] residents only   [address] [facility]  [phone]        [facility]  Free mental health counseling  https://walkin.org/  612.870.0565 X2    Mental Health Apps      Calm Harm  https://calmharm.co.uk/      My3  https://my3app.org/      [place] Safety Plan  https://www.mysafetyplan.org/   Administrative Billing:   Time spent with patient includes counseling and coordination of care regarding above diagnoses and treatment plan.    The longitudinal plan of care for the diagnosis(es)/condition(s) as documented were addressed during this visit. Due to the added complexity in care, I will continue to support Rao in the subsequent management and with ongoing continuity of care.    Patient Status:  This is a continuous care patient and medications will be prescribed by the psychiatric provider until further indicated.    Signed:   DUYEN Carrington-BC   Psychiatry

## 2024-08-22 NOTE — NURSING NOTE
Is the patient currently in the state of [place]? YES    Current patient location: [address] [place] [address]   [facility] [address]    Visit mode:VIDEO    If the visit is dropped, the patient can be reconnected by: VIDEO VISIT: Text to cell phone:   Telephone Information:   Mobile [phone]       Will anyone else be joining the visit? No  (If patient encounters technical issues they should call [phone])    How would you like to obtain your AVS? MyChart    Are changes needed to the allergy or medication list? No    Are refills needed on medications prescribed by this physician? NO    Rooming Documentation: Questionnaire(s) completed.    Reason for visit: RECHFLORES Carey

## 2024-08-22 NOTE — LETTER
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  His symptoms of tom and  disruptive behavior would occur as his medications would be off schedule.      Bree Gruloln, NP

## 2024-08-23 ENCOUNTER — TELEPHONE (OUTPATIENT)
Dept: PSYCHIATRY | Facility: CLINIC | Age: 34
End: 2024-08-23
Payer: COMMERCIAL

## 2024-08-23 NOTE — TELEPHONE ENCOUNTER
RN reviewed the [facility] notes     RN reviewed that the letter and work accomodation form was completed and faxed successfully to [facility] at [phone] at 3:44 PM on 8/22/2024    RN called the patient at [phone] to let him know the above information.    Adriana Bob RN on 8/23/2024 at 3:25 PM

## 2024-08-23 NOTE — TELEPHONE ENCOUNTER
Reason for call:  Other   Patient called regarding (reason for call): call back  Additional comments: Pt requesting a status update on a fax sent in yesterday, which needs to be completed and faxed back to his employer    Phone number to reach patient:  Cell number on file:    Telephone Information:   Mobile [phone]       Best Time:  any    Can we leave a detailed message on this number?  YES    Travel screening: Not Applicable

## 2024-08-31 NOTE — PATIENT INSTRUCTIONS
"Patient Education   The Panel Psychiatry Program  What to Expect  Here's what to expect in the Panel Psychiatry Program.   About the program  You'll be meeting with a psychiatric doctor to check your mental health. A psychiatric doctor helps you deal with troubling thoughts and feelings by giving you medicine. They'll make sure you know the plan for your care. You may see them for a long time. When you're feeling better, they may refer you back to seeing your family doctor.   If you have any questions, we'll be glad to talk to you.  About visits  Be open  At your visits, please talk openly about your problems. It may feel hard, but it's the best way for us to help you.  Cancelling visits  If you can't come to your visit, please call us right away at [phone]. If you don't cancel at least 24 hours (1 full day) before your visit, that's \"late cancellation.\"  Not showing up for your visits  Being very late is the same as not showing up. You'll be a \"no show\" if:  You're more than 15 minutes late for a 30-minute (half hour) visit.  You're more than 30 minutes late for a 60-minute (full hour) visit.  If you cancel late or don't show up 2 times within 6 months, we may end your care.  Getting help between visits  If you need help between visits, you can call us Monday to Friday from 8 a.m. to 4:30 p.m. at [phone].  Emergency care  Call 911 or go to the nearest emergency department if your life or someone else's life is in danger.  Call 988 anytime to reach the national Suicide and Crisis hotline.  Medicine refills  To refill your medicine, call your pharmacy. You can also call [facility] at [phone], Monday to Friday, 8 a.m. to 4:30 p.m. It can take 1 to 3 business days to get a refill.   Forms, letters, and tests  You may have papers to fill out, like FMLA, short-term disability, and workability. We can help you with these forms at your visits, but you must have an " appointment. You may need more than 1 visit for this, to be in an intensive therapy program, or both.  Before we can give you medicine for ADHD, we may refer you to get tested for it or confirm it another way.  We may not be able to give you an emotional support animal letter.  We don't do mental health checks ordered by the court.   We don't do mental health testing, but we can refer you to get tested.   Thank you for choosing us for your care.  For informational purposes only. Not to replace the advice of your health care provider. Copyright   2022 [facility]. All rights reserved. [employer] [id number] - 12/22.     Treatment Plan:      1.  Lurasidone 40 mg daily with food for mood regulation  2.  Continue propranolol 20 mg 2 times daily for anxiety  3.  You will be contacted by someone to schedule an appointment with a new psychiatric provider as I will be retiring September 13    Continue all other medical directions per primary care provider.   Continue all other medications as reviewed per electronic medical record today.   Safety plan reviewed. To the Emergency Department as needed or call after hours crisis line at [phone] or [phone]. [place] Crisis Text Line: Text [place] to 165288  or  Suicide LifeLine Chat: suicidepreventionlifeline.org/chat/  To schedule individual or family therapy, call [facility] at [phone].   Schedule an appointment in October  or sooner as needed.  Call [facility] at [phone] to schedule.  Follow up with primary care provider as planned or for acute medical concerns.  Call the psychiatric nurse line with medication questions or concerns at [phone].  XMS Penvisionhart may be used to communicate with your provider, but this is not intended to be used for emergencies.        Crisis Resources   The EmPath is an adults only unit located at [facility] [place] is a short term (generally less than 23 hour stay) designed for  crisis intervention and stabilization. Pts have the opportunity to meet quickly with a behavioral health team for evaluation in a calm and peaceful therapuetic environment. To be evaluated for admission pts are triaged throught the [facility] ED.      The following hotlines are for both adults and children. The and are open 24 hours a day, 7 days a week unless noted otherwise.        Crisis Lines      Crisis Text Line  Text 441337  You will be connected with a trained live crisis counselor to provide support.        Gambling Hotline  9.972.203.4466 [hope]        línea de crisis española  396.620.7807        [place] [employer] Helpline  037.981.4872        National Hope Line  0.951.422.9710 [hope]        National Suicide Prevention Lifeline  Free and confidential support  988 or 1.873.632.TALK [8255]  http://suicidepreventionlifeline.org        The Jax Project (LGBTQ Youth Crisis Line)  4.383.124.2875  text START to 735-068        's Crisis Line  1.019.302.4825 (Press 1)  or text 306207    [facility] Mental Health Crisis Response  Within [place], call **CRISIS [**087900] to be connected to a mental health professional who can assist you.        [facility] Crisis  [phone]      [facility] Mobile Crisis  [phone]      [facility] Crisis  [phone]      [facility] Mobile Crisis  [phone] (adults)  [phone] (children)      [facility] Mobile Crisis  [phone] (adults)  [phone] (children)      [facility] Mobile Crisis  [phone]      [facility] Mobile Crisis  [phone]    Community Resources      Fast Tracker  Linking people to mental health and substance use disorder resources  fasttrackermn.org        [place] Mental Health Warmline  Peer to peer support  5 pm to 9 am 7 days/week  5.818.848.7559  https://mnwitw.org/mindy        National [place] on Mental Illness (DEE)  116.921.4866 or 1.888.DEE.HELPS   https://[facility] Hospitalimn.org/        [facility] Urgent Care for Adult Mental Health  [facility] residents only   [address] [facility]  [phone]        [facility]  Free mental health counseling  https://walkin.org/  612.870.0565 X2    Mental Health Apps      Calm Harm  https://calmharm.co.uk/      My3  https://my3app.org/      [place] Safety Plan  https://www.mysafetyplan.org/

## 2024-11-11 DIAGNOSIS — F31.9 BIPOLAR 1 DISORDER (H): ICD-10-CM

## 2024-11-11 DIAGNOSIS — F41.1 GENERALIZED ANXIETY DISORDER: ICD-10-CM

## 2024-11-11 RX ORDER — PROPRANOLOL HCL 20 MG
20 TABLET ORAL 2 TIMES DAILY
Qty: 60 TABLET | Refills: 0 | Status: SHIPPED | OUTPATIENT
Start: 2024-11-11 | End: 2024-11-12

## 2024-11-11 RX ORDER — LURASIDONE HYDROCHLORIDE 60 MG/1
60 TABLET, FILM COATED ORAL
Qty: 30 TABLET | Refills: 3 | OUTPATIENT
Start: 2024-11-11

## 2024-11-11 NOTE — TELEPHONE ENCOUNTER
Refilled Propranolol - but per last note Latuda should be at 40 mg.  Notify patient of this. FOLLOW UP tomorrow to discuss. Suzi Jaime, DNP

## 2024-11-11 NOTE — TELEPHONE ENCOUNTER
The patient called and reports he is out of his medication. The patient was told by psychiatry to call PCP for refills.  The patient took last pills this am. The patient reports he is on Latuda 60 mg and propranolol.  The Latuda is not the same dose listed on his med list.      The patient was scheduled for appt tomorrow 11/12/24 to discuss medications and doses.  He took his last pills this am.      Ok to wait for appt or ok to refill other dose?    Thank you    Deborah VALERIO RN

## 2024-11-12 ENCOUNTER — VIRTUAL VISIT (OUTPATIENT)
Dept: FAMILY MEDICINE | Facility: CLINIC | Age: 34
End: 2024-11-12
Payer: COMMERCIAL

## 2024-11-12 DIAGNOSIS — F19.20 CHEMICAL DEPENDENCY (H): Chronic | ICD-10-CM

## 2024-11-12 DIAGNOSIS — F43.10 PTSD (POST-TRAUMATIC STRESS DISORDER): Chronic | ICD-10-CM

## 2024-11-12 DIAGNOSIS — F60.3 BORDERLINE PERSONALITY DISORDER (H): Chronic | ICD-10-CM

## 2024-11-12 DIAGNOSIS — F41.1 GENERALIZED ANXIETY DISORDER: Primary | ICD-10-CM

## 2024-11-12 PROCEDURE — 99441 PR PHYSICIAN TELEPHONE EVALUATION 5-10 MIN: CPT | Mod: 93 | Performed by: NURSE PRACTITIONER

## 2024-11-12 RX ORDER — PROPRANOLOL HCL 20 MG
20 TABLET ORAL 2 TIMES DAILY
Qty: 180 TABLET | Refills: 1 | Status: SHIPPED | OUTPATIENT
Start: 2024-11-12

## 2024-11-12 RX ORDER — LURASIDONE HYDROCHLORIDE 60 MG/1
60 TABLET, FILM COATED ORAL
COMMUNITY
Start: 2024-10-10 | End: 2024-11-12

## 2024-11-12 RX ORDER — LURASIDONE HYDROCHLORIDE 60 MG/1
60 TABLET, FILM COATED ORAL
Qty: 90 TABLET | Refills: 1 | Status: SHIPPED | OUTPATIENT
Start: 2024-11-12

## 2024-11-12 ASSESSMENT — PATIENT HEALTH QUESTIONNAIRE - PHQ9
SUM OF ALL RESPONSES TO PHQ QUESTIONS 1-9: 5
5. POOR APPETITE OR OVEREATING: SEVERAL DAYS

## 2024-11-12 ASSESSMENT — ANXIETY QUESTIONNAIRES
IF YOU CHECKED OFF ANY PROBLEMS ON THIS QUESTIONNAIRE, HOW DIFFICULT HAVE THESE PROBLEMS MADE IT FOR YOU TO DO YOUR WORK, TAKE CARE OF THINGS AT HOME, OR GET ALONG WITH OTHER PEOPLE: SOMEWHAT DIFFICULT
GAD7 TOTAL SCORE: 7
GAD7 TOTAL SCORE: 7
6. BECOMING EASILY ANNOYED OR IRRITABLE: SEVERAL DAYS
1. FEELING NERVOUS, ANXIOUS, OR ON EDGE: SEVERAL DAYS
2. NOT BEING ABLE TO STOP OR CONTROL WORRYING: SEVERAL DAYS
5. BEING SO RESTLESS THAT IT IS HARD TO SIT STILL: SEVERAL DAYS
7. FEELING AFRAID AS IF SOMETHING AWFUL MIGHT HAPPEN: SEVERAL DAYS
3. WORRYING TOO MUCH ABOUT DIFFERENT THINGS: SEVERAL DAYS

## 2024-11-12 NOTE — PATIENT INSTRUCTIONS
Crisis phone numbers:    Crisis Connection ([facility]): [phone]  [facility]: [phone]  [facility]: [phone]  Region 7E Crisis Services ([facility] [place], [facility], [facility] and [facility]) [phone]  [facility]: [phone]      New referral for Psychiatry to establish care  you don't hear from a representative within 2 business days, please call [phone].     Suzi Jaime, DNP

## 2024-11-12 NOTE — PROGRESS NOTES
"Rao is a 34 year old who is being evaluated via a billable telephone visit.    What phone number would you like to be contacted at? [phone]  How would you like to obtain your AVS? Yoan  Originating Location (pt. Location): Home    Distant Location (provider location):  On-site    Assessment & Plan     Generalized anxiety disorder  Stable.  Reviewed PHQ-9 and AAYUSH  - lurasidone (LATUDA) 60 MG TABS tablet  Dispense: 90 tablet; Refill: 1  - propranolol (INDERAL) 20 MG tablet  Dispense: 180 tablet; Refill: 1  - Adult Mental Health  Referral    PTSD (post-traumatic stress disorder)     - lurasidone (LATUDA) 60 MG TABS tablet  Dispense: 90 tablet; Refill: 1  - Adult Mental Health  Referral    Borderline personality disorder (H)     - lurasidone (LATUDA) 60 MG TABS tablet  Dispense: 90 tablet; Refill: 1  - Adult Mental Health  Referral    Chemical dependency (H)  Currently reporting sober.    Given crisis information.                BMI  Estimated body mass index is 25.22 kg/m  as calculated from the following:    Height as of 7/3/24: 1.702 m (5' 7\").    Weight as of 7/3/24: 73 kg (161 lb).         See Patient Instructions    Subjective   Rao is a 34 year old, presenting for the following health issues:  No chief complaint on file.      11/12/2024    11:42 AM   Additional Questions   Roomed by shalonda padgett cma   Accompanied by self     HPI     Depression and Anxiety   How are you doing with your depression since your last visit? Worsened   How are you doing with your anxiety since your last visit?  Worsened   Are you having other symptoms that might be associated with depression or anxiety? No  Have you had a significant life event? Health Concerns   Do you have any concerns with your use of alcohol or other drugs? 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 4/16/2024    12:40 PM 7/19/2024    10:43 AM 11/12/2024    11:40 AM   PHQ   PHQ-9 Total Score 7 2 5   Q9: Thoughts of better off dead/self-harm past 2 weeks Not at all  Not at all  Not at all       Patient-reported         1/7/2022     1:54 PM 10/31/2022     1:42 PM 11/12/2024    11:40 AM   AAYUSH-7 SCORE   Total Score  13 (moderate anxiety)    Total Score 6 13 7         11/12/2024    11:40 AM   Last PHQ-9   1.  Little interest or pleasure in doing things 1   2.  Feeling down, depressed, or hopeless 1   3.  Trouble falling or staying asleep, or sleeping too much 1   4.  Feeling tired or having little energy 0   5.  Poor appetite or overeating 0   6.  Feeling bad about yourself 1   7.  Trouble concentrating 0   8.  Moving slowly or restless 1   Q9: Thoughts of better off dead/self-harm past 2 weeks 0   PHQ-9 Total Score 5   Difficulty at work, home, or with people Somewhat difficult         11/12/2024    11:40 AM   AAYUSH-7    1. Feeling nervous, anxious, or on edge 1   2. Not being able to stop or control worrying 1   3. Worrying too much about different things 1   4. Trouble relaxing 1   5. Being so restless that it is hard to sit still 1   6. Becoming easily annoyed or irritable 1   7. Feeling afraid, as if something awful might happen 1   AAYUSH-7 Total Score 7   If you checked any problems, how difficult have they made it for you to do your work, take care of things at home, or get along with other people? Somewhat difficult     Reports Latuda was increased to 60 mg per \"pharmacy\"  Was on this dose in the past - but reduced due to some side " effects of outburst and irritability.  Also taking Propranolol 20 mg twice daily.  Was seeing Bree, Psychiatry but needs new provider as she is retiring.    Denies SI reports or thoughts of harm to others.      Review of Systems  Constitutional, HEENT, cardiovascular, pulmonary, GI, , musculoskeletal, neuro, skin, endocrine and psych systems are negative, except as otherwise noted.      Objective    Vitals - Patient Reported  Pain Score: No Pain (0)        Physical Exam   General: Alert and no distress //Respiratory: No audible wheeze, cough, or shortness of breath // Psychiatric:  Appropriate affect, tone, and pace of words          Phone call duration: 10 minutes  Signed Electronically by: Suzi Jaime, SRINIVASA

## 2025-04-06 DIAGNOSIS — F41.1 GENERALIZED ANXIETY DISORDER: ICD-10-CM

## 2025-04-07 RX ORDER — PROPRANOLOL HCL 20 MG
20 TABLET ORAL 2 TIMES DAILY
Qty: 180 TABLET | Refills: 0 | Status: SHIPPED | OUTPATIENT
Start: 2025-04-07

## 2025-07-19 ENCOUNTER — HEALTH MAINTENANCE LETTER (OUTPATIENT)
Age: 35
End: 2025-07-19

## 2025-07-31 ENCOUNTER — OFFICE VISIT (OUTPATIENT)
Dept: URGENT CARE | Facility: URGENT CARE | Age: 35
End: 2025-07-31

## 2025-07-31 VITALS
HEIGHT: 67 IN | OXYGEN SATURATION: 97 % | WEIGHT: 175 LBS | HEART RATE: 51 BPM | SYSTOLIC BLOOD PRESSURE: 138 MMHG | BODY MASS INDEX: 27.47 KG/M2 | TEMPERATURE: 97.8 F | DIASTOLIC BLOOD PRESSURE: 78 MMHG | RESPIRATION RATE: 14 BRPM

## 2025-07-31 DIAGNOSIS — R11.2 NAUSEA AND VOMITING, UNSPECIFIED VOMITING TYPE: Primary | ICD-10-CM

## 2025-07-31 RX ORDER — ONDANSETRON 4 MG/1
4 TABLET, ORALLY DISINTEGRATING ORAL EVERY 8 HOURS PRN
Qty: 10 TABLET | Refills: 0 | Status: SHIPPED | OUTPATIENT
Start: 2025-07-31

## 2025-07-31 ASSESSMENT — ENCOUNTER SYMPTOMS
DIARRHEA: 0
CHEST TIGHTNESS: 0
SHORTNESS OF BREATH: 0
HEMATURIA: 0
ALLERGIC/IMMUNOLOGIC NEGATIVE: 1
WHEEZING: 0
HEADACHES: 0
CARDIOVASCULAR NEGATIVE: 1
DYSURIA: 0
ABDOMINAL PAIN: 0
RESPIRATORY NEGATIVE: 1
COUGH: 0
VOMITING: 1
MUSCULOSKELETAL NEGATIVE: 1
CONSTITUTIONAL NEGATIVE: 1
NEUROLOGICAL NEGATIVE: 1
CHILLS: 0
FREQUENCY: 0
NAUSEA: 1
PALPITATIONS: 0
FEVER: 0
MYALGIAS: 0
SORE THROAT: 0

## 2025-07-31 ASSESSMENT — PAIN SCALES - GENERAL: PAINLEVEL_OUTOF10: NO PAIN (0)

## 2025-07-31 NOTE — LETTER
July 31, 2025      Rao Leavitt  [address]   [facility] [address]        To Whom It May Concern:    Rao MAGALIS Dagmar  was seen on 7/31/2025.  He mar return to work with no restrictions his next available shift.        Sincerely,        Luis Reaves PA-C    Electronically signed

## 2025-07-31 NOTE — PROGRESS NOTES
Urgent Care Clinic Visit    Chief Complaint   Patient presents with    Nausea & Vomiting     Patient reports episodes of nausea and vomiting intermittently for one month; patient denies sore throat, congestion, cough, other illness symptoms.                7/31/2025    11:02 AM   Additional Questions   Roomed by CARYL Boucher   Accompanied by Self         7/31/2025   Forms   Any forms needing to be completed Yes     Sander Blount LPN

## 2025-07-31 NOTE — PROGRESS NOTES
"Chief Complaint:    Chief Complaint   Patient presents with    Nausea & Vomiting     Patient reports episodes of nausea and vomiting intermittently for one month; patient denies sore throat, congestion, cough, other illness symptoms.      Medical Decision Making:    Vital signs reviewed by Luis Reaves PA-C  /78 (BP Location: Left arm, Patient Position: Sitting, Cuff Size: Adult Regular)   Pulse 51   Temp 97.8  F (36.6  C) (Oral)   Resp 14   Ht 1.702 m (5' 7\")   Wt 79.4 kg (175 lb)   SpO2 97%   BMI 27.41 kg/m       ASSESSMENT:     1. Nausea and vomiting, unspecified vomiting type         PLAN:    Patient is in no acute distress.  He is afebrile with stable vital signs.  Abdominal exam was benign.  Unclear cause of his vomiting.   Rx for Zofran sent in.    Patient instructed to follow up with PCP in 1 week if symptoms are not improving.  Sooner if symptoms worsen.  Worrisome symptoms discussed with instructions to go to the ED.  Patient verbalized understanding and agreed with this plan.    Labs:     No results found for any visits on 07/31/25.    Current Meds:    Current Outpatient Medications:     lurasidone (LATUDA) 60 MG TABS tablet, Take 1 tablet (60 mg) by mouth daily with food., Disp: 90 tablet, Rfl: 1    propranolol ER (INDERAL LA) 60 MG 24 hr capsule, Take 1 capsule (60 mg) by mouth daily., Disp: 90 capsule, Rfl: 1    ondansetron (ZOFRAN ODT) 4 MG ODT tab, Take 1 tablet (4 mg) by mouth every 8 hours as needed for nausea., Disp: 10 tablet, Rfl: 0    propranolol (INDERAL) 20 MG tablet, TAKE ONE TABLET BY MOUTH TWICE A DAY (Patient not taking: Reported on 7/31/2025), Disp: 180 tablet, Rfl: 0    Allergies:  Allergies   Allergen Reactions    Tramadol Nausea    Vicodin [Hydrocodone-Acetaminophen] Rash       SUBJECTIVE    HPI: Rao Leavitt is an 35 year old male who presents for evaluation and treatment of nausea and vomiting.  Symptoms started roughly 1 month ago ad come and go.  He " typically has an episode 1-2 times per week that come and go quickly.  He is not aware of any trigger.  He is having normal formed bowel movements.  He has not had any fever, or abdominal pain.  No recent foreign travel.  No sick contacts at home.      ROS:      Review of Systems   Constitutional: Negative.  Negative for chills and fever.   HENT: Negative.  Negative for sore throat.    Respiratory: Negative.  Negative for cough, chest tightness, shortness of breath and wheezing.    Cardiovascular: Negative.  Negative for chest pain and palpitations.   Gastrointestinal:  Positive for nausea and vomiting. Negative for abdominal pain and diarrhea.   Genitourinary:  Negative for dysuria, frequency, hematuria and urgency.   Musculoskeletal: Negative.  Negative for myalgias.   Skin:  Negative for rash.   Allergic/Immunologic: Negative.  Negative for immunocompromised state.   Neurological: Negative.  Negative for headaches.        Family History   Family History   Problem Relation Age of Onset    Migraines Mother     Post-Traumatic Stress Disorder (PTSD) Father     Bipolar Disorder Paternal Grandfather     Bipolar Disorder Sister        Social History  Social History     Socioeconomic History    Marital status: Single     Spouse name: Not on file    Number of children: Not on file    Years of education: Not on file    Highest education level: Not on file   Occupational History    Not on file   Tobacco Use    Smoking status: Former     Current packs/day: 0.00     Average packs/day: 0.5 packs/day for 18.0 years (9.0 ttl pk-yrs)     Types: Cigarettes     Start date: 2003     Quit date: 2021     Years since quittin.5    Smokeless tobacco: Former     Types: Snuff     Quit date: 2021   Vaping Use    Vaping status: Every Day   Substance and Sexual Activity    Alcohol use: Not Currently     Comment: 6 months alcohol free    Drug use: No     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 Tested positive on      "Sexual activity: Yes     Partners: Female   Other Topics Concern    Parent/sibling w/ CABG, MI or angioplasty before 65F 55M? Not Asked   Social History Narrative    Not on file     Social Drivers of Health     Financial Resource Strain: Not on file   Food Insecurity: Not on file   Transportation Needs: Not on file   Physical Activity: Not on file   Stress: Not on file   Social Connections: Not on file   Interpersonal Safety: Not At Risk (5/2/2025)    [facility]     Humiliation, Afraid, Rape, and Kick questionnaire     Fear of Current or Ex-Partner: No     Emotionally Abused: No     Physically Abused: No     Sexually Abused: No   Housing Stability: Not on file        Surgical History:  Past Surgical History:   Procedure Laterality Date    ESOPHAGOSCOPY, GASTROSCOPY, DUODENOSCOPY (EGD), COMBINED N/A 4/16/2019    Procedure: COMBINED ESOPHAGOSCOPY, GASTROSCOPY, DUODENOSCOPY (EGD), BIOPSY SINGLE OR MULTIPLE;  Surgeon: Kj Thomas MD;  Location: [facility]    PE tube          Problem List:  Patient Active Problem List   Diagnosis    Bipolar 1 disorder (H)    Generalized anxiety disorder    High risk medication use    Lumbar spine tumor    Lithium use    Chemical dependency (H)    PTSD (post-traumatic stress disorder)    Borderline personality disorder (H)    Attention-deficit hyperactivity disorder    History of traumatic brain injury    Abnormal EEG        OBJECTIVE:     Vital signs noted and reviewed by Luis Reaves PA-C  /78 (BP Location: Left arm, Patient Position: Sitting, Cuff Size: Adult Regular)   Pulse 51   Temp 97.8  F (36.6  C) (Oral)   Resp 14   Ht 1.702 m (5' 7\")   Wt 79.4 kg (175 lb)   SpO2 97%   BMI 27.41 kg/m       PEFR:    Physical Exam  Vitals and nursing note reviewed.   Constitutional:       General: He is not in acute distress.     Appearance: He is well-developed. He is not ill-appearing, toxic-appearing or diaphoretic.   HENT:      Head: Normocephalic and " atraumatic.      Right Ear: Hearing, tympanic membrane, ear canal and external ear normal. Tympanic membrane is not perforated, erythematous, retracted or bulging.      Left Ear: Hearing, tympanic membrane, ear canal and external ear normal. Tympanic membrane is not perforated, erythematous, retracted or bulging.      Nose: Nose normal. No mucosal edema, congestion or rhinorrhea.      Mouth/Throat:      Pharynx: No oropharyngeal exudate or posterior oropharyngeal erythema.      Tonsils: No tonsillar exudate or tonsillar abscesses. 0 on the right. 0 on the left.   Eyes:      Pupils: Pupils are equal, round, and reactive to light.   Cardiovascular:      Rate and Rhythm: Normal rate and regular rhythm.      Heart sounds: Normal heart sounds, S1 normal and S2 normal. Heart sounds not distant. No murmur heard.     No friction rub. No gallop.   Pulmonary:      Effort: Pulmonary effort is normal. No respiratory distress.      Breath sounds: Normal breath sounds. No decreased breath sounds, wheezing, rhonchi or rales.   Abdominal:      General: Bowel sounds are normal. There is no distension.      Palpations: Abdomen is soft.      Tenderness: There is no abdominal tenderness.   Musculoskeletal:      Cervical back: Normal range of motion and neck supple.   Lymphadenopathy:      Cervical: No cervical adenopathy.   Skin:     General: Skin is warm and dry.      Findings: No rash.   Neurological:      Mental Status: He is alert.      Cranial Nerves: No cranial nerve deficit.   Psychiatric:         Attention and Perception: He is attentive.         Speech: Speech normal.         Behavior: Behavior normal. Behavior is cooperative.         Thought Content: Thought content normal.         Judgment: Judgment normal.             Luis Reaves PA-C  7/31/2025, 11:05 AM

## (undated) RX ORDER — PROPOFOL 10 MG/ML
INJECTION, EMULSION INTRAVENOUS
Status: DISPENSED
Start: 2019-04-16

## (undated) RX ORDER — LIDOCAINE HYDROCHLORIDE 10 MG/ML
INJECTION, SOLUTION EPIDURAL; INFILTRATION; INTRACAUDAL; PERINEURAL
Status: DISPENSED
Start: 2019-04-16

## (undated) RX ORDER — GLYCOPYRROLATE 0.2 MG/ML
INJECTION, SOLUTION INTRAMUSCULAR; INTRAVENOUS
Status: DISPENSED
Start: 2019-04-16